# Patient Record
Sex: FEMALE | Race: WHITE | NOT HISPANIC OR LATINO | Employment: OTHER | URBAN - NONMETROPOLITAN AREA
[De-identification: names, ages, dates, MRNs, and addresses within clinical notes are randomized per-mention and may not be internally consistent; named-entity substitution may affect disease eponyms.]

---

## 2017-01-12 ENCOUNTER — OFFICE VISIT (OUTPATIENT)
Dept: ORTHOPEDIC SURGERY | Facility: CLINIC | Age: 72
End: 2017-01-12

## 2017-01-12 ENCOUNTER — TELEPHONE (OUTPATIENT)
Dept: CARDIOLOGY | Facility: CLINIC | Age: 72
End: 2017-01-12

## 2017-01-12 VITALS — BODY MASS INDEX: 29.53 KG/M2 | HEIGHT: 64 IN | WEIGHT: 173 LBS

## 2017-01-12 DIAGNOSIS — M75.101 TEAR OF RIGHT ROTATOR CUFF, UNSPECIFIED TEAR EXTENT: ICD-10-CM

## 2017-01-12 DIAGNOSIS — M25.511 RIGHT SHOULDER PAIN, UNSPECIFIED CHRONICITY: ICD-10-CM

## 2017-01-12 DIAGNOSIS — M75.41 IMPINGEMENT SYNDROME OF RIGHT SHOULDER: ICD-10-CM

## 2017-01-12 DIAGNOSIS — M17.0 PRIMARY OSTEOARTHRITIS OF BOTH KNEES: Primary | ICD-10-CM

## 2017-01-12 PROCEDURE — 99214 OFFICE O/P EST MOD 30 MIN: CPT | Performed by: NURSE PRACTITIONER

## 2017-01-12 PROCEDURE — 20610 DRAIN/INJ JOINT/BURSA W/O US: CPT | Performed by: NURSE PRACTITIONER

## 2017-01-12 RX ORDER — TRIAMCINOLONE ACETONIDE 40 MG/ML
40 INJECTION, SUSPENSION INTRA-ARTICULAR; INTRAMUSCULAR
Status: COMPLETED | OUTPATIENT
Start: 2017-01-12 | End: 2017-01-12

## 2017-01-12 RX ORDER — LIDOCAINE HYDROCHLORIDE 20 MG/ML
2 INJECTION, SOLUTION INFILTRATION; PERINEURAL
Status: COMPLETED | OUTPATIENT
Start: 2017-01-12 | End: 2017-01-12

## 2017-01-12 RX ADMIN — LIDOCAINE HYDROCHLORIDE 2 ML: 20 INJECTION, SOLUTION INFILTRATION; PERINEURAL at 08:04

## 2017-01-12 RX ADMIN — TRIAMCINOLONE ACETONIDE 40 MG: 40 INJECTION, SUSPENSION INTRA-ARTICULAR; INTRAMUSCULAR at 08:04

## 2017-01-12 NOTE — TELEPHONE ENCOUNTER
Patient was notified that Dr. Mendez reviewed her chart. She was recently seen in the office and has been cleared for surgery.     ----- Message from Dmitri Mendez MD sent at 1/12/2017 12:45 PM CST -----  Regarding: RE: clearance needed   Contact: [phone]  She can be cleared.   ----- Message -----     From: Shirley Jon RN     Sent: 1/12/2017   1:09 PM       To: Dmitri Mendez MD  Subject: FW: clearance needed                             Please advise on clearance for this pt.   Shirley  ----- Message -----     From: Pippa Yee     Sent: 1/12/2017  10:48 AM       To: Shirley Jon RN  Subject: clearance needed                                 Patient is to have surgery on her shoulder (not scheduled yet) and her surgeon Dr Haines advised her she needs clearance. [phone]

## 2017-01-12 NOTE — PROGRESS NOTES
Thelma Haley is a 71 y.o. female returns for     Chief Complaint   Patient presents with   • Left Knee - Follow-up   • Right Knee - Follow-up   • Right Shoulder - Follow-up       HISTORY OF PRESENT ILLNESS: patient request steroid injection geoffrey knee and to see davie for rotator cuff tear.        CONCURRENT MEDICAL HISTORY:    Past Medical History   Diagnosis Date   • Abnormal results of thyroid function studies    • Acute pain of left shoulder    • Acute pain of right shoulder    • Allergic rhinitis due to pollen    • Asthma       uncomplicated      • Cataract    • Chronic rhinitis    • Essential hypertension    • Extrinsic asthma with status asthmaticus    • Glaucoma    • Hyperlipidemia    • Impingement syndrome of right shoulder    • Neuropathy    • Nuclear senile cataract    • Obesity    • Overweight with body mass index (BMI) 25.0-29.9    • Primary fibromyalgia syndrome    • Primary open angle glaucoma    • Sinus headache    • Temporomandibular joint disorder        Allergies   Allergen Reactions   • Ciprofloxacin    • Lortab [Hydrocodone-Acetaminophen]    • Metronidazole    • Ultram [Tramadol Hcl]          Current Outpatient Prescriptions:   •  albuterol (PROVENTIL HFA;VENTOLIN HFA) 108 (90 BASE) MCG/ACT inhaler, Inhale 2 puffs Every 4 (Four) Hours As Needed for wheezing., Disp: 6.7 g, Rfl: 11  •  Biotin (BIOTIN MAXIMUM STRENGTH) 10 MG tablet, Take  by mouth., Disp: , Rfl:   •  budesonide-formoterol (SYMBICORT) 80-4.5 MCG/ACT inhaler, Inhale 2 puffs 2 (Two) Times a Day., Disp: 10.2 g, Rfl: 0  •  CETIRIZINE HCL PO, Take  by mouth., Disp: , Rfl:   •  Cholecalciferol (VITAMIN D3) 1000 UNITS capsule, Take  by mouth., Disp: , Rfl:   •  FLUAD 0.5 ML suspension prefilled syringe, ADM 0.5ML IM UTD, Disp: , Rfl: 0  •  fluticasone (FLONASE) 50 MCG/ACT nasal spray, 2 sprays into each nostril Daily. Administer 2 sprays in each nostril for each dose., Disp: 1 each, Rfl: 5  •  gabapentin (NEURONTIN) 100 MG capsule, Take 1  "capsule by mouth 3 (Three) Times a Day., Disp: 270 capsule, Rfl: 3  •  hydrochlorothiazide (HYDRODIURIL) 25 MG tablet, Take 1 tablet by mouth Daily., Disp: 90 tablet, Rfl: 3  •  lisinopril (PRINIVIL,ZESTRIL) 5 MG tablet, Take 1 tablet by mouth Daily., Disp: 90 tablet, Rfl: 3  •  LOTEMAX 0.5 % gel ophthalmic gel, , Disp: , Rfl:   •  lovastatin (MEVACOR) 20 MG tablet, Take 1 tablet by mouth Every Night., Disp: 90 tablet, Rfl: 3  •  methIMAzole (TAPAZOLE) 5 MG tablet, Take 0.5 tablets by mouth Daily., Disp: 45 tablet, Rfl: 11    Past Surgical History   Procedure Laterality Date   • Shoulder surgery       Excision of 4.8 cm mass with primary intermediate closure.)   03/30/2012    • Carpal tunnel release       Bilateral carpal tunnel release.)   03/27/2000    • Injection of medication  06/17/2015     celestone(betamethasone) (2)    • Injection of medication  04/14/2016     depo medrol ( methylprednisone) (20)   • Spinal fusion     • Other surgical history       hysterosope procedure   • Right oophorectomy         ROS  No fevers or chills.  No chest pain or shortness of air.  No GI or  disturbances.    PHYSICAL EXAMINATION:       Visit Vitals   • Ht 64\" (162.6 cm)   • Wt 173 lb (78.5 kg)   • BMI 29.7 kg/m2       Physical Exam   Constitutional: She is oriented to person, place, and time. Vital signs are normal. She appears well-developed and well-nourished. She is cooperative.   HENT:   Head: Normocephalic and atraumatic.   Neck: Trachea normal and phonation normal.   Cardiovascular: Regular rhythm, S1 normal, S2 normal, normal heart sounds and normal pulses.    Pulmonary/Chest: Effort normal and breath sounds normal. No respiratory distress.   Abdominal: Soft. Normal appearance. She exhibits no distension.   Musculoskeletal:        Right knee: She exhibits no effusion.        Left knee: She exhibits no effusion.   Neurological: She is alert and oriented to person, place, and time. GCS eye subscore is 4. GCS verbal " subscore is 5. GCS motor subscore is 6.   Skin: Skin is warm, dry and intact.   Psychiatric: She has a normal mood and affect. Her speech is normal and behavior is normal. Judgment and thought content normal. Cognition and memory are normal.   Vitals reviewed.      GAIT:     []  Normal  [x]  Antalgic    Assistive device: [x]  None  []  Walker     []  Crutches  []  Cane     []  Wheelchair  []  Stretcher    Right Knee Exam     Tenderness   The patient is experiencing tenderness in the medial joint line and pes anserinus.    Range of Motion   Extension: normal   Flexion: abnormal     Other   Erythema: absent  Scars: absent  Sensation: normal  Pulse: present  Swelling: mild  Other tests: no effusion present      Left Knee Exam     Tenderness   The patient is experiencing tenderness in the pes anserinus.    Range of Motion   Extension: normal   Flexion: abnormal     Other   Erythema: absent  Scars: absent  Sensation: normal  Pulse: present  Swelling: mild  Effusion: no effusion present      Right Shoulder Exam     Tenderness   The patient is experiencing tenderness in the acromioclavicular joint.    Range of Motion   Active Abduction: abnormal   Forward Flexion: abnormal   External Rotation: abnormal   Internal Rotation 90 degrees: abnormal     Muscle Strength   Abduction: 4/5   Internal Rotation: 4/5   External Rotation: 4/5   Supraspinatus: 3/5     Tests   Cross Arm: positive  Drop Arm: positive  Impingement: positive    Other   Erythema: absent  Scars: absent  Sensation: normal  Pulse: present      Left Shoulder Exam   Left shoulder exam is normal.          Large Joint Arthrocentesis  Date/Time: 1/12/2017 8:04 AM  Consent given by: patient  Site marked: site marked  Timeout: Immediately prior to procedure a time out was called to verify the correct patient, procedure, equipment, support staff and site/side marked as required   Supporting Documentation  Indications: pain   Procedure Details  Location: knee - R  knee  Preparation: Patient was prepped and draped in the usual sterile fashion  Needle size: 22 G  Approach: anteromedial  Medications administered: 40 mg triamcinolone acetonide 40 MG/ML; 2 mL lidocaine 2 %  Patient tolerance: patient tolerated the procedure well with no immediate complications    Large Joint Arthrocentesis  Date/Time: 1/12/2017 8:04 AM  Consent given by: patient  Site marked: site marked  Timeout: Immediately prior to procedure a time out was called to verify the correct patient, procedure, equipment, support staff and site/side marked as required   Supporting Documentation  Indications: pain   Procedure Details  Location: knee - L knee  Preparation: Patient was prepped and draped in the usual sterile fashion  Needle size: 22 G  Approach: anteromedial  Medications administered: 40 mg triamcinolone acetonide 40 MG/ML; 2 mL lidocaine 2 %  Patient tolerance: patient tolerated the procedure well with no immediate complications            MRI right shoulder. 11/16/16      History- Trauma, patient fell, two months ago. Continued pain right  shoulder.      Prior exam- Right shoulder x-ray October 28, 2016      Technique- Multiplanar multisequence noncontrast images right  shoulder.      Large full-thickness tear distal supraspinatous tendon, and portions  of the infraspinatus tendon. The size of the gap in lateral medial  projection 1.68 cm. The size of the gap in AP projection 2.8 cm. The  supraspinatous muscle appears small, atrophy.       Acromioclavicular joint arthrosis. Significant capsular hypertrophy,  causing moderate underlying impingement. Normal-appearing glenoid  mark. Normal-appearing long head biceps tendon within the  intertubercular sulcus.      No bone edema or fracture.      Conclusion- Large full-thickness tear involving the supraspinatous and  portions of the infraspinous tendon. This is associated with some  atrophic changes of the the supraspinatous muscle.       Acromioclavicular  joint arthrosis, capsule hypertrophy causing  underlying impingement. MRI examination right shoulder is otherwise  unremarkable.      Electronically Signed By- Simone Martin MD On: 2016-11-10 17:45:18    PROCEDURE- Right Shoulder three views. 10/28/16      REASON FOR EXAM-               [id number]- PAIN IN RIGHT SHOULDER      FINDINGS- No comparison. Bony and soft tissue structures of the  shoulder are normal. There is no evidence of fracture or dislocation.               IMPRESSION-   Negative shoulder.       Electronically Signed By- Sergei Shore MD On: 2016-10-28 13:20:31      ASSESSMENT:    Diagnoses and all orders for this visit:    Primary osteoarthritis of both knees  -     Large Joint Arthrocentesis  -     Large Joint Arthrocentesis    Tear of right rotator cuff, unspecified tear extent    Impingement syndrome of right shoulder    Right shoulder pain, unspecified chronicity          PLAN  Dr. Haines reviewed the MRI of the shoulder and recommended repair of the RTC tear.   Risk, benefits and alternative tx options explained in detail and patient verbalized understanding of the tx plan.     No Follow-up on file.    JUAN Hathaway

## 2017-01-12 NOTE — MR AVS SNAPSHOT
Thelma PIA Haley   1/12/2017 8:00 AM   Office Visit    Dept Phone:  [phone]   Encounter #:  [id number]    Provider:  JUAN Villanueva   Department:  [facility] ORTHOPEDICS                Your Full Care Plan              Your Updated Medication List          This list is accurate as of: 1/12/17  8:43 AM.  Always use your most recent med list.                albuterol 108 (90 BASE) MCG/ACT inhaler   Commonly known as:  PROVENTIL HFA;VENTOLIN HFA   Inhale 2 puffs Every 4 (Four) Hours As Needed for wheezing.       BIOTIN MAXIMUM STRENGTH 10 MG tablet   Generic drug:  Biotin       budesonide-formoterol 80-4.5 MCG/ACT inhaler   Commonly known as:  SYMBICORT   Inhale 2 puffs 2 (Two) Times a Day.       CETIRIZINE HCL PO       FLUAD 0.5 ML suspension prefilled syringe   Generic drug:  Influenza Vac A&B Surf Ant Adj       fluticasone 50 MCG/ACT nasal spray   Commonly known as:  FLONASE   2 sprays into each nostril Daily. Administer 2 sprays in each nostril for each dose.       gabapentin 100 MG capsule   Commonly known as:  NEURONTIN   Take 1 capsule by mouth 3 (Three) Times a Day.       hydrochlorothiazide 25 MG tablet   Commonly known as:  HYDRODIURIL   Take 1 tablet by mouth Daily.       lisinopril 5 MG tablet   Commonly known as:  PRINIVIL,ZESTRIL   Take 1 tablet by mouth Daily.       LOTEMAX 0.5 % gel ophthalmic gel   Generic drug:  loteprednol etabonate       lovastatin 20 MG tablet   Commonly known as:  MEVACOR   Take 1 tablet by mouth Every Night.       methIMAzole 5 MG tablet   Commonly known as:  TAPAZOLE   Take 0.5 tablets by mouth Daily.       Vitamin D3 1000 UNITS capsule               We Performed the Following     Large Joint Arthrocentesis     Large Joint Arthrocentesis       You Were Diagnosed With        Codes Comments    Primary osteoarthritis of both knees    -  Primary ICD-10-CM: M17.0  ICD-9-CM: 715.16     Tear of right rotator cuff, unspecified tear  extent     ICD-10-CM: M75.101  ICD-9-CM: 840.4     Impingement syndrome of right shoulder     ICD-10-CM: M75.41  ICD-9-CM: 726.2     Right shoulder pain, unspecified chronicity     ICD-10-CM: M25.511  ICD-9-CM: 719.41       Instructions     None    Patient Instructions History      Upcoming Appointments     Visit Type Date Time Department    FOLLOW UP 1/12/2017  8:00 AM [facility] ORTHOPEDIC [facility]    [facility] BONE DENSITY AXIAL 1/25/2017 10:00 AM [facility] DEXA [facility]OMENS [facility]AD    FOLLOW UP 3/13/2017  8:00 AM [facility]OP[facility]    FOLLOW UP 4/12/2017  8:00 AM [facility] ORTHOPEDIC [facility]    OFFICE VISIT 6/23/2017 10:45 AM [facility] CARDIOLOGY [facility]      MyChart Signup     Our records indicate that you have declined [facility] sign[facility]. If you would like to sign up for S[facility], please email [email or web address] or call [phone] to obtain an activation code.             Other Info from Your Visit           Your Appointments     Jan 25, 2017 10:00 AM CST   DEXA BONE DENSITY AXIAL with [facility] WOMEN CTR DEXA 1   [facility] BONE DENSITY ([facility] ([facility])    [address] [place] [id number] [phone]            Mar 13, 2017  8:00 AM CDT   Follow Up with JUAN Villanueva   [facility] ORTHOPEDICS (--)    [address] [place] [address] [phone]           Arrive 15 minutes prior to appointment.            Apr 12, 2017  8:00 AM CDT   Follow Up with JUAN Villanueva   [facility] ORTHOPEDICS (--)    [address] [place] [address] [phone]           Arrive 15 minutes prior to appointment.            Jun 23, 2017 10:45 AM CDT   Office Visit with Dmitri Mendez MD   [facility] CARDIOLOGY (--)    [address] [place] [address] [address]  [facility] [address] [place] [id number] [phone]           Arrive 15 minutes prior to appointment.              Other Notes About Your Plan   "   Risk Score: 4        Allergies     Ciprofloxacin      Lortab [Hydrocodone-acetaminophen]      Metronidazole      Ultram [Tramadol Hcl]        Reason for Visit     Left Knee - Follow-up     Right Knee - Follow-up     Right Shoulder - Follow-up           Vital Signs     Height Weight Body Mass Index Smoking Status          64\" (162.6 cm) 173 lb (78.5 kg) 29.7 kg/m2 Never Smoker        Problems and Diagnoses Noted     Impingement syndrome of right shoulder    Primary osteoarthritis of both knees    -  Primary    Tear of right rotator cuff        Right shoulder pain, unspecified chronicity            "

## 2017-01-13 ENCOUNTER — HOSPITAL ENCOUNTER (OUTPATIENT)
Dept: OTHER | Facility: HOSPITAL | Age: 72
Discharge: HOME | End: 2017-01-13
Attending: INTERNAL MEDICINE | Admitting: INTERNAL MEDICINE

## 2017-01-23 RX ORDER — LOVASTATIN 20 MG/1
TABLET ORAL
Qty: 90 TABLET | Refills: 1 | Status: SHIPPED | OUTPATIENT
Start: 2017-01-23 | End: 2017-12-21 | Stop reason: SDUPTHER

## 2017-01-24 ENCOUNTER — OFFICE VISIT (OUTPATIENT)
Dept: FAMILY MEDICINE CLINIC | Facility: CLINIC | Age: 72
End: 2017-01-24

## 2017-01-24 VITALS
HEART RATE: 70 BPM | HEIGHT: 64 IN | DIASTOLIC BLOOD PRESSURE: 80 MMHG | BODY MASS INDEX: 29.37 KG/M2 | OXYGEN SATURATION: 96 % | SYSTOLIC BLOOD PRESSURE: 126 MMHG | WEIGHT: 172 LBS

## 2017-01-24 DIAGNOSIS — I10 ESSENTIAL HYPERTENSION: Primary | ICD-10-CM

## 2017-01-24 DIAGNOSIS — E66.09 NON MORBID OBESITY DUE TO EXCESS CALORIES: ICD-10-CM

## 2017-01-24 DIAGNOSIS — G89.29 CHRONIC RIGHT SHOULDER PAIN: ICD-10-CM

## 2017-01-24 DIAGNOSIS — M25.511 CHRONIC RIGHT SHOULDER PAIN: ICD-10-CM

## 2017-01-24 PROCEDURE — 99213 OFFICE O/P EST LOW 20 MIN: CPT | Performed by: FAMILY MEDICINE

## 2017-01-24 NOTE — PROGRESS NOTES
Case discussed with family medicine resident and agree with assessment and plan.     Erik Andujar, DO

## 2017-01-24 NOTE — MR AVS SNAPSHOT
Thelma Haley   1/24/2017 9:00 AM   Office Visit    Dept Phone:  [phone]   Encounter #:  [id number]    Provider:  Julisa Ruiz MD   Department:  [facility] FAMILY MEDICINE                Your Full Care Plan              Your Updated Medication List          This list is accurate as of: 1/24/17  9:50 AM.  Always use your most recent med list.                albuterol 108 (90 BASE) MCG/ACT inhaler   Commonly known as:  PROVENTIL HFA;VENTOLIN HFA   Inhale 2 puffs Every 4 (Four) Hours As Needed for wheezing.       BIOTIN MAXIMUM STRENGTH 10 MG tablet   Generic drug:  Biotin       budesonide-formoterol 80-4.5 MCG/ACT inhaler   Commonly known as:  SYMBICORT   Inhale 2 puffs 2 (Two) Times a Day.       CETIRIZINE HCL PO       FLUAD 0.5 ML suspension prefilled syringe   Generic drug:  Influenza Vac A&B Surf Ant Adj       fluticasone 50 MCG/ACT nasal spray   Commonly known as:  FLONASE   2 sprays into each nostril Daily. Administer 2 sprays in each nostril for each dose.       gabapentin 100 MG capsule   Commonly known as:  NEURONTIN   Take 1 capsule by mouth 3 (Three) Times a Day.       hydrochlorothiazide 25 MG tablet   Commonly known as:  HYDRODIURIL   Take 1 tablet by mouth Daily.       lisinopril 5 MG tablet   Commonly known as:  PRINIVIL,ZESTRIL   Take 1 tablet by mouth Daily.       LOTEMAX 0.5 % gel ophthalmic gel   Generic drug:  loteprednol etabonate       lovastatin 20 MG tablet   Commonly known as:  MEVACOR   TAKE 1 TABLET BY MOUTH EVERY NIGHT AT BEDTIME       methIMAzole 5 MG tablet   Commonly known as:  TAPAZOLE   Take 0.5 tablets by mouth Daily.       Vitamin D3 1000 UNITS capsule               Instructions     None    Patient Instructions History      Upcoming Appointments     Visit Type Date Time Department    OFFICE VISIT 1/24/2017  9:00 AM [facility] FM RESIDENT [facility] MIGUEL [facility]EE BONE DENSITY AXIAL 1/24/2017 10:30 AM [facility] DEXA WOMENS MAD    FOLLOW UP 3/13/2017   "8:00 AM [facility]    FOLLOW UP 4/12/2017  8:00 AM [facility]    OFFICE VISIT 6/23/2017 10:45 AM [facility] MIGUEL [facility] Signup     Our records indicate that you have declined [facility] signup. If you would like to sign up for [facility], please email [email or web address] or call [phone] to obtain an activation code.             Other Info from Your Visit           Your Appointments     Jan 24, 2017 10:30 AM CST   DEXA BONE DENSITY AXIAL with [facility]IG[facility] WOMEN CTR DEXA 1   [facility] BONE DENSITY ([facility])    [address] [place] [address] [phone]            Mar 13, 2017  8:00 AM CDT   Follow Up with JUAN Villanueva   [facility] ORTHOPEDICS (--)    [address]  [facility] [address] [phone]           Arrive 15 minutes prior to appointment.            Apr 12, 2017  8:00 AM CDT   Follow Up with JUAN Villanueva   [facility] ORTHOPEDICS (--)    [address]  [facility] [address] [phone]           Arrive 15 minutes prior to appointment.            Jun 23, 2017 10:45 AM CDT   Office Visit with Dmitri Mendez MD   [facility] CARDIOLOGY (--)    [address] [place] [address]  [facility] [address] [place] [address] [phone]           Arrive 15 minutes prior to appointment.              Other Notes About Your Plan     Risk Score: 4        Allergies     Ciprofloxacin      Lortab [Hydrocodone-acetaminophen]      Metronidazole      Ultram [Tramadol Hcl]        Reason for Visit     Surgical Clearance           Vital Signs     Blood Pressure Pulse Height Weight Oxygen Saturation Body Mass Index    126/80 70 64\" (162.6 cm) 172 lb (78 kg) 96% 29.52 kg/m2    Smoking Status                   Never Smoker             "

## 2017-01-24 NOTE — PROGRESS NOTES
I have reviewed the notes, assessments, and/or procedures performed. I concur with her/his documentation of Thelma Haley.     Erik Andujar, DO

## 2017-01-24 NOTE — PROGRESS NOTES
Thelma Haley is a 71 y.o. female presents for surgery clearance.  Patient plans to have right rotator cuff surgery done by Dr. Haines.  Patient states this is planned for February 6.  She does not have any complaints at today's visit.  She recently saw Dr. Mendez her cardiologist on December 16.  At that visit EKG was performed and found to be normal.  Patient states that Dr. Mendez pleased with her health.  She does not have any prior cardiac history.  She has never had an MI, cardiac catheter, or any cardiac related events.  Patient denies any chest pain.  She is a nonsmoker.  She does not have any respiratory issues.  Patient has not been sick recently.  At this time she states her right shoulder is the only thing causing her pain and she is anxious to have the surgery performed.  She is nervous about the postop period and how long she will be restricted.  No recent medication changes.    Chief Complaint   Patient presents with   • Surgical Clearance       Shoulder Injury    The right shoulder is affected. There was no injury mechanism. The quality of the pain is described as aching. Pertinent negatives include no chest pain, muscle weakness, numbness or tingling. The symptoms are aggravated by movement and overhead lifting. Treatments tried: injections. The treatment provided mild relief.       Social History     Social History   • Marital status:      Spouse name: N/A   • Number of children: N/A   • Years of education: N/A     Occupational History   • Not on file.     Social History Main Topics   • Smoking status: Never Smoker   • Smokeless tobacco: Never Used   • Alcohol use No   • Drug use: No   • Sexual activity: Defer     Other Topics Concern   • Not on file     Social History Narrative       Allergies   Allergen Reactions   • Ciprofloxacin    • Lortab [Hydrocodone-Acetaminophen]    • Metronidazole    • Ultram [Tramadol Hcl]        Past Medical History   Diagnosis Date   • Abnormal results of  "thyroid function studies    • Acute pain of left shoulder    • Acute pain of right shoulder    • Allergic rhinitis due to pollen    • Asthma       uncomplicated      • Cataract    • Chronic rhinitis    • Essential hypertension    • Extrinsic asthma with status asthmaticus    • Glaucoma    • Hyperlipidemia    • Impingement syndrome of right shoulder    • Neuropathy    • Nuclear senile cataract    • Obesity    • Overweight with body mass index (BMI) 25.0-29.9    • Primary fibromyalgia syndrome    • Primary open angle glaucoma    • Sinus headache    • Temporomandibular joint disorder        Family History   Problem Relation Age of Onset   • Heart disease Other    • Hypertension Other    • COPD Mother        Review of Systems   Constitutional: Negative for activity change, appetite change, chills, fatigue and fever.   HENT: Negative for congestion, dental problem, facial swelling, postnasal drip, rhinorrhea, sinus pressure, trouble swallowing and voice change.    Eyes: Negative for photophobia and visual disturbance.   Respiratory: Negative for cough, choking, chest tightness, shortness of breath and wheezing.    Cardiovascular: Negative for chest pain, palpitations and leg swelling.   Gastrointestinal: Negative for abdominal pain, constipation, diarrhea, nausea and vomiting.   Genitourinary: Negative for difficulty urinating.   Musculoskeletal: Positive for arthralgias.        Right shoulder   Skin: Negative for rash and wound.   Neurological: Negative for dizziness, tingling, seizures, syncope, facial asymmetry, speech difficulty, light-headedness, numbness and headaches.   Hematological: Negative for adenopathy.   Psychiatric/Behavioral: Negative for behavioral problems. The patient is not nervous/anxious.      Visit Vitals   • /80   • Pulse 70   • Ht 64\" (162.6 cm)   • Wt 172 lb (78 kg)   • SpO2 96%   • BMI 29.52 kg/m2       Physical Exam   Constitutional: She is oriented to person, place, and time. She " appears well-developed and well-nourished. No distress.   HENT:   Head: Normocephalic and atraumatic.   Nose: Nose normal.   Mouth/Throat: Oropharynx is clear and moist. No oropharyngeal exudate.   Eyes: Conjunctivae and EOM are normal. Right eye exhibits no discharge. Left eye exhibits no discharge. No scleral icterus.   Neck: Neck supple. No tracheal deviation present. No thyromegaly present.   Cardiovascular: Normal rate, regular rhythm, normal heart sounds and intact distal pulses.    Pulmonary/Chest: Effort normal and breath sounds normal. No stridor. No respiratory distress. She has no wheezes. She exhibits no tenderness.   Abdominal: Soft. Bowel sounds are normal. She exhibits no distension. There is no tenderness.   Musculoskeletal: She exhibits no edema or tenderness.   Lymphadenopathy:     She has no cervical adenopathy.   Neurological: She is alert and oriented to person, place, and time. She exhibits normal muscle tone. Coordination normal.   Skin: Skin is warm and dry. She is not diaphoretic.   Psychiatric: She has a normal mood and affect. Her behavior is normal. Judgment and thought content normal.   Nursing note and vitals reviewed.        Assessment/Plan    Thelma was seen today for surgical clearance.    Diagnoses and all orders for this visit:    Essential hypertension    Non morbid obesity due to excess calories    Chronic right shoulder pain        PLAN:  Patient is felt to be low risk for surgery.  Patient has recently been seen by her cardiologist and cleared.  Patient has not had any history of cardiac events.  Patient is a nonsmoker and does not have any respiratory issues.  Patient is currently asymptomatic.  Patient has been instructed to contact our office or any changes or new concerns prior to her procedure.  Risk score 3          This document has been electronically signed by Julisa Ruiz MD on January 24, 2017 11:00 AM      Signed by Julisa Ruiz MD

## 2017-01-30 ENCOUNTER — PREP FOR SURGERY (OUTPATIENT)
Dept: ORTHOPEDIC SURGERY | Facility: CLINIC | Age: 72
End: 2017-01-30

## 2017-01-30 DIAGNOSIS — M25.511 ACUTE PAIN OF RIGHT SHOULDER: ICD-10-CM

## 2017-01-30 DIAGNOSIS — M75.121 COMPLETE TEAR OF RIGHT ROTATOR CUFF: Primary | ICD-10-CM

## 2017-01-30 DIAGNOSIS — M19.011 ARTHRITIS OF RIGHT ACROMIOCLAVICULAR JOINT: ICD-10-CM

## 2017-01-30 DIAGNOSIS — M75.41 IMPINGEMENT SYNDROME OF RIGHT SHOULDER: ICD-10-CM

## 2017-01-30 RX ORDER — SODIUM CHLORIDE 0.9 % (FLUSH) 0.9 %
1-10 SYRINGE (ML) INJECTION AS NEEDED
Status: CANCELLED | OUTPATIENT
Start: 2017-01-30

## 2017-01-30 RX ORDER — CELECOXIB 200 MG/1
200 CAPSULE ORAL ONCE
Status: CANCELLED | OUTPATIENT
Start: 2017-01-30 | End: 2017-01-30

## 2017-02-01 ENCOUNTER — APPOINTMENT (OUTPATIENT)
Dept: PREADMISSION TESTING | Facility: HOSPITAL | Age: 72
End: 2017-02-01

## 2017-02-01 VITALS
WEIGHT: 176 LBS | BODY MASS INDEX: 30.05 KG/M2 | DIASTOLIC BLOOD PRESSURE: 78 MMHG | HEART RATE: 74 BPM | RESPIRATION RATE: 18 BRPM | HEIGHT: 64 IN | OXYGEN SATURATION: 98 % | SYSTOLIC BLOOD PRESSURE: 142 MMHG

## 2017-02-01 LAB
ANION GAP SERPL CALCULATED.3IONS-SCNC: 9 MMOL/L (ref 5–15)
BUN BLD-MCNC: 24 MG/DL (ref 7–21)
BUN/CREAT SERPL: 24.5 (ref 7–25)
CALCIUM SPEC-SCNC: 9.6 MG/DL (ref 8.4–10.2)
CHLORIDE SERPL-SCNC: 105 MMOL/L (ref 95–110)
CO2 SERPL-SCNC: 27 MMOL/L (ref 22–31)
CREAT BLD-MCNC: 0.98 MG/DL (ref 0.5–1)
GFR SERPL CREATININE-BSD FRML MDRD: 56 ML/MIN/1.73 (ref 39–90)
GLUCOSE BLD-MCNC: 110 MG/DL (ref 60–100)
POTASSIUM BLD-SCNC: 4.1 MMOL/L (ref 3.5–5.1)
SODIUM BLD-SCNC: 141 MMOL/L (ref 137–145)

## 2017-02-01 PROCEDURE — 80048 BASIC METABOLIC PNL TOTAL CA: CPT | Performed by: ANESTHESIOLOGY

## 2017-02-01 PROCEDURE — 36415 COLL VENOUS BLD VENIPUNCTURE: CPT

## 2017-02-03 ENCOUNTER — ANESTHESIA EVENT (OUTPATIENT)
Dept: PERIOP | Facility: HOSPITAL | Age: 72
End: 2017-02-03

## 2017-02-06 ENCOUNTER — ANESTHESIA (OUTPATIENT)
Dept: PERIOP | Facility: HOSPITAL | Age: 72
End: 2017-02-06

## 2017-02-06 ENCOUNTER — HOSPITAL ENCOUNTER (OUTPATIENT)
Facility: HOSPITAL | Age: 72
Setting detail: HOSPITAL OUTPATIENT SURGERY
Discharge: HOME | End: 2017-02-06
Attending: ORTHOPAEDIC SURGERY | Admitting: ORTHOPAEDIC SURGERY

## 2017-02-06 VITALS
SYSTOLIC BLOOD PRESSURE: 165 MMHG | OXYGEN SATURATION: 97 % | HEART RATE: 73 BPM | WEIGHT: 177.47 LBS | BODY MASS INDEX: 30.3 KG/M2 | DIASTOLIC BLOOD PRESSURE: 75 MMHG | TEMPERATURE: 97 F | HEIGHT: 64 IN | RESPIRATION RATE: 18 BRPM

## 2017-02-06 DIAGNOSIS — M75.41 IMPINGEMENT SYNDROME OF RIGHT SHOULDER REGION: ICD-10-CM

## 2017-02-06 DIAGNOSIS — M75.41 IMPINGEMENT SYNDROME OF RIGHT SHOULDER: ICD-10-CM

## 2017-02-06 DIAGNOSIS — M19.011 ARTHRITIS OF RIGHT ACROMIOCLAVICULAR JOINT: ICD-10-CM

## 2017-02-06 DIAGNOSIS — M75.121 COMPLETE TEAR OF RIGHT ROTATOR CUFF: ICD-10-CM

## 2017-02-06 DIAGNOSIS — M25.511 ACUTE PAIN OF RIGHT SHOULDER: ICD-10-CM

## 2017-02-06 PROCEDURE — 25010000003 CEFAZOLIN PER 500 MG: Performed by: ORTHOPAEDIC SURGERY

## 2017-02-06 PROCEDURE — 25010000002 DEXAMETHASONE PER 1 MG: Performed by: NURSE ANESTHETIST, CERTIFIED REGISTERED

## 2017-02-06 PROCEDURE — 25010000002 FENTANYL CITRATE (PF) 100 MCG/2ML SOLUTION: Performed by: NURSE ANESTHETIST, CERTIFIED REGISTERED

## 2017-02-06 PROCEDURE — 25010000002 EPINEPHRINE 1 MG/ML SOLUTION: Performed by: ORTHOPAEDIC SURGERY

## 2017-02-06 PROCEDURE — 25010000002 PROPOFOL 10 MG/ML EMULSION: Performed by: NURSE ANESTHETIST, CERTIFIED REGISTERED

## 2017-02-06 PROCEDURE — 88305 TISSUE EXAM BY PATHOLOGIST: CPT | Performed by: PATHOLOGY

## 2017-02-06 PROCEDURE — 25010000002 SUCCINYLCHOLINE PER 20 MG: Performed by: NURSE ANESTHETIST, CERTIFIED REGISTERED

## 2017-02-06 PROCEDURE — 29827 SHO ARTHRS SRG RT8TR CUF RPR: CPT | Performed by: ORTHOPAEDIC SURGERY

## 2017-02-06 PROCEDURE — C1713 ANCHOR/SCREW BN/BN,TIS/BN: HCPCS | Performed by: ORTHOPAEDIC SURGERY

## 2017-02-06 PROCEDURE — 29824 SHO ARTHRS SRG DSTL CLAVICLC: CPT | Performed by: ORTHOPAEDIC SURGERY

## 2017-02-06 PROCEDURE — 88305 TISSUE EXAM BY PATHOLOGIST: CPT | Performed by: ORTHOPAEDIC SURGERY

## 2017-02-06 PROCEDURE — 25010000002 ONDANSETRON PER 1 MG: Performed by: NURSE ANESTHETIST, CERTIFIED REGISTERED

## 2017-02-06 PROCEDURE — 25010000002 PROMETHAZINE PER 50 MG: Performed by: ORTHOPAEDIC SURGERY

## 2017-02-06 PROCEDURE — 25010000002 MIDAZOLAM PER 1 MG: Performed by: NURSE ANESTHETIST, CERTIFIED REGISTERED

## 2017-02-06 PROCEDURE — 29826 SHO ARTHRS SRG DECOMPRESSION: CPT | Performed by: ORTHOPAEDIC SURGERY

## 2017-02-06 DEVICE — SUT/ANCH BIOCOMP SWIVELOCK/C DBL 4.75X22MM: Type: IMPLANTABLE DEVICE | Site: SHOULDER | Status: FUNCTIONAL

## 2017-02-06 DEVICE — SUT/ANCH BIOCOMP SWIVELOCK/C TIGR TP LP WHT: Type: IMPLANTABLE DEVICE | Site: SHOULDER | Status: FUNCTIONAL

## 2017-02-06 RX ORDER — SODIUM CHLORIDE 0.9 % (FLUSH) 0.9 %
1-10 SYRINGE (ML) INJECTION AS NEEDED
Status: DISCONTINUED | OUTPATIENT
Start: 2017-02-06 | End: 2017-02-06 | Stop reason: HOSPADM

## 2017-02-06 RX ORDER — METHIMAZOLE 5 MG/1
2.5 TABLET ORAL DAILY
COMMUNITY
End: 2018-04-25

## 2017-02-06 RX ORDER — ONDANSETRON 2 MG/ML
INJECTION INTRAMUSCULAR; INTRAVENOUS AS NEEDED
Status: DISCONTINUED | OUTPATIENT
Start: 2017-02-06 | End: 2017-02-06 | Stop reason: SURG

## 2017-02-06 RX ORDER — ROCURONIUM BROMIDE 10 MG/ML
INJECTION, SOLUTION INTRAVENOUS AS NEEDED
Status: DISCONTINUED | OUTPATIENT
Start: 2017-02-06 | End: 2017-02-06 | Stop reason: SURG

## 2017-02-06 RX ORDER — CELECOXIB 200 MG/1
200 CAPSULE ORAL ONCE
Status: COMPLETED | OUTPATIENT
Start: 2017-02-06 | End: 2017-02-06

## 2017-02-06 RX ORDER — ONDANSETRON 2 MG/ML
4 INJECTION INTRAMUSCULAR; INTRAVENOUS ONCE AS NEEDED
Status: COMPLETED | OUTPATIENT
Start: 2017-02-06 | End: 2017-02-06

## 2017-02-06 RX ORDER — PROMETHAZINE HYDROCHLORIDE 25 MG/ML
12.5 INJECTION, SOLUTION INTRAMUSCULAR; INTRAVENOUS ONCE AS NEEDED
Status: COMPLETED | OUTPATIENT
Start: 2017-02-06 | End: 2017-02-06

## 2017-02-06 RX ORDER — FENTANYL CITRATE 50 UG/ML
INJECTION, SOLUTION INTRAMUSCULAR; INTRAVENOUS AS NEEDED
Status: DISCONTINUED | OUTPATIENT
Start: 2017-02-06 | End: 2017-02-06 | Stop reason: SURG

## 2017-02-06 RX ORDER — OXYCODONE AND ACETAMINOPHEN 7.5; 325 MG/1; MG/1
1 TABLET ORAL EVERY 4 HOURS PRN
Qty: 40 TABLET | Refills: 0 | Status: SHIPPED | OUTPATIENT
Start: 2017-02-06 | End: 2017-06-26

## 2017-02-06 RX ORDER — DEXAMETHASONE SODIUM PHOSPHATE 4 MG/ML
8 INJECTION, SOLUTION INTRA-ARTICULAR; INTRALESIONAL; INTRAMUSCULAR; INTRAVENOUS; SOFT TISSUE ONCE AS NEEDED
Status: COMPLETED | OUTPATIENT
Start: 2017-02-06 | End: 2017-02-06

## 2017-02-06 RX ORDER — ROCURONIUM BROMIDE 10 MG/ML
INJECTION, SOLUTION INTRAVENOUS AS NEEDED
Status: DISCONTINUED | OUTPATIENT
Start: 2017-02-06 | End: 2017-02-06

## 2017-02-06 RX ORDER — SODIUM CHLORIDE, SODIUM GLUCONATE, SODIUM ACETATE, POTASSIUM CHLORIDE, AND MAGNESIUM CHLORIDE 526; 502; 368; 37; 30 MG/100ML; MG/100ML; MG/100ML; MG/100ML; MG/100ML
1000 INJECTION, SOLUTION INTRAVENOUS CONTINUOUS PRN
Status: DISCONTINUED | OUTPATIENT
Start: 2017-02-06 | End: 2017-02-06 | Stop reason: HOSPADM

## 2017-02-06 RX ORDER — NALOXONE HCL 0.4 MG/ML
0.2 VIAL (ML) INJECTION AS NEEDED
Status: DISCONTINUED | OUTPATIENT
Start: 2017-02-06 | End: 2017-02-10 | Stop reason: HOSPADM

## 2017-02-06 RX ORDER — SUCCINYLCHOLINE CHLORIDE 20 MG/ML
INJECTION INTRAMUSCULAR; INTRAVENOUS AS NEEDED
Status: DISCONTINUED | OUTPATIENT
Start: 2017-02-06 | End: 2017-02-06 | Stop reason: SURG

## 2017-02-06 RX ORDER — DIPHENHYDRAMINE HYDROCHLORIDE 50 MG/ML
12.5 INJECTION INTRAMUSCULAR; INTRAVENOUS
Status: DISCONTINUED | OUTPATIENT
Start: 2017-02-06 | End: 2017-02-10 | Stop reason: HOSPADM

## 2017-02-06 RX ORDER — HYDRALAZINE HYDROCHLORIDE 20 MG/ML
5 INJECTION INTRAMUSCULAR; INTRAVENOUS
Status: DISCONTINUED | OUTPATIENT
Start: 2017-02-06 | End: 2017-02-10 | Stop reason: HOSPADM

## 2017-02-06 RX ORDER — BUPIVACAINE HYDROCHLORIDE AND EPINEPHRINE 2.5; 5 MG/ML; UG/ML
INJECTION, SOLUTION EPIDURAL; INFILTRATION; INTRACAUDAL; PERINEURAL
Status: DISCONTINUED
Start: 2017-02-06 | End: 2017-02-06 | Stop reason: WASHOUT

## 2017-02-06 RX ORDER — MIDAZOLAM HYDROCHLORIDE 1 MG/ML
INJECTION INTRAMUSCULAR; INTRAVENOUS AS NEEDED
Status: DISCONTINUED | OUTPATIENT
Start: 2017-02-06 | End: 2017-02-06 | Stop reason: SURG

## 2017-02-06 RX ORDER — MORPHINE SULFATE 2 MG/ML
2 INJECTION, SOLUTION INTRAMUSCULAR; INTRAVENOUS
Status: ACTIVE | OUTPATIENT
Start: 2017-02-06 | End: 2017-02-06

## 2017-02-06 RX ORDER — CEFAZOLIN SODIUM 1 G/3ML
INJECTION, POWDER, FOR SOLUTION INTRAMUSCULAR; INTRAVENOUS AS NEEDED
Status: DISCONTINUED | OUTPATIENT
Start: 2017-02-06 | End: 2017-02-06

## 2017-02-06 RX ORDER — FLUMAZENIL 0.1 MG/ML
0.2 INJECTION INTRAVENOUS AS NEEDED
Status: DISCONTINUED | OUTPATIENT
Start: 2017-02-06 | End: 2017-02-10 | Stop reason: HOSPADM

## 2017-02-06 RX ORDER — PROPOFOL 10 MG/ML
VIAL (ML) INTRAVENOUS AS NEEDED
Status: DISCONTINUED | OUTPATIENT
Start: 2017-02-06 | End: 2017-02-06 | Stop reason: SURG

## 2017-02-06 RX ORDER — DEXAMETHASONE SODIUM PHOSPHATE 4 MG/ML
8 INJECTION, SOLUTION INTRA-ARTICULAR; INTRALESIONAL; INTRAMUSCULAR; INTRAVENOUS; SOFT TISSUE ONCE
Status: DISCONTINUED | OUTPATIENT
Start: 2017-02-06 | End: 2017-02-10 | Stop reason: HOSPADM

## 2017-02-06 RX ORDER — OXYCODONE AND ACETAMINOPHEN 7.5; 325 MG/1; MG/1
1 TABLET ORAL ONCE AS NEEDED
Status: DISCONTINUED | OUTPATIENT
Start: 2017-02-06 | End: 2017-02-10 | Stop reason: HOSPADM

## 2017-02-06 RX ORDER — ONDANSETRON 2 MG/ML
4 INJECTION INTRAMUSCULAR; INTRAVENOUS ONCE AS NEEDED
Status: DISCONTINUED | OUTPATIENT
Start: 2017-02-06 | End: 2017-02-10 | Stop reason: HOSPADM

## 2017-02-06 RX ORDER — LABETALOL HYDROCHLORIDE 5 MG/ML
5 INJECTION, SOLUTION INTRAVENOUS
Status: DISCONTINUED | OUTPATIENT
Start: 2017-02-06 | End: 2017-02-10 | Stop reason: HOSPADM

## 2017-02-06 RX ORDER — EPINEPHRINE 1 MG/ML
INJECTION INTRAMUSCULAR; INTRAVENOUS; SUBCUTANEOUS AS NEEDED
Status: DISCONTINUED | OUTPATIENT
Start: 2017-02-06 | End: 2017-02-10 | Stop reason: HOSPADM

## 2017-02-06 RX ORDER — METOCLOPRAMIDE HYDROCHLORIDE 5 MG/ML
10 INJECTION INTRAMUSCULAR; INTRAVENOUS ONCE AS NEEDED
Status: DISCONTINUED | OUTPATIENT
Start: 2017-02-06 | End: 2017-02-10 | Stop reason: HOSPADM

## 2017-02-06 RX ADMIN — PROPOFOL 150 MG: 10 INJECTION, EMULSION INTRAVENOUS at 12:49

## 2017-02-06 RX ADMIN — CEFAZOLIN SODIUM 2 G: 1 INJECTION, POWDER, FOR SOLUTION INTRAMUSCULAR; INTRAVENOUS at 12:40

## 2017-02-06 RX ADMIN — PROMETHAZINE HYDROCHLORIDE 3 MG: 25 INJECTION INTRAMUSCULAR; INTRAVENOUS at 16:33

## 2017-02-06 RX ADMIN — ONDANSETRON 4 MG: 2 INJECTION INTRAMUSCULAR; INTRAVENOUS at 15:17

## 2017-02-06 RX ADMIN — SUCCINYLCHOLINE CHLORIDE 100 MG: 20 INJECTION, SOLUTION INTRAMUSCULAR; INTRAVENOUS at 12:49

## 2017-02-06 RX ADMIN — ROCURONIUM BROMIDE 5 MG: 10 INJECTION INTRAVENOUS at 12:49

## 2017-02-06 RX ADMIN — ONDANSETRON 4 MG: 2 INJECTION INTRAMUSCULAR; INTRAVENOUS at 14:10

## 2017-02-06 RX ADMIN — CELECOXIB 200 MG: 200 CAPSULE ORAL at 10:33

## 2017-02-06 RX ADMIN — FENTANYL CITRATE 100 MCG: 50 INJECTION, SOLUTION INTRAMUSCULAR; INTRAVENOUS at 12:40

## 2017-02-06 RX ADMIN — FENTANYL CITRATE 50 MCG: 50 INJECTION, SOLUTION INTRAMUSCULAR; INTRAVENOUS at 13:35

## 2017-02-06 RX ADMIN — SODIUM CHLORIDE, SODIUM GLUCONATE, SODIUM ACETATE, POTASSIUM CHLORIDE, AND MAGNESIUM CHLORIDE 1000 ML: 526; 502; 368; 37; 30 INJECTION, SOLUTION INTRAVENOUS at 16:39

## 2017-02-06 RX ADMIN — SODIUM CHLORIDE, SODIUM GLUCONATE, SODIUM ACETATE, POTASSIUM CHLORIDE, AND MAGNESIUM CHLORIDE 1000 ML: 526; 502; 368; 37; 30 INJECTION, SOLUTION INTRAVENOUS at 10:33

## 2017-02-06 RX ADMIN — MIDAZOLAM 2 MG: 1 INJECTION INTRAMUSCULAR; INTRAVENOUS at 12:33

## 2017-02-06 RX ADMIN — ROCURONIUM BROMIDE 30 MG: 10 INJECTION INTRAVENOUS at 13:10

## 2017-02-06 RX ADMIN — DEXAMETHASONE SODIUM PHOSPHATE 8 MG: 4 INJECTION, SOLUTION INTRAMUSCULAR; INTRAVENOUS at 16:12

## 2017-02-06 RX ADMIN — FENTANYL CITRATE 50 MCG: 50 INJECTION, SOLUTION INTRAMUSCULAR; INTRAVENOUS at 13:15

## 2017-02-06 RX ADMIN — FENTANYL CITRATE 50 MCG: 50 INJECTION, SOLUTION INTRAMUSCULAR; INTRAVENOUS at 14:15

## 2017-02-06 NOTE — OP NOTE
NAME: Thelma Haley     YOB: 1945    DATE OF SURGERY: 2/6/2017    PREOPERATIVE DIAGNOSIS:  IMPINGEMENT SYNDROME OF RIGHT SHOULDER     POSTOPERATIVE DIAGNOSIS:  Post-Op Diagnosis Codes:     * Impingement syndrome of right shoulder region [M75.41]     * Complete tear of right rotator cuff [M75.121]    PROCEDURE PERFORMED:      Arthroscopy of the Right shoulder with   1.  Rotator cuff repair   2.  NEENA procedure   3.  Sub-acromial decompression    SURGEON:  Terrence Hianes MD    Assistant:  MEHREEN Subramanian    Staff:    Circulator: Ellie Chiang RN; Jovany Max RN  Scrub Person: Marie Juarez  Assistant: Inga Angeles CSA    Anesthesia:  General  With interscalene block    Estimated Blood Loss:  minimal    Specimens : * No specimens in log *    Complications: none    Implants:    Implant Name Type Inv. Item Serial No.  Lot No. LRB No. Used   SUT/ANCH BIOCOMP SWIVELOCK/C TIGR [facility] - [id number] - [id number] Implant SUT/ANCH BIOCOMP SWIVELOCK/C TIGR TP [facility] [id number] ARTHREX [id number] Right 1   SUT/ANCH BIOCOMP SWIVELOCK/C DBL 4.75X19.1MM - [id number] - [id number] Implant SUT/ANCH BIOCOMP SWIVELOCK/C DBL 4.75X19.1MM [id number] ARTHREX [id number] Right 1       DESCRIPTION OF PROCEDURE:   Once consent was obtained, the patient was taken to the operating. Once adequate anesthesia was obtained, then the patient was rolled in the lateral decubitus position, Right side up. All bony prominences were well padded, and an axillary roll was placed. The Right upper extremity was then manipulated through a full range of  motion without any difficulty. The Right upper extremity was then  prepped and draped in the standard surgical fashion. The Right arm was  then placed in the arthroscopic arm griffin. The standard posterior  portal was made, and the diagnostic portion of the arthroscopy began.  The intra-articular portion of the exam showed that the articular  surface  was in good condition without any sign of articular cartilage wear.. The biceps tendon showed a firm attachment onto the glenoid labrum. The rotator cuff was visualized and the torn and retracted back to the level of the glenoid.. There were no loose bodiesin the intra-articular space or in the subacromial recess.      The subacromial space was then entered and there was noted to be a large  subacromial spur on the anterior aspect. There also was noted to be  significant inflammatory bursitis, which was resected using electrocautery and the  suction shaver. Once the acromion and the clavicle were clearly  identified, then the buffy was used to resect the subacromial spur and to  turn the acromion into a type 1 acromion. This was done after having  established a lateral portal using an 18-gauge spinal needle for  localization and a small stab wound incision.      At this point, the anterior portal was also established using an 18-  gauge spinal needle for localization and a small stab wound incision.  The cannula was inserted and the distal clavicle resection was performed  in the standard fashion resecting approximately 8-10 mm of bone.      The attention was returned to the rotator cuff which was found to be torn and pulled back to the level of the glenoid.  There was noted to be some fraying at the edge of the rotator cuff as well.  Biceps tendon was noted and somewhat frayed underneath the rotator cuff tear.  The cuff was then repaired using 2 Arthrex fiber tapes in an inverted mattress orientation.  Each fiber tape was then placed into a swivel lock suture anchor which was placed about 1-1/2 cm lateral to the articular margin on the greater tuberosity.  There was a slight area of elevation anterior to the anterior fiber tape and the #2 FiberWire was passed using the scorpion needle punch and this suture was tied using a modified Quijano loop sliding knot.  There was also a slight area in between the fiber tapes  that was captured with the #2 FiberWire in the posterior swivel lock.  Once these were tied then it was felt that adequate fixation of the rotator cuff had been achieved.  The edge of the rotator cuff was probed and found to be firmly attached onto the tuberosity.    The shaver was then allowed to run on suction to remove any  remaining loose fragments. The arthroscopy was then terminated. The  wounds were closed with interrupted nylon suture, covered with Xeroform  gauze, 4x4's, and Elastoplast dressing. The patient was then awakened  and taken to the recovery room in good condition. She tolerated the  procedure very well.      Terrence Haines MD     Date: 2/6/2017  Time: 3:06 PM

## 2017-02-06 NOTE — ANESTHESIA POSTPROCEDURE EVALUATION
Patient: Thelma Haley    Procedure Summary     Date Anesthesia Start Anesthesia Stop Room / Location    02/06/17 1233 1510  [facility] / [facility]       Procedure Diagnosis Surgeon Provider    RIGHT SHOULDER ARTHROSCOPY SUBACROMIAL DECOMPRESSION, ROTATOR CUFF REPAIR    (Right Shoulder) Impingement syndrome of right shoulder region; Complete tear of right rotator cuff  (IMPINGEMENT SYNDROME OF RIGHT SHOULDER ) MD Eliud Mayen MD          Anesthesia Type: general  Last vitals  BP (!) 190/92 (02/06/17 1554)    Temp 97.5 °F (36.4 °C) (02/06/17 1554)    Pulse 95 (02/06/17 1554)   Resp 20 (02/06/17 1554)    SpO2 95 % (02/06/17 1554)      Post Anesthesia Care and Evaluation    Patient location during evaluation: bedside  Patient participation: complete - patient participated  Level of consciousness: awake  Pain management: adequate  Airway patency: patent  Anesthetic complications: No anesthetic complications  PONV Status: inadequately controlled  Cardiovascular status: acceptable  Respiratory status: acceptable  Hydration status: acceptable    Comments:

## 2017-02-06 NOTE — PLAN OF CARE
Problem: Perioperative Period (Adult)  Goal: Signs and Symptoms of Listed Potential Problems Will be Absent or Manageable (Perioperative Period)  Outcome: Outcome(s) achieved Date Met:  02/06/17 02/06/17 6906   Perioperative Period   Problems Assessed (Perioperative Period) all   Problems Present (Perioperative Period) none

## 2017-02-06 NOTE — PLAN OF CARE
Problem: Patient Care Overview (Adult)  Goal: Plan of Care Review  Outcome: Outcome(s) achieved Date Met:  02/06/17 02/06/17 1747   Coping/Psychosocial Response Interventions   Plan Of Care Reviewed With patient   Patient Care Overview   Progress improving   Outcome Evaluation   Outcome Summary/Follow up Plan discharge criteria met

## 2017-02-06 NOTE — H&P (VIEW-ONLY)
Thelma Haley is a 71 y.o. female returns for     Chief Complaint   Patient presents with   • Left Knee - Follow-up   • Right Knee - Follow-up   • Right Shoulder - Follow-up       HISTORY OF PRESENT ILLNESS: patient request steroid injection geoffrey knee and to see davie for rotator cuff tear.        CONCURRENT MEDICAL HISTORY:    Past Medical History   Diagnosis Date   • Abnormal results of thyroid function studies    • Acute pain of left shoulder    • Acute pain of right shoulder    • Allergic rhinitis due to pollen    • Asthma       uncomplicated      • Cataract    • Chronic rhinitis    • Essential hypertension    • Extrinsic asthma with status asthmaticus    • Glaucoma    • Hyperlipidemia    • Impingement syndrome of right shoulder    • Neuropathy    • Nuclear senile cataract    • Obesity    • Overweight with body mass index (BMI) 25.0-29.9    • Primary fibromyalgia syndrome    • Primary open angle glaucoma    • Sinus headache    • Temporomandibular joint disorder        Allergies   Allergen Reactions   • Ciprofloxacin    • Lortab [Hydrocodone-Acetaminophen]    • Metronidazole    • Ultram [Tramadol Hcl]          Current Outpatient Prescriptions:   •  albuterol (PROVENTIL HFA;VENTOLIN HFA) 108 (90 BASE) MCG/ACT inhaler, Inhale 2 puffs Every 4 (Four) Hours As Needed for wheezing., Disp: 6.7 g, Rfl: 11  •  Biotin (BIOTIN MAXIMUM STRENGTH) 10 MG tablet, Take  by mouth., Disp: , Rfl:   •  budesonide-formoterol (SYMBICORT) 80-4.5 MCG/ACT inhaler, Inhale 2 puffs 2 (Two) Times a Day., Disp: 10.2 g, Rfl: 0  •  CETIRIZINE HCL PO, Take  by mouth., Disp: , Rfl:   •  Cholecalciferol (VITAMIN D3) 1000 UNITS capsule, Take  by mouth., Disp: , Rfl:   •  FLUAD 0.5 ML suspension prefilled syringe, ADM 0.5ML IM UTD, Disp: , Rfl: 0  •  fluticasone (FLONASE) 50 MCG/ACT nasal spray, 2 sprays into each nostril Daily. Administer 2 sprays in each nostril for each dose., Disp: 1 each, Rfl: 5  •  gabapentin (NEURONTIN) 100 MG capsule, Take 1  "capsule by mouth 3 (Three) Times a Day., Disp: 270 capsule, Rfl: 3  •  hydrochlorothiazide (HYDRODIURIL) 25 MG tablet, Take 1 tablet by mouth Daily., Disp: 90 tablet, Rfl: 3  •  lisinopril (PRINIVIL,ZESTRIL) 5 MG tablet, Take 1 tablet by mouth Daily., Disp: 90 tablet, Rfl: 3  •  LOTEMAX 0.5 % gel ophthalmic gel, , Disp: , Rfl:   •  lovastatin (MEVACOR) 20 MG tablet, Take 1 tablet by mouth Every Night., Disp: 90 tablet, Rfl: 3  •  methIMAzole (TAPAZOLE) 5 MG tablet, Take 0.5 tablets by mouth Daily., Disp: 45 tablet, Rfl: 11    Past Surgical History   Procedure Laterality Date   • Shoulder surgery       Excision of 4.8 cm mass with primary intermediate closure.)   03/30/2012    • Carpal tunnel release       Bilateral carpal tunnel release.)   03/27/2000    • Injection of medication  06/17/2015     celestone(betamethasone) (2)    • Injection of medication  04/14/2016     depo medrol ( methylprednisone) (20)   • Spinal fusion     • Other surgical history       hysterosope procedure   • Right oophorectomy         ROS  No fevers or chills.  No chest pain or shortness of air.  No GI or  disturbances.    PHYSICAL EXAMINATION:       Visit Vitals   • Ht 64\" (162.6 cm)   • Wt 173 lb (78.5 kg)   • BMI 29.7 kg/m2       Physical Exam   Constitutional: She is oriented to person, place, and time. Vital signs are normal. She appears well-developed and well-nourished. She is cooperative.   HENT:   Head: Normocephalic and atraumatic.   Neck: Trachea normal and phonation normal.   Cardiovascular: Regular rhythm, S1 normal, S2 normal, normal heart sounds and normal pulses.    Pulmonary/Chest: Effort normal and breath sounds normal. No respiratory distress.   Abdominal: Soft. Normal appearance. She exhibits no distension.   Musculoskeletal:        Right knee: She exhibits no effusion.        Left knee: She exhibits no effusion.   Neurological: She is alert and oriented to person, place, and time. GCS eye subscore is 4. GCS verbal " subscore is 5. GCS motor subscore is 6.   Skin: Skin is warm, dry and intact.   Psychiatric: She has a normal mood and affect. Her speech is normal and behavior is normal. Judgment and thought content normal. Cognition and memory are normal.   Vitals reviewed.      GAIT:     []  Normal  [x]  Antalgic    Assistive device: [x]  None  []  Walker     []  Crutches  []  Cane     []  Wheelchair  []  Stretcher    Right Knee Exam     Tenderness   The patient is experiencing tenderness in the medial joint line and pes anserinus.    Range of Motion   Extension: normal   Flexion: abnormal     Other   Erythema: absent  Scars: absent  Sensation: normal  Pulse: present  Swelling: mild  Other tests: no effusion present      Left Knee Exam     Tenderness   The patient is experiencing tenderness in the pes anserinus.    Range of Motion   Extension: normal   Flexion: abnormal     Other   Erythema: absent  Scars: absent  Sensation: normal  Pulse: present  Swelling: mild  Effusion: no effusion present      Right Shoulder Exam     Tenderness   The patient is experiencing tenderness in the acromioclavicular joint.    Range of Motion   Active Abduction: abnormal   Forward Flexion: abnormal   External Rotation: abnormal   Internal Rotation 90 degrees: abnormal     Muscle Strength   Abduction: 4/5   Internal Rotation: 4/5   External Rotation: 4/5   Supraspinatus: 3/5     Tests   Cross Arm: positive  Drop Arm: positive  Impingement: positive    Other   Erythema: absent  Scars: absent  Sensation: normal  Pulse: present      Left Shoulder Exam   Left shoulder exam is normal.          Large Joint Arthrocentesis  Date/Time: 1/12/2017 8:04 AM  Consent given by: patient  Site marked: site marked  Timeout: Immediately prior to procedure a time out was called to verify the correct patient, procedure, equipment, support staff and site/side marked as required   Supporting Documentation  Indications: pain   Procedure Details  Location: knee - R  knee  Preparation: Patient was prepped and draped in the usual sterile fashion  Needle size: 22 G  Approach: anteromedial  Medications administered: 40 mg triamcinolone acetonide 40 MG/ML; 2 mL lidocaine 2 %  Patient tolerance: patient tolerated the procedure well with no immediate complications    Large Joint Arthrocentesis  Date/Time: 1/12/2017 8:04 AM  Consent given by: patient  Site marked: site marked  Timeout: Immediately prior to procedure a time out was called to verify the correct patient, procedure, equipment, support staff and site/side marked as required   Supporting Documentation  Indications: pain   Procedure Details  Location: knee - L knee  Preparation: Patient was prepped and draped in the usual sterile fashion  Needle size: 22 G  Approach: anteromedial  Medications administered: 40 mg triamcinolone acetonide 40 MG/ML; 2 mL lidocaine 2 %  Patient tolerance: patient tolerated the procedure well with no immediate complications            MRI right shoulder. 11/16/16      History- Trauma, patient fell, two months ago. Continued pain right  shoulder.      Prior exam- Right shoulder x-ray October 28, 2016      Technique- Multiplanar multisequence noncontrast images right  shoulder.      Large full-thickness tear distal supraspinatous tendon, and portions  of the infraspinatus tendon. The size of the gap in lateral medial  projection 1.68 cm. The size of the gap in AP projection 2.8 cm. The  supraspinatous muscle appears small, atrophy.       Acromioclavicular joint arthrosis. Significant capsular hypertrophy,  causing moderate underlying impingement. Normal-appearing glenoid  mark. Normal-appearing long head biceps tendon within the  intertubercular sulcus.      No bone edema or fracture.      Conclusion- Large full-thickness tear involving the supraspinatous and  portions of the infraspinous tendon. This is associated with some  atrophic changes of the the supraspinatous muscle.       Acromioclavicular  joint arthrosis, capsule hypertrophy causing  underlying impingement. MRI examination right shoulder is otherwise  unremarkable.      Electronically Signed By- Simone Martin MD On: 2016-11-10 17:45:18    PROCEDURE- Right Shoulder three views. 10/28/16      REASON FOR EXAM-               [id number]- PAIN IN RIGHT SHOULDER      FINDINGS- No comparison. Bony and soft tissue structures of the  shoulder are normal. There is no evidence of fracture or dislocation.               IMPRESSION-   Negative shoulder.       Electronically Signed By- Sergei Shore MD On: 2016-10-28 13:20:31      ASSESSMENT:    Diagnoses and all orders for this visit:    Primary osteoarthritis of both knees  -     Large Joint Arthrocentesis  -     Large Joint Arthrocentesis    Tear of right rotator cuff, unspecified tear extent    Impingement syndrome of right shoulder    Right shoulder pain, unspecified chronicity          PLAN  Dr. Haines reviewed the MRI of the shoulder and recommended repair of the RTC tear.   Risk, benefits and alternative tx options explained in detail and patient verbalized understanding of the tx plan.     No Follow-up on file.    JUAN Hathaway

## 2017-02-06 NOTE — PLAN OF CARE
Problem: Patient Care Overview (Adult)  Goal: Plan of Care Review  Outcome: Ongoing (interventions implemented as appropriate)    02/06/17 1538   Coping/Psychosocial Response Interventions   Plan Of Care Reviewed With patient   Patient Care Overview   Progress improving   Outcome Evaluation   Outcome Summary/Follow up Plan vss, pain management begun, dsg cdi       Goal: Adult Individualization and Mutuality  Outcome: Ongoing (interventions implemented as appropriate)    Problem: Perioperative Period (Adult)  Goal: Signs and Symptoms of Listed Potential Problems Will be Absent or Manageable (Perioperative Period)  Outcome: Ongoing (interventions implemented as appropriate)

## 2017-02-06 NOTE — ANESTHESIA PREPROCEDURE EVALUATION
Anesthesia Evaluation     Patient summary reviewed and Nursing notes reviewed    Airway   Mallampati: II  TM distance: >3 FB  Neck ROM: full  possible difficult intubation  Dental - normal exam     Pulmonary - normal exam    breath sounds clear to auscultation  (+) asthma,   Cardiovascular - normal exam  (+) hypertension poorly controlled,     ECG reviewed  Rhythm: regular  Rate: normal  ROS comment: EKG:NSR    Neuro/Psych  (+) headaches (Sinus headaches.),    GI/Hepatic/Renal/Endo    (+) morbid obesity, hyperthyroidism    Musculoskeletal     (+) myalgias (Fibromyalgia),   Abdominal    Substance History - negative use     OB/GYN negative ob/gyn ROS         Other   (+) arthritis                          Anesthesia Plan    ASA 3     general   (Discussed peripheral nerve block(interscalene) for post op pain relief and patient understands possible complications,risks and agrees.)  intravenous induction   Anesthetic plan and risks discussed with patient and spouse/significant other.

## 2017-02-06 NOTE — PLAN OF CARE
Problem: Patient Care Overview (Adult)  Goal: Discharge Needs Assessment  Outcome: Outcome(s) achieved Date Met:  02/06/17 02/06/17 174   Discharge Needs Assessment   Concerns To Be Addressed no discharge needs identified

## 2017-02-06 NOTE — ANESTHESIA PROCEDURE NOTES
Peripheral Block    Patient location during procedure: holding area  Start time: 2/6/2017 12:38 PM  Stop time: 2/6/2017 12:47 PM  Reason for block: at surgeon's request and post-op pain management  Performed by  Anesthesiologist: MAIN FLOYD  Preanesthetic Checklist  Completed: patient identified and risks and benefits discussed  Peripheral Block Prep:  Sterile barriers:gloves  Prep: ChloraPrep  Patient monitoring: blood pressure monitoring, continuous pulse oximetry and EKG  Peripheral Procedure  Sedation:yes  Guidance:ultrasound guided  Images:still images obtained  Laterality:rightBlock Type:interscalene  Injection Technique:single-shotNeedle Type:short-bevel  Needle Gauge:21 G  ULTRASOUND INTERPRETATION.  Using ultrasound guidance a 21 G gauge needle was placed in close proximity to the brachial plexus nerve, at which point, under ultrasound guidance anesthetic was injected in the area of the nerve and spread of the anesthesia was seen on ultrasound in close proximity thereto.  There were no abnormalities seen on ultrasound; a digital image was taken; and the patient tolerated the procedure with no complications.   Medications  Comment:    Local Injected:ropivacaine 0.5% without epinephrine Local Amount Injected:25mL  Post Assessment  Patient Tolerance:comfortable throughout block  Complications:no  Additional Notes  Ultrasound Interpretation:  Using ultrasound guidance the needle was placed in close proximity to the brachial plexus and anesthetic was injected in the area of the brachial plexus and spread of the anesthetic was seen on ultrasound in close proximity thereto.  There were no abnormalities seen on ultrasound; a digital image was taken; and the patient tolerated the procedure with no complications.

## 2017-02-06 NOTE — PLAN OF CARE
Problem: Patient Care Overview (Adult)  Goal: Adult Individualization and Mutuality  Outcome: Outcome(s) achieved Date Met:  02/06/17

## 2017-02-06 NOTE — ANESTHESIA PROCEDURE NOTES
Airway  Urgency: elective    Airway not difficult    General Information and Staff    Patient location during procedure: OR  CRNA: MARY BETANCOURT    Indications and Patient Condition    Preoxygenated: yes  Mask difficulty assessment: 1 - vent by mask    Final Airway Details  Final airway type: endotracheal airway      Successful airway: ETT  Cuffed: yes   Successful intubation technique: direct laryngoscopy  Facilitating devices/methods: intubating stylet  Endotracheal tube insertion site: oral  Blade: Motta  Blade size: #3  Cormack-Lehane Classification: grade IIa - partial view of glottis  Placement verified by: chest auscultation and capnometry   Cuff volume (mL): 10  Measured from: lips  Number of attempts at approach: 1

## 2017-02-07 DIAGNOSIS — M75.121 COMPLETE TEAR OF RIGHT ROTATOR CUFF: Primary | ICD-10-CM

## 2017-02-08 LAB
LAB AP CASE REPORT: NORMAL
Lab: NORMAL
PATH REPORT.FINAL DX SPEC: NORMAL
PATH REPORT.GROSS SPEC: NORMAL

## 2017-02-09 ENCOUNTER — TELEPHONE (OUTPATIENT)
Dept: ORTHOPEDIC SURGERY | Facility: CLINIC | Age: 72
End: 2017-02-09

## 2017-02-09 RX ORDER — ONDANSETRON HYDROCHLORIDE 8 MG/1
8 TABLET, FILM COATED ORAL EVERY 8 HOURS PRN
Qty: 30 TABLET | Refills: 0 | Status: SHIPPED | OUTPATIENT
Start: 2017-02-09 | End: 2017-07-31

## 2017-02-09 NOTE — TELEPHONE ENCOUNTER
Patient was told yes she needs to start PT. Which she starts today. She was given script of zofran.

## 2017-02-16 ENCOUNTER — OFFICE VISIT (OUTPATIENT)
Dept: ORTHOPEDIC SURGERY | Facility: CLINIC | Age: 72
End: 2017-02-16

## 2017-02-16 DIAGNOSIS — M25.511 ACUTE PAIN OF RIGHT SHOULDER: ICD-10-CM

## 2017-02-16 DIAGNOSIS — M75.41 IMPINGEMENT SYNDROME OF RIGHT SHOULDER: ICD-10-CM

## 2017-02-16 DIAGNOSIS — M19.011 ARTHRITIS OF RIGHT ACROMIOCLAVICULAR JOINT: ICD-10-CM

## 2017-02-16 DIAGNOSIS — M75.121 COMPLETE TEAR OF RIGHT ROTATOR CUFF: Primary | ICD-10-CM

## 2017-02-16 PROCEDURE — 99024 POSTOP FOLLOW-UP VISIT: CPT | Performed by: ORTHOPAEDIC SURGERY

## 2017-02-16 NOTE — PROGRESS NOTES
Thelma Haley is a 71 y.o. female is s/p       Chief Complaint   Patient presents with   • Right Shoulder - Post-op   • Suture / Staple Removal       HISTORY OF PRESENT ILLNESS:   Arthroscopy of the Right shoulder with  1. Rotator cuff repair  2. NEENA procedure  3. Sub-acromial decompression done on 2/6/2017  Pain is improved.  She now has some general soreness.  She is working with physical therapy and wearing a sling.  No numbness or tingling noted.      [facility]   Allergies   Allergen Reactions   • Ciprofloxacin    • Hydrocodone    • Lortab [Hydrocodone-Acetaminophen]    • Metronidazole    • Ultram [Tramadol Hcl]          Current Outpatient Prescriptions:   •  albuterol (PROVENTIL HFA;VENTOLIN HFA) 108 (90 BASE) MCG/ACT inhaler, Inhale 2 puffs Every 4 (Four) Hours As Needed for wheezing., Disp: 6.7 g, Rfl: 11  •  Biotin (BIOTIN MAXIMUM STRENGTH) 10 MG tablet, Take 10 mg by mouth Daily., Disp: , Rfl:   •  budesonide-formoterol (SYMBICORT) 80-4.5 MCG/ACT inhaler, Inhale 2 puffs 2 (Two) Times a Day., Disp: 10.2 g, Rfl: 0  •  CETIRIZINE HCL PO, Take 10 mg by mouth Daily., Disp: , Rfl:   •  Cholecalciferol (VITAMIN D3) 1000 UNITS capsule, Take 1,000 Units by mouth Daily., Disp: , Rfl:   •  FLUAD 0.5 ML suspension prefilled syringe, ADM 0.5ML IM UTD, Disp: , Rfl: 0  •  fluticasone (FLONASE) 50 MCG/ACT nasal spray, 2 sprays into each nostril Daily. Administer 2 sprays in each nostril for each dose., Disp: 1 each, Rfl: 5  •  gabapentin (NEURONTIN) 100 MG capsule, Take 1 capsule by mouth 3 (Three) Times a Day., Disp: 270 capsule, Rfl: 3  •  hydrochlorothiazide (HYDRODIURIL) 25 MG tablet, Take 1 tablet by mouth Daily., Disp: 90 tablet, Rfl: 3  •  lisinopril (PRINIVIL,ZESTRIL) 5 MG tablet, Take 1 tablet by mouth Daily., Disp: 90 tablet, Rfl: 3  •  LOTEMAX 0.5 % gel ophthalmic gel, 1 drop 4 (Four) Times a Day., Disp: , Rfl:   •  lovastatin (MEVACOR) 20 MG tablet, TAKE 1 TABLET BY MOUTH  EVERY NIGHT AT BEDTIME, Disp: 90 tablet, Rfl: 1  •  methIMAzole (TAPAZOLE) 5 MG tablet, Take 2.5 mg by mouth Daily., Disp: , Rfl:   •  ondansetron (ZOFRAN) 8 MG tablet, Take 1 tablet by mouth Every 8 (Eight) Hours As Needed for nausea or vomiting., Disp: 30 tablet, Rfl: 0  •  oxyCODONE-acetaminophen (PERCOCET) 7.5-325 MG per tablet, Take 1 tablet by mouth Every 4 (Four) Hours As Needed (Pain)., Disp: 40 tablet, Rfl: 0    No fevers or chills.  No nausea or vomiting.      PHYSICAL EXAMINATION:       Thelma Haley is a 71 y.o. female    Patient is awake and alert, answers questions appropriately and is in no apparent distress.      Right Shoulder Exam     Comments:  Incisions clean dry and intact.  No erythema.  Sutures removed today's visit.  Mild diffuse swelling.                    ASSESSMENT:    Diagnoses and all orders for this visit:    Complete tear of right rotator cuff    Acute pain of right shoulder    Impingement syndrome of right shoulder    Arthritis of right acromioclavicular joint        PLAN    Continue passive range of motion.  Continue use of the sling.  Slowly increase passive range of motion as her pain allows.  We discussed no active motion until 6 weeks postop.  Continue working with physical therapy.    Return in about 4 weeks (around 3/16/2017).    Terrence Haines MD

## 2017-03-13 ENCOUNTER — OFFICE VISIT (OUTPATIENT)
Dept: ORTHOPEDIC SURGERY | Facility: CLINIC | Age: 72
End: 2017-03-13

## 2017-03-13 VITALS — HEIGHT: 64 IN | BODY MASS INDEX: 29.37 KG/M2 | WEIGHT: 172 LBS

## 2017-03-13 DIAGNOSIS — M25.512 ACUTE PAIN OF LEFT SHOULDER: ICD-10-CM

## 2017-03-13 DIAGNOSIS — M75.42 IMPINGEMENT SYNDROME, SHOULDER, LEFT: Primary | ICD-10-CM

## 2017-03-13 PROCEDURE — 20610 DRAIN/INJ JOINT/BURSA W/O US: CPT | Performed by: NURSE PRACTITIONER

## 2017-03-13 PROCEDURE — 99213 OFFICE O/P EST LOW 20 MIN: CPT | Performed by: NURSE PRACTITIONER

## 2017-03-13 RX ORDER — LIDOCAINE HYDROCHLORIDE 20 MG/ML
2 INJECTION, SOLUTION INFILTRATION; PERINEURAL
Status: COMPLETED | OUTPATIENT
Start: 2017-03-13 | End: 2017-03-13

## 2017-03-13 RX ORDER — TRIAMCINOLONE ACETONIDE 40 MG/ML
40 INJECTION, SUSPENSION INTRA-ARTICULAR; INTRAMUSCULAR
Status: COMPLETED | OUTPATIENT
Start: 2017-03-13 | End: 2017-03-13

## 2017-03-13 RX ADMIN — LIDOCAINE HYDROCHLORIDE 2 ML: 20 INJECTION, SOLUTION INFILTRATION; PERINEURAL at 08:06

## 2017-03-13 RX ADMIN — TRIAMCINOLONE ACETONIDE 40 MG: 40 INJECTION, SUSPENSION INTRA-ARTICULAR; INTRAMUSCULAR at 08:06

## 2017-03-13 NOTE — PROGRESS NOTES
Thelma Haley is a 71 y.o. female returns for     Chief Complaint   Patient presents with   • Left Shoulder - Follow-up       HISTORY OF PRESENT ILLNESS: Patient states that the left shoulder is about as bad as the right. Had surgery on right in February. Requesting injection in the left shoulder.          CONCURRENT MEDICAL HISTORY:    Past Medical History   Diagnosis Date   • Abnormal results of thyroid function studies    • Acute pain of left shoulder    • Acute pain of right shoulder    • Allergic rhinitis due to pollen    • Asthma       uncomplicated      • Cataract    • Chronic rhinitis    • Essential hypertension    • Extrinsic asthma with status asthmaticus    • Glaucoma    • Hyperlipidemia    • Impingement syndrome of right shoulder    • Neuropathy    • Nuclear senile cataract    • Obesity    • Overweight with body mass index (BMI) 25.0-29.9    • Primary fibromyalgia syndrome    • Primary open angle glaucoma    • Sinus headache    • Temporomandibular joint disorder        Allergies   Allergen Reactions   • Ciprofloxacin    • Hydrocodone    • Lortab [Hydrocodone-Acetaminophen]    • Metronidazole    • Ultram [Tramadol Hcl]          Current Outpatient Prescriptions:   •  albuterol (PROVENTIL HFA;VENTOLIN HFA) 108 (90 BASE) MCG/ACT inhaler, Inhale 2 puffs Every 4 (Four) Hours As Needed for wheezing., Disp: 6.7 g, Rfl: 11  •  Biotin (BIOTIN MAXIMUM STRENGTH) 10 MG tablet, Take 10 mg by mouth Daily., Disp: , Rfl:   •  budesonide-formoterol (SYMBICORT) 80-4.5 MCG/ACT inhaler, Inhale 2 puffs 2 (Two) Times a Day., Disp: 10.2 g, Rfl: 0  •  CETIRIZINE HCL PO, Take 10 mg by mouth Daily., Disp: , Rfl:   •  Cholecalciferol (VITAMIN D3) 1000 UNITS capsule, Take 1,000 Units by mouth Daily., Disp: , Rfl:   •  FLUAD 0.5 ML suspension prefilled syringe, ADM 0.5ML IM UTD, Disp: , Rfl: 0  •  fluticasone (FLONASE) 50 MCG/ACT nasal spray, 2 sprays into each nostril Daily. Administer 2 sprays in each nostril for each dose., Disp:  "1 each, Rfl: 5  •  gabapentin (NEURONTIN) 100 MG capsule, Take 1 capsule by mouth 3 (Three) Times a Day., Disp: 270 capsule, Rfl: 3  •  hydrochlorothiazide (HYDRODIURIL) 25 MG tablet, Take 1 tablet by mouth Daily., Disp: 90 tablet, Rfl: 3  •  lisinopril (PRINIVIL,ZESTRIL) 5 MG tablet, Take 1 tablet by mouth Daily., Disp: 90 tablet, Rfl: 3  •  LOTEMAX 0.5 % gel ophthalmic gel, 1 drop 4 (Four) Times a Day., Disp: , Rfl:   •  lovastatin (MEVACOR) 20 MG tablet, TAKE 1 TABLET BY MOUTH EVERY NIGHT AT BEDTIME, Disp: 90 tablet, Rfl: 1  •  methIMAzole (TAPAZOLE) 5 MG tablet, Take 2.5 mg by mouth Daily., Disp: , Rfl:   •  ondansetron (ZOFRAN) 8 MG tablet, Take 1 tablet by mouth Every 8 (Eight) Hours As Needed for nausea or vomiting., Disp: 30 tablet, Rfl: 0  •  oxyCODONE-acetaminophen (PERCOCET) 7.5-325 MG per tablet, Take 1 tablet by mouth Every 4 (Four) Hours As Needed (Pain)., Disp: 40 tablet, Rfl: 0    Past Surgical History   Procedure Laterality Date   • Shoulder surgery       Excision of 4.8 cm mass with primary intermediate closure.)   03/30/2012    • Carpal tunnel release       Bilateral carpal tunnel release.)   03/27/2000    • Injection of medication  06/17/2015     celestone(betamethasone) (2)    • Injection of medication  04/14/2016     depo medrol ( methylprednisone) (20)   • Spinal fusion     • Other surgical history       hysterosope procedure   • Right oophorectomy     • Shoulder arthroscopy Right 2/6/2017     Procedure: RIGHT SHOULDER ARTHROSCOPY SUBACROMIAL DECOMPRESSION, ROTATOR CUFF REPAIR   ;  Surgeon: Terrence Haines MD;  Location: [facility];  Service:        ROS  No fevers or chills.  No chest pain or shortness of air.  No GI or  disturbances.    PHYSICAL EXAMINATION:       Visit Vitals   • Ht 64\" (162.6 cm)   • Wt 172 lb (78 kg)   • BMI 29.52 kg/m2       Physical Exam   Constitutional: She is oriented to person, place, and time. Vital signs are normal. She appears well-developed and " well-nourished. She is cooperative.   HENT:   Head: Normocephalic and atraumatic.   Neck: Trachea normal and phonation normal.   Pulmonary/Chest: Effort normal. No respiratory distress.   Abdominal: Soft. Normal appearance. She exhibits no distension.   Neurological: She is alert and oriented to person, place, and time. GCS eye subscore is 4. GCS verbal subscore is 5. GCS motor subscore is 6.   Skin: Skin is warm, dry and intact.   Psychiatric: She has a normal mood and affect. Her speech is normal and behavior is normal. Judgment and thought content normal. Cognition and memory are normal.   Vitals reviewed.      GAIT:     []  Normal  [x]  Antalgic    Assistive device: [x]  None  []  Walker     []  Crutches  []  Cane     []  Wheelchair  []  Stretcher    Right Shoulder Exam     Tenderness   The patient is experiencing no tenderness.        Range of Motion   Active Abduction: abnormal   Forward Flexion: abnormal   External Rotation: abnormal   Internal Rotation 90 degrees: abnormal     Muscle Strength   Abduction: 4/5   Internal Rotation: 4/5     Tests   Cross Arm: positive  Drop Arm: positive  Impingement: positive    Other   Erythema: absent  Scars: absent  Sensation: normal  Pulse: present            Large Joint Arthrocentesis  Date/Time: 3/13/2017 8:06 AM  Consent given by: patient  Site marked: site marked  Timeout: Immediately prior to procedure a time out was called to verify the correct patient, procedure, equipment, support staff and site/side marked as required   Supporting Documentation  Indications: pain   Procedure Details  Location: shoulder - L subacromial bursa  Preparation: Patient was prepped and draped in the usual sterile fashion  Needle size: 22 G  Approach: posterior  Medications administered: 40 mg triamcinolone acetonide 40 MG/ML; 2 mL lidocaine 2%  Patient tolerance: patient tolerated the procedure well with no immediate complications            No results  found.          ASSESSMENT:    Diagnoses and all orders for this visit:    Impingement syndrome, shoulder, left  -     Large Joint Arthrocentesis    Acute pain of left shoulder  -     Large Joint Arthrocentesis          PLAN  Injected the left shoulder today and recommended f/u as needed for exacerbations in pain   No Follow-up on file.    Michael Wong, APRN

## 2017-03-21 ENCOUNTER — OFFICE VISIT (OUTPATIENT)
Dept: ORTHOPEDIC SURGERY | Facility: CLINIC | Age: 72
End: 2017-03-21

## 2017-03-21 DIAGNOSIS — M19.011 ARTHRITIS OF RIGHT ACROMIOCLAVICULAR JOINT: ICD-10-CM

## 2017-03-21 DIAGNOSIS — M75.121 COMPLETE TEAR OF RIGHT ROTATOR CUFF: Primary | ICD-10-CM

## 2017-03-21 PROCEDURE — 99024 POSTOP FOLLOW-UP VISIT: CPT | Performed by: ORTHOPAEDIC SURGERY

## 2017-03-21 NOTE — PROGRESS NOTES
Thelma Haley is a 71 y.o. female is s/p       Chief Complaint   Patient presents with   • Right Shoulder - Follow-up       HISTORY OF PRESENT ILLNESS:  Arthroscopy of the Right shoulder with  1. Rotator cuff repair  2. NEENA procedure  3. Sub-acromial decompression 2/6/17    Patient is currently in PT and no longer wearing sling.    sleeping better.  Improving slowly  [facility].      Allergies   Allergen Reactions   • Ciprofloxacin    • Hydrocodone    • Lortab [Hydrocodone-Acetaminophen]    • Metronidazole    • Ultram [Tramadol Hcl]          Current Outpatient Prescriptions:   •  albuterol (PROVENTIL HFA;VENTOLIN HFA) 108 (90 BASE) MCG/ACT inhaler, Inhale 2 puffs Every 4 (Four) Hours As Needed for wheezing., Disp: 6.7 g, Rfl: 11  •  Biotin (BIOTIN MAXIMUM STRENGTH) 10 MG tablet, Take 10 mg by mouth Daily., Disp: , Rfl:   •  budesonide-formoterol (SYMBICORT) 80-4.5 MCG/ACT inhaler, Inhale 2 puffs 2 (Two) Times a Day., Disp: 10.2 g, Rfl: 0  •  CETIRIZINE HCL PO, Take 10 mg by mouth Daily., Disp: , Rfl:   •  Cholecalciferol (VITAMIN D3) 1000 UNITS capsule, Take 1,000 Units by mouth Daily., Disp: , Rfl:   •  FLUAD 0.5 ML suspension prefilled syringe, ADM 0.5ML IM UTD, Disp: , Rfl: 0  •  fluticasone (FLONASE) 50 MCG/ACT nasal spray, 2 sprays into each nostril Daily. Administer 2 sprays in each nostril for each dose., Disp: 1 each, Rfl: 5  •  gabapentin (NEURONTIN) 100 MG capsule, Take 1 capsule by mouth 3 (Three) Times a Day., Disp: 270 capsule, Rfl: 3  •  hydrochlorothiazide (HYDRODIURIL) 25 MG tablet, Take 1 tablet by mouth Daily., Disp: 90 tablet, Rfl: 3  •  lisinopril (PRINIVIL,ZESTRIL) 5 MG tablet, Take 1 tablet by mouth Daily., Disp: 90 tablet, Rfl: 3  •  LOTEMAX 0.5 % gel ophthalmic gel, 1 drop 4 (Four) Times a Day., Disp: , Rfl:   •  lovastatin (MEVACOR) 20 MG tablet, TAKE 1 TABLET BY MOUTH EVERY NIGHT AT BEDTIME, Disp: 90 tablet, Rfl: 1  •  methIMAzole (TAPAZOLE) 5 MG tablet, Take 2.5 mg by mouth  Daily., Disp: , Rfl:   •  ondansetron (ZOFRAN) 8 MG tablet, Take 1 tablet by mouth Every 8 (Eight) Hours As Needed for nausea or vomiting., Disp: 30 tablet, Rfl: 0  •  oxyCODONE-acetaminophen (PERCOCET) 7.5-325 MG per tablet, Take 1 tablet by mouth Every 4 (Four) Hours As Needed (Pain)., Disp: 40 tablet, Rfl: 0    No fevers or chills.  No nausea or vomiting.      PHYSICAL EXAMINATION:       Thelma Haley is a 71 y.o. female    Patient is awake and alert, answers questions appropriately and is in no apparent distress.      Right Shoulder Exam     Range of Motion   Active Abduction: 100   Forward Flexion: 120     Other   Sensation: normal  Pulse: present    Comments:  Incisions healed                    ASSESSMENT:    Diagnoses and all orders for this visit:    Complete tear of right rotator cuff    Arthritis of right acromioclavicular joint          PLAN    Continue PT  Continue HEP  Slowly progress as pain allows.  Start gentle strengthening and resistance.    Return in about 6 weeks (around 5/2/2017) for recheck.    Terrence Haines MD

## 2017-04-12 ENCOUNTER — OFFICE VISIT (OUTPATIENT)
Dept: ORTHOPEDIC SURGERY | Facility: CLINIC | Age: 72
End: 2017-04-12

## 2017-04-12 VITALS — BODY MASS INDEX: 30.71 KG/M2 | HEIGHT: 64 IN | WEIGHT: 179.9 LBS

## 2017-04-12 DIAGNOSIS — M17.0 PRIMARY OSTEOARTHRITIS OF BOTH KNEES: Primary | ICD-10-CM

## 2017-04-12 PROCEDURE — 20610 DRAIN/INJ JOINT/BURSA W/O US: CPT | Performed by: NURSE PRACTITIONER

## 2017-04-12 PROCEDURE — 99213 OFFICE O/P EST LOW 20 MIN: CPT | Performed by: NURSE PRACTITIONER

## 2017-04-12 RX ORDER — NYSTATIN 100000 U/G
OINTMENT TOPICAL
COMMUNITY
Start: 2017-04-02 | End: 2017-07-03

## 2017-04-12 RX ORDER — LIDOCAINE HYDROCHLORIDE 20 MG/ML
2 INJECTION, SOLUTION INFILTRATION; PERINEURAL
Status: COMPLETED | OUTPATIENT
Start: 2017-04-12 | End: 2017-04-12

## 2017-04-12 RX ORDER — TRIAMCINOLONE ACETONIDE 40 MG/ML
40 INJECTION, SUSPENSION INTRA-ARTICULAR; INTRAMUSCULAR
Status: COMPLETED | OUTPATIENT
Start: 2017-04-12 | End: 2017-04-12

## 2017-04-12 RX ORDER — DORZOLAMIDE HYDROCHLORIDE AND TIMOLOL MALEATE 20; 5 MG/ML; MG/ML
SOLUTION/ DROPS OPHTHALMIC
Refills: 0 | COMMUNITY
Start: 2017-03-14 | End: 2019-11-04

## 2017-04-12 RX ADMIN — LIDOCAINE HYDROCHLORIDE 2 ML: 20 INJECTION, SOLUTION INFILTRATION; PERINEURAL at 08:12

## 2017-04-12 RX ADMIN — TRIAMCINOLONE ACETONIDE 40 MG: 40 INJECTION, SUSPENSION INTRA-ARTICULAR; INTRAMUSCULAR at 08:13

## 2017-04-12 RX ADMIN — LIDOCAINE HYDROCHLORIDE 2 ML: 20 INJECTION, SOLUTION INFILTRATION; PERINEURAL at 08:13

## 2017-04-12 RX ADMIN — TRIAMCINOLONE ACETONIDE 40 MG: 40 INJECTION, SUSPENSION INTRA-ARTICULAR; INTRAMUSCULAR at 08:12

## 2017-04-12 NOTE — PROGRESS NOTES
Thelma Haley is a 71 y.o. female returns for     Chief Complaint   Patient presents with   • Left Knee - Follow-up   • Right Knee - Follow-up       HISTORY OF PRESENT ILLNESS:       CONCURRENT MEDICAL HISTORY:    Past Medical History:   Diagnosis Date   • Abnormal results of thyroid function studies    • Acute pain of left shoulder    • Acute pain of right shoulder    • Allergic rhinitis due to pollen    • Asthma      uncomplicated      • Cataract    • Chronic rhinitis    • Essential hypertension    • Extrinsic asthma with status asthmaticus    • Glaucoma    • Hyperlipidemia    • Impingement syndrome of right shoulder    • Neuropathy    • Nuclear senile cataract    • Obesity    • Overweight with body mass index (BMI) 25.0-29.9    • Primary fibromyalgia syndrome    • Primary open angle glaucoma    • Sinus headache    • Temporomandibular joint disorder        Allergies   Allergen Reactions   • Ciprofloxacin    • Hydrocodone    • Lortab [Hydrocodone-Acetaminophen]    • Metronidazole    • Ultram [Tramadol Hcl]          Current Outpatient Prescriptions:   •  (No Medication Selected), APPLY BID TO THE AFFECTED AREA., Disp: , Rfl: 2  •  albuterol (PROVENTIL HFA;VENTOLIN HFA) 108 (90 BASE) MCG/ACT inhaler, Inhale 2 puffs Every 4 (Four) Hours As Needed for wheezing., Disp: 6.7 g, Rfl: 11  •  Biotin (BIOTIN MAXIMUM STRENGTH) 10 MG tablet, Take 10 mg by mouth Daily., Disp: , Rfl:   •  budesonide-formoterol (SYMBICORT) 80-4.5 MCG/ACT inhaler, Inhale 2 puffs 2 (Two) Times a Day., Disp: 10.2 g, Rfl: 0  •  CETIRIZINE HCL PO, Take 10 mg by mouth Daily., Disp: , Rfl:   •  Cholecalciferol (VITAMIN D3) 1000 UNITS capsule, Take 1,000 Units by mouth Daily., Disp: , Rfl:   •  dorzolamide-timolol (COSOPT) 22.3-6.8 MG/ML ophthalmic solution, INSTILL 1 DROP INTO EACH EYE BID, Disp: , Rfl: 0  •  FLUAD 0.5 ML suspension prefilled syringe, ADM 0.5ML IM UTD, Disp: , Rfl: 0  •  fluticasone (FLONASE) 50 MCG/ACT nasal spray, 2 sprays into each  "nostril Daily. Administer 2 sprays in each nostril for each dose., Disp: 1 each, Rfl: 5  •  gabapentin (NEURONTIN) 100 MG capsule, Take 1 capsule by mouth 3 (Three) Times a Day., Disp: 270 capsule, Rfl: 3  •  hydrochlorothiazide (HYDRODIURIL) 25 MG tablet, Take 1 tablet by mouth Daily., Disp: 90 tablet, Rfl: 3  •  lisinopril (PRINIVIL,ZESTRIL) 5 MG tablet, Take 1 tablet by mouth Daily., Disp: 90 tablet, Rfl: 3  •  LOTEMAX 0.5 % gel ophthalmic gel, 1 drop 4 (Four) Times a Day., Disp: , Rfl:   •  lovastatin (MEVACOR) 20 MG tablet, TAKE 1 TABLET BY MOUTH EVERY NIGHT AT BEDTIME, Disp: 90 tablet, Rfl: 1  •  methIMAzole (TAPAZOLE) 5 MG tablet, Take 2.5 mg by mouth Daily., Disp: , Rfl:   •  nystatin (MYCOSTATIN) 269611 UNIT/GM ointment, , Disp: , Rfl:   •  ondansetron (ZOFRAN) 8 MG tablet, Take 1 tablet by mouth Every 8 (Eight) Hours As Needed for nausea or vomiting., Disp: 30 tablet, Rfl: 0  •  oxyCODONE-acetaminophen (PERCOCET) 7.5-325 MG per tablet, Take 1 tablet by mouth Every 4 (Four) Hours As Needed (Pain)., Disp: 40 tablet, Rfl: 0    Past Surgical History:   Procedure Laterality Date   • CARPAL TUNNEL RELEASE      Bilateral carpal tunnel release.)   03/27/2000    • INJECTION OF MEDICATION  06/17/2015    celestone(betamethasone) (2)    • INJECTION OF MEDICATION  04/14/2016    depo medrol ( methylprednisone) (20)   • OTHER SURGICAL HISTORY      hysterosope procedure   • RIGHT OOPHORECTOMY     • SHOULDER ARTHROSCOPY Right 2/6/2017    Procedure: RIGHT SHOULDER ARTHROSCOPY SUBACROMIAL DECOMPRESSION, ROTATOR CUFF REPAIR   ;  Surgeon: Terrence Haines MD;  Location: [facility];  Service:    • SHOULDER SURGERY      Excision of 4.8 cm mass with primary intermediate closure.)   03/30/2012    • SPINAL FUSION         ROS  No fevers or chills.  No chest pain or shortness of air.  No GI or  disturbances.    PHYSICAL EXAMINATION:       Ht 64\" (162.6 cm)  Wt 179 lb 14.4 oz (81.6 kg)  BMI 30.88 kg/m2    Physical Exam "   Constitutional: She is oriented to person, place, and time. Vital signs are normal. She appears well-developed and well-nourished. She is cooperative.   HENT:   Head: Normocephalic and atraumatic.   Neck: Trachea normal and phonation normal.   Pulmonary/Chest: Effort normal. No respiratory distress.   Abdominal: Soft. Normal appearance. She exhibits no distension.   Musculoskeletal:        Right knee: She exhibits no effusion.        Left knee: She exhibits no effusion.   Neurological: She is alert and oriented to person, place, and time. GCS eye subscore is 4. GCS verbal subscore is 5. GCS motor subscore is 6.   Skin: Skin is warm, dry and intact.   Psychiatric: She has a normal mood and affect. Her speech is normal and behavior is normal. Judgment and thought content normal. Cognition and memory are normal.   Vitals reviewed.      GAIT:     []  Normal  [x]  Antalgic    Assistive device: []  None  []  Walker     []  Crutches  []  Cane     []  Wheelchair  []  Stretcher    Right Knee Exam     Tenderness   The patient is experiencing tenderness in the medial joint line.    Range of Motion   Extension: normal   Flexion: abnormal     Other   Erythema: absent  Scars: absent  Sensation: normal  Pulse: present  Swelling: mild  Other tests: no effusion present      Left Knee Exam     Tenderness   The patient is experiencing tenderness in the medial joint line.    Range of Motion   Extension: normal   Flexion: abnormal     Other   Erythema: absent  Sensation: normal  Pulse: present  Swelling: mild  Effusion: no effusion present           Large Joint Arthrocentesis  Date/Time: 4/12/2017 8:12 AM  Consent given by: patient  Site marked: site marked  Timeout: Immediately prior to procedure a time out was called to verify the correct patient, procedure, equipment, support staff and site/side marked as required   Supporting Documentation  Indications: pain   Procedure Details  Location: knee - R knee  Preparation: Patient was  prepped and draped in the usual sterile fashion  Needle size: 22 G  Approach: anteromedial  Medications administered: 2 mL lidocaine 2%; 40 mg triamcinolone acetonide 40 MG/ML  Patient tolerance: patient tolerated the procedure well with no immediate complications    Large Joint Arthrocentesis  Date/Time: 4/12/2017 8:13 AM  Consent given by: patient  Site marked: site marked  Timeout: Immediately prior to procedure a time out was called to verify the correct patient, procedure, equipment, support staff and site/side marked as required   Supporting Documentation  Indications: pain   Procedure Details  Location: knee - L knee  Preparation: Patient was prepped and draped in the usual sterile fashion  Needle size: 22 G  Approach: anteromedial  Medications administered: 2 mL lidocaine 2%; 40 mg triamcinolone acetonide 40 MG/ML  Patient tolerance: patient tolerated the procedure well with no immediate complications              No results found.          ASSESSMENT:    Diagnoses and all orders for this visit:    Primary osteoarthritis of both knees  -     Large Joint Arthrocentesis  -     Large Joint Arthrocentesis    Other orders  -     (No Medication Selected); APPLY BID TO THE AFFECTED AREA.  -     dorzolamide-timolol (COSOPT) 22.3-6.8 MG/ML ophthalmic solution; INSTILL 1 DROP INTO EACH EYE BID  -     nystatin (MYCOSTATIN) 234813 UNIT/GM ointment;           PLAN  Repeated the intra-articular injections into both knees today and recommended continue progression of activity and range of motion as tolerated based on pain.  Also use of the cane as needed during periods of pain exacerbation and follow-up as needed.  No Follow-up on file.    Michael Wong, JUAN

## 2017-05-16 ENCOUNTER — OFFICE VISIT (OUTPATIENT)
Dept: ORTHOPEDIC SURGERY | Facility: CLINIC | Age: 72
End: 2017-05-16

## 2017-05-16 VITALS — BODY MASS INDEX: 30.56 KG/M2 | WEIGHT: 179 LBS | HEIGHT: 64 IN

## 2017-05-16 DIAGNOSIS — M19.011 ARTHRITIS OF RIGHT ACROMIOCLAVICULAR JOINT: ICD-10-CM

## 2017-05-16 DIAGNOSIS — M75.121 COMPLETE TEAR OF RIGHT ROTATOR CUFF: Primary | ICD-10-CM

## 2017-05-16 DIAGNOSIS — M25.511 ACUTE PAIN OF RIGHT SHOULDER: ICD-10-CM

## 2017-05-16 DIAGNOSIS — M75.41 IMPINGEMENT SYNDROME OF RIGHT SHOULDER: ICD-10-CM

## 2017-05-16 PROCEDURE — 99213 OFFICE O/P EST LOW 20 MIN: CPT | Performed by: ORTHOPAEDIC SURGERY

## 2017-06-13 ENCOUNTER — OFFICE VISIT (OUTPATIENT)
Dept: ORTHOPEDIC SURGERY | Facility: CLINIC | Age: 72
End: 2017-06-13

## 2017-06-13 VITALS — BODY MASS INDEX: 30.05 KG/M2 | WEIGHT: 176 LBS | HEIGHT: 64 IN

## 2017-06-13 DIAGNOSIS — M25.512 ACUTE PAIN OF LEFT SHOULDER: ICD-10-CM

## 2017-06-13 DIAGNOSIS — M75.42 IMPINGEMENT SYNDROME, SHOULDER, LEFT: ICD-10-CM

## 2017-06-13 DIAGNOSIS — M75.41 IMPINGEMENT SYNDROME OF RIGHT SHOULDER: ICD-10-CM

## 2017-06-13 DIAGNOSIS — M75.121 COMPLETE TEAR OF RIGHT ROTATOR CUFF: Primary | ICD-10-CM

## 2017-06-13 DIAGNOSIS — M25.511 RIGHT SHOULDER PAIN, UNSPECIFIED CHRONICITY: ICD-10-CM

## 2017-06-13 DIAGNOSIS — M25.511 ACUTE PAIN OF RIGHT SHOULDER: ICD-10-CM

## 2017-06-13 DIAGNOSIS — M19.011 ARTHRITIS OF RIGHT ACROMIOCLAVICULAR JOINT: ICD-10-CM

## 2017-06-13 PROCEDURE — 20610 DRAIN/INJ JOINT/BURSA W/O US: CPT | Performed by: NURSE PRACTITIONER

## 2017-06-13 PROCEDURE — 99213 OFFICE O/P EST LOW 20 MIN: CPT | Performed by: NURSE PRACTITIONER

## 2017-06-13 RX ORDER — LIDOCAINE HYDROCHLORIDE 20 MG/ML
2 INJECTION, SOLUTION INFILTRATION; PERINEURAL
Status: COMPLETED | OUTPATIENT
Start: 2017-06-13 | End: 2017-06-13

## 2017-06-13 RX ORDER — TRIAMCINOLONE ACETONIDE 40 MG/ML
40 INJECTION, SUSPENSION INTRA-ARTICULAR; INTRAMUSCULAR
Status: COMPLETED | OUTPATIENT
Start: 2017-06-13 | End: 2017-06-13

## 2017-06-13 RX ADMIN — LIDOCAINE HYDROCHLORIDE 2 ML: 20 INJECTION, SOLUTION INFILTRATION; PERINEURAL at 08:00

## 2017-06-13 RX ADMIN — LIDOCAINE HYDROCHLORIDE 2 ML: 20 INJECTION, SOLUTION INFILTRATION; PERINEURAL at 08:01

## 2017-06-13 RX ADMIN — TRIAMCINOLONE ACETONIDE 40 MG: 40 INJECTION, SUSPENSION INTRA-ARTICULAR; INTRAMUSCULAR at 08:00

## 2017-06-13 RX ADMIN — TRIAMCINOLONE ACETONIDE 40 MG: 40 INJECTION, SUSPENSION INTRA-ARTICULAR; INTRAMUSCULAR at 08:01

## 2017-06-13 NOTE — PROGRESS NOTES
Thelma Haley is a 71 y.o. female returns for     Chief Complaint   Patient presents with   • Right Shoulder - Follow-up   • Left Shoulder - Follow-up       HISTORY OF PRESENT ILLNESS:       CONCURRENT MEDICAL HISTORY:    Past Medical History:   Diagnosis Date   • Abnormal results of thyroid function studies    • Acute pain of left shoulder    • Acute pain of right shoulder    • Allergic rhinitis due to pollen    • Asthma      uncomplicated      • Cataract    • Chronic rhinitis    • Essential hypertension    • Extrinsic asthma with status asthmaticus    • Glaucoma    • Hyperlipidemia    • Impingement syndrome of right shoulder    • Neuropathy    • Nuclear senile cataract    • Obesity    • Overweight with body mass index (BMI) 25.0-29.9    • Primary fibromyalgia syndrome    • Primary open angle glaucoma    • Sinus headache    • Temporomandibular joint disorder        Allergies   Allergen Reactions   • Ciprofloxacin    • Hydrocodone    • Lortab [Hydrocodone-Acetaminophen]    • Metronidazole    • Ultram [Tramadol Hcl]          Current Outpatient Prescriptions:   •  (No Medication Selected), APPLY BID TO THE AFFECTED AREA., Disp: , Rfl: 2  •  albuterol (PROVENTIL HFA;VENTOLIN HFA) 108 (90 BASE) MCG/ACT inhaler, Inhale 2 puffs Every 4 (Four) Hours As Needed for wheezing., Disp: 6.7 g, Rfl: 11  •  Biotin (BIOTIN MAXIMUM STRENGTH) 10 MG tablet, Take 10 mg by mouth Daily., Disp: , Rfl:   •  budesonide-formoterol (SYMBICORT) 80-4.5 MCG/ACT inhaler, Inhale 2 puffs 2 (Two) Times a Day., Disp: 10.2 g, Rfl: 0  •  CETIRIZINE HCL PO, Take 10 mg by mouth Daily., Disp: , Rfl:   •  Cholecalciferol (VITAMIN D3) 1000 UNITS capsule, Take 1,000 Units by mouth Daily., Disp: , Rfl:   •  dorzolamide-timolol (COSOPT) 22.3-6.8 MG/ML ophthalmic solution, INSTILL 1 DROP INTO EACH EYE BID, Disp: , Rfl: 0  •  FLUAD 0.5 ML suspension prefilled syringe, ADM 0.5ML IM UTD, Disp: , Rfl: 0  •  fluticasone (FLONASE) 50 MCG/ACT nasal spray, 2 sprays  "into each nostril Daily. Administer 2 sprays in each nostril for each dose., Disp: 1 each, Rfl: 5  •  gabapentin (NEURONTIN) 100 MG capsule, Take 1 capsule by mouth 3 (Three) Times a Day., Disp: 270 capsule, Rfl: 3  •  hydrochlorothiazide (HYDRODIURIL) 25 MG tablet, Take 1 tablet by mouth Daily., Disp: 90 tablet, Rfl: 3  •  lisinopril (PRINIVIL,ZESTRIL) 5 MG tablet, Take 1 tablet by mouth Daily., Disp: 90 tablet, Rfl: 3  •  LOTEMAX 0.5 % gel ophthalmic gel, 1 drop 4 (Four) Times a Day., Disp: , Rfl:   •  lovastatin (MEVACOR) 20 MG tablet, TAKE 1 TABLET BY MOUTH EVERY NIGHT AT BEDTIME, Disp: 90 tablet, Rfl: 1  •  methIMAzole (TAPAZOLE) 5 MG tablet, Take 2.5 mg by mouth Daily., Disp: , Rfl:   •  nystatin (MYCOSTATIN) 521098 UNIT/GM ointment, , Disp: , Rfl:   •  ondansetron (ZOFRAN) 8 MG tablet, Take 1 tablet by mouth Every 8 (Eight) Hours As Needed for nausea or vomiting., Disp: 30 tablet, Rfl: 0  •  oxyCODONE-acetaminophen (PERCOCET) 7.5-325 MG per tablet, Take 1 tablet by mouth Every 4 (Four) Hours As Needed (Pain)., Disp: 40 tablet, Rfl: 0    Past Surgical History:   Procedure Laterality Date   • CARPAL TUNNEL RELEASE      Bilateral carpal tunnel release.)   03/27/2000    • INJECTION OF MEDICATION  06/17/2015    celestone(betamethasone) (2)    • INJECTION OF MEDICATION  04/14/2016    depo medrol ( methylprednisone) (20)   • OTHER SURGICAL HISTORY      hysterosope procedure   • RIGHT OOPHORECTOMY     • SHOULDER ARTHROSCOPY Right 2/6/2017    Procedure: RIGHT SHOULDER ARTHROSCOPY SUBACROMIAL DECOMPRESSION, ROTATOR CUFF REPAIR   ;  Surgeon: Terrence Haines MD;  Location: [facility];  Service:    • SHOULDER SURGERY      Excision of 4.8 cm mass with primary intermediate closure.)   03/30/2012    • SPINAL FUSION         ROS  No fevers or chills.  No chest pain or shortness of air.  No GI or  disturbances.    PHYSICAL EXAMINATION:       Ht 64\" (162.6 cm)  Wt 176 lb (79.8 kg)  BMI 30.21 kg/m2    Physical Exam "   Constitutional: She is oriented to person, place, and time. Vital signs are normal. She appears well-developed and well-nourished. She is cooperative.   HENT:   Head: Normocephalic and atraumatic.   Neck: Trachea normal and phonation normal.   Pulmonary/Chest: Effort normal. No respiratory distress.   Abdominal: Soft. Normal appearance. She exhibits no distension.   Neurological: She is alert and oriented to person, place, and time. GCS eye subscore is 4. GCS verbal subscore is 5. GCS motor subscore is 6.   Skin: Skin is warm, dry and intact.   Psychiatric: She has a normal mood and affect. Her speech is normal and behavior is normal. Judgment and thought content normal. Cognition and memory are normal.   Vitals reviewed.      GAIT:     [x]  Normal  []  Antalgic    Assistive device: [x]  None  []  Walker     []  Crutches  []  Cane     []  Wheelchair  []  Stretcher    Right Shoulder Exam     Tenderness   The patient is experiencing tenderness in the acromioclavicular joint.    Range of Motion   Active Abduction: abnormal   Forward Flexion: abnormal   External Rotation: abnormal   Internal Rotation 90 degrees: abnormal     Muscle Strength   Abduction: 4/5   Internal Rotation: 4/5   External Rotation: 4/5   Supraspinatus: 4/5     Other   Erythema: absent  Scars: absent  Sensation: normal  Pulse: present      Left Shoulder Exam     Tenderness   The patient is experiencing tenderness in the acromioclavicular joint.    Range of Motion   Active Abduction: abnormal   Forward Flexion: abnormal   External Rotation: abnormal   Internal Rotation 90 degrees: abnormal     Muscle Strength   Abduction: 4/5   Internal Rotation: 4/5   External Rotation: 4/5   Supraspinatus: 4/5     Other   Erythema: absent  Scars: absent  Sensation: normal  Pulse: present            Large Joint Arthrocentesis  Date/Time: 6/13/2017 8:00 AM  Consent given by: patient  Site marked: site marked  Timeout: Immediately prior to procedure a time out was  called to verify the correct patient, procedure, equipment, support staff and site/side marked as required   Supporting Documentation  Indications: pain   Procedure Details  Location: shoulder - R subacromial bursa  Preparation: Patient was prepped and draped in the usual sterile fashion  Needle size: 22 G  Approach: posterior  Medications administered: 40 mg triamcinolone acetonide 40 MG/ML; 2 mL lidocaine 2%  Patient tolerance: patient tolerated the procedure well with no immediate complications    Large Joint Arthrocentesis  Date/Time: 6/13/2017 8:01 AM  Consent given by: patient  Site marked: site marked  Timeout: Immediately prior to procedure a time out was called to verify the correct patient, procedure, equipment, support staff and site/side marked as required   Supporting Documentation  Indications: pain   Procedure Details  Location: shoulder - L subacromial bursa  Preparation: Patient was prepped and draped in the usual sterile fashion  Needle size: 22 G  Approach: posterior  Medications administered: 40 mg triamcinolone acetonide 40 MG/ML; 2 mL lidocaine 2%  Patient tolerance: patient tolerated the procedure well with no immediate complications              No results found.          ASSESSMENT:    Diagnoses and all orders for this visit:    Complete tear of right rotator cuff  -     Large Joint Arthrocentesis  -     Large Joint Arthrocentesis    Arthritis of right acromioclavicular joint  -     Large Joint Arthrocentesis  -     Large Joint Arthrocentesis    Acute pain of right shoulder  -     Large Joint Arthrocentesis  -     Large Joint Arthrocentesis    Impingement syndrome, shoulder, left  -     Large Joint Arthrocentesis  -     Large Joint Arthrocentesis    Impingement syndrome of right shoulder  -     Large Joint Arthrocentesis  -     Large Joint Arthrocentesis    Acute pain of left shoulder  -     Large Joint Arthrocentesis  -     Large Joint Arthrocentesis    Right shoulder pain, unspecified  chronicity  -     Large Joint Arthrocentesis  -     Large Joint Arthrocentesis          PLAN  Injected the shoulder today and recommended progression range of motion and activity as tolerated based on pain.  Follow-up in as needed for exacerbations of discomfort.  No Follow-up on file.    Michael Wong, APRN

## 2017-06-26 ENCOUNTER — OFFICE VISIT (OUTPATIENT)
Dept: CARDIOLOGY | Facility: CLINIC | Age: 72
End: 2017-06-26

## 2017-06-26 VITALS
SYSTOLIC BLOOD PRESSURE: 136 MMHG | BODY MASS INDEX: 30.56 KG/M2 | HEIGHT: 64 IN | DIASTOLIC BLOOD PRESSURE: 78 MMHG | HEART RATE: 70 BPM | WEIGHT: 179 LBS | OXYGEN SATURATION: 97 %

## 2017-06-26 DIAGNOSIS — E78.5 HYPERLIPIDEMIA, UNSPECIFIED HYPERLIPIDEMIA TYPE: ICD-10-CM

## 2017-06-26 DIAGNOSIS — I10 ESSENTIAL HYPERTENSION: Primary | ICD-10-CM

## 2017-06-26 PROCEDURE — 99213 OFFICE O/P EST LOW 20 MIN: CPT | Performed by: INTERNAL MEDICINE

## 2017-06-26 RX ORDER — KETOCONAZOLE 20 MG/ML
SHAMPOO TOPICAL
Refills: 2 | COMMUNITY
Start: 2017-06-14 | End: 2017-08-18

## 2017-06-26 RX ORDER — FLUOCINONIDE TOPICAL SOLUTION USP, 0.05% 0.5 MG/ML
SOLUTION TOPICAL
Refills: 2 | COMMUNITY
Start: 2017-06-16 | End: 2017-08-18

## 2017-06-26 RX ORDER — MINOCYCLINE HYDROCHLORIDE 100 MG/1
CAPSULE ORAL
Refills: 2 | COMMUNITY
Start: 2017-06-14 | End: 2017-10-19

## 2017-06-26 NOTE — PROGRESS NOTES
Chief complaint : Hypertension    History of Present Illness very pleasant 71-year-old female who comes today for a follow-up visit.  Patient has no chest pain or chest discomfort.  Patient denies shortness of breath orthopnea or PND.         Review of Systems   Constitution: Negative. Negative for decreased appetite, diaphoresis, weakness, night sweats, weight gain and weight loss.   HENT: Negative for headaches, hearing loss, nosebleeds and sore throat.    Eyes: Negative.  Negative for blurred vision and photophobia.   Cardiovascular: Negative for chest pain, claudication, dyspnea on exertion, irregular heartbeat, leg swelling, palpitations, paroxysmal nocturnal dyspnea and syncope.   Respiratory: Negative for cough, hemoptysis, shortness of breath and wheezing.    Endocrine: Negative for cold intolerance, heat intolerance, polydipsia, polyphagia and polyuria.   Hematologic/Lymphatic: Negative.    Skin: Negative for color change, dry skin, flushing, itching and rash.   Musculoskeletal: Negative.  Negative for muscle cramps, muscle weakness and myalgias.   Gastrointestinal: Negative for abdominal pain, change in bowel habit, diarrhea, hematemesis, melena, nausea and vomiting.   Genitourinary: Negative for dysuria, frequency and hematuria.   Neurological: Negative for dizziness, focal weakness, light-headedness, loss of balance, numbness and seizures.   Psychiatric/Behavioral: Negative.  Negative for substance abuse, suicidal ideas and thoughts of violence.   Allergic/Immunologic: Negative.        Past Medical History:   Diagnosis Date   • Abnormal results of thyroid function studies    • Acute pain of left shoulder    • Acute pain of right shoulder    • Allergic rhinitis due to pollen    • Asthma      uncomplicated      • Cataract    • Chronic rhinitis    • Essential hypertension    • Extrinsic asthma with status asthmaticus    • Glaucoma    • Hyperlipidemia    • Impingement syndrome of right shoulder    •  Neuropathy    • Nuclear senile cataract    • Obesity    • Overweight with body mass index (BMI) 25.0-29.9    • Primary fibromyalgia syndrome    • Primary open angle glaucoma    • Sinus headache    • Temporomandibular joint disorder        Family History   Problem Relation Age of Onset   • Heart disease Other    • Hypertension Other    • COPD Mother        Ciprofloxacin; Hydrocodone; Lortab [hydrocodone-acetaminophen]; Metronidazole; and Ultram [tramadol hcl]     reports that she has never smoked. She has never used smokeless tobacco. She reports that she does not drink alcohol or use illicit drugs.    Objective     Vital Signs  Heart Rate:  [70] 70  BP: (136)/(78) 136/78    Physical Exam   Constitutional: She is oriented to person, place, and time. She appears well-developed and well-nourished.   HENT:   Head: Normocephalic and atraumatic.   Eyes: Conjunctivae and EOM are normal. Pupils are equal, round, and reactive to light.   Neck: Neck supple. No JVD present. Carotid bruit is not present. No tracheal deviation and no edema present.   Cardiovascular: Normal rate, regular rhythm, S1 normal, S2 normal, normal heart sounds and intact distal pulses.  Exam reveals no gallop, no S3, no S4 and no friction rub.    No murmur heard.  Pulmonary/Chest: Effort normal and breath sounds normal. She has no wheezes. She has no rales. She exhibits no tenderness.   Abdominal: Bowel sounds are normal. She exhibits no abdominal bruit and no pulsatile midline mass. There is no rebound and no guarding.   Musculoskeletal: Normal range of motion. She exhibits no edema.   Neurological: She is alert and oriented to person, place, and time.   Skin: Skin is warm and dry.   Psychiatric: She has a normal mood and affect.       Procedures    Assessment/Plan     Patient Active Problem List   Diagnosis   • Primary fibromyalgia syndrome   • Obesity   • Neuropathy   • Hyperlipidemia   • Essential hypertension   • Glaucoma   • Chronic rhinitis   •  Allergic rhinitis due to pollen   • Abnormal results of thyroid function studies   • Acute pain of left shoulder   • Acute pain of right shoulder   • Impingement syndrome of right shoulder   • Diverticulitis of intestine without perforation or abscess   • Chest pain   • Vitamin D deficiency   • Subclinical hyperthyroidism   • Prediabetes   • Simple goiter   • Disorder of bone    • Impaired fasting glucose    • Complete tear of right rotator cuff   • Arthritis of right acromioclavicular joint     1. Essential hypertension  Patient's blood pressure is well-controlled.  We'll continue with current medications.    2. Hyperlipidemia, unspecified hyperlipidemia type  Component      Latest Ref Rng & Units 6/3/2015 9/2/2016   Total Cholesterol      0 - 199 mg/dl 203 (H) 151   Triglycerides      20 - 199 mg/dl 211 (H) 147   HDL Cholesterol      60 - 200 mg/dl 37 (L) 40 (L)   LDL Cholesterol      0 - 129 mg/dl 124 82     · Continue with current dose of statin.  · We will continue to monitor lipid panel and liver function test    I discussed the patients findings and my recommendations with patient.          This document has been electronically signed by Dmitri Mendez MD on June 26, 2017 3:04 PM     Dmitri Mendez MD  06/26/17  3:02 PM

## 2017-07-03 ENCOUNTER — OFFICE VISIT (OUTPATIENT)
Dept: PULMONOLOGY | Facility: CLINIC | Age: 72
End: 2017-07-03

## 2017-07-03 VITALS
HEIGHT: 64 IN | WEIGHT: 179 LBS | OXYGEN SATURATION: 97 % | BODY MASS INDEX: 30.56 KG/M2 | DIASTOLIC BLOOD PRESSURE: 78 MMHG | HEART RATE: 83 BPM | SYSTOLIC BLOOD PRESSURE: 120 MMHG

## 2017-07-03 DIAGNOSIS — J20.9 ACUTE BRONCHITIS, UNSPECIFIED ORGANISM: ICD-10-CM

## 2017-07-03 DIAGNOSIS — J45.42 MODERATE PERSISTENT ASTHMA WITH STATUS ASTHMATICUS: Primary | ICD-10-CM

## 2017-07-03 PROCEDURE — 99214 OFFICE O/P EST MOD 30 MIN: CPT | Performed by: INTERNAL MEDICINE

## 2017-07-03 PROCEDURE — 96372 THER/PROPH/DIAG INJ SC/IM: CPT | Performed by: INTERNAL MEDICINE

## 2017-07-03 RX ORDER — BUDESONIDE AND FORMOTEROL FUMARATE DIHYDRATE 160; 4.5 UG/1; UG/1
AEROSOL RESPIRATORY (INHALATION)
Qty: 1 INHALER | Refills: 5 | Status: SHIPPED | OUTPATIENT
Start: 2017-07-03 | End: 2017-07-31

## 2017-07-03 RX ORDER — AMOXICILLIN AND CLAVULANATE POTASSIUM 875; 125 MG/1; MG/1
TABLET, FILM COATED ORAL
Qty: 20 TABLET | Refills: 0 | Status: SHIPPED | OUTPATIENT
Start: 2017-07-03 | End: 2017-07-20

## 2017-07-03 RX ORDER — PREDNISONE 10 MG/1
TABLET ORAL
Qty: 21 TABLET | Refills: 0 | Status: SHIPPED | OUTPATIENT
Start: 2017-07-03 | End: 2017-07-20

## 2017-07-03 RX ORDER — METHYLPREDNISOLONE ACETATE 40 MG/ML
80 INJECTION, SUSPENSION INTRA-ARTICULAR; INTRALESIONAL; INTRAMUSCULAR; SOFT TISSUE ONCE
Status: COMPLETED | OUTPATIENT
Start: 2017-07-03 | End: 2017-07-03

## 2017-07-03 RX ADMIN — METHYLPREDNISOLONE ACETATE 80 MG: 40 INJECTION, SUSPENSION INTRA-ARTICULAR; INTRALESIONAL; INTRAMUSCULAR; SOFT TISSUE at 09:23

## 2017-07-03 NOTE — PROGRESS NOTES
"This lady has asthma and allergic rhinitis and chronic bronchitis.  For several days she has complained of cough wheeze shortness of breath purulent sputum and dyspnea on exertion    ROS    Constitutional-no night sweats weight loss headaches  GI no abdominal pain nausea or diarrhea  Neuro no seizure or neurologic deficits  Musculoskeletal no deformity or joint pain   no dysuria or hematuria  Skin no rash or other lesions  All other systems reviewed and were negative except for the above.      Physical Exam  /78 (BP Location: Right arm, Patient Position: Sitting, Cuff Size: Adult)  Pulse 83  Ht 64\" (162.6 cm)  Wt 179 lb (81.2 kg)  SpO2 97%  BMI 30.73 kg/m2  Vital signs as above  Pupils equally round and reactive to light and accommodation, neck no JVD or adenopathy.  Cardiovascular regular rhythm and rate no murmur or gallop.  Abdomen soft no organomegaly tenderness.  Extremities no clubbing cyanosis or edema.  No cervical adenopathy.  No skin rash.  Neurologic good strength bilaterally without deficits  Dyspneic white female with wheezes bilaterally    Impression asthma with status asthmaticus, acute bronchitis    Plan Depo-Medrol, prednisone, Symbicort, Augmentin, return in 2 weeks  "

## 2017-07-11 ENCOUNTER — OFFICE VISIT (OUTPATIENT)
Dept: PULMONOLOGY | Facility: CLINIC | Age: 72
End: 2017-07-11

## 2017-07-11 VITALS
DIASTOLIC BLOOD PRESSURE: 92 MMHG | OXYGEN SATURATION: 97 % | WEIGHT: 173.8 LBS | SYSTOLIC BLOOD PRESSURE: 142 MMHG | BODY MASS INDEX: 29.67 KG/M2 | HEIGHT: 64 IN | HEART RATE: 89 BPM

## 2017-07-11 DIAGNOSIS — J45.42 MODERATE PERSISTENT ASTHMA WITH STATUS ASTHMATICUS: Primary | ICD-10-CM

## 2017-07-11 DIAGNOSIS — J20.9 ACUTE BRONCHITIS, UNSPECIFIED ORGANISM: ICD-10-CM

## 2017-07-11 PROCEDURE — 99214 OFFICE O/P EST MOD 30 MIN: CPT | Performed by: INTERNAL MEDICINE

## 2017-07-11 PROCEDURE — 96372 THER/PROPH/DIAG INJ SC/IM: CPT | Performed by: INTERNAL MEDICINE

## 2017-07-11 RX ORDER — PREDNISONE 10 MG/1
TABLET ORAL
Qty: 21 TABLET | Refills: 0 | Status: SHIPPED | OUTPATIENT
Start: 2017-07-11 | End: 2017-07-20

## 2017-07-11 RX ORDER — METHYLPREDNISOLONE ACETATE 40 MG/ML
80 INJECTION, SUSPENSION INTRA-ARTICULAR; INTRALESIONAL; INTRAMUSCULAR; SOFT TISSUE ONCE
Status: COMPLETED | OUTPATIENT
Start: 2017-07-11 | End: 2017-07-11

## 2017-07-11 RX ORDER — DOXYCYCLINE 100 MG/1
CAPSULE ORAL
Qty: 20 CAPSULE | Refills: 0 | Status: SHIPPED | OUTPATIENT
Start: 2017-07-11 | End: 2017-07-31

## 2017-07-11 RX ADMIN — METHYLPREDNISOLONE ACETATE 80 MG: 40 INJECTION, SUSPENSION INTRA-ARTICULAR; INTRALESIONAL; INTRAMUSCULAR; SOFT TISSUE at 10:05

## 2017-07-11 NOTE — PROGRESS NOTES
"This lady has asthma and chronic bronchitis.  For the past several days she has noted cough wheeze and purulent sputum.  She denies chest pain or hemoptysis.    ROS    Constitutional-no night sweats weight loss headaches  GI no abdominal pain nausea or diarrhea  Neuro no seizure or neurologic deficits  Musculoskeletal no deformity or joint pain   no dysuria or hematuria  Skin no rash or other lesions  All other systems reviewed and were negative except for the above.      Physical Exam  /92  Pulse 89  Ht 64\" (162.6 cm)  Wt 173 lb 12.8 oz (78.8 kg)  SpO2 97%  BMI 29.83 kg/m2  Vital signs as above  Pupils equally round and reactive to light and accommodation, neck no JVD or adenopathy.  Cardiovascular regular rhythm and rate no murmur or gallop.  Abdomen soft no organomegaly tenderness.  Extremities no clubbing cyanosis or edema.  No cervical adenopathy.  No skin rash.  Neurologic good strength bilaterally without deficits  Lungs reveal tight wheezes and rhonchi cardiovascular regular rhythm and rate    Impression status asthmaticus and acute bronchitis    Plan Depo-Medrol, doxycycline, prednisone, routine meds, return in 2 weeks  "

## 2017-07-19 ENCOUNTER — OFFICE VISIT (OUTPATIENT)
Dept: ORTHOPEDIC SURGERY | Facility: CLINIC | Age: 72
End: 2017-07-19

## 2017-07-19 DIAGNOSIS — M17.0 PRIMARY OSTEOARTHRITIS OF BOTH KNEES: Primary | ICD-10-CM

## 2017-07-19 PROCEDURE — 99214 OFFICE O/P EST MOD 30 MIN: CPT | Performed by: NURSE PRACTITIONER

## 2017-07-19 PROCEDURE — 20610 DRAIN/INJ JOINT/BURSA W/O US: CPT | Performed by: NURSE PRACTITIONER

## 2017-07-19 RX ADMIN — TRIAMCINOLONE ACETONIDE 40 MG: 40 INJECTION, SUSPENSION INTRA-ARTICULAR; INTRAMUSCULAR at 13:55

## 2017-07-19 RX ADMIN — LIDOCAINE HYDROCHLORIDE 2 ML: 10 INJECTION, SOLUTION INFILTRATION; PERINEURAL at 13:55

## 2017-07-19 NOTE — PROGRESS NOTES
Thelma Haley is a 71 y.o. female returns for     Chief Complaint   Patient presents with   • Left Knee - Follow-up   • Right Knee - Follow-up       HISTORY OF PRESENT ILLNESS: patient requesting steroid injection geoffrey knees, last injection done on 4/12/2017       CONCURRENT MEDICAL HISTORY:    Past Medical History:   Diagnosis Date   • Abnormal results of thyroid function studies    • Acute pain of left shoulder    • Acute pain of right shoulder    • Allergic rhinitis due to pollen    • Asthma      uncomplicated      • Cataract    • Chronic rhinitis    • Essential hypertension    • Extrinsic asthma with status asthmaticus    • Glaucoma    • Hyperlipidemia    • Impingement syndrome of right shoulder    • Neuropathy    • Nuclear senile cataract    • Obesity    • Overweight with body mass index (BMI) 25.0-29.9    • Primary fibromyalgia syndrome    • Primary open angle glaucoma    • Sinus headache    • Temporomandibular joint disorder        Allergies   Allergen Reactions   • Ciprofloxacin    • Hydrocodone    • Lortab [Hydrocodone-Acetaminophen]    • Metronidazole    • Ultram [Tramadol Hcl]          Current Outpatient Prescriptions:   •  albuterol (PROVENTIL HFA;VENTOLIN HFA) 108 (90 BASE) MCG/ACT inhaler, Inhale 2 puffs Every 4 (Four) Hours As Needed for wheezing., Disp: 6.7 g, Rfl: 11  •  amoxicillin-clavulanate (AUGMENTIN) 875-125 MG per tablet, 1 tablet twice a day po, Disp: 20 tablet, Rfl: 0  •  Biotin (BIOTIN MAXIMUM STRENGTH) 10 MG tablet, Take 10 mg by mouth Daily., Disp: , Rfl:   •  budesonide-formoterol (SYMBICORT) 160-4.5 MCG/ACT inhaler, 2 puffs twice a day, Disp: 1 inhaler, Rfl: 5  •  budesonide-formoterol (SYMBICORT) 80-4.5 MCG/ACT inhaler, Inhale 2 puffs 2 (Two) Times a Day., Disp: 10.2 g, Rfl: 0  •  CETIRIZINE HCL PO, Take 10 mg by mouth Daily., Disp: , Rfl:   •  Cholecalciferol (VITAMIN D3) 1000 UNITS capsule, Take 1,000 Units by mouth Daily., Disp: , Rfl:   •  dorzolamide-timolol (COSOPT) 22.3-6.8  MG/ML ophthalmic solution, INSTILL 1 DROP INTO EACH EYE BID, Disp: , Rfl: 0  •  doxycycline (MONODOX) 100 MG capsule, 1 dose by mouth twice a day, Disp: 20 capsule, Rfl: 0  •  fluocinonide (LIDEX) 0.05 % external solution, APPLY TOPICALLY AA ONCE QD TO BID, Disp: , Rfl: 2  •  fluticasone (FLONASE) 50 MCG/ACT nasal spray, 2 sprays into each nostril Daily. Administer 2 sprays in each nostril for each dose., Disp: 1 each, Rfl: 5  •  gabapentin (NEURONTIN) 100 MG capsule, Take 1 capsule by mouth 3 (Three) Times a Day., Disp: 270 capsule, Rfl: 3  •  hydrochlorothiazide (HYDRODIURIL) 25 MG tablet, Take 1 tablet by mouth Daily., Disp: 90 tablet, Rfl: 3  •  ketoconazole (NIZORAL) 2 % shampoo, USE 2 TO 3 TIMES PER WEEK PRN, Disp: , Rfl: 2  •  lisinopril (PRINIVIL,ZESTRIL) 5 MG tablet, Take 1 tablet by mouth Daily., Disp: 90 tablet, Rfl: 3  •  LOTEMAX 0.5 % gel ophthalmic gel, 1 drop 4 (Four) Times a Day., Disp: , Rfl:   •  lovastatin (MEVACOR) 20 MG tablet, TAKE 1 TABLET BY MOUTH EVERY NIGHT AT BEDTIME, Disp: 90 tablet, Rfl: 1  •  methIMAzole (TAPAZOLE) 5 MG tablet, Take 2.5 mg by mouth Daily., Disp: , Rfl:   •  metroNIDAZOLE (METROCREAM) 0.75 % cream, APPLY TOPICALLY BID FOR ROSACEA, Disp: , Rfl: 2  •  minocycline (MINOCIN,DYNACIN) 100 MG capsule, TK 1 C PO QD, Disp: , Rfl: 2  •  ondansetron (ZOFRAN) 8 MG tablet, Take 1 tablet by mouth Every 8 (Eight) Hours As Needed for nausea or vomiting., Disp: 30 tablet, Rfl: 0  •  predniSONE (DELTASONE) 10 MG tablet, 2 tablets by mouth daily for 1 week, then 1 tablet daily for 1 week, Disp: 21 tablet, Rfl: 0  •  predniSONE (DELTASONE) 10 MG tablet, 2 tablets by mouth daily for 1 week, then 1 tablet daily for 1 week, Disp: 21 tablet, Rfl: 0    Past Surgical History:   Procedure Laterality Date   • CARPAL TUNNEL RELEASE      Bilateral carpal tunnel release.)   03/27/2000    • INJECTION OF MEDICATION  06/17/2015    celestone(betamethasone) (2)    • INJECTION OF MEDICATION  04/14/2016     depo medrol ( methylprednisone) (20)   • OTHER SURGICAL HISTORY      hysterosope procedure   • RIGHT OOPHORECTOMY     • SHOULDER ARTHROSCOPY Right 2/6/2017    Procedure: RIGHT SHOULDER ARTHROSCOPY SUBACROMIAL DECOMPRESSION, ROTATOR CUFF REPAIR   ;  Surgeon: Terrence Haines MD;  Location: [facility];  Service:    • SHOULDER SURGERY      Excision of 4.8 cm mass with primary intermediate closure.)   03/30/2012    • SPINAL FUSION         ROS  No fevers or chills.  No chest pain or shortness of air.  No GI or  disturbances.    PHYSICAL EXAMINATION:       There were no vitals taken for this visit.    Physical Exam   Constitutional: She is oriented to person, place, and time. Vital signs are normal. She appears well-developed and well-nourished. She is cooperative.   HENT:   Head: Normocephalic and atraumatic.   Neck: Trachea normal and phonation normal.   Pulmonary/Chest: Effort normal. No respiratory distress.   Abdominal: Soft. Normal appearance. She exhibits no distension.   Musculoskeletal:        Right knee: She exhibits no effusion.        Left knee: She exhibits no effusion.   Neurological: She is alert and oriented to person, place, and time. GCS eye subscore is 4. GCS verbal subscore is 5. GCS motor subscore is 6.   Skin: Skin is warm, dry and intact.   Psychiatric: She has a normal mood and affect. Her speech is normal and behavior is normal. Judgment and thought content normal. Cognition and memory are normal.   Vitals reviewed.      GAIT:     []  Normal  [x]  Antalgic    Assistive device: []  None  []  Walker     []  Crutches  []  Cane     []  Wheelchair  []  Stretcher    Right Knee Exam     Tenderness   The patient is experiencing tenderness in the medial joint line.    Range of Motion   Extension: normal   Flexion: abnormal     Other   Erythema: absent  Scars: absent  Sensation: normal  Pulse: present  Swelling: mild  Other tests: no effusion present      Left Knee Exam     Tenderness   The patient  is experiencing tenderness in the medial joint line.    Range of Motion   Extension: normal   Flexion: abnormal     Other   Erythema: absent  Sensation: normal  Pulse: present  Swelling: mild  Effusion: no effusion present                Large Joint Arthrocentesis  Date/Time: 7/19/2017 1:55 PM  Consent given by: patient  Site marked: site marked  Timeout: Immediately prior to procedure a time out was called to verify the correct patient, procedure, equipment, support staff and site/side marked as required   Supporting Documentation  Indications: pain   Procedure Details  Location: knee - R knee  Preparation: Patient was prepped and draped in the usual sterile fashion  Needle size: 22 G  Approach: anteromedial  Medications administered: 40 mg triamcinolone acetonide 40 MG/ML; 2 mL lidocaine 1 %  Patient tolerance: patient tolerated the procedure well with no immediate complications    Large Joint Arthrocentesis  Date/Time: 7/19/2017 1:55 PM  Consent given by: patient  Site marked: site marked  Timeout: Immediately prior to procedure a time out was called to verify the correct patient, procedure, equipment, support staff and site/side marked as required   Supporting Documentation  Indications: pain   Procedure Details  Location: knee - L knee  Preparation: Patient was prepped and draped in the usual sterile fashion  Needle size: 22 G  Approach: anteromedial  Medications administered: 40 mg triamcinolone acetonide 40 MG/ML; 2 mL lidocaine 1 %  Patient tolerance: patient tolerated the procedure well with no immediate complications              ASSESSMENT:    Diagnoses and all orders for this visit:    Primary osteoarthritis of both knees  -     Large Joint Arthrocentesis  -     Large Joint Arthrocentesis          PLAN  Repeated the bilateral intra-articular injection of steroids today and recommended continued home exercises and progressive range of motion and activity as tolerated based on pain  No Follow-up on  file.    Michael Wong, APRN

## 2017-07-20 ENCOUNTER — APPOINTMENT (OUTPATIENT)
Dept: LAB | Facility: HOSPITAL | Age: 72
End: 2017-07-20

## 2017-07-20 ENCOUNTER — OFFICE VISIT (OUTPATIENT)
Dept: FAMILY MEDICINE CLINIC | Facility: CLINIC | Age: 72
End: 2017-07-20

## 2017-07-20 VITALS
BODY MASS INDEX: 29.89 KG/M2 | SYSTOLIC BLOOD PRESSURE: 154 MMHG | OXYGEN SATURATION: 98 % | HEART RATE: 89 BPM | DIASTOLIC BLOOD PRESSURE: 82 MMHG | HEIGHT: 64 IN | WEIGHT: 175.06 LBS

## 2017-07-20 DIAGNOSIS — I10 ESSENTIAL HYPERTENSION: Primary | ICD-10-CM

## 2017-07-20 DIAGNOSIS — Z51.81 ENCOUNTER FOR MEDICATION MONITORING: ICD-10-CM

## 2017-07-20 DIAGNOSIS — G62.9 NEUROPATHY: ICD-10-CM

## 2017-07-20 PROCEDURE — 80307 DRUG TEST PRSMV CHEM ANLYZR: CPT | Performed by: FAMILY MEDICINE

## 2017-07-20 PROCEDURE — 99213 OFFICE O/P EST LOW 20 MIN: CPT | Performed by: FAMILY MEDICINE

## 2017-07-20 PROCEDURE — G0481 DRUG TEST DEF 8-14 CLASSES: HCPCS | Performed by: FAMILY MEDICINE

## 2017-07-20 RX ORDER — LIDOCAINE HYDROCHLORIDE 10 MG/ML
2 INJECTION, SOLUTION INFILTRATION; PERINEURAL
Status: COMPLETED | OUTPATIENT
Start: 2017-07-19 | End: 2017-07-19

## 2017-07-20 RX ORDER — LISINOPRIL 5 MG/1
5 TABLET ORAL DAILY
Qty: 90 TABLET | Refills: 3 | Status: SHIPPED | OUTPATIENT
Start: 2017-07-20 | End: 2018-07-20 | Stop reason: SDUPTHER

## 2017-07-20 RX ORDER — TRIAMCINOLONE ACETONIDE 40 MG/ML
40 INJECTION, SUSPENSION INTRA-ARTICULAR; INTRAMUSCULAR
Status: COMPLETED | OUTPATIENT
Start: 2017-07-19 | End: 2017-07-19

## 2017-07-20 RX ORDER — HYDROCHLOROTHIAZIDE 25 MG/1
25 TABLET ORAL DAILY
Qty: 90 TABLET | Refills: 3 | Status: SHIPPED | OUTPATIENT
Start: 2017-07-20 | End: 2018-07-20 | Stop reason: SDUPTHER

## 2017-07-20 RX ORDER — GABAPENTIN 100 MG/1
100 CAPSULE ORAL 3 TIMES DAILY
Qty: 90 CAPSULE | Refills: 2 | Status: SHIPPED | OUTPATIENT
Start: 2017-07-20 | End: 2017-10-25 | Stop reason: SDUPTHER

## 2017-07-20 NOTE — PROGRESS NOTES
Subjective   Thelma Haley is a 71 y.o. female.     History of Present Illness The patienbt comes in to see Dr. Polo. She is doing well.     The following portions of the patient's history were reviewed and updated as appropriate: allergies, current medications, past family history, past medical history, past social history, past surgical history and problem list.    Review of Systems   Constitutional: Negative for fatigue and fever.   Respiratory: Negative for cough, chest tightness and stridor.    Cardiovascular: Negative for chest pain, palpitations and leg swelling.   Neurological: Positive for dizziness.       Objective   Physical Exam   Constitutional: She appears well-developed and well-nourished.   HENT:   Head: Normocephalic and atraumatic.   Right Ear: External ear normal.   Left Ear: External ear normal.   Nose: Nose normal.   Mouth/Throat: Oropharynx is clear and moist.   Eyes: EOM are normal. Pupils are equal, round, and reactive to light.   Neck: Normal range of motion.   Cardiovascular: Normal rate, regular rhythm and normal heart sounds.  Exam reveals no gallop and no friction rub.    No murmur heard.  Pulmonary/Chest: Effort normal and breath sounds normal. No respiratory distress. She has no wheezes. She has no rales.   Abdominal: Soft. Bowel sounds are normal.   Skin: Skin is warm and dry.   Vitals reviewed.      Assessment/Plan   Thelma was seen today for hypertension and med refill.    Diagnoses and all orders for this visit:    Neuropathy  -     ToxASSURE Select 13 (MW)    Encounter for medication monitoring  -     ToxASSURE Select 13 (MW)    Other orders  -     lisinopril (PRINIVIL,ZESTRIL) 5 MG tablet; Take 1 tablet by mouth Daily.  -     gabapentin (NEURONTIN) 100 MG capsule; Take 1 capsule by mouth 3 (Three) Times a Day.  -     hydrochlorothiazide (HYDRODIURIL) 25 MG tablet; Take 1 tablet by mouth Daily.        I have reviewed the notes, assessments, and/or procedures performed by  the resident  , I concur with her/his documentation of Thelma Haley.

## 2017-07-20 NOTE — PROGRESS NOTES
Subjective   Thelma Haley is a 71 y.o. female.     Hypertension   This is a chronic problem. The current episode started more than 1 year ago. The problem has been waxing and waning since onset. The problem is uncontrolled. Pertinent negatives include no chest pain, headaches, palpitations or shortness of breath. Agents associated with hypertension include decongestants. Risk factors for coronary artery disease include obesity, sedentary lifestyle, stress and family history. Past treatments include ACE inhibitors.        The following portions of the patient's history were reviewed and updated as appropriate: allergies, current medications, past family history, past medical history, past social history, past surgical history and problem list.    Social History     Social History   • Marital status:      Spouse name: N/A   • Number of children: N/A   • Years of education: N/A     Occupational History   • Not on file.     Social History Main Topics   • Smoking status: Never Smoker   • Smokeless tobacco: Never Used   • Alcohol use No   • Drug use: No   • Sexual activity: Defer     Other Topics Concern   • Not on file     Social History Narrative       Immunization History   Administered Date(s) Administered   • Influenza, Unspecified 10/29/2008, 10/02/2014, 12/17/2015   • Pneumococcal Conjugate 13-Valent 12/17/2015   • Pneumococcal Polysaccharide 10/02/2014   • Tetanus 07/10/2014        Current Outpatient Prescriptions on File Prior to Visit   Medication Sig Dispense Refill   • Biotin (BIOTIN MAXIMUM STRENGTH) 10 MG tablet Take 10 mg by mouth Daily.     • budesonide-formoterol (SYMBICORT) 160-4.5 MCG/ACT inhaler 2 puffs twice a day 1 inhaler 5   • budesonide-formoterol (SYMBICORT) 80-4.5 MCG/ACT inhaler Inhale 2 puffs 2 (Two) Times a Day. 10.2 g 0   • CETIRIZINE HCL PO Take 10 mg by mouth Daily.     • Cholecalciferol (VITAMIN D3) 1000 UNITS capsule Take 1,000 Units by mouth Daily.     • dorzolamide-timolol  (COSOPT) 22.3-6.8 MG/ML ophthalmic solution INSTILL 1 DROP INTO EACH EYE BID  0   • doxycycline (MONODOX) 100 MG capsule 1 dose by mouth twice a day 20 capsule 0   • fluocinonide (LIDEX) 0.05 % external solution APPLY TOPICALLY AA ONCE QD TO BID  2   • fluticasone (FLONASE) 50 MCG/ACT nasal spray 2 sprays into each nostril Daily. Administer 2 sprays in each nostril for each dose. 1 each 5   • gabapentin (NEURONTIN) 100 MG capsule Take 1 capsule by mouth 3 (Three) Times a Day. 270 capsule 3   • hydrochlorothiazide (HYDRODIURIL) 25 MG tablet Take 1 tablet by mouth Daily. 90 tablet 3   • ketoconazole (NIZORAL) 2 % shampoo USE 2 TO 3 TIMES PER WEEK PRN  2   • lisinopril (PRINIVIL,ZESTRIL) 5 MG tablet Take 1 tablet by mouth Daily. 90 tablet 3   • lovastatin (MEVACOR) 20 MG tablet TAKE 1 TABLET BY MOUTH EVERY NIGHT AT BEDTIME 90 tablet 1   • methIMAzole (TAPAZOLE) 5 MG tablet Take 2.5 mg by mouth Daily.     • metroNIDAZOLE (METROCREAM) 0.75 % cream APPLY TOPICALLY BID FOR ROSACEA  2   • minocycline (MINOCIN,DYNACIN) 100 MG capsule TK 1 C PO QD  2   • ondansetron (ZOFRAN) 8 MG tablet Take 1 tablet by mouth Every 8 (Eight) Hours As Needed for nausea or vomiting. 30 tablet 0   • albuterol (PROVENTIL HFA;VENTOLIN HFA) 108 (90 BASE) MCG/ACT inhaler Inhale 2 puffs Every 4 (Four) Hours As Needed for wheezing. 6.7 g 11   • [DISCONTINUED] amoxicillin-clavulanate (AUGMENTIN) 875-125 MG per tablet 1 tablet twice a day po 20 tablet 0   • [DISCONTINUED] LOTEMAX 0.5 % gel ophthalmic gel 1 drop 4 (Four) Times a Day.     • [DISCONTINUED] predniSONE (DELTASONE) 10 MG tablet 2 tablets by mouth daily for 1 week, then 1 tablet daily for 1 week 21 tablet 0   • [DISCONTINUED] predniSONE (DELTASONE) 10 MG tablet 2 tablets by mouth daily for 1 week, then 1 tablet daily for 1 week 21 tablet 0     No current facility-administered medications on file prior to visit.        Review of Systems   Constitutional: Negative for activity change, appetite  change, fatigue and fever.   HENT: Negative for ear pain and sore throat.    Eyes: Negative for pain and visual disturbance.   Respiratory: Negative for cough and shortness of breath.    Cardiovascular: Negative for chest pain and palpitations.   Gastrointestinal: Negative for abdominal pain, constipation and nausea.   Endocrine: Negative for cold intolerance and heat intolerance.   Genitourinary: Negative for difficulty urinating and dysuria.   Musculoskeletal: Negative for arthralgias and gait problem.   Skin: Negative for color change and rash.   Neurological: Positive for dizziness (which started when patient started doxycycline by Dr. Lamar.). Negative for weakness and headaches.   Hematological: Negative for adenopathy. Does not bruise/bleed easily.   Psychiatric/Behavioral: Negative for agitation, confusion and sleep disturbance.       Objective   Physical Exam   Constitutional: She is oriented to person, place, and time. She appears well-developed and well-nourished.   HENT:   Head: Normocephalic and atraumatic.   Mouth/Throat: Abnormal dentition. Dental caries present.   Eyes: Conjunctivae, EOM and lids are normal. Pupils are equal, round, and reactive to light.   Neck: Normal range of motion. Neck supple.   Cardiovascular: Normal rate, regular rhythm and normal heart sounds.  Exam reveals no gallop and no friction rub.    No murmur heard.  Pulmonary/Chest: Effort normal and breath sounds normal.   Abdominal: Soft. Normal appearance and bowel sounds are normal. There is no tenderness.   Musculoskeletal: Normal range of motion.   Neurological: She is alert and oriented to person, place, and time.   Skin: Skin is warm, dry and intact.   Psychiatric: She has a normal mood and affect. Her speech is normal and behavior is normal. Judgment and thought content normal. Cognition and memory are normal.   Nursing note and vitals reviewed.      Lab Results (most recent)     None          Vitals:    07/20/17 1547    BP: 154/82   Pulse: 89   SpO2: 98%       Assessment/Plan   Thelma was seen today for hypertension and med refill.    Diagnoses and all orders for this visit:    Essential hypertension    Neuropathy  -     ToxASSURE Select 13 (MW)    Encounter for medication monitoring  -     ToxASSURE Select 13 (MW)    Other orders  -     lisinopril (PRINIVIL,ZESTRIL) 5 MG tablet; Take 1 tablet by mouth Daily.  -     gabapentin (NEURONTIN) 100 MG capsule; Take 1 capsule by mouth 3 (Three) Times a Day.  -     hydrochlorothiazide (HYDRODIURIL) 25 MG tablet; Take 1 tablet by mouth Daily.           Risk score 3          This document has been electronically signed by Gina Polo MD on July 20, 2017 3:51 PM

## 2017-07-20 NOTE — PATIENT INSTRUCTIONS
Gabapentin capsules or tablets  What is this medicine?  GABAPENTIN (GA ba pen tin) is used to control partial seizures in adults with epilepsy. It is also used to treat certain types of nerve pain.  This medicine may be used for other purposes; ask your health care provider or pharmacist if you have questions.  COMMON BRAND NAME(S): Active-PAC with Gabapentin, Gabarone, Neurontin  What should I tell my health care provider before I take this medicine?  They need to know if you have any of these conditions:  -kidney disease  -suicidal thoughts, plans, or attempt; a previous suicide attempt by you or a family member  -an unusual or allergic reaction to gabapentin, other medicines, foods, dyes, or preservatives  -pregnant or trying to get pregnant  -breast-feeding  How should I use this medicine?  Take this medicine by mouth with a glass of water. Follow the directions on the prescription label. You can take it with or without food. If it upsets your stomach, take it with food.Take your medicine at regular intervals. Do not take it more often than directed. Do not stop taking except on your doctor's advice.  If you are directed to break the 600 or 800 mg tablets in half as part of your dose, the extra half tablet should be used for the next dose. If you have not used the extra half tablet within 28 days, it should be thrown away.  A special MedGuide will be given to you by the pharmacist with each prescription and refill. Be sure to read this information carefully each time.  Talk to your pediatrician regarding the use of this medicine in children. Special care may be needed.  Overdosage: If you think you have taken too much of this medicine contact a poison control center or emergency room at once.  NOTE: This medicine is only for you. Do not share this medicine with others.  What if I miss a dose?  If you miss a dose, take it as soon as you can. If it is almost time for your next dose, take only that dose. Do not  take double or extra doses.  What may interact with this medicine?  Do not take this medicine with any of the following medications:  -other gabapentin products  This medicine may also interact with the following medications:  -alcohol  -antacids  -antihistamines for allergy, cough and cold  -certain medicines for anxiety or sleep  -certain medicines for depression or psychotic disturbances  -homatropine; hydrocodone  -naproxen  -narcotic medicines (opiates) for pain  -phenothiazines like chlorpromazine, mesoridazine, prochlorperazine, thioridazine  This list may not describe all possible interactions. Give your health care provider a list of all the medicines, herbs, non-prescription drugs, or dietary supplements you use. Also tell them if you smoke, drink alcohol, or use illegal drugs. Some items may interact with your medicine.  What should I watch for while using this medicine?  Visit your doctor or health care professional for regular checks on your progress. You may want to keep a record at home of how you feel your condition is responding to treatment. You may want to share this information with your doctor or health care professional at each visit. You should contact your doctor or health care professional if your seizures get worse or if you have any new types of seizures. Do not stop taking this medicine or any of your seizure medicines unless instructed by your doctor or health care professional. Stopping your medicine suddenly can increase your seizures or their severity.  Wear a medical identification bracelet or chain if you are taking this medicine for seizures, and carry a card that lists all your medications.  You may get drowsy, dizzy, or have blurred vision. Do not drive, use machinery, or do anything that needs mental alertness until you know how this medicine affects you. To reduce dizzy or fainting spells, do not sit or stand up quickly, especially if you are an older patient. Alcohol can  increase drowsiness and dizziness. Avoid alcoholic drinks.  Your mouth may get dry. Chewing sugarless gum or sucking hard candy, and drinking plenty of water will help.  The use of this medicine may increase the chance of suicidal thoughts or actions. Pay special attention to how you are responding while on this medicine. Any worsening of mood, or thoughts of suicide or dying should be reported to your health care professional right away.  Women who become pregnant while using this medicine may enroll in the [place] Antiepileptic Drug Pregnancy Registry by calling 1-934.538.4620. This registry collects information about the safety of antiepileptic drug use during pregnancy.  What side effects may I notice from receiving this medicine?  Side effects that you should report to your doctor or health care professional as soon as possible:  -allergic reactions like skin rash, itching or hives, swelling of the face, lips, or tongue  -worsening of mood, thoughts or actions of suicide or dying  Side effects that usually do not require medical attention (report to your doctor or health care professional if they continue or are bothersome):  -constipation  -difficulty walking or controlling muscle movements  -dizziness  -nausea  -slurred speech  -tiredness  -tremors  -weight gain  This list may not describe all possible side effects. Call your doctor for medical advice about side effects. You may report side effects to FDA at 5-567-FDA-2739.  Where should I keep my medicine?  Keep out of reach of children.  This medicine may cause accidental overdose and death if it taken by other adults, children, or pets. Mix any unused medicine with a substance like cat litter or coffee grounds. Then throw the medicine away in a sealed container like a sealed bag or a coffee can with a lid. Do not use the medicine after the expiration date.  Store at room temperature between 15 and 30 degrees C (59 and 86 degrees F).  NOTE: This  sheet is a summary. It may not cover all possible information. If you have questions about this medicine, talk to your doctor, pharmacist, or health care provider.     © 2017, [employer]. (2015-02-13 15:26:50)    Hypertension  Hypertension, commonly called high blood pressure, is when the force of blood pumping through your arteries is too strong. Your arteries are the blood vessels that carry blood from your heart throughout your body. A blood pressure reading consists of a higher number over a lower number, such as 110/72. The higher number (systolic) is the pressure inside your arteries when your heart pumps. The lower number (diastolic) is the pressure inside your arteries when your heart relaxes. Ideally you want your blood pressure below 120/80.  Hypertension forces your heart to work harder to pump blood. Your arteries may become narrow or stiff. Having untreated or uncontrolled hypertension can cause heart attack, stroke, kidney disease, and other problems.  RISK FACTORS  Some risk factors for high blood pressure are controllable. Others are not.   Risk factors you cannot control include:   · Race. You may be at higher risk if you are .  · Age. Risk increases with age.  · Gender. Men are at higher risk than women before age 45 years. After age 65, women are at higher risk than men.  Risk factors you can control include:  · Not getting enough exercise or physical activity.  · Being overweight.  · Getting too much fat, sugar, calories, or salt in your diet.  · Drinking too much alcohol.  SIGNS AND SYMPTOMS  Hypertension does not usually cause signs or symptoms. Extremely high blood pressure (hypertensive crisis) may cause headache, anxiety, shortness of breath, and nosebleed.  DIAGNOSIS  To check if you have hypertension, your health care provider will measure your blood pressure while you are seated, with your arm held at the level of your heart. It should be measured at least  twice using the same arm. Certain conditions can cause a difference in blood pressure between your right and left arms. A blood pressure reading that is higher than normal on one occasion does not mean that you need treatment. If it is not clear whether you have high blood pressure, you may be asked to return on a different day to have your blood pressure checked again. Or, you may be asked to monitor your blood pressure at home for 1 or more weeks.  TREATMENT  Treating high blood pressure includes making lifestyle changes and possibly taking medicine. Living a healthy lifestyle can help lower high blood pressure. You may need to change some of your habits.  Lifestyle changes may include:  · Following the DASH diet. This diet is high in fruits, vegetables, and whole grains. It is low in salt, red meat, and added sugars.  · Keep your sodium intake below 2,300 mg per day.  · Getting at least 30-45 minutes of aerobic exercise at least 4 times per week.  · Losing weight if necessary.  · Not smoking.  · Limiting alcoholic beverages.  · Learning ways to reduce stress.  Your health care provider may prescribe medicine if lifestyle changes are not enough to get your blood pressure under control, and if one of the following is true:  · You are 18-59 years of age and your systolic blood pressure is above 140.  · You are 60 years of age or older, and your systolic blood pressure is above 150.  · Your diastolic blood pressure is above 90.  · You have diabetes, and your systolic blood pressure is over 140 or your diastolic blood pressure is over 90.  · You have kidney disease and your blood pressure is above 140/90.  · You have heart disease and your blood pressure is above 140/90.  Your personal target blood pressure may vary depending on your medical conditions, your age, and other factors.  HOME CARE INSTRUCTIONS  · Have your blood pressure rechecked as directed by your health care provider.    · Take medicines only as  directed by your health care provider. Follow the directions carefully. Blood pressure medicines must be taken as prescribed. The medicine does not work as well when you skip doses. Skipping doses also puts you at risk for problems.  · Do not smoke.    · Monitor your blood pressure at home as directed by your health care provider.   SEEK MEDICAL CARE IF:   · You think you are having a reaction to medicines taken.  · You have recurrent headaches or feel dizzy.  · You have swelling in your ankles.  · You have trouble with your vision.  SEEK IMMEDIATE MEDICAL CARE IF:  · You develop a severe headache or confusion.  · You have unusual weakness, numbness, or feel faint.  · You have severe chest or abdominal pain.  · You vomit repeatedly.  · You have trouble breathing.  MAKE SURE YOU:   · Understand these instructions.  · Will watch your condition.  · Will get help right away if you are not doing well or get worse.     This information is not intended to replace advice given to you by your health care provider. Make sure you discuss any questions you have with your health care provider.     Document Released: 12/18/2006 Document Revised: 05/03/2016 Document Reviewed: 10/10/2014  [employer] Interactive Patient Education ©2017 [employer]    How to Take Your Blood Pressure  HOW DO I GET A BLOOD PRESSURE MACHINE?  · You can buy an electronic home blood pressure machine at your local pharmacy. Insurance will sometimes cover the cost if you have a prescription.  · Ask your doctor what type of machine is best for you. There are different machines for your arm and your wrist.  · If you decide to buy a machine to check your blood pressure on your arm, first check the size of your arm so you can buy the right size cuff. To check the size of your arm:      Use a measuring tape that shows both inches and centimeters.      Wrap the measuring tape around the upper-middle part of your arm. You may need someone to help you measure.      " Write down your arm measurement in both inches and centimeters.    · To measure your blood pressure correctly, it is important to have the right size cuff.      If your arm is up to 13 inches (up to 34 centimeters), get an adult cuff size.    If your arm is 13 to 17 inches (35 to 44 centimeters), get a large adult cuff size.       If your arm is 17 to 20 inches (45 to 52 centimeters), get an adult thigh cuff.    WHAT DO THE NUMBERS MEAN?   · There are two numbers that make up your blood pressure. For example: 120/80.    The first number (120 in our example) is called the \"systolic pressure.\" It is a measure of the pressure in your blood vessels when your heart is pumping blood.    The second number (80 in our example) is called the \"diastolic pressure.\" It is a measure of the pressure in your blood vessels when your heart is resting between beats.  · Your doctor will tell you what your blood pressure should be.  WHAT SHOULD I DO BEFORE I CHECK MY BLOOD PRESSURE?   · Try to rest or relax for at least 30 minutes before you check your blood pressure.  · Do not smoke.  · Do not have any drinks with caffeine, such as:    Soda.    Coffee.    Tea.  · Check your blood pressure in a quiet room.  · Sit down and stretch out your arm on a table. Keep your arm at about the level of your heart. Let your arm relax.  · Make sure that your legs are not crossed.  HOW DO I CHECK MY BLOOD PRESSURE?  · Follow the directions that came with your machine.  · Make sure you remove any tight-fitting clothing from your arm or wrist. Wrap the cuff around your upper arm or wrist. You should be able to fit a finger between the cuff and your arm. If you cannot fit a finger between the cuff and your arm, it is too tight and should be removed and rewrapped.  · Some units require you to manually pump up the arm cuff.  · Automatic units inflate the cuff when you press a button.  · Cuff deflation is automatic in both models.  · After the cuff is " inflated, the unit measures your blood pressure and pulse. The readings are shown on a monitor. Hold still and breathe normally while the cuff is inflated.  · Getting a reading takes less than a minute.  · Some models store readings in a memory. Some provide a printout of readings. If your machine does not store your readings, keep a written record.  · Take readings with you to your next visit with your doctor.     This information is not intended to replace advice given to you by your health care provider. Make sure you discuss any questions you have with your health care provider.     Document Released: 11/30/2009 Document Revised: 01/08/2016 Document Reviewed: 02/12/2015  [employer] Interactive Patient Education ©2017 [employer]

## 2017-07-24 ENCOUNTER — APPOINTMENT (OUTPATIENT)
Dept: LAB | Facility: HOSPITAL | Age: 72
End: 2017-07-24

## 2017-07-24 ENCOUNTER — LAB (OUTPATIENT)
Dept: LAB | Facility: HOSPITAL | Age: 72
End: 2017-07-24

## 2017-07-24 ENCOUNTER — OFFICE VISIT (OUTPATIENT)
Dept: PULMONOLOGY | Facility: CLINIC | Age: 72
End: 2017-07-24

## 2017-07-24 VITALS
HEART RATE: 71 BPM | BODY MASS INDEX: 29.88 KG/M2 | SYSTOLIC BLOOD PRESSURE: 140 MMHG | DIASTOLIC BLOOD PRESSURE: 82 MMHG | OXYGEN SATURATION: 98 % | WEIGHT: 175 LBS | HEIGHT: 64 IN

## 2017-07-24 DIAGNOSIS — J45.30 MILD PERSISTENT ASTHMA WITHOUT COMPLICATION: ICD-10-CM

## 2017-07-24 DIAGNOSIS — J30.1 NON-SEASONAL ALLERGIC RHINITIS DUE TO POLLEN: Primary | ICD-10-CM

## 2017-07-24 DIAGNOSIS — E78.5 HYPERLIPIDEMIA, UNSPECIFIED HYPERLIPIDEMIA TYPE: ICD-10-CM

## 2017-07-24 LAB
ALBUMIN SERPL-MCNC: 3.4 G/DL (ref 3.4–4.8)
ALBUMIN/GLOB SERPL: 1.2 G/DL (ref 1.1–1.8)
ALP SERPL-CCNC: 74 U/L (ref 38–126)
ALT SERPL W P-5'-P-CCNC: 44 U/L (ref 9–52)
ANION GAP SERPL CALCULATED.3IONS-SCNC: 7 MMOL/L (ref 5–15)
ARTICHOKE IGE QN: 129 MG/DL (ref 1–129)
AST SERPL-CCNC: 22 U/L (ref 14–36)
BILIRUB SERPL-MCNC: 0.8 MG/DL (ref 0.2–1.3)
BUN BLD-MCNC: 29 MG/DL (ref 7–21)
BUN/CREAT SERPL: 33 (ref 7–25)
CALCIUM SPEC-SCNC: 9.4 MG/DL (ref 8.4–10.2)
CHLORIDE SERPL-SCNC: 104 MMOL/L (ref 95–110)
CHOLEST SERPL-MCNC: 191 MG/DL (ref 0–199)
CO2 SERPL-SCNC: 25 MMOL/L (ref 22–31)
CREAT BLD-MCNC: 0.88 MG/DL (ref 0.5–1)
GFR SERPL CREATININE-BSD FRML MDRD: 63 ML/MIN/1.73 (ref 60–90)
GLOBULIN UR ELPH-MCNC: 2.9 GM/DL (ref 2.3–3.5)
GLUCOSE BLD-MCNC: 167 MG/DL (ref 60–100)
HDLC SERPL-MCNC: 50 MG/DL (ref 60–200)
LDLC/HDLC SERPL: 2.28 {RATIO} (ref 0–3.22)
POTASSIUM BLD-SCNC: 4.9 MMOL/L (ref 3.5–5.1)
PROT SERPL-MCNC: 6.3 G/DL (ref 6.3–8.6)
SODIUM BLD-SCNC: 136 MMOL/L (ref 137–145)
TRIGL SERPL-MCNC: 135 MG/DL (ref 20–199)

## 2017-07-24 PROCEDURE — 80061 LIPID PANEL: CPT | Performed by: INTERNAL MEDICINE

## 2017-07-24 PROCEDURE — 80053 COMPREHEN METABOLIC PANEL: CPT | Performed by: INTERNAL MEDICINE

## 2017-07-24 PROCEDURE — 99213 OFFICE O/P EST LOW 20 MIN: CPT | Performed by: INTERNAL MEDICINE

## 2017-07-24 PROCEDURE — 36415 COLL VENOUS BLD VENIPUNCTURE: CPT

## 2017-07-24 RX ORDER — DOXYCYCLINE 100 MG/1
CAPSULE ORAL
Qty: 20 CAPSULE | Refills: 0 | Status: SHIPPED | OUTPATIENT
Start: 2017-07-24 | End: 2017-07-31

## 2017-07-24 RX ORDER — BUDESONIDE AND FORMOTEROL FUMARATE DIHYDRATE 160; 4.5 UG/1; UG/1
AEROSOL RESPIRATORY (INHALATION)
Qty: 1 INHALER | Refills: 5 | OUTPATIENT
Start: 2017-07-24 | End: 2019-07-13

## 2017-07-24 RX ORDER — ALBUTEROL SULFATE 90 UG/1
AEROSOL, METERED RESPIRATORY (INHALATION)
Qty: 1 INHALER | Refills: 5 | Status: SHIPPED | OUTPATIENT
Start: 2017-07-24 | End: 2019-07-13

## 2017-07-24 NOTE — PROGRESS NOTES
"This lady has asthma and rhinitis.  She had recent status asthmaticus but has improved.  She has not coughing or wheezing    ROS    Constitutional-no night sweats weight loss headaches  GI no abdominal pain nausea or diarrhea  Neuro no seizure or neurologic deficits  Musculoskeletal no deformity or joint pain   no dysuria or hematuria  Skin no rash or other lesions  All other systems reviewed and were negative except for the above.      Physical Exam  /82 (BP Location: Left arm, Patient Position: Sitting, Cuff Size: Adult)  Pulse 71  Ht 64\" (162.6 cm)  Wt 175 lb (79.4 kg)  SpO2 98%  BMI 30.04 kg/m2  Vital signs as above  Pupils equally round and reactive to light and accommodation, neck no JVD or adenopathy.  Cardiovascular regular rhythm and rate no murmur or gallop.  Abdomen soft no organomegaly tenderness.  Extremities no clubbing cyanosis or edema.  No cervical adenopathy.  No skin rash.  Neurologic good strength bilaterally without deficits  Lungs are clear    Impression rhinitis and asthma improved    Plan you present medications, return in 3 months  "

## 2017-07-25 DIAGNOSIS — R73.01 ELEVATED FASTING GLUCOSE: Primary | ICD-10-CM

## 2017-07-25 NOTE — PROGRESS NOTES
Patient had a CMP with a fasting glucose 167. Called to verify that the measurement was fasting and patient reported about 10-11 hours of fasting prior to getting the blood test done. Have discussed with patients concerns for diagnosis of diabetes and need for HbA1c to verify possible diagnosis. Patient voiced her understanding.          This document has been electronically signed by Gina Polo MD on July 25, 2017 1:27 PM

## 2017-07-29 LAB — CONV REPORT SUMMARY: NORMAL

## 2017-07-31 ENCOUNTER — LAB (OUTPATIENT)
Dept: LAB | Facility: HOSPITAL | Age: 72
End: 2017-07-31

## 2017-07-31 ENCOUNTER — OFFICE VISIT (OUTPATIENT)
Dept: FAMILY MEDICINE CLINIC | Facility: CLINIC | Age: 72
End: 2017-07-31

## 2017-07-31 VITALS
HEART RATE: 100 BPM | DIASTOLIC BLOOD PRESSURE: 80 MMHG | HEIGHT: 64 IN | WEIGHT: 177 LBS | SYSTOLIC BLOOD PRESSURE: 120 MMHG | OXYGEN SATURATION: 98 % | BODY MASS INDEX: 30.22 KG/M2

## 2017-07-31 DIAGNOSIS — E11.9 TYPE 2 DIABETES MELLITUS WITHOUT COMPLICATION, WITHOUT LONG-TERM CURRENT USE OF INSULIN (HCC): Primary | ICD-10-CM

## 2017-07-31 DIAGNOSIS — R73.01 ELEVATED FASTING GLUCOSE: ICD-10-CM

## 2017-07-31 LAB — HBA1C MFR BLD: 8.7 % (ref 4–5.6)

## 2017-07-31 PROCEDURE — 99213 OFFICE O/P EST LOW 20 MIN: CPT | Performed by: FAMILY MEDICINE

## 2017-07-31 PROCEDURE — 36415 COLL VENOUS BLD VENIPUNCTURE: CPT

## 2017-07-31 PROCEDURE — 83036 HEMOGLOBIN GLYCOSYLATED A1C: CPT | Performed by: FAMILY MEDICINE

## 2017-07-31 NOTE — PROGRESS NOTES
Subjective   Thelma Haley is a 71 y.o. female.     HPI Comments: Patient came in for councelling for newly diagnosed type 2 diabetes. Patient reports that she has many educational books about how to eat at home since her husbands diagnosis of pre-diabetes. Has seen Dr. Rivera for pre-diabetes but did not make any changes in diet or lifestyle. Spoke in depth about need for dietary changes, sugar avoidance, glycemic index, and exercise as tolerated. Dicussed need for frequent foot checks at home and foot care because of complications of diabetic feet, renal disease, and increased cardiovascular risk because of her diagnosis.    Diabetes   She presents for her initial diabetic visit. She has type 2 diabetes mellitus. There are no hypoglycemic associated symptoms. Pertinent negatives for hypoglycemia include no confusion, dizziness or headaches. Associated symptoms include blurred vision, fatigue, foot paresthesias, polyuria, visual change and weakness. Pertinent negatives for diabetes include no chest pain, no foot ulcerations and no weight loss. There are no hypoglycemic complications. Risk factors for coronary artery disease include diabetes mellitus, obesity, stress, sedentary lifestyle, post-menopausal and hypertension. When asked about current treatments, none were reported. An ACE inhibitor/angiotensin II receptor blocker is being taken. She does not see a podiatrist.Eye exam is current.        The following portions of the patient's history were reviewed and updated as appropriate: new diagnosis and related medications and plan.    Social History     Social History   • Marital status:      Spouse name: N/A   • Number of children: N/A   • Years of education: N/A     Occupational History   • Not on file.     Social History Main Topics   • Smoking status: Never Smoker   • Smokeless tobacco: Never Used   • Alcohol use No   • Drug use: No   • Sexual activity: Defer     Other Topics Concern   • Not on  file     Social History Narrative       Immunization History   Administered Date(s) Administered   • Influenza, Unspecified 10/29/2008, 10/02/2014, 12/17/2015   • Pneumococcal Conjugate 13-Valent 12/17/2015   • Pneumococcal Polysaccharide 10/02/2014   • Tetanus 07/10/2014        Current Outpatient Prescriptions on File Prior to Visit   Medication Sig Dispense Refill   • albuterol (PROVENTIL HFA;VENTOLIN HFA) 108 (90 BASE) MCG/ACT inhaler Inhale 2 puffs Every 4 (Four) Hours As Needed for wheezing. 6.7 g 11   • albuterol (VENTOLIN HFA) 108 (90 BASE) MCG/ACT inhaler 2 puffs every 4 hours as needed for breathing 1 inhaler 5   • Biotin (BIOTIN MAXIMUM STRENGTH) 10 MG tablet Take 10 mg by mouth Daily.     • budesonide-formoterol (SYMBICORT) 160-4.5 MCG/ACT inhaler 2 puffs twice a day 1 inhaler 5   • CETIRIZINE HCL PO Take 10 mg by mouth Daily.     • Cholecalciferol (VITAMIN D3) 1000 UNITS capsule Take 1,000 Units by mouth Daily.     • dorzolamide-timolol (COSOPT) 22.3-6.8 MG/ML ophthalmic solution INSTILL 1 DROP INTO EACH EYE BID  0   • fluocinonide (LIDEX) 0.05 % external solution APPLY TOPICALLY AA ONCE QD TO BID  2   • fluticasone (FLONASE) 50 MCG/ACT nasal spray 2 sprays into each nostril Daily. Administer 2 sprays in each nostril for each dose. 1 each 5   • gabapentin (NEURONTIN) 100 MG capsule Take 1 capsule by mouth 3 (Three) Times a Day. 90 capsule 2   • hydrochlorothiazide (HYDRODIURIL) 25 MG tablet Take 1 tablet by mouth Daily. 90 tablet 3   • ketoconazole (NIZORAL) 2 % shampoo USE 2 TO 3 TIMES PER WEEK PRN  2   • lisinopril (PRINIVIL,ZESTRIL) 5 MG tablet Take 1 tablet by mouth Daily. 90 tablet 3   • lovastatin (MEVACOR) 20 MG tablet TAKE 1 TABLET BY MOUTH EVERY NIGHT AT BEDTIME 90 tablet 1   • methIMAzole (TAPAZOLE) 5 MG tablet Take 2.5 mg by mouth Daily.     • metroNIDAZOLE (METROCREAM) 0.75 % cream APPLY TOPICALLY BID FOR ROSACEA  2   • minocycline (MINOCIN,DYNACIN) 100 MG capsule TK 1 C PO QD  2   •  [DISCONTINUED] budesonide-formoterol (SYMBICORT) 160-4.5 MCG/ACT inhaler 2 puffs twice a day 1 inhaler 5   • [DISCONTINUED] budesonide-formoterol (SYMBICORT) 80-4.5 MCG/ACT inhaler Inhale 2 puffs 2 (Two) Times a Day. 10.2 g 0   • [DISCONTINUED] doxycycline (MONODOX) 100 MG capsule 1 dose by mouth twice a day 20 capsule 0   • [DISCONTINUED] doxycycline (MONODOX) 100 MG capsule 1 dose by mouth twice a day 20 capsule 0   • [DISCONTINUED] ondansetron (ZOFRAN) 8 MG tablet Take 1 tablet by mouth Every 8 (Eight) Hours As Needed for nausea or vomiting. 30 tablet 0     No current facility-administered medications on file prior to visit.        Review of Systems   Constitutional: Positive for fatigue. Negative for activity change, appetite change, fever and weight loss.   HENT: Negative for ear pain and sore throat.    Eyes: Positive for blurred vision. Negative for pain and visual disturbance.   Respiratory: Negative for cough and shortness of breath.    Cardiovascular: Negative for chest pain and palpitations.   Gastrointestinal: Negative for abdominal pain and nausea.   Endocrine: Positive for polyuria. Negative for cold intolerance and heat intolerance.   Genitourinary: Negative for difficulty urinating and dysuria.   Musculoskeletal: Negative for arthralgias and gait problem.   Skin: Negative for color change and rash.   Neurological: Positive for weakness. Negative for dizziness and headaches.   Hematological: Negative for adenopathy. Does not bruise/bleed easily.   Psychiatric/Behavioral: Negative for agitation, confusion and sleep disturbance.       Objective   Physical Exam   Constitutional: She is oriented to person, place, and time. She appears well-developed and well-nourished.   HENT:   Head: Normocephalic and atraumatic.   Eyes: Conjunctivae, EOM and lids are normal. Pupils are equal, round, and reactive to light.   Neck: Normal range of motion. Neck supple.   Cardiovascular: Normal rate, regular rhythm and  normal heart sounds.  Exam reveals no gallop and no friction rub.    No murmur heard.  Pulmonary/Chest: Effort normal and breath sounds normal.   Abdominal: Soft. Normal appearance and bowel sounds are normal. There is no tenderness.   Musculoskeletal: Normal range of motion.   Neurological: She is alert and oriented to person, place, and time.   Skin: Skin is warm, dry and intact.   Psychiatric: She has a normal mood and affect. Her speech is normal and behavior is normal. Judgment and thought content normal. Cognition and memory are normal.     Lab Results   Component Value Date    HGBA1C 8.7 (H) 07/31/2017            Vitals:    07/31/17 1407   BP: 120/80   Pulse: 100   SpO2: 98%       Assessment/Plan   Thelma was seen today for diabetes and follow-up.    Diagnoses and all orders for this visit:    Type 2 diabetes mellitus without complication, without long-term current use of insulin  -     SITagliptin-MetFORMIN HCl ER  MG tablet sustained-release 24 hour; Take 1 tablet by mouth Daily.  -     Ambulatory Referral to Nutrition Services                       Risk score 4          This document has been electronically signed by Gina Polo MD on July 31, 2017 3:03 PM

## 2017-07-31 NOTE — PATIENT INSTRUCTIONS
"Basic Carbohydrate Counting for Diabetes Mellitus  Carbohydrate counting is a method for keeping track of the amount of carbohydrates you eat. Eating carbohydrates naturally increases the level of sugar (glucose) in your blood, so it is important for you to know the amount that is okay for you to have in every meal. Carbohydrate counting helps keep the level of glucose in your blood within normal limits. The amount of carbohydrates allowed is different for every person. A dietitian can help you calculate the amount that is right for you. Once you know the amount of carbohydrates you can have, you can count the carbohydrates in the foods you want to eat.  Carbohydrates are found in the following foods:  · Grains, such as breads and cereals.  · Dried beans and soy products.  · Starchy vegetables, such as potatoes, peas, and corn.  · Fruit and fruit juices.  · Milk and yogurt.  · Sweets and snack foods, such as cake, cookies, candy, chips, soft drinks, and fruit drinks.  CARBOHYDRATE COUNTING  There are two ways to count the carbohydrates in your food. You can use either of the methods or a combination of both.  Reading the \"Nutrition Facts\" on Packaged Food  The \"Nutrition Facts\" is an area that is included on the labels of almost all packaged food and beverages in the [place]. It includes the serving size of that food or beverage and information about the nutrients in each serving of the food, including the grams (g) of carbohydrate per serving.   Decide the number of servings of this food or beverage that you will be able to eat or drink. Multiply that number of servings by the number of grams of carbohydrate that is listed on the label for that serving. The total will be the amount of carbohydrates you will be having when you eat or drink this food or beverage.  Learning Standard Serving Sizes of Food  When you eat food that is not packaged or does not include \"Nutrition Facts\" on the label, you need to " measure the servings in order to count the amount of carbohydrates. A serving of most carbohydrate-rich foods contains about 15 g of carbohydrates. The following list includes serving sizes of carbohydrate-rich foods that provide 15 g of carbohydrate per serving:    · 1 slice of bread (1 oz) or 1 six-inch tortilla.    · ½ of a hamburger bun or English muffin.  · 4-6 crackers.  · ¾ cup unsweetened dry cereal.    · ½ cup hot cereal.  · ? cup rice or pasta.    · ½ cup mashed potatoes or ¼ of a large baked potato.  · 1 cup fresh fruit or one small piece of fruit.    · ½ cup canned or frozen fruit or fruit juice.  · 1 cup milk.  · ? cup plain fat-free yogurt or yogurt sweetened with artificial sweeteners.  · ½ cup cooked dried beans or starchy vegetable, such as peas, corn, or potatoes.    Decide the number of standard-size servings that you will eat. Multiply that number of servings by 15 (the grams of carbohydrates in that serving). For example, if you eat 2 cups of strawberries, you will have eaten 2 servings and 30 g of carbohydrates (2 servings x 15 g = 30 g). For foods such as soups and casseroles, in which more than one food is mixed in, you will need to count the carbohydrates in each food that is included.  EXAMPLE OF CARBOHYDRATE COUNTING  Sample Dinner   · 3 oz chicken breast.  · ? cup of brown rice.  · ½ cup of corn.  · 1 cup milk.    · 1 cup strawberries with sugar-free whipped topping.    Carbohydrate Calculation   Step 1: Identify the foods that contain carbohydrates:   · Rice.    · Corn.    · Milk.    · Strawberries.  Step 2: Calculate the number of servings eaten of each:   · 2 servings of rice.    · 1 serving of corn.    · 1 serving of milk.    · 1 serving of strawberries.  Step 3:  Multiply each of those number of servings by 15 g:   · 2 servings of rice x 15 g = 30 g.    · 1 serving of corn x 15 g = 15 g.    · 1 serving of milk x 15 g = 15 g.    · 1 serving of strawberries x 15 g = 15 g.  Step 4: Add  together all of the amounts to find the total grams of carbohydrates eaten: 30 g + 15 g + 15 g + 15 g = 75 g.     This information is not intended to replace advice given to you by your health care provider. Make sure you discuss any questions you have with your health care provider.     Document Released: 12/18/2006 Document Revised: 01/08/2016 Document Reviewed: 11/14/2014  [employer] Interactive Patient Education ©2017 [employer]    Diabetes and Exercise  Exercising regularly is important. It is not just about losing weight. It has many health benefits, such as:  · Improving your overall fitness, flexibility, and endurance.  · Increasing your bone density.  · Helping with weight control.  · Decreasing your body fat.  · Increasing your muscle strength.  · Reducing stress and tension.  · Improving your overall health.  People with diabetes who exercise gain additional benefits because exercise:  · Reduces appetite.  · Improves the body's use of blood sugar (glucose).  · Helps lower or control blood glucose.  · Decreases blood pressure.  · Helps control blood lipids (such as cholesterol and triglycerides).  · Improves the body's use of the hormone insulin by:    Increasing the body's insulin sensitivity.    Reducing the body's insulin needs.  · Decreases the risk for heart disease because exercising:    Lowers cholesterol and triglycerides levels.    Increases the levels of good cholesterol (such as high-density lipoproteins [HDL]) in the body.    Lowers blood glucose levels.  YOUR ACTIVITY PLAN   Choose an activity that you enjoy, and set realistic goals. To exercise safely, you should begin practicing any new physical activity slowly, and gradually increase the intensity of the exercise over time. Your health care provider or diabetes educator can help create an activity plan that works for you. General recommendations include:  · Encouraging children to engage in at least 60 minutes of physical activity each  day.  · Stretching and performing strength training exercises, such as yoga or weight lifting, at least 2 times per week.  · Performing a total of at least 150 minutes of moderate-intensity exercise each week, such as brisk walking or water aerobics.  · Exercising at least 3 days per week, making sure you allow no more than 2 consecutive days to pass without exercising.  · Avoiding long periods of inactivity (90 minutes or more). When you have to spend an extended period of time sitting down, take frequent breaks to walk or stretch.  RECOMMENDATIONS FOR EXERCISING WITH TYPE 1 OR TYPE 2 DIABETES   · Check your blood glucose before exercising. If blood glucose levels are greater than 240 mg/dL, check for urine ketones. Do not exercise if ketones are present.  · Avoid injecting insulin into areas of the body that are going to be exercised. For example, avoid injecting insulin into:    The arms when playing tennis.    The legs when jogging.  · Keep a record of:    Food intake before and after you exercise.    Expected peak times of insulin action.    Blood glucose levels before and after you exercise.    The type and amount of exercise you have done.  · Review your records with your health care provider. Your health care provider will help you to develop guidelines for adjusting food intake and insulin amounts before and after exercising.  · If you take insulin or oral hypoglycemic agents, watch for signs and symptoms of hypoglycemia. They include:    Dizziness.    Shaking.    Sweating.    Chills.    Confusion.  · Drink plenty of water while you exercise to prevent dehydration or heat stroke. Body water is lost during exercise and must be replaced.  · Talk to your health care provider before starting an exercise program to make sure it is safe for you. Remember, almost any type of activity is better than none.     This information is not intended to replace advice given to you by your health care provider. Make sure you  discuss any questions you have with your health care provider.     Document Released: 03/09/2005 Document Revised: 04/10/2017 Document Reviewed: 05/27/2014  [employer] Interactive Patient Education ©2017 [employer].    Diabetes and Foot Care  Diabetes may cause you to have problems because of poor blood supply (circulation) to your feet and legs. This may cause the skin on your feet to become thinner, break easier, and heal more slowly. Your skin may become dry, and the skin may peel and crack. You may also have nerve damage in your legs and feet causing decreased feeling in them. You may not notice minor injuries to your feet that could lead to infections or more serious problems. Taking care of your feet is one of the most important things you can do for yourself.  HOME CARE INSTRUCTIONS  · Wear shoes at all times, even in the house. Do not go barefoot. Bare feet are easily injured.  · Check your feet daily for blisters, cuts, and redness. If you cannot see the bottom of your feet, use a mirror or ask someone for help.  · Wash your feet with warm water (do not use hot water) and mild soap. Then pat your feet and the areas between your toes until they are completely dry. Do not soak your feet as this can dry your skin.  · Apply a moisturizing lotion or petroleum jelly (that does not contain alcohol and is unscented) to the skin on your feet and to dry, brittle toenails. Do not apply lotion between your toes.  · Trim your toenails straight across. Do not dig under them or around the cuticle. File the edges of your nails with an emery board or nail file.  · Do not cut corns or calluses or try to remove them with medicine.  · Wear clean socks or stockings every day. Make sure they are not too tight. Do not wear knee-high stockings since they may decrease blood flow to your legs.  · Wear shoes that fit properly and have enough cushioning. To break in new shoes, wear them for just a few hours a day. This prevents you  from injuring your feet. Always look in your shoes before you put them on to be sure there are no objects inside.  · Do not cross your legs. This may decrease the blood flow to your feet.  · If you find a minor scrape, cut, or break in the skin on your feet, keep it and the skin around it clean and dry. These areas may be cleansed with mild soap and water. Do not cleanse the area with peroxide, alcohol, or iodine.  · When you remove an adhesive bandage, be sure not to damage the skin around it.  · If you have a wound, look at it several times a day to make sure it is healing.  · Do not use heating pads or hot water bottles. They may burn your skin. If you have lost feeling in your feet or legs, you may not know it is happening until it is too late.  · Make sure your health care provider performs a complete foot exam at least annually or more often if you have foot problems. Report any cuts, sores, or bruises to your health care provider immediately.  SEEK MEDICAL CARE IF:   · You have an injury that is not healing.  · You have cuts or breaks in the skin.  · You have an ingrown nail.  · You notice redness on your legs or feet.  · You feel burning or tingling in your legs or feet.  · You have pain or cramps in your legs and feet.  · Your legs or feet are numb.  · Your feet always feel cold.  SEEK IMMEDIATE MEDICAL CARE IF:   · There is increasing redness, swelling, or pain in or around a wound.  · There is a red line that goes up your leg.  · Pus is coming from a wound.  · You develop a fever or as directed by your health care provider.  · You notice a bad smell coming from an ulcer or wound.     This information is not intended to replace advice given to you by your health care provider. Make sure you discuss any questions you have with your health care provider.     Document Released: 12/15/2001 Document Revised: 04/10/2017 Document Reviewed: 05/27/2014  [employer] Interactive Patient Education ©2017 [employer]    Diabetes and Standards of Medical Care  Diabetes is complicated. Your diabetes team may include a dietitian, nurse, diabetes educator, endocrinologist, eye doctor, your primary care provider, and others. To help everyone know what is going on and to help you get the care you deserve, the following schedule of care was developed to help keep you on track.  HbA1c test  This test shows how well you have controlled your glucose over the past 2-3 months. It is used to see if your diabetes management plan needs to be adjusted.   · It is performed at least 2 times a year if you are meeting treatment goals.  · It is performed 4 times a year if therapy has changed or if you are not meeting treatment goals.  Blood pressure test  · This test is performed at every routine medical visit. The goal is less than 140/90 mm Hg for most people, but 130/80 mm Hg in some cases. Ask your health care provider about your goal.  Dental exam  · Visit your dentist two times per year.  Eye exam  · If you are diagnosed with type 1 diabetes as a child, get an exam upon reaching the age of 10 years or older and having had diabetes for 3-5 years. Yearly eye exams are recommended after that initial eye exam.  · If you have type 1 diabetes, have an eye exam 3-5 years after you are diagnosed, and then once per year after your first exam.  · If you have type 2 diabetes, have an eye exam as soon as you are diagnosed, and then once per year after your first exam.  Foot care exam  · Visual foot exams are performed at every routine medical visit. The exams check for cuts, injuries, or other problems with the feet.  · You should have a complete foot exam performed every year. This exam includes an inspection of the structure and skin of your feet, a check of the pulses in your feet, and a check of the sensation in your feet.    Type 1 diabetes: The first exam is performed 3-5 years after diagnosis.    Type 2 diabetes: The first exam is performed at  the time of diagnosis.  · Check your feet nightly for cuts, injuries, or other problems with your feet. Tell your health care provider if anything is not healing.  Kidney function test (urine microalbumin)  · This test is performed once a year.  ¨ Type 1 diabetes: The first test is performed 5 years after diagnosis.  ¨ Type 2 diabetes: The first test is performed at the time of diagnosis.  · A serum creatinine and estimated glomerular filtration rate (eGFR) test is done once a year to assess the level of chronic kidney disease (CKD), if present.  Lipid profile (cholesterol, HDL, LDL, triglycerides)  · Performed every 5 years for most people.    The goal for LDL is less than 100 mg/dL. If you are at high risk, the goal is less than 70 mg/dL.    The goal for HDL is 40 mg/dL-50 mg/dL for men and 50 mg/dL-60 mg/dL for women. An HDL cholesterol of 60 mg/dL or higher gives some protection against heart disease.    The goal for triglycerides is less than 150 mg/dL.  Immunizations  · The flu (influenza) vaccine is recommended yearly for every person 6 months of age or older who has diabetes.  · The pneumonia (pneumococcal) vaccine is recommended for every person 2 years of age or older who has diabetes. Adults 65 years of age or older may receive the pneumonia vaccine as a series of two separate shots.  · The hepatitis B vaccine is recommended for adults shortly after they have been diagnosed with diabetes.  · The Tdap (tetanus, diphtheria, and pertussis) vaccine should be given:    According to normal childhood vaccination schedules, for children.    Every 10 years, for adults who have diabetes.  Mental and Emotional Health  · Screening for symptoms of eating disorders, anxiety, and depression is recommended at the time of diagnosis and afterward as needed. If your screening shows that you have symptoms (you have a positive screening result), you may need further evaluation and be referred to a mental health care  provider.  Diabetes self-management education  · Education is recommended at diagnosis and ongoing as needed.  Treatment plan  · Your treatment plan is reviewed at every medical visit.     This information is not intended to replace advice given to you by your health care provider. Make sure you discuss any questions you have with your health care provider.     Document Released: 10/15/2010 Document Revised: 04/10/2017 Document Reviewed: 05/20/2014  [employer] Interactive Patient Education ©2017 [employer] Inc.    Diabetic Nephropathy  Diabetic nephropathy is kidney disease that is caused by diabetes. Kidneys are the organs that filter and clean your blood. Kidneys also get rid of body waste products and extra fluid.  Diabetes can cause gradual kidney damage over many years. Diabetic nephropathy that continues to get worse can lead to kidney failure.  CAUSES  This condition is caused by kidney damage from diabetes.  RISK FACTORS  This condition is more likely to develop in people with diabetes who also have:  · High blood pressure.  · High blood glucose.  · A family history of diabetic nephropathy.  · A history of diabetes for many years.  · A history of tobacco use.  · Certain inherited genes.  SYMPTOMS  You may already have this condition before symptoms develop. Symptoms may include:  · Swelling of your hands, feet, or ankles.  · Weakness.  · Poor appetite.  · Nausea.  · Confusion.  · Fatigue.  · Trouble sleeping.  If nephropathy leads to kidney failure, symptoms may include:  · Vomiting.  · Shortness of breath.  · Seizures.  · Coma.  DIAGNOSIS  Usually, your health care provider can diagnose diabetic nephropathy from your symptoms and medical history. Your health care provider will also do a physical exam. It is important to diagnose this condition before symptoms develop so you may also have screening tests to look for any early signs of problems. These tests may include:  · Yearly (annual) urine tests.  · Urine  collection over a 24-hour period to measure kidney function.  · Blood tests that measure blood glucose levels and kidney function.  Problems other than diabetes can damage kidneys. If screening tests show early kidney damage, but your health care provider suspects that it is caused by a different problem, you may have other tests, such as:  · An ultrasound of your kidneys.  · A procedure to take a sample of kidney tissue for testing (biopsy).  TREATMENT  The goal of treatment is to prevent or slow down damage to your kidneys by managing your diabetes. To do this, it is important to control:  · Your blood pressure. Your target blood pressure will be based on your age, the medicines you take, how long you have had diabetes, and any other medical conditions you have. Generally, the goal is to keep your blood pressure below 140/90. Medicines called ACE inhibitors may be used along with a water pill (diuretic) to help you control your blood pressure.  · Your A1c (hemoglobin A1c, or HbA1c) level. This is a measurement of your average blood glucose level during the previous 2-3 months. You and your health care provider should work to keep this number under 7.  · Your blood lipids. If you have high cholesterol, you may need to take lipid-lowering drugs, such as statins.  Other treatments may include:  · Medicines, including insulin injections.  · Lifestyle changes, such as:    Losing weight.    Quitting smoking (smoking cessation).  · A diet, which may include:    Restrictions of salt (sodium), protein, and fluid intake.    Vitamin D supplements.  If your disease progresses to end-stage kidney failure, treatment may include:  · Dialysis. This is a procedure to filter your blood with a machine.  · Kidney transplant.  HOME CARE INSTRUCTIONS  · Take over-the-counter and prescription medicines only as told by your health care provider.  · Follow instructions from your health care provider about eating or drinking  restrictions.  · Do not use tobacco products, including cigarettes, chewing tobacco, and e-cigarettes. If you need help quitting, ask your health care provider.  · Be physically active every day. Ask your health care provider what type of exercise is best for you.  · Eat healthy foods, and eat healthy snacks between meals. Have 3 meals and 3 snacks each day.  · Maintain a healthy weight.  · Keep all follow-up visits as told by your health care provider. This is important.  SEEK MEDICAL CARE IF:  · You have trouble keeping your blood glucose in your goal range.  · Your hands, feet, or ankles are swollen.  · You feel weak, tired, or dizzy.  · You have muscle spasms.  · You have nausea or vomiting.  · You feel tired all the time.  SEEK IMMEDIATE MEDICAL CARE IF:  · You are very sleepy.  · You have:    A seizure or convulsion.    Severe, painful muscle spasms.    Shortness of breath.    Chest pain.  · You pass out.     This information is not intended to replace advice given to you by your health care provider. Make sure you discuss any questions you have with your health care provider.     Document Released: 01/06/2009 Document Revised: 04/10/2017 Document Reviewed: 08/08/2016  [employer] Interactive Patient Education ©2017 [employer]    Diabetic Retinopathy  Diabetic retinopathy is a disease of the light-sensitive membrane at the back of the eye (retina). It is a complication of diabetes and a common cause of blindness. Early detection of the disease is key to keeping your eyes healthy.   CAUSES   Diabetic retinopathy is caused by blood sugar (glucose) levels that are too high over an extended period of time. High blood sugars cause damage to the small blood vessels of the retina, allowing blood to leak through the vessel walls. This causes visual impairment and eventually blindness.  RISK FACTORS  · High blood pressure.  · Having diabetes for a long time.  · Having poorly controlled blood sugars.  SIGNS AND SYMPTOMS    In the early stages of diabetic retinopathy, there are often no symptoms. As the condition advances, symptoms may include:  · Blurred vision. This is usually caused by a swelling due to abnormal blood glucose levels. The blurriness may go away when blood glucose levels return to normal.  · Moving specks or dark spots (floaters) in your vision. These can be caused by a small retinal hemorrhage. A hemorrhage is bleeding from blood vessels.  · Missing parts of your field of vision, such as things at the side. This can be caused by larger retinal hemorrhages.  · Difficulty reading books or signs.  · Double vision.  · Pain in one or both eyes.  · Feeling pressure in one or both eyes.  · Trouble seeing straight lines. Straight lines do not look straight.  · Redness of the eyes that does not go away.  DIAGNOSIS   Your eye care specialist can detect changes in the blood vessels of your eye by putting drops in your eyes that enlarge (dilate) your pupils. This allows your eye care specialist to get a good look at your retina to see if there are any changes that have occurred as a result of your diabetes. You should have your eyes examined once a year.  TREATMENT   Your eye care specialist may use a special laser beam to seal the blood vessels of the retina and stop them from leaking. Early detection and treatment are important so that further damage to your eyes can be prevented. In addition, managing your blood sugars and keeping them in the target range can slow the progress of the disease.  HOME CARE INSTRUCTIONS   · Keep your blood pressure within your target range.  · Keep your blood glucose levels within your target range.  · Follow your health care provider's instructions regarding diet and other means for controlling your blood glucose levels.  · Check your blood levels for glucose as recommended by your health care provider.  · Keep regular appointments with your eye specialist. An eye specialist can usually see  diabetic retinopathy developing long before it starts causing problems. In many cases, it can be treated to prevent complications from occurring. If you have diabetes, you should have your eyes checked at least every year. Your risk of retinopathy increases the longer you have the disease.  · If you smoke, quit. Ask your health care provider for help if needed. Smoking can make retinopathy worse.  SEEK MEDICAL CARE IF:   · You notice gradual blurring or other changes in your vision over time.  · You notice that your glasses or contact lenses do not make things look as sharp as they once did.  · You have trouble reading or seeing details at a distance with either eye.  · You notice a sudden change in your vision or notice that parts of your field of vision appear missing or hazy.  · You suddenly see moving specks or dark spots in the field of vision of either eye.  · You have sudden partial loss of vision in either eye.     This information is not intended to replace advice given to you by your health care provider. Make sure you discuss any questions you have with your health care provider.     Document Released: 12/15/2001 Document Revised: 10/08/2014 Document Reviewed: 06/09/2014  [employer] Interactive Patient Education ©2017 [employer]  Vegetarian Eating and Nutrition  Vegetarian diets are designed for those people who prefer vegetarian eating for Mu-ism, ecologic, or personal reasons. These diets, which are often lower in cholesterol and saturated fats, can provide significant health benefits. They result in lower rates of obesity, diabetes, breast and colon cancers, and cardiovascular and gallbladder diseases.   There are several types of vegetarian diets, but all restrict animal proteins to some degree. Because animal proteins have important nutrients, vegetarians should make sure to get these nutrients from other types of foods. The main purpose of healthy vegetarian eating is to provide the energy,  vitamins, and minerals for proper growth and health maintenance at any age.   WHAT DO I NEED TO KNOW ABOUT VEGETARIAN EATING AND NUTRITION?  The following nutrients are found in animal products. It is important to make sure you get enough of these nutrients from your diet. If you think you are not getting the right nutrients or if you do not eat any animal products, talk with your health care provider or dietitian about taking supplements.  Protein  Your dietitian can help you determine your individual protein needs. Sources of protein include:  · Beans, such as black beans or kidney beans, or other legumes, such as lentils and split peas. Soy products. Nuts, such as almonds, Brazil nuts, and pecans. Seeds, such as sunflower seeds. Tofu, tempeh, and hummus. Eggs, milk, and cheese.  Vitamin B12  This vitamin is only found in:  · Cheeses, fish, and eggs. It can also be found in some breakfast cereals and other prepared products that have added vitamin B12. If you eat no animal products, you should discuss taking a supplement with your health care provider or dietitian.  Vitamin D  Good sources of vitamin D include:  · Cod liver oil. Fish, such as swordfish, salmon, and tuna. Dairy products. Orange juice. Fortified mushrooms. Cereals with added vitamin D.  Iron  Good sources of iron include:  · Dark, leafy greens. Nuts. Beans. Grain products that have added iron, such as cereals. Tofu, tempeh, soybeans, and quinoa. Plant-based iron is better absorbed when eaten with vitamin C. For example, squeeze lemon juice over cooked greens like kale, chard, or spinach, or have a glass of orange juice with your meals.  Omega-3 Fatty Acids  Good sources of omega-3 fatty acids include:  · Walnuts. Foods with added omega-3 fatty acids, such as eggs, milk, and juices. Flax seeds, canola oil, soybean oil, and tofu. Fish (cold water fatty fish such as salmon, sardines, mackerel, herring, lake trout, and albacore tuna).  Calcium  Good  sources of calcium include:  · Dark, leafy greens, such as kale, bok fiona, Chinese cabbage, kolton greens and mustard greens. Broccoli. Okra. Dairy products. Soy products with added calcium. Calcium-fortified breakfast cereals, calcium-fortified fruit juices, and figs.  Zinc  Good sources of zinc include:  · Legumes. Dairy products. Wheat germ, cereals, and breads that have added zinc. Baked beans. Legumes, such as cashews, chickpeas, kidney beans, and green peas. Almonds and nut butters. Tofu and other soy products.     This information is not intended to replace advice given to you by your health care provider. Make sure you discuss any questions you have with your health care provider.     Document Released: 12/20/2004 Document Revised: 01/08/2016 Document Reviewed: 10/23/2014  [employer] Interactive Patient Education ©2017 [employer]  Reading Food Labels  Foods that are packaged or in containers have a Nutrition Facts panel on the side or back. This is commonly called the food label. The food label helps you make healthy food choices by providing information about serving size and the amount of calories and various nutrients in the food. You can check the food label to find out if the food contains high or low amounts of nutrients that you want to limit in your diet. You can also use the food label to see if the food is a good source of the nutrients that you want to make sure are included in your diet.  HOW DO I READ THE FOOD LABEL?  · Begin by checking the serving size and number of servings in the container. All of the nutrition information listed on the food label is based on one serving. If you eat more than one serving, you must multiply the amounts (such as calories, grams of saturated fat, or milligrams of sodium) by the number of servings.  · Check the calories. Choosing foods that are low in calories can help you manage your weight.  · Look at the numbers in the % Daily Value column for each listed  nutrient. This gives you an idea of how much of the daily recommended amount for that nutrient is provided in one serving of the food. A daily value of 5% or less is considered low. A daily value of 20% or more is considered high.  · Check the amounts for the items you should limit in your diet. These include:    Total fat.    Saturated fat.    Trans fat.    Cholesterol.    Sodium.  · Check the amounts for the items you should make sure you get enough of. These include:    Dietary fiber.    Vitamins A and C.    Calcium.    Iron.  WHAT INFORMATION IS PROVIDED ON THE FOOD LABEL?  Serving Information  · Serving size.    The serving size is listed in cups or pieces. The nutrient amounts listed on the food label apply to this amount of the food.  · Servings per container or package.    This shows the number of servings you can expect to get from the container or package if you follow the suggested serving size.  Amount Per Serving  · Calories.    The number of calories in one serving of the food. This information is helpful in managing weight. Low-calorie foods contain 40 calories or less. High-calorie foods contain 400 or more calories.  · Calories from fat.    The number of calories that come from fat in one serving.  Percent Daily Value  Percent Daily Value (shown on the label as % Daily Value) tells you what percent of the daily value for each nutrient one serving provides. The daily value is the recommended amount of the nutrient that you should get each day. For example, if 15% is listed next to dietary fiber, it means that one serving of the food will give you 15% of the recommended amount of fiber that you should get in a day. The daily values are based on a 2,000-calorie-per-day diet. You may get more or less than 2,000 calories in your diet each day, but the % Daily Value gives you an idea of whether the food contains a high or low amount of the listed nutrient. A daily value of 5% or less is low. A daily value  of 20% or more is high.  Total Fat  Total fat shows you the total amount of fat in one serving (listed in grams). Foods with high amounts of fat usually have higher calories and may lead to weight gain. Two of the fats that make up a portion of the total fat are included on the label:  · Saturated fat.    This number is the amount of saturated fat in one serving (listed in grams). Saturated fat increases the amount of blood cholesterol and should be limited to less than 7% of total calories each day. This means that if you eat 2,000 calories each day, you should eat less than 140 calories from saturated fat.    · Trans fat.    This number is the amount of trans fat in one serving (listed in grams). Trans fat is the most unhealthy fat for heart health and should be limited to as little as possible (less than 2 grams per day).  Cholesterol  The amount of cholesterol in one serving is listed in milligrams. Cholesterol should be limited to no more than 300 mg each day.  Sodium  The amount of sodium in one serving is listed in milligrams. Most people should limit their sodium intake to 2,300 mg a day.  Total Carbohydrate  This number shows the amount of total carbohydrate in one serving (listed in grams). This information can help people with diabetes manage the amount of carbohydrate they eat. Two of the carbohydrates that make up a portion of the total carbohydrate are included on the label:  · Dietary fiber.    The amount of dietary fiber in one serving is listed in grams. Most people should eat at least 25 g of dietary fiber each day.    · Sugars.    The amount of sugar in one serving is also listed in grams. This value includes both naturally occurring sugars from fruit and milk and added sugars such as honey or table sugar.  Protein  The amount of protein in one serving is listed in grams.    Vitamins and Minerals  The % Daily Value is listed for vitamin A, vitamin C, calcium, and iron. Some food labels also list %  "Daily Values for some other vitamins or minerals.    WHAT OTHER IMPORTANT LABELING IS ON THE FOOD PACKAGE?  Ingredients   Food labels will list each ingredient in the food. The first ingredient listed is the ingredient that the food has the most of. The ingredients are listed in the order of their amount by weight from highest to lowest.  Food Allergen Labeling  Food labels may also include a food allergen warning. Listed here are ingredients that can cause allergic reactions in some people. The potential allergens are listed behind the word \"Contains\" or \"May contain.\" Examples of ingredients that may be listed are wheat, dairy, eggs, soy, and nuts. If a person knows that he or she is allergic to one of these ingredients, he or she will know to avoid that food.  WHERE CAN I FIND MORE INFORMATION?  [place] Food and Drug Administration: www.fda.gov     This information is not intended to replace advice given to you by your health care provider. Make sure you discuss any questions you have with your health care provider.     Document Released: 12/18/2006 Document Revised: 12/23/2014 Document Reviewed: 11/10/2014  [employer] Interactive Patient Education ©2017 [employer]    "

## 2017-07-31 NOTE — PROGRESS NOTES
Subjective   Thelma Haley is a 71 y.o. female.     History of Present Illness   The patient comes in for check of her Diabetes. She has received teaching on diet and foot changes.  The following portions of the patient's history were reviewed and updated as appropriate: allergies, current medications, past family history, past medical history, past social history, past surgical history and problem list.    Review of Systems   Constitutional: Negative for fatigue and fever.   Respiratory: Negative for cough, chest tightness and stridor.    Cardiovascular: Negative for chest pain, palpitations and leg swelling.       Objective   Physical Exam   Constitutional: She appears well-developed and well-nourished.   HENT:   Head: Normocephalic and atraumatic.   Right Ear: External ear normal.   Left Ear: External ear normal.   Nose: Nose normal.   Mouth/Throat: Oropharynx is clear and moist.   Eyes: Pupils are equal, round, and reactive to light.   Neck: Normal range of motion.   Cardiovascular: Normal rate, regular rhythm and normal heart sounds.  Exam reveals no gallop and no friction rub.    No murmur heard.  Pulmonary/Chest: Effort normal and breath sounds normal. No respiratory distress. She has no wheezes. She has no rales.   Abdominal: Soft. Bowel sounds are normal. She exhibits no distension. There is no tenderness.   Skin: Skin is warm and dry.   Vitals reviewed.      Assessment/Plan   Thelma was seen today for diabetes and follow-up.    Diagnoses and all orders for this visit:    Type 2 diabetes mellitus without complication, without long-term current use of insulin      I have reviewed the notes, assessments, and/or procedures performed by the resident  , I concur with her/his documentation of Thelma Haley.        This document has been electronically signed by Aurelio Ellison MD on July 31, 2017 3:21 PM

## 2017-08-01 ENCOUNTER — TELEPHONE (OUTPATIENT)
Dept: CARDIOLOGY | Facility: CLINIC | Age: 72
End: 2017-08-01

## 2017-08-01 NOTE — TELEPHONE ENCOUNTER
----- Message from Donna Egan [place] sent at 7/31/2017  8:36 AM CDT -----  Contact: [phone]  Patient says she is having a problem with her feet swelling and fluctuation with her weight.  She says she has had times where she gained 6lbs over night.  She has asked if you could call her at [phone].    Spoke with pt. She is concerned about leg edema with weight gain. She has no shortness of breath or chest pain. Dr. Mendez was notified. Appointment was made for tomorrow for evaluation.

## 2017-08-02 ENCOUNTER — LAB (OUTPATIENT)
Dept: LAB | Facility: HOSPITAL | Age: 72
End: 2017-08-02

## 2017-08-02 ENCOUNTER — OFFICE VISIT (OUTPATIENT)
Dept: CARDIOLOGY | Facility: CLINIC | Age: 72
End: 2017-08-02

## 2017-08-02 VITALS
DIASTOLIC BLOOD PRESSURE: 62 MMHG | WEIGHT: 178 LBS | OXYGEN SATURATION: 95 % | HEART RATE: 86 BPM | SYSTOLIC BLOOD PRESSURE: 124 MMHG | BODY MASS INDEX: 30.39 KG/M2 | HEIGHT: 64 IN

## 2017-08-02 DIAGNOSIS — I10 ESSENTIAL HYPERTENSION: ICD-10-CM

## 2017-08-02 DIAGNOSIS — R60.0 LOCALIZED EDEMA: ICD-10-CM

## 2017-08-02 DIAGNOSIS — R07.9 CHEST PAIN, UNSPECIFIED TYPE: ICD-10-CM

## 2017-08-02 DIAGNOSIS — I87.2 VENOUS (PERIPHERAL) INSUFFICIENCY: ICD-10-CM

## 2017-08-02 DIAGNOSIS — R06.09 DYSPNEA ON EXERTION: ICD-10-CM

## 2017-08-02 DIAGNOSIS — E78.5 HYPERLIPIDEMIA, UNSPECIFIED HYPERLIPIDEMIA TYPE: Primary | ICD-10-CM

## 2017-08-02 LAB — D-DIMER, QUANTITATIVE (MAD,POW, STR): 1126 NG/ML (FEU) (ref 0–470)

## 2017-08-02 PROCEDURE — 99214 OFFICE O/P EST MOD 30 MIN: CPT | Performed by: INTERNAL MEDICINE

## 2017-08-02 PROCEDURE — 36415 COLL VENOUS BLD VENIPUNCTURE: CPT

## 2017-08-02 PROCEDURE — 85379 FIBRIN DEGRADATION QUANT: CPT | Performed by: INTERNAL MEDICINE

## 2017-08-02 RX ORDER — BUDESONIDE AND FORMOTEROL FUMARATE DIHYDRATE 80; 4.5 UG/1; UG/1
AEROSOL RESPIRATORY (INHALATION)
COMMUNITY
End: 2018-04-25

## 2017-08-02 NOTE — PROGRESS NOTES
Chief complaint : Hypertension    History of Present Illness very pleasant 71-year-old female who comes today for follow-up visit.  Patient has been complaining of chest discomfort.  She stated this retrosternal in location.  Patient stated that it radiates to her neck.  It's associated with severe fatigue and shortness of breath.  Patient also has noted significant bilateral lower extremity edema all the way out to her niece.  Patient stated the edema is better when she gets up in the morning.  It worse during the day.         Review of Systems   Constitution: Negative. Negative for decreased appetite, diaphoresis, weakness, night sweats, weight gain and weight loss.   HENT: Negative for headaches, hearing loss, nosebleeds and sore throat.    Eyes: Negative.  Negative for blurred vision and photophobia.   Cardiovascular: Negative for chest pain, claudication, dyspnea on exertion, irregular heartbeat, leg swelling, palpitations, paroxysmal nocturnal dyspnea and syncope.   Respiratory: Negative for cough, hemoptysis, shortness of breath and wheezing.    Endocrine: Negative for cold intolerance, heat intolerance, polydipsia, polyphagia and polyuria.   Hematologic/Lymphatic: Negative.    Skin: Negative for color change, dry skin, flushing, itching and rash.   Musculoskeletal: Negative.  Negative for muscle cramps, muscle weakness and myalgias.   Gastrointestinal: Negative for abdominal pain, change in bowel habit, diarrhea, hematemesis, melena, nausea and vomiting.   Genitourinary: Negative for dysuria, frequency and hematuria.   Neurological: Negative for dizziness, focal weakness, light-headedness, loss of balance, numbness and seizures.   Psychiatric/Behavioral: Negative.  Negative for substance abuse, suicidal ideas and thoughts of violence.   Allergic/Immunologic: Negative.        Past Medical History:   Diagnosis Date   • Abnormal results of thyroid function studies    • Acute pain of left shoulder    • Acute  pain of right shoulder    • Allergic rhinitis due to pollen    • Asthma      uncomplicated      • Cataract    • Chronic rhinitis    • Essential hypertension    • Extrinsic asthma with status asthmaticus    • Glaucoma    • Hyperlipidemia    • Impingement syndrome of right shoulder    • Neuropathy    • Nuclear senile cataract    • Obesity    • Overweight with body mass index (BMI) 25.0-29.9    • Primary fibromyalgia syndrome    • Primary open angle glaucoma    • Sinus headache    • Temporomandibular joint disorder        Family History   Problem Relation Age of Onset   • Heart disease Other    • Hypertension Other    • COPD Mother        Ciprofloxacin; Hydrocodone; Lortab [hydrocodone-acetaminophen]; Metronidazole; and Ultram [tramadol hcl]     reports that she has never smoked. She has never used smokeless tobacco. She reports that she does not drink alcohol or use illicit drugs.    Objective     Vital Signs  Heart Rate:  [86] 86  BP: (124)/(62) 124/62    Physical Exam   Constitutional: She is oriented to person, place, and time. She appears well-developed and well-nourished.   HENT:   Head: Normocephalic and atraumatic.   Eyes: Conjunctivae and EOM are normal. Pupils are equal, round, and reactive to light.   Neck: Neck supple. No JVD present. Carotid bruit is not present. No tracheal deviation and no edema present.   Cardiovascular: Normal rate, regular rhythm, S1 normal, S2 normal, normal heart sounds and intact distal pulses.  Exam reveals no gallop, no S3, no S4 and no friction rub.    No murmur heard.  Pulmonary/Chest: Effort normal and breath sounds normal. She has no wheezes. She has no rales. She exhibits no tenderness.   Abdominal: Bowel sounds are normal. She exhibits no abdominal bruit and no pulsatile midline mass. There is no rebound and no guarding.   Musculoskeletal: Normal range of motion. She exhibits no edema.   Neurological: She is alert and oriented to person, place, and time.   Skin: Skin is  warm and dry.   Psychiatric: She has a normal mood and affect.       Procedures    Assessment/Plan     Patient Active Problem List   Diagnosis   • Primary fibromyalgia syndrome   • Obesity   • Neuropathy   • Hyperlipidemia   • Essential hypertension   • Glaucoma   • Chronic rhinitis   • Allergic rhinitis due to pollen   • Abnormal results of thyroid function studies   • Acute pain of left shoulder   • Acute pain of right shoulder   • Impingement syndrome of right shoulder   • Diverticulitis of intestine without perforation or abscess   • Chest pain   • Vitamin D deficiency   • Subclinical hyperthyroidism   • Type 2 diabetes mellitus without complication, without long-term current use of insulin   • Simple goiter   • Disorder of bone    • Impaired fasting glucose    • Complete tear of right rotator cuff   • Arthritis of right acromioclavicular joint   • Primary osteoarthritis of both knees   • Asthma   • Bronchitis, acute   • Neck pain     1. Hyperlipidemia, unspecified hyperlipidemia type    Component      Latest Ref Rng & Units 6/3/2015 9/2/2016 7/24/2017   Total Cholesterol      0 - 199 mg/dL 203 (H) 151 191   Triglycerides      20 - 199 mg/dL 211 (H) 147 135   HDL Cholesterol      60 - 200 mg/dL 37 (L) 40 (L) 50 (L)   LDL Cholesterol      1 - 129 mg/dL 124 82 129   LDL/HDL Ratio      0.00 - 3.22   2.28     Continue with current dose of pravastatin.  Continue with risk factor modification.    2. Essential hypertension  Patient's blood pressure is well controlled we'll continue with current dose off lisinopril and hydrochlorothiazide.    3.  Chest pain  We will order nuclear perfusion imaging stress test to evaluate for possible coronary artery disease.  Patient has been complaining of chest discomfort and severe fatigue and lack of energy.    4.  Bilateral lower extremity edema.  We are ordering a d-dimer to evaluate any potential possibility of venous thrombosis.  I have also advised patient to elevate her legs.   Once we determined she has no differential pulses order venous Dopplers to evaluate for venous insufficiency.    I discussed the patients findings and my recommendations with patient.          This document has been electronically signed by Dmitri Mendez MD on August 2, 2017 3:56 PM     Dmitri Mendez MD  08/02/17  3:54 PM

## 2017-08-03 ENCOUNTER — HOSPITAL ENCOUNTER (OUTPATIENT)
Dept: CT IMAGING | Facility: HOSPITAL | Age: 72
Discharge: HOME | End: 2017-08-03
Admitting: INTERNAL MEDICINE

## 2017-08-03 ENCOUNTER — TELEPHONE (OUTPATIENT)
Dept: CARDIOLOGY | Facility: CLINIC | Age: 72
End: 2017-08-03

## 2017-08-03 DIAGNOSIS — I26.99 OTHER PULMONARY EMBOLISM WITHOUT ACUTE COR PULMONALE, UNSPECIFIED CHRONICITY (HCC): ICD-10-CM

## 2017-08-03 DIAGNOSIS — I26.99 OTHER PULMONARY EMBOLISM WITHOUT ACUTE COR PULMONALE, UNSPECIFIED CHRONICITY (HCC): Primary | ICD-10-CM

## 2017-08-03 DIAGNOSIS — I82.403 ACUTE DEEP VEIN THROMBOSIS (DVT) OF BOTH LOWER EXTREMITIES, UNSPECIFIED VEIN (HCC): ICD-10-CM

## 2017-08-03 PROCEDURE — 71275 CT ANGIOGRAPHY CHEST: CPT

## 2017-08-03 PROCEDURE — 0 IOPAMIDOL PER 1 ML: Performed by: INTERNAL MEDICINE

## 2017-08-03 RX ADMIN — IOPAMIDOL 58 ML: 755 INJECTION, SOLUTION INTRAVENOUS at 15:30

## 2017-08-03 NOTE — TELEPHONE ENCOUNTER
Dr. Mendez spoke with Ms Haley to inform her the DDimer was abnormal. Lower Ext U/S and CT Chest ordered. Times were given to Ms. Haley.

## 2017-08-04 ENCOUNTER — TELEPHONE (OUTPATIENT)
Dept: CARDIOLOGY | Facility: CLINIC | Age: 72
End: 2017-08-04

## 2017-08-04 NOTE — TELEPHONE ENCOUNTER
Dr. Mendez noted Ms. Haley CT and Lower extremity U/S were negative for blood clots. He wants her to elevate her legs as much as possible.

## 2017-08-04 NOTE — TELEPHONE ENCOUNTER
----- Message from Donna Trujillo sent at 8/4/2017  1:41 PM CDT -----  Contact: [phone]  Patient has some questions about when she needs to take her diabetic medication for her nuclear.  She asked whenever they called to schedule, but they weren't sure.  Her phone number is [phone].    Instructions given for Myocardial nuclear stress test. All questions answered.

## 2017-08-10 LAB
BH CV ECHO MEAS - ACS: 1.7 CM
BH CV ECHO MEAS - AO MAX PG (FULL): 6.9 MMHG
BH CV ECHO MEAS - AO MAX PG: 11.7 MMHG
BH CV ECHO MEAS - AO MEAN PG (FULL): 4 MMHG
BH CV ECHO MEAS - AO MEAN PG: 6 MMHG
BH CV ECHO MEAS - AO ROOT AREA: 3.8 CM^2
BH CV ECHO MEAS - AO ROOT DIAM: 2.2 CM
BH CV ECHO MEAS - AO V2 MAX: 171 CM/SEC
BH CV ECHO MEAS - AO V2 MEAN: 117 CM/SEC
BH CV ECHO MEAS - AO V2 VTI: 29.2 CM
BH CV ECHO MEAS - AVA(I,A): 3.5 CM^2
BH CV ECHO MEAS - AVA(I,D): 3.5 CM^2
BH CV ECHO MEAS - AVA(V,A): 2.6 CM^2
BH CV ECHO MEAS - AVA(V,D): 2.6 CM^2
BH CV ECHO MEAS - EDV(CUBED): 39.3 ML
BH CV ECHO MEAS - EDV(TEICH): 47.4 ML
BH CV ECHO MEAS - EF(CUBED): 64.8 %
BH CV ECHO MEAS - EF(TEICH): 57.5 %
BH CV ECHO MEAS - ESV(CUBED): 13.8 ML
BH CV ECHO MEAS - ESV(TEICH): 20.2 ML
BH CV ECHO MEAS - FS: 29.4 %
BH CV ECHO MEAS - IVS/LVPW: 1
BH CV ECHO MEAS - IVSD: 1.3 CM
BH CV ECHO MEAS - LV MASS(C)D: 147.6 GRAMS
BH CV ECHO MEAS - LV MAX PG: 4.8 MMHG
BH CV ECHO MEAS - LV MEAN PG: 2 MMHG
BH CV ECHO MEAS - LV V1 MAX: 109 CM/SEC
BH CV ECHO MEAS - LV V1 MEAN: 71.8 CM/SEC
BH CV ECHO MEAS - LV V1 VTI: 24.3 CM
BH CV ECHO MEAS - LVIDD: 3.4 CM
BH CV ECHO MEAS - LVIDS: 2.4 CM
BH CV ECHO MEAS - LVOT AREA (M): 4.2 CM^2
BH CV ECHO MEAS - LVOT AREA: 4.2 CM^2
BH CV ECHO MEAS - LVOT DIAM: 2.3 CM
BH CV ECHO MEAS - LVPWD: 1.3 CM
BH CV ECHO MEAS - MR MAX PG: 100.8 MMHG
BH CV ECHO MEAS - MR MAX VEL: 502 CM/SEC
BH CV ECHO MEAS - MV A MAX VEL: 135 CM/SEC
BH CV ECHO MEAS - MV DEC SLOPE: 310 CM/SEC^2
BH CV ECHO MEAS - MV E MAX VEL: 89.3 CM/SEC
BH CV ECHO MEAS - MV E/A: 0.66
BH CV ECHO MEAS - MV P1/2T MAX VEL: 84.5 CM/SEC
BH CV ECHO MEAS - MV P1/2T: 79.8 MSEC
BH CV ECHO MEAS - MVA P1/2T LCG: 2.6 CM^2
BH CV ECHO MEAS - MVA(P1/2T): 2.8 CM^2
BH CV ECHO MEAS - PA MAX PG: 3.2 MMHG
BH CV ECHO MEAS - PA V2 MAX: 89.8 CM/SEC
BH CV ECHO MEAS - RAP SYSTOLE: 5 MMHG
BH CV ECHO MEAS - RVSP: 39.6 MMHG
BH CV ECHO MEAS - SV(AO): 111 ML
BH CV ECHO MEAS - SV(CUBED): 25.5 ML
BH CV ECHO MEAS - SV(LVOT): 101 ML
BH CV ECHO MEAS - SV(TEICH): 27.3 ML
BH CV ECHO MEAS - TR MAX VEL: 294 CM/SEC

## 2017-08-16 ENCOUNTER — APPOINTMENT (OUTPATIENT)
Dept: NUCLEAR MEDICINE | Facility: HOSPITAL | Age: 72
End: 2017-08-16
Attending: INTERNAL MEDICINE

## 2017-08-16 ENCOUNTER — HOSPITAL ENCOUNTER (EMERGENCY)
Facility: HOSPITAL | Age: 72
Discharge: HOME | End: 2017-08-16
Attending: EMERGENCY MEDICINE | Admitting: EMERGENCY MEDICINE

## 2017-08-16 ENCOUNTER — APPOINTMENT (OUTPATIENT)
Dept: GENERAL RADIOLOGY | Facility: HOSPITAL | Age: 72
End: 2017-08-16

## 2017-08-16 ENCOUNTER — APPOINTMENT (OUTPATIENT)
Dept: CARDIOLOGY | Facility: HOSPITAL | Age: 72
End: 2017-08-16
Attending: INTERNAL MEDICINE

## 2017-08-16 VITALS
DIASTOLIC BLOOD PRESSURE: 67 MMHG | SYSTOLIC BLOOD PRESSURE: 147 MMHG | WEIGHT: 173.2 LBS | RESPIRATION RATE: 18 BRPM | OXYGEN SATURATION: 97 % | BODY MASS INDEX: 29.57 KG/M2 | HEART RATE: 67 BPM | HEIGHT: 64 IN | TEMPERATURE: 98.2 F

## 2017-08-16 DIAGNOSIS — M54.32 SCIATICA OF LEFT SIDE: Primary | ICD-10-CM

## 2017-08-16 PROCEDURE — 99283 EMERGENCY DEPT VISIT LOW MDM: CPT

## 2017-08-16 PROCEDURE — 72100 X-RAY EXAM L-S SPINE 2/3 VWS: CPT

## 2017-08-16 PROCEDURE — 25010000002 KETOROLAC TROMETHAMINE PER 15 MG: Performed by: EMERGENCY MEDICINE

## 2017-08-16 PROCEDURE — 25010000002 ORPHENADRINE CITRATE PER 60 MG: Performed by: EMERGENCY MEDICINE

## 2017-08-16 PROCEDURE — 96374 THER/PROPH/DIAG INJ IV PUSH: CPT

## 2017-08-16 PROCEDURE — 96372 THER/PROPH/DIAG INJ SC/IM: CPT

## 2017-08-16 RX ORDER — CYCLOBENZAPRINE HCL 5 MG
5 TABLET ORAL 3 TIMES DAILY PRN
Qty: 15 TABLET | Refills: 0 | Status: SHIPPED | OUTPATIENT
Start: 2017-08-16 | End: 2017-10-19

## 2017-08-16 RX ORDER — METHYLPREDNISOLONE 4 MG/1
TABLET ORAL
Qty: 21 TABLET | Refills: 0 | Status: SHIPPED | OUTPATIENT
Start: 2017-08-16 | End: 2017-10-19

## 2017-08-16 RX ORDER — ORPHENADRINE CITRATE 30 MG/ML
60 INJECTION INTRAMUSCULAR; INTRAVENOUS ONCE
Status: COMPLETED | OUTPATIENT
Start: 2017-08-16 | End: 2017-08-16

## 2017-08-16 RX ORDER — KETOROLAC TROMETHAMINE 30 MG/ML
60 INJECTION, SOLUTION INTRAMUSCULAR; INTRAVENOUS ONCE
Status: COMPLETED | OUTPATIENT
Start: 2017-08-16 | End: 2017-08-16

## 2017-08-16 RX ORDER — OXYCODONE HYDROCHLORIDE AND ACETAMINOPHEN 5; 325 MG/1; MG/1
1 TABLET ORAL EVERY 6 HOURS PRN
Qty: 10 TABLET | Refills: 0 | Status: SHIPPED | OUTPATIENT
Start: 2017-08-16 | End: 2017-08-18

## 2017-08-16 RX ADMIN — ORPHENADRINE CITRATE 60 MG: 30 INJECTION INTRAMUSCULAR; INTRAVENOUS at 08:11

## 2017-08-16 RX ADMIN — KETOROLAC TROMETHAMINE 60 MG: 60 INJECTION, SOLUTION INTRAMUSCULAR at 08:09

## 2017-08-16 NOTE — ED NOTES
Pt is presented to ED with c/o left hip pain that radiates down her left leg.  Pt states this started Saturday.     Nimco Duran RN  08/16/17 [id number]

## 2017-08-16 NOTE — ED PROVIDER NOTES
"Subjective   Patient is a 71 y.o. female presenting with back pain.   Back Pain   Location:  Lumbar spine, sacro-iliac joint and gluteal region  Quality:  Aching  Radiates to:  L thigh and L foot  Pain severity:  Moderate  Pain is:  Same all the time  Onset quality:  Sudden  Duration:  5 days  Timing:  Constant  Progression:  Unchanged  Chronicity:  New  Context comment:  Pt states that saturday, 8/12/2017 she was ewalking up the front steps of her house and felt a \"twinge\" in her back which has progressed down her left leg to her foot since  Relieved by:  Nothing  Worsened by:  Nothing  Ineffective treatments:  None tried  Associated symptoms: leg pain (left)    Associated symptoms: no abdominal pain, no bladder incontinence, no bowel incontinence, no chest pain, no dysuria, no fever, no headaches, no numbness, no paresthesias, no pelvic pain, no perianal numbness, no tingling and no weakness    Risk factors: obesity    Risk factors: no hx of cancer        Review of Systems   Constitutional: Negative for activity change, appetite change, chills and fever.   HENT: Negative for congestion, ear pain and sore throat.    Eyes: Negative.  Negative for discharge and redness.   Respiratory: Negative.  Negative for cough, chest tightness and shortness of breath.    Cardiovascular: Negative.  Negative for chest pain, palpitations and leg swelling.   Gastrointestinal: Negative.  Negative for abdominal pain, bowel incontinence, diarrhea, nausea and vomiting.   Genitourinary: Negative for bladder incontinence, difficulty urinating, dysuria, flank pain, pelvic pain and urgency.   Musculoskeletal: Positive for back pain. Negative for arthralgias, joint swelling, myalgias and neck pain.   Skin: Negative.  Negative for color change and rash.   Neurological: Negative.  Negative for dizziness, tingling, seizures, speech difficulty, weakness, numbness, headaches and paresthesias.        Positive straight leg raise LLE at 30 degrees "   Psychiatric/Behavioral: Negative for behavioral problems.   All other systems reviewed and are negative.      Past Medical History:   Diagnosis Date   • Abnormal results of thyroid function studies    • Acute pain of left shoulder    • Acute pain of right shoulder    • Allergic rhinitis due to pollen    • Asthma      uncomplicated      • Cataract    • Chronic rhinitis    • Essential hypertension    • Extrinsic asthma with status asthmaticus    • Glaucoma    • Hyperlipidemia    • Impingement syndrome of right shoulder    • Neuropathy    • Nuclear senile cataract    • Obesity    • Overweight with body mass index (BMI) 25.0-29.9    • Primary fibromyalgia syndrome    • Primary open angle glaucoma    • Rosacea    • Sinus headache    • Temporomandibular joint disorder        Allergies   Allergen Reactions   • Ciprofloxacin    • Hydrocodone    • Lortab [Hydrocodone-Acetaminophen]    • Metronidazole    • Ultram [Tramadol Hcl]        Past Surgical History:   Procedure Laterality Date   • CARPAL TUNNEL RELEASE      Bilateral carpal tunnel release.)   03/27/2000    • CATARACT EXTRACTION      Bilateral   • INJECTION OF MEDICATION  06/17/2015    celestone(betamethasone) (2)    • INJECTION OF MEDICATION  04/14/2016    depo medrol ( methylprednisone) (20)   • OTHER SURGICAL HISTORY      hysterosope procedure   • RIGHT OOPHORECTOMY     • ROTATOR CUFF REPAIR      Right   • SHOULDER ARTHROSCOPY Right 2/6/2017    Procedure: RIGHT SHOULDER ARTHROSCOPY SUBACROMIAL DECOMPRESSION, ROTATOR CUFF REPAIR   ;  Surgeon: Terrence Haines MD;  Location: [facility];  Service:    • SHOULDER SURGERY      Excision of 4.8 cm mass with primary intermediate closure.)   03/30/2012    • SPINAL FUSION         Family History   Problem Relation Age of Onset   • Heart disease Other    • Hypertension Other    • COPD Mother        Social History     Social History   • Marital status:      Spouse name: N/A   • Number of children: N/A   • Years of  education: N/A     Social History Main Topics   • Smoking status: Never Smoker   • Smokeless tobacco: Never Used   • Alcohol use No   • Drug use: No   • Sexual activity: Defer     Other Topics Concern   • None     Social History Narrative           Objective   Physical Exam   Constitutional: She is oriented to person, place, and time. She appears well-developed and well-nourished.   HENT:   Head: Normocephalic and atraumatic.   Mouth/Throat: Oropharynx is clear and moist.   Eyes: Conjunctivae and EOM are normal. Pupils are equal, round, and reactive to light.   Neck: Normal range of motion. Neck supple.   Cardiovascular: Normal rate, regular rhythm and normal heart sounds.  Exam reveals no gallop and no friction rub.    No murmur heard.  Pulmonary/Chest: Effort normal and breath sounds normal. She has no wheezes. She has no rales.   Abdominal: Soft. Bowel sounds are normal. She exhibits no mass. There is no tenderness. There is no rebound and no guarding.   Musculoskeletal: Normal range of motion. She exhibits no tenderness.   Neurological: She is alert and oriented to person, place, and time. She has normal reflexes. No cranial nerve deficit.   Positive straight leg raise, LLE at 30 degrees   Skin: Skin is warm and dry.   Nursing note and vitals reviewed.      Procedures         ED Course  ED Course      Labs Reviewed - No data to display    XR Spine Lumbar 2 or 3 View   Final Result   1. Lumbar spine curvature convex left may be positional versus   secondary to levoscoliosis..   2. Degenerative disc disease at the L3-L4, L4-L5, and L1-L2   intervertebral disc space levels. Worse degenerative changes are   seen at the L3-4 disc space level..   3. Otherwise unremarkable lumbar spine..      Electronically signed by:  Sergei Shore MD  8/16/2017 8:44 AM CDT   Workstation: [id number]          Will d/c on medrol dose pack, flexeril and norco            [facility]  Number of Diagnoses or Management Options      Final diagnoses:    Sciatica of left side            Arnoldo Ruiz MD  08/22/17 0908

## 2017-08-18 ENCOUNTER — OFFICE VISIT (OUTPATIENT)
Dept: FAMILY MEDICINE CLINIC | Facility: CLINIC | Age: 72
End: 2017-08-18

## 2017-08-18 VITALS
OXYGEN SATURATION: 98 % | WEIGHT: 173.13 LBS | BODY MASS INDEX: 29.56 KG/M2 | HEIGHT: 64 IN | DIASTOLIC BLOOD PRESSURE: 80 MMHG | SYSTOLIC BLOOD PRESSURE: 152 MMHG | HEART RATE: 80 BPM

## 2017-08-18 DIAGNOSIS — E11.9 TYPE 2 DIABETES MELLITUS WITHOUT COMPLICATION, WITHOUT LONG-TERM CURRENT USE OF INSULIN (HCC): ICD-10-CM

## 2017-08-18 DIAGNOSIS — R29.898 WEAKNESS OF LOW BACK DUE TO INACTIVITY: ICD-10-CM

## 2017-08-18 DIAGNOSIS — G89.29 CHRONIC BILATERAL LOW BACK PAIN WITHOUT SCIATICA: Primary | ICD-10-CM

## 2017-08-18 DIAGNOSIS — M54.50 CHRONIC BILATERAL LOW BACK PAIN WITHOUT SCIATICA: Primary | ICD-10-CM

## 2017-08-18 DIAGNOSIS — M54.32 SCIATICA OF LEFT SIDE: Chronic | ICD-10-CM

## 2017-08-18 PROCEDURE — 99213 OFFICE O/P EST LOW 20 MIN: CPT | Performed by: FAMILY MEDICINE

## 2017-08-18 RX ORDER — BLOOD-GLUCOSE METER
1 KIT MISCELLANEOUS AS NEEDED
Qty: 1 EACH | Refills: 2 | Status: SHIPPED | OUTPATIENT
Start: 2017-08-18 | End: 2018-08-18

## 2017-08-18 NOTE — PROGRESS NOTES
Subjective:     Thelma Haley is a 71 y.o. female who presents for left sided sciatica. She went to the ED and she was given percocet, but hasn't been taking it because it doesn't help. She has been taking naproxen. She has not been in a formal PT program.    DM type 2 recently diagnosed.  She is taking januvia/metformin.    The following portions of the patient's history were reviewed and updated as appropriate: allergies, current medications, past family history, past medical history, past social history, past surgical history and problem list.      Past Medical Hx:  Past Medical History:   Diagnosis Date   • Abnormal results of thyroid function studies    • Acute pain of left shoulder    • Acute pain of right shoulder    • Allergic rhinitis due to pollen    • Asthma      uncomplicated      • Cataract    • Chronic rhinitis    • Essential hypertension    • Extrinsic asthma with status asthmaticus    • Glaucoma    • Hyperlipidemia    • Impingement syndrome of right shoulder    • Neuropathy    • Nuclear senile cataract    • Obesity    • Overweight with body mass index (BMI) 25.0-29.9    • Primary fibromyalgia syndrome    • Primary open angle glaucoma    • Rosacea    • Sinus headache    • Temporomandibular joint disorder        Past Surgical Hx:  Past Surgical History:   Procedure Laterality Date   • CARPAL TUNNEL RELEASE      Bilateral carpal tunnel release.)   03/27/2000    • CATARACT EXTRACTION      Bilateral   • INJECTION OF MEDICATION  06/17/2015    celestone(betamethasone) (2)    • INJECTION OF MEDICATION  04/14/2016    depo medrol ( methylprednisone) (20)   • OTHER SURGICAL HISTORY      hysterosope procedure   • RIGHT OOPHORECTOMY     • ROTATOR CUFF REPAIR      Right   • SHOULDER ARTHROSCOPY Right 2/6/2017    Procedure: RIGHT SHOULDER ARTHROSCOPY SUBACROMIAL DECOMPRESSION, ROTATOR CUFF REPAIR   ;  Surgeon: Terrence Haines MD;  Location: [facility];  Service:    • SHOULDER SURGERY      Excision of  4.8 cm mass with primary intermediate closure.)   03/30/2012    • SPINAL FUSION         Health Maintenance:  Health Maintenance   Topic Date Due   • MEDICARE ANNUAL WELLNESS  08/24/2016   • ZOSTER VACCINE  08/24/2016   • DIABETIC FOOT EXAM  07/31/2017   • DIABETIC EYE EXAM  07/31/2017   • URINE MICROALBUMIN  07/31/2017   • INFLUENZA VACCINE  09/01/2017   • MAMMOGRAM  09/28/2017   • HEMOGLOBIN A1C  01/31/2018   • LIPID PANEL  07/24/2018   • COLONOSCOPY  05/21/2022   • TDAP/TD VACCINES (2 - Td) 07/10/2024   • PNEUMOCOCCAL VACCINES (65+ LOW/MEDIUM RISK)  Completed       Current Meds:    Current Outpatient Prescriptions:   •  albuterol (PROVENTIL HFA;VENTOLIN HFA) 108 (90 BASE) MCG/ACT inhaler, Inhale 2 puffs Every 4 (Four) Hours As Needed for wheezing., Disp: 6.7 g, Rfl: 11  •  albuterol (VENTOLIN HFA) 108 (90 BASE) MCG/ACT inhaler, 2 puffs every 4 hours as needed for breathing, Disp: 1 inhaler, Rfl: 5  •  ASPIRIN PO, Take  by mouth., Disp: , Rfl:   •  Biotin (BIOTIN MAXIMUM STRENGTH) 10 MG tablet, Take 10 mg by mouth Daily., Disp: , Rfl:   •  budesonide-formoterol (SYMBICORT) 160-4.5 MCG/ACT inhaler, 2 puffs twice a day, Disp: 1 inhaler, Rfl: 5  •  budesonide-formoterol (SYMBICORT) 80-4.5 MCG/ACT inhaler, Inhale., Disp: , Rfl:   •  CETIRIZINE HCL PO, Take 10 mg by mouth Daily., Disp: , Rfl:   •  Cholecalciferol (VITAMIN D3) 1000 UNITS capsule, Take 1,000 Units by mouth Daily., Disp: , Rfl:   •  cyclobenzaprine (FLEXERIL) 5 MG tablet, Take 1 tablet by mouth 3 (Three) Times a Day As Needed for Muscle Spasms., Disp: 15 tablet, Rfl: 0  •  dorzolamide-timolol (COSOPT) 22.3-6.8 MG/ML ophthalmic solution, INSTILL 1 DROP INTO EACH EYE BID, Disp: , Rfl: 0  •  fluticasone (FLONASE) 50 MCG/ACT nasal spray, 2 sprays into each nostril Daily. Administer 2 sprays in each nostril for each dose., Disp: 1 each, Rfl: 5  •  gabapentin (NEURONTIN) 100 MG capsule, Take 1 capsule by mouth 3 (Three) Times a Day., Disp: 90 capsule, Rfl: 2  •   hydrochlorothiazide (HYDRODIURIL) 25 MG tablet, Take 1 tablet by mouth Daily., Disp: 90 tablet, Rfl: 3  •  lisinopril (PRINIVIL,ZESTRIL) 5 MG tablet, Take 1 tablet by mouth Daily., Disp: 90 tablet, Rfl: 3  •  lovastatin (MEVACOR) 20 MG tablet, TAKE 1 TABLET BY MOUTH EVERY NIGHT AT BEDTIME, Disp: 90 tablet, Rfl: 1  •  methIMAzole (TAPAZOLE) 5 MG tablet, Take 2.5 mg by mouth Daily., Disp: , Rfl:   •  MethylPREDNISolone (MEDROL, HARDY,) 4 MG tablet, Take as directed on package instructions., Disp: 21 tablet, Rfl: 0  •  metroNIDAZOLE (METROCREAM) 0.75 % cream, APPLY TOPICALLY BID FOR ROSACEA, Disp: , Rfl: 2  •  minocycline (MINOCIN,DYNACIN) 100 MG capsule, TK 1 C PO QD, Disp: , Rfl: 2  •  SITagliptin-MetFORMIN HCl ER  MG tablet sustained-release 24 hour, Take 1 tablet by mouth Daily., Disp: 30 tablet, Rfl: 4    Allergies:  Ciprofloxacin; Hydrocodone; Lortab [hydrocodone-acetaminophen]; Metronidazole; and Ultram [tramadol hcl]    Family Hx:  Family History   Problem Relation Age of Onset   • Heart disease Other    • Hypertension Other    • COPD Mother         Social History:  Social History     Social History   • Marital status:      Spouse name: N/A   • Number of children: N/A   • Years of education: N/A     Occupational History   • Not on file.     Social History Main Topics   • Smoking status: Never Smoker   • Smokeless tobacco: Never Used   • Alcohol use No   • Drug use: No   • Sexual activity: Defer     Other Topics Concern   • Not on file     Social History Narrative       Review of Systems  Review of Systems   Constitutional: Negative for activity change, appetite change, fatigue and fever.   HENT: Negative for ear pain and sore throat.    Eyes: Negative for pain and visual disturbance.   Respiratory: Negative for cough and shortness of breath.    Cardiovascular: Negative for chest pain and palpitations.   Gastrointestinal: Negative for abdominal pain and nausea.   Endocrine: Negative for cold  "intolerance and heat intolerance.   Genitourinary: Negative for difficulty urinating and dysuria.   Musculoskeletal: Positive for arthralgias and back pain. Negative for gait problem.   Skin: Negative for color change and rash.   Neurological: Negative for dizziness, weakness and headaches.   Hematological: Negative for adenopathy. Does not bruise/bleed easily.   Psychiatric/Behavioral: Negative for agitation, confusion and sleep disturbance.       Objective:     /80 (BP Location: Left arm, Patient Position: Sitting, Cuff Size: Adult)  Pulse 80  Ht 64\" (162.6 cm)  Wt 173 lb 2 oz (78.5 kg)  SpO2 98%  BMI 29.72 kg/m2  Physical Exam   Constitutional: She is oriented to person, place, and time. She appears well-developed and well-nourished.   HENT:   Head: Normocephalic and atraumatic.   Right Ear: Hearing, tympanic membrane, external ear and ear canal normal.   Left Ear: Hearing, tympanic membrane, external ear and ear canal normal.   Nose: Nose normal.   Mouth/Throat: Oropharynx is clear and moist.   Eyes: Conjunctivae and EOM are normal. Pupils are equal, round, and reactive to light.   Neck: Normal range of motion. Neck supple.   Cardiovascular: Normal rate, regular rhythm and normal heart sounds.    Pulmonary/Chest: Effort normal and breath sounds normal.   Abdominal: Soft. Bowel sounds are normal. She exhibits no distension. There is no tenderness.   Musculoskeletal: Normal range of motion. She exhibits tenderness (left sciatic notch tenderness).   Neurological: She is alert and oriented to person, place, and time.   Skin: Skin is warm and dry.   Psychiatric: She has a normal mood and affect. Her speech is normal and behavior is normal. Judgment and thought content normal. Cognition and memory are normal.              Assessment:     Thelma was seen today for back pain.    Diagnoses and all orders for this visit:    Chronic bilateral low back pain without sciatica  -     Ambulatory Referral to Physical " Therapy Evaluate and treat (back pain and weakness when getting out of chair, off floor)    Sciatica of left side  -     Ambulatory Referral to Physical Therapy Evaluate and treat (back pain and weakness when getting out of chair, off floor)    Weakness of low back due to inactivity  -     Ambulatory Referral to Physical Therapy Evaluate and treat (back pain and weakness when getting out of chair, off floor)        Plan:     I have seen and examined the patient.  I have reviewed the notes, assessments, and/or procedures performed by Gina Polo MD , I concur with her/his documentation and assessment and plan for Thelma Haley.        This document has been electronically signed by Caryl Astudillo MD on August 18, 2017 10:36 AM

## 2017-08-18 NOTE — PROGRESS NOTES
Subjective   Thelma Haley is a 71 y.o. female.     HPI Comments: Sciatica  Patient has been having left sided low back pain related to a sciatica flare that began last Saturday (8/12/17). The pain is severe and is limiting her ability to do anything; describes and sharp and radiates down her legs. She has pain bilaterally but the left side is causing more of her symptoms. She has been doing exercises at home, using ice/heat, steroids from ED, and pain medications (naproxen) but has not been taking the percocet as it made her dizzy and nauseated and didn't affect the pain in the slightest. Pain is unaffected and still causing significant discomfort for the patient.  Patient was diagnosed with diabetes last month with elevated HbA1c. She has had a visit for diabetes education. She reports that she has been doing well with her diet and has many resources as her  also has diabetes. Requesting glucometer at her visit today.       The following portions of the patient's history were reviewed and updated as appropriate: allergies, current medications, past family history, past medical history, past social history, past surgical history and problem list.    Social History     Social History   • Marital status:      Spouse name: N/A   • Number of children: N/A   • Years of education: N/A     Occupational History   • Not on file.     Social History Main Topics   • Smoking status: Never Smoker   • Smokeless tobacco: Never Used   • Alcohol use No   • Drug use: No   • Sexual activity: Defer     Other Topics Concern   • Not on file     Social History Narrative       Immunization History   Administered Date(s) Administered   • Influenza, Unspecified 10/29/2008, 10/02/2014, 12/17/2015   • Pneumococcal Conjugate 13-Valent 12/17/2015   • Pneumococcal Polysaccharide 10/02/2014   • Tetanus 07/10/2014        Current Outpatient Prescriptions on File Prior to Visit   Medication Sig Dispense Refill   • albuterol  (PROVENTIL HFA;VENTOLIN HFA) 108 (90 BASE) MCG/ACT inhaler Inhale 2 puffs Every 4 (Four) Hours As Needed for wheezing. 6.7 g 11   • albuterol (VENTOLIN HFA) 108 (90 BASE) MCG/ACT inhaler 2 puffs every 4 hours as needed for breathing 1 inhaler 5   • ASPIRIN PO Take  by mouth.     • Biotin (BIOTIN MAXIMUM STRENGTH) 10 MG tablet Take 10 mg by mouth Daily.     • budesonide-formoterol (SYMBICORT) 160-4.5 MCG/ACT inhaler 2 puffs twice a day 1 inhaler 5   • budesonide-formoterol (SYMBICORT) 80-4.5 MCG/ACT inhaler Inhale.     • CETIRIZINE HCL PO Take 10 mg by mouth Daily.     • Cholecalciferol (VITAMIN D3) 1000 UNITS capsule Take 1,000 Units by mouth Daily.     • cyclobenzaprine (FLEXERIL) 5 MG tablet Take 1 tablet by mouth 3 (Three) Times a Day As Needed for Muscle Spasms. 15 tablet 0   • dorzolamide-timolol (COSOPT) 22.3-6.8 MG/ML ophthalmic solution INSTILL 1 DROP INTO EACH EYE BID  0   • fluticasone (FLONASE) 50 MCG/ACT nasal spray 2 sprays into each nostril Daily. Administer 2 sprays in each nostril for each dose. 1 each 5   • gabapentin (NEURONTIN) 100 MG capsule Take 1 capsule by mouth 3 (Three) Times a Day. 90 capsule 2   • hydrochlorothiazide (HYDRODIURIL) 25 MG tablet Take 1 tablet by mouth Daily. 90 tablet 3   • lisinopril (PRINIVIL,ZESTRIL) 5 MG tablet Take 1 tablet by mouth Daily. 90 tablet 3   • lovastatin (MEVACOR) 20 MG tablet TAKE 1 TABLET BY MOUTH EVERY NIGHT AT BEDTIME 90 tablet 1   • methIMAzole (TAPAZOLE) 5 MG tablet Take 2.5 mg by mouth Daily.     • MethylPREDNISolone (MEDROL, HARDY,) 4 MG tablet Take as directed on package instructions. 21 tablet 0   • metroNIDAZOLE (METROCREAM) 0.75 % cream APPLY TOPICALLY BID FOR ROSACEA  2   • minocycline (MINOCIN,DYNACIN) 100 MG capsule TK 1 C PO QD  2   • SITagliptin-MetFORMIN HCl ER  MG tablet sustained-release 24 hour Take 1 tablet by mouth Daily. 30 tablet 4   • [DISCONTINUED] fluocinonide (LIDEX) 0.05 % external solution APPLY TOPICALLY AA ONCE QD TO BID   2   • [DISCONTINUED] ketoconazole (NIZORAL) 2 % shampoo USE 2 TO 3 TIMES PER WEEK PRN  2   • [DISCONTINUED] oxyCODONE-acetaminophen (PERCOCET) 5-325 MG per tablet Take 1 tablet by mouth Every 6 (Six) Hours As Needed for Moderate Pain . 10 tablet 0     No current facility-administered medications on file prior to visit.        Review of Systems   Constitutional: Negative for activity change, appetite change, fatigue and fever.   HENT: Negative for ear pain and sore throat.    Eyes: Negative for pain and visual disturbance.   Respiratory: Negative for cough and shortness of breath.    Cardiovascular: Negative for chest pain and palpitations.   Gastrointestinal: Negative for abdominal pain and nausea.   Endocrine: Negative for cold intolerance and heat intolerance.   Genitourinary: Negative for difficulty urinating and dysuria.   Musculoskeletal: Positive for back pain. Negative for arthralgias and gait problem.   Skin: Negative for color change and rash.   Neurological: Negative for dizziness, weakness and headaches.   Hematological: Negative for adenopathy. Does not bruise/bleed easily.   Psychiatric/Behavioral: Negative for agitation, confusion and sleep disturbance.       Objective   Physical Exam   Constitutional: She is oriented to person, place, and time. She appears well-developed and well-nourished.   HENT:   Head: Normocephalic and atraumatic.   Right Ear: Tympanic membrane normal.   Left Ear: Tympanic membrane normal.   Eyes: Conjunctivae, EOM and lids are normal. Pupils are equal, round, and reactive to light.   Neck: Normal range of motion. Neck supple.   Cardiovascular: Normal rate, regular rhythm and normal heart sounds.  Exam reveals no gallop and no friction rub.    No murmur heard.  Pulmonary/Chest: Effort normal and breath sounds normal.   Abdominal: Soft. Normal appearance and bowel sounds are normal. There is no tenderness.   Musculoskeletal: Normal range of motion.    Thelma had a diabetic foot  exam performed (decreased vibrational and monofilament on right foot) today.   During the foot exam she had a monofilament test performed.    Neurological Sensory Findings -  No right ankle/foot altered proprioception and no left ankle/foot altered proprioception    Vascular Status -  Her exam exhibits right foot edema. Her exam exhibits left foot edema.   Skin Integrity  -  She has right foot onychomycosis, right foot ingrown toenail and right heel is dry and cracked.She has left foot onychomycosis, left foot ingrown toenail and left heel dry and cracked..  Neurological: She is alert and oriented to person, place, and time.   Skin: Skin is warm, dry and intact.   Psychiatric: She has a normal mood and affect. Her speech is normal and behavior is normal. Judgment and thought content normal. Cognition and memory are normal.       Lab Results (most recent)     None          Vitals:    08/18/17 0953   BP: 152/80   Pulse: 80   SpO2: 98%       Assessment/Plan   Thelma was seen today for back pain.    Diagnoses and all orders for this visit:    Chronic bilateral low back pain without sciatica  -     Ambulatory Referral to Physical Therapy Evaluate and treat (back pain and weakness when getting out of chair, off floor)    Sciatica of left side  -     Ambulatory Referral to Physical Therapy Evaluate and treat (back pain and weakness when getting out of chair, off floor)    Weakness of low back due to inactivity  -     Ambulatory Referral to Physical Therapy Evaluate and treat (back pain and weakness when getting out of chair, off floor)    Type 2 diabetes mellitus without complication, without long-term current use of insulin  -     glucose monitoring kit (FREESTYLE) monitoring kit; 1 each As Needed (diabetes).                       Risk score 4          This document has been electronically signed by Gina Polo MD on August 18, 2017 10:06 AM

## 2017-08-24 ENCOUNTER — TELEPHONE (OUTPATIENT)
Dept: FAMILY MEDICINE CLINIC | Facility: CLINIC | Age: 72
End: 2017-08-24

## 2017-08-24 DIAGNOSIS — E11.9 TYPE 2 DIABETES MELLITUS WITHOUT COMPLICATION, WITHOUT LONG-TERM CURRENT USE OF INSULIN (HCC): Primary | ICD-10-CM

## 2017-08-24 RX ORDER — LANCETS 33 GAUGE
1 EACH MISCELLANEOUS 2 TIMES DAILY PRN
Qty: 100 EACH | Refills: 12 | Status: SHIPPED | OUTPATIENT
Start: 2017-08-24 | End: 2018-03-06 | Stop reason: SDUPTHER

## 2017-08-24 NOTE — TELEPHONE ENCOUNTER
DEANA CALLING ABOUT THE SCRIPT FOR THIS PTS GLUCOSE TEST KIT, SAYS IT WAS FOR THE FREESTYLE, AND THAT THE PT ALREADY HAS A ONE TOUCH METER AT HOME.  ASKING IF YOU CAN DO THE SCRIPT FOR THE ONE TOUCH STRIPS AND LANCETS INSTEAD SO SHE WON'T HAVE TO GE T A NEW METER.    THANKS

## 2017-08-29 ENCOUNTER — TELEPHONE (OUTPATIENT)
Dept: FAMILY MEDICINE CLINIC | Facility: CLINIC | Age: 72
End: 2017-08-29

## 2017-08-30 ENCOUNTER — DOCUMENTATION (OUTPATIENT)
Dept: FAMILY MEDICINE CLINIC | Facility: CLINIC | Age: 72
End: 2017-08-30

## 2017-08-30 DIAGNOSIS — M54.41 ACUTE BILATERAL LOW BACK PAIN WITH RIGHT-SIDED SCIATICA: Primary | ICD-10-CM

## 2017-08-30 NOTE — PROGRESS NOTES
Spoke to Nissa at [facility] physical therapy reports that patient has developed left sided foot drop. Patient was recently seen in my office for severe progressive back pain. Imaging available indicated radiculopathy. Will order MRI to evaluate for new onset neurological deficits in setting of back pain.          This document has been electronically signed by Gina Polo MD on August 30, 2017 9:55 AM

## 2017-09-06 ENCOUNTER — APPOINTMENT (OUTPATIENT)
Dept: NUCLEAR MEDICINE | Facility: HOSPITAL | Age: 72
End: 2017-09-06
Attending: INTERNAL MEDICINE

## 2017-09-06 ENCOUNTER — APPOINTMENT (OUTPATIENT)
Dept: CARDIOLOGY | Facility: HOSPITAL | Age: 72
End: 2017-09-06
Attending: INTERNAL MEDICINE

## 2017-09-07 ENCOUNTER — TELEPHONE (OUTPATIENT)
Dept: FAMILY MEDICINE CLINIC | Facility: CLINIC | Age: 72
End: 2017-09-07

## 2017-09-07 RX ORDER — LORAZEPAM 1 MG/1
1 TABLET ORAL EVERY 8 HOURS PRN
Qty: 2 TABLET | Refills: 0 | Status: SHIPPED | OUTPATIENT
Start: 2017-09-07 | End: 2017-09-07

## 2017-09-07 RX ORDER — LORAZEPAM 1 MG/1
1 TABLET ORAL EVERY 8 HOURS PRN
Qty: 2 TABLET | Refills: 0 | Status: SHIPPED | OUTPATIENT
Start: 2017-09-07 | End: 2017-10-19

## 2017-09-07 RX ORDER — LORAZEPAM 1 MG/1
1 TABLET ORAL EVERY 8 HOURS PRN
Qty: 2 TABLET | Refills: 0 | Status: SHIPPED | OUTPATIENT
Start: 2017-09-07 | End: 2017-09-07 | Stop reason: SDUPTHER

## 2017-09-07 NOTE — TELEPHONE ENCOUNTER
PATIENT CALLED YESTERDAY AND TODAY. SHE NEEDS TO SPEAK WITH DR CALDERA.    SHE WAS IN ER A FEW WEEKS AGO FOR BACK PAIN.  SHE HAS A MRI SCHEDULED FOR TOMORROW AT 4 PM BUT IS NOT SURE HOW SHE WILL TOLERATE IT WITHOUT SOMETHING FOR PAIN. SHE HAS DIFFICULTY WITH A LOT OF PAIN MEDICATIONS SO SHE NEEDS TO SPEAK WITH YOU BEFORE YOU CAN PRESCRIBE.    PLEASE CALL.    THANK YOU.

## 2017-09-08 ENCOUNTER — HOSPITAL ENCOUNTER (OUTPATIENT)
Dept: MRI IMAGING | Facility: HOSPITAL | Age: 72
Discharge: HOME | End: 2017-09-08
Admitting: FAMILY MEDICINE

## 2017-09-08 DIAGNOSIS — M54.41 ACUTE BILATERAL LOW BACK PAIN WITH RIGHT-SIDED SCIATICA: ICD-10-CM

## 2017-09-08 PROCEDURE — 72148 MRI LUMBAR SPINE W/O DYE: CPT

## 2017-09-11 DIAGNOSIS — M79.89 SOFT TISSUE MASS: Primary | ICD-10-CM

## 2017-09-11 NOTE — PROGRESS NOTES
Discussed with the patient results of her MRI and recommended that she see an orthopedic surgeon for back surgery. Patient had a poor interaction with Dr. Moore and requested referral to a different surgeon. Discussed with her reasons to have further imaging of mass, most likely to be a herniated disk but cannot exclude a meningioma with gadolinium contrast. Have ordered patient BMP for eval of kidney function discussed with patient she can go to the lab for this test. Patient expressed her understanding.          This document has been electronically signed by Gina Polo MD on September 11, 2017 2:05 PM

## 2017-09-12 DIAGNOSIS — M79.89 MASS OF SOFT TISSUE: Primary | ICD-10-CM

## 2017-09-13 ENCOUNTER — HOSPITAL ENCOUNTER (OUTPATIENT)
Dept: MRI IMAGING | Facility: HOSPITAL | Age: 72
Discharge: HOME | End: 2017-09-13
Admitting: FAMILY MEDICINE

## 2017-09-13 DIAGNOSIS — M79.89 SOFT TISSUE MASS: ICD-10-CM

## 2017-09-13 PROCEDURE — 72149 MRI LUMBAR SPINE W/DYE: CPT

## 2017-09-13 PROCEDURE — 25010000002 GADOTERIDOL PER 1 ML: Performed by: FAMILY MEDICINE

## 2017-09-13 PROCEDURE — A9576 INJ PROHANCE MULTIPACK: HCPCS | Performed by: FAMILY MEDICINE

## 2017-09-13 RX ADMIN — GADOTERIDOL 17 ML: 279.3 INJECTION, SOLUTION INTRAVENOUS at 14:00

## 2017-09-14 ENCOUNTER — OFFICE VISIT (OUTPATIENT)
Dept: ORTHOPEDIC SURGERY | Facility: CLINIC | Age: 72
End: 2017-09-14

## 2017-09-14 VITALS — HEIGHT: 64 IN | WEIGHT: 173 LBS | BODY MASS INDEX: 29.53 KG/M2

## 2017-09-14 DIAGNOSIS — M75.41 IMPINGEMENT SYNDROME OF RIGHT SHOULDER: ICD-10-CM

## 2017-09-14 DIAGNOSIS — M25.511 ACUTE PAIN OF RIGHT SHOULDER: Primary | ICD-10-CM

## 2017-09-14 PROCEDURE — 99213 OFFICE O/P EST LOW 20 MIN: CPT | Performed by: ORTHOPAEDIC SURGERY

## 2017-09-14 NOTE — PROGRESS NOTES
Thelma Haley is a 72 y.o. female returns for     Chief Complaint   Patient presents with   • Right Shoulder - Follow-up       HISTORY OF PRESENT ILLNESS:   Patient has not gotten worse. She has been having a lot pain with her back.        CONCURRENT MEDICAL HISTORY:    Past Medical History:   Diagnosis Date   • Abnormal results of thyroid function studies    • Acute pain of left shoulder    • Acute pain of right shoulder    • Allergic rhinitis due to pollen    • Asthma      uncomplicated      • Cataract    • Chronic rhinitis    • Essential hypertension    • Extrinsic asthma with status asthmaticus    • Glaucoma    • Hyperlipidemia    • Impingement syndrome of right shoulder    • Neuropathy    • Nuclear senile cataract    • Obesity    • Overweight with body mass index (BMI) 25.0-29.9    • Primary fibromyalgia syndrome    • Primary open angle glaucoma    • Rosacea    • Sinus headache    • Temporomandibular joint disorder        Allergies   Allergen Reactions   • Ciprofloxacin    • Hydrocodone    • Lortab [Hydrocodone-Acetaminophen]    • Metronidazole    • Ultram [Tramadol Hcl]          Current Outpatient Prescriptions:   •  albuterol (PROVENTIL HFA;VENTOLIN HFA) 108 (90 BASE) MCG/ACT inhaler, Inhale 2 puffs Every 4 (Four) Hours As Needed for wheezing., Disp: 6.7 g, Rfl: 11  •  albuterol (VENTOLIN HFA) 108 (90 BASE) MCG/ACT inhaler, 2 puffs every 4 hours as needed for breathing, Disp: 1 inhaler, Rfl: 5  •  ASPIRIN PO, Take  by mouth., Disp: , Rfl:   •  Biotin (BIOTIN MAXIMUM STRENGTH) 10 MG tablet, Take 10 mg by mouth Daily., Disp: , Rfl:   •  budesonide-formoterol (SYMBICORT) 160-4.5 MCG/ACT inhaler, 2 puffs twice a day, Disp: 1 inhaler, Rfl: 5  •  budesonide-formoterol (SYMBICORT) 80-4.5 MCG/ACT inhaler, Inhale., Disp: , Rfl:   •  CETIRIZINE HCL PO, Take 10 mg by mouth Daily., Disp: , Rfl:   •  Cholecalciferol (VITAMIN D3) 1000 UNITS capsule, Take 1,000 Units by mouth Daily., Disp: , Rfl:   •  cyclobenzaprine  (FLEXERIL) 5 MG tablet, Take 1 tablet by mouth 3 (Three) Times a Day As Needed for Muscle Spasms., Disp: 15 tablet, Rfl: 0  •  dorzolamide-timolol (COSOPT) 22.3-6.8 MG/ML ophthalmic solution, INSTILL 1 DROP INTO EACH EYE BID, Disp: , Rfl: 0  •  fluticasone (FLONASE) 50 MCG/ACT nasal spray, 2 sprays into each nostril Daily. Administer 2 sprays in each nostril for each dose., Disp: 1 each, Rfl: 5  •  gabapentin (NEURONTIN) 100 MG capsule, Take 1 capsule by mouth 3 (Three) Times a Day., Disp: 90 capsule, Rfl: 2  •  glucose blood test strip, Use as instructed, Disp: 100 each, Rfl: 12  •  glucose monitoring kit (FREESTYLE) monitoring kit, 1 each As Needed (diabetes)., Disp: 1 each, Rfl: 2  •  hydrochlorothiazide (HYDRODIURIL) 25 MG tablet, Take 1 tablet by mouth Daily., Disp: 90 tablet, Rfl: 3  •  lisinopril (PRINIVIL,ZESTRIL) 5 MG tablet, Take 1 tablet by mouth Daily., Disp: 90 tablet, Rfl: 3  •  LORazepam (ATIVAN) 1 MG tablet, Take 1 tablet by mouth Every 8 (Eight) Hours As Needed for Anxiety., Disp: 2 tablet, Rfl: 0  •  lovastatin (MEVACOR) 20 MG tablet, TAKE 1 TABLET BY MOUTH EVERY NIGHT AT BEDTIME, Disp: 90 tablet, Rfl: 1  •  methIMAzole (TAPAZOLE) 5 MG tablet, Take 2.5 mg by mouth Daily., Disp: , Rfl:   •  MethylPREDNISolone (MEDROL, HARDY,) 4 MG tablet, Take as directed on package instructions., Disp: 21 tablet, Rfl: 0  •  metroNIDAZOLE (METROCREAM) 0.75 % cream, APPLY TOPICALLY BID FOR ROSACEA, Disp: , Rfl: 2  •  minocycline (MINOCIN,DYNACIN) 100 MG capsule, TK 1 C PO QD, Disp: , Rfl: 2  •  ONETOUCH DELICA LANCETS 33G misc, 1 application 2 (Two) Times a Day As Needed (for sugars)., Disp: 100 each, Rfl: 12  •  SITagliptin-MetFORMIN HCl ER  MG tablet sustained-release 24 hour, Take 1 tablet by mouth Daily., Disp: 30 tablet, Rfl: 4    Past Surgical History:   Procedure Laterality Date   • CARPAL TUNNEL RELEASE      Bilateral carpal tunnel release.)   03/27/2000    • CATARACT EXTRACTION      Bilateral   •  "INJECTION OF MEDICATION  06/17/2015    celestone(betamethasone) (2)    • INJECTION OF MEDICATION  04/14/2016    depo medrol ( methylprednisone) (20)   • OTHER SURGICAL HISTORY      hysterosope procedure   • RIGHT OOPHORECTOMY     • ROTATOR CUFF REPAIR      Right   • SHOULDER ARTHROSCOPY Right 2/6/2017    Procedure: RIGHT SHOULDER ARTHROSCOPY SUBACROMIAL DECOMPRESSION, ROTATOR CUFF REPAIR   ;  Surgeon: Terrence Haines MD;  Location: [facility];  Service:    • SHOULDER SURGERY      Excision of 4.8 cm mass with primary intermediate closure.)   03/30/2012    • SPINAL FUSION         ROS  No fevers or chills.  No chest pain or shortness of air.  No GI or  disturbances.    PHYSICAL EXAMINATION:       Ht 64\" (162.6 cm)  Wt 173 lb (78.5 kg)  LMP 02/06/1980 (Approximate)  BMI 29.7 kg/m2    Physical Exam   Constitutional: She is oriented to person, place, and time. She appears well-developed and well-nourished.   Neurological: She is alert and oriented to person, place, and time.   Psychiatric: She has a normal mood and affect. Her behavior is normal. Judgment and thought content normal.       Right Shoulder Exam     Tenderness   The patient is experiencing tenderness in the acromioclavicular joint.    Range of Motion   Active Abduction: 150   Forward Flexion: 150     Muscle Strength   Abduction: 4/5   Supraspinatus: 4/5     Tests   Hawkin's test: positive  Impingement: positive    Other   Erythema: absent  Sensation: normal  Pulse: present                        ASSESSMENT:    Diagnoses and all orders for this visit:    Acute pain of right shoulder    Impingement syndrome of right shoulder          PLAN    Activity as tolerated.  Continue with HEP  No restrictions  Continue stretching and progression as pain allows.      Return if symptoms worsen or fail to improve, for recheck.    Terrence Haines MD  "

## 2017-09-15 ENCOUNTER — TELEPHONE (OUTPATIENT)
Dept: FAMILY MEDICINE CLINIC | Facility: CLINIC | Age: 72
End: 2017-09-15

## 2017-09-15 NOTE — TELEPHONE ENCOUNTER
Pt called, said that she had an MRI 1st of this week and had to have another one on Wednesday.    She is wanting to know if you could please call her with those results with those results as soon as can.   Pt said that she did not want to wait all weekend and not know the results.    Please call pt at [phone]

## 2017-09-19 ENCOUNTER — TELEPHONE (OUTPATIENT)
Dept: FAMILY MEDICINE CLINIC | Facility: CLINIC | Age: 72
End: 2017-09-19

## 2017-09-19 NOTE — TELEPHONE ENCOUNTER
PT CALLED, SAID THAT SHE WAS SUPPOSED TO HAVE A REF TO A BACK SURGEON MIRTA PALACIO IN [place].   PT STATED THAT SHE HAS NOT HEARD FROM YOU NOR HIS OFFICE IN REGARDS TO THIS AND WANTED TO MAKE SURE THAT YOU SENT THE REF IN.    SAID THAT SHE IS IN A LOT OF PAIN.       PLEASE CALL PT [phone]

## 2017-09-26 ENCOUNTER — TELEPHONE (OUTPATIENT)
Dept: NUTRITION | Facility: HOSPITAL | Age: 72
End: 2017-09-26

## 2017-09-26 NOTE — PROGRESS NOTES
[facility]    Patient Name:  Thelma Haley  YOB: 1945  MRN: [id number]  Admit Date:  (Not on file)        Pt was referred for diabetes education. Has medicare which will pay 80% of the fee. Spoke with pt by phone and she said she is in so much pain with a bulging disc in her back and she is unable to come in for the individual visit or the class at this time. This RDN will follow as pt is ready to discuss dm education.       Electronically signed by:  Shoshana De Leon RD  09/26/17 11:32 AM

## 2017-10-04 ENCOUNTER — TELEPHONE (OUTPATIENT)
Dept: CARDIOLOGY | Facility: CLINIC | Age: 72
End: 2017-10-04

## 2017-10-04 ENCOUNTER — APPOINTMENT (OUTPATIENT)
Dept: NUCLEAR MEDICINE | Facility: HOSPITAL | Age: 72
End: 2017-10-04
Attending: INTERNAL MEDICINE

## 2017-10-04 ENCOUNTER — APPOINTMENT (OUTPATIENT)
Dept: CARDIOLOGY | Facility: HOSPITAL | Age: 72
End: 2017-10-04
Attending: INTERNAL MEDICINE

## 2017-10-19 ENCOUNTER — OFFICE VISIT (OUTPATIENT)
Dept: FAMILY MEDICINE CLINIC | Facility: CLINIC | Age: 72
End: 2017-10-19

## 2017-10-19 ENCOUNTER — OFFICE VISIT (OUTPATIENT)
Dept: ORTHOPEDIC SURGERY | Facility: CLINIC | Age: 72
End: 2017-10-19

## 2017-10-19 VITALS
OXYGEN SATURATION: 98 % | HEART RATE: 88 BPM | HEIGHT: 64 IN | DIASTOLIC BLOOD PRESSURE: 72 MMHG | SYSTOLIC BLOOD PRESSURE: 148 MMHG | WEIGHT: 161.19 LBS | BODY MASS INDEX: 27.52 KG/M2

## 2017-10-19 DIAGNOSIS — E11.9 TYPE 2 DIABETES MELLITUS WITHOUT COMPLICATION, WITHOUT LONG-TERM CURRENT USE OF INSULIN (HCC): Primary | ICD-10-CM

## 2017-10-19 DIAGNOSIS — Z01.818 PRE-OPERATIVE CLEARANCE: ICD-10-CM

## 2017-10-19 DIAGNOSIS — M17.0 PRIMARY OSTEOARTHRITIS OF BOTH KNEES: Primary | ICD-10-CM

## 2017-10-19 LAB — GLUCOSE BLDC GLUCOMTR-MCNC: 90 MG/DL (ref 70–130)

## 2017-10-19 PROCEDURE — 82962 GLUCOSE BLOOD TEST: CPT | Performed by: FAMILY MEDICINE

## 2017-10-19 PROCEDURE — 93010 ELECTROCARDIOGRAM REPORT: CPT | Performed by: INTERNAL MEDICINE

## 2017-10-19 PROCEDURE — 99214 OFFICE O/P EST MOD 30 MIN: CPT | Performed by: NURSE PRACTITIONER

## 2017-10-19 PROCEDURE — 20610 DRAIN/INJ JOINT/BURSA W/O US: CPT | Performed by: NURSE PRACTITIONER

## 2017-10-19 PROCEDURE — 93005 ELECTROCARDIOGRAM TRACING: CPT | Performed by: FAMILY MEDICINE

## 2017-10-19 RX ORDER — OMEPRAZOLE 20 MG/1
20 CAPSULE, DELAYED RELEASE ORAL
COMMUNITY
End: 2019-02-21

## 2017-10-19 RX ORDER — LIDOCAINE HYDROCHLORIDE 10 MG/ML
2 INJECTION, SOLUTION INFILTRATION; PERINEURAL
Status: COMPLETED | OUTPATIENT
Start: 2017-10-19 | End: 2017-10-19

## 2017-10-19 RX ORDER — TRIAMCINOLONE ACETONIDE 40 MG/ML
40 INJECTION, SUSPENSION INTRA-ARTICULAR; INTRAMUSCULAR
Status: COMPLETED | OUTPATIENT
Start: 2017-10-19 | End: 2017-10-19

## 2017-10-19 RX ADMIN — TRIAMCINOLONE ACETONIDE 40 MG: 40 INJECTION, SUSPENSION INTRA-ARTICULAR; INTRAMUSCULAR at 08:10

## 2017-10-19 RX ADMIN — LIDOCAINE HYDROCHLORIDE 2 ML: 10 INJECTION, SOLUTION INFILTRATION; PERINEURAL at 08:10

## 2017-10-19 NOTE — PROGRESS NOTES
Thelma Haley presents for preop evaluation:     Surgeon : Dr. Khalif Betts  Type of surgery : L4 L5 back surgery   Surgery site : L4-L5 of lumbar spine  Date of procedure:  Oct 26, 2017    Review of Systems   Constitutional: Negative for activity change, appetite change, fatigue and fever.   HENT: Negative for ear pain and sore throat.    Eyes: Negative for pain and visual disturbance.   Respiratory: Negative for cough and shortness of breath.    Cardiovascular: Negative for chest pain and palpitations.   Gastrointestinal: Negative for abdominal pain and nausea.   Endocrine: Negative for cold intolerance and heat intolerance.   Genitourinary: Negative for difficulty urinating and dysuria.   Musculoskeletal: Positive for back pain, gait problem and joint swelling. Negative for arthralgias.   Skin: Negative for color change and rash.   Neurological: Negative for dizziness, weakness and headaches.   Hematological: Negative for adenopathy. Does not bruise/bleed easily.   Psychiatric/Behavioral: Negative for agitation, confusion and sleep disturbance.       Physical Exam   Constitutional: She is oriented to person, place, and time. She appears well-developed and well-nourished.   HENT:   Head: Normocephalic and atraumatic.   Eyes: Conjunctivae, EOM and lids are normal. Pupils are equal, round, and reactive to light.   Neck: Normal range of motion. Neck supple.   Cardiovascular: Normal rate, regular rhythm and normal heart sounds.  Exam reveals no gallop and no friction rub.    No murmur heard.  Pulmonary/Chest: Effort normal and breath sounds normal.   Abdominal: Soft. Normal appearance and bowel sounds are normal. There is no tenderness.   Musculoskeletal: Normal range of motion.   Neurological: She is alert and oriented to person, place, and time.   Skin: Skin is warm, dry and intact.   Psychiatric: She has a normal mood and affect. Her speech is normal and behavior is normal. Judgment and thought content normal.  Cognition and memory are normal.         Lab Results   Component Value Date    WBC 7.5 09/02/2016    HGB 12.5 09/02/2016    HCT 35.5 09/02/2016    MCV 87.7 09/02/2016     09/02/2016     Lab Results   Component Value Date    CREATININE 0.88 07/24/2017     No results found for: GLUF       Assessment/Plan     Thelma was seen today for pre-op exam.    Diagnoses and all orders for this visit:    Type 2 diabetes mellitus without complication, without long-term current use of insulin  -     POCT Glucose    Pre-operative clearance  -     ECG 12 Lead  Patient has several order from her referred surgeon that has requested [facility]; CBC, BMP, PT, aPTT, INR, ESR, and Hep B and C, as well as a CXR. Could not find an order in for an EKG, will have her do that here today and have results faxed to [phone]      Risk score          This document has been electronically signed by Gina Polo MD on October 19, 2017 9:11 AM  '

## 2017-10-20 ENCOUNTER — TELEPHONE (OUTPATIENT)
Dept: FAMILY MEDICINE CLINIC | Facility: CLINIC | Age: 72
End: 2017-10-20

## 2017-10-24 ENCOUNTER — TELEPHONE (OUTPATIENT)
Dept: FAMILY MEDICINE CLINIC | Facility: CLINIC | Age: 72
End: 2017-10-24

## 2017-10-24 NOTE — TELEPHONE ENCOUNTER
Pt called, said that when she was here last time, she forgot to mention that she needed her Gabapentin.   She is wanting to know if she can get that soon please.         Can reach pt at [phone]      Thanks

## 2017-10-25 ENCOUNTER — TELEPHONE (OUTPATIENT)
Dept: FAMILY MEDICINE CLINIC | Facility: CLINIC | Age: 72
End: 2017-10-25

## 2017-10-25 RX ORDER — GABAPENTIN 100 MG/1
100 CAPSULE ORAL 3 TIMES DAILY
Qty: 90 CAPSULE | Refills: 2 | Status: SHIPPED | OUTPATIENT
Start: 2017-10-25 | End: 2018-05-03 | Stop reason: SDUPTHER

## 2017-10-25 NOTE — TELEPHONE ENCOUNTER
DR PALACIO OFFICE IN [place] CALLED. THEY ARE NEEDING CLEARANCE FOR PT SURGERY TOMORROW. SENT FAX TWICE 10/11/17. PLEASE CALL AT [facility]AR[employer] CONVENIENCE [phone]

## 2017-10-25 NOTE — TELEPHONE ENCOUNTER
PATIENT CALLED FOR REFILL : GABAPENTIN 100 MG     THANK YOU    PATIENT CANNOT COME IN BECAUSE SHE IS HAVING SURGERY IN THE MORNING IN [place]

## 2017-10-27 RX ORDER — GABAPENTIN 100 MG/1
CAPSULE ORAL
Qty: 270 CAPSULE | Refills: 0 | OUTPATIENT
Start: 2017-10-27

## 2017-12-21 RX ORDER — LOVASTATIN 20 MG/1
20 TABLET ORAL NIGHTLY
Qty: 90 TABLET | Refills: 3 | Status: SHIPPED | OUTPATIENT
Start: 2017-12-21 | End: 2018-04-25

## 2018-01-08 ENCOUNTER — EPISODE CHANGES (OUTPATIENT)
Dept: CASE MANAGEMENT | Facility: OTHER | Age: 73
End: 2018-01-08

## 2018-01-08 ENCOUNTER — OFFICE VISIT (OUTPATIENT)
Dept: ORTHOPEDIC SURGERY | Facility: CLINIC | Age: 73
End: 2018-01-08

## 2018-01-08 VITALS — HEIGHT: 64 IN | WEIGHT: 158.7 LBS | BODY MASS INDEX: 27.1 KG/M2

## 2018-01-08 DIAGNOSIS — M17.0 PRIMARY OSTEOARTHRITIS OF BOTH KNEES: Primary | ICD-10-CM

## 2018-01-08 PROBLEM — M51.16 LUMBAR DISC HERNIATION WITH RADICULOPATHY: Status: ACTIVE | Noted: 2017-10-11

## 2018-01-08 PROCEDURE — 20610 DRAIN/INJ JOINT/BURSA W/O US: CPT | Performed by: NURSE PRACTITIONER

## 2018-01-08 PROCEDURE — 99214 OFFICE O/P EST MOD 30 MIN: CPT | Performed by: NURSE PRACTITIONER

## 2018-01-08 RX ORDER — DIAZEPAM 5 MG/1
TABLET ORAL
Refills: 0 | COMMUNITY
Start: 2017-10-28 | End: 2018-04-25

## 2018-01-08 RX ORDER — LIDOCAINE HYDROCHLORIDE 10 MG/ML
2 INJECTION, SOLUTION INFILTRATION; PERINEURAL
Status: COMPLETED | OUTPATIENT
Start: 2018-01-08 | End: 2018-01-08

## 2018-01-08 RX ORDER — TIZANIDINE 4 MG/1
TABLET ORAL
Refills: 0 | COMMUNITY
Start: 2017-10-27 | End: 2018-04-25

## 2018-01-08 RX ORDER — TRIAMCINOLONE ACETONIDE 40 MG/ML
40 INJECTION, SUSPENSION INTRA-ARTICULAR; INTRAMUSCULAR
Status: COMPLETED | OUTPATIENT
Start: 2018-01-08 | End: 2018-01-08

## 2018-01-08 RX ADMIN — TRIAMCINOLONE ACETONIDE 40 MG: 40 INJECTION, SUSPENSION INTRA-ARTICULAR; INTRAMUSCULAR at 11:08

## 2018-01-08 RX ADMIN — LIDOCAINE HYDROCHLORIDE 2 ML: 10 INJECTION, SOLUTION INFILTRATION; PERINEURAL at 11:08

## 2018-01-08 NOTE — PROGRESS NOTES
Thelma Haley is a 72 y.o. female returns for     Chief Complaint   Patient presents with   • Right Shoulder - Follow-up   • Left Shoulder - Follow-up       HISTORY OF PRESENT ILLNESS:  72-year-old  female in the office today for follow-up of the left shoulder pain and right knee pain.  She is requesting an intra-articular injection in the joints again however she's recently been been diagnosed with diabetes and is concerned that the  steroids will increase her blood sugar.       CONCURRENT MEDICAL HISTORY:    Past Medical History:   Diagnosis Date   • Abnormal results of thyroid function studies    • Acute pain of left shoulder    • Acute pain of right shoulder    • Allergic rhinitis due to pollen    • Asthma      uncomplicated      • Cataract    • Chronic rhinitis    • Essential hypertension    • Extrinsic asthma with status asthmaticus    • Glaucoma    • Hyperlipidemia    • Impingement syndrome of right shoulder    • Neuropathy    • Nuclear senile cataract    • Obesity    • Overweight with body mass index (BMI) 25.0-29.9    • Primary fibromyalgia syndrome    • Primary open angle glaucoma    • Rosacea    • Sinus headache    • Temporomandibular joint disorder        Allergies   Allergen Reactions   • Ciprofloxacin    • Hydrocodone    • Lortab [Hydrocodone-Acetaminophen]    • Metronidazole    • Oxycodone      Dizziness and doesn't decrease pain   • Ultram [Tramadol Hcl]          Current Outpatient Prescriptions:   •  albuterol (PROVENTIL HFA;VENTOLIN HFA) 108 (90 BASE) MCG/ACT inhaler, Inhale 2 puffs Every 4 (Four) Hours As Needed for wheezing., Disp: 6.7 g, Rfl: 11  •  albuterol (VENTOLIN HFA) 108 (90 BASE) MCG/ACT inhaler, 2 puffs every 4 hours as needed for breathing, Disp: 1 inhaler, Rfl: 5  •  Biotin (BIOTIN MAXIMUM STRENGTH) 10 MG tablet, Take 10 mg by mouth Daily., Disp: , Rfl:   •  budesonide-formoterol (SYMBICORT) 160-4.5 MCG/ACT inhaler, 2 puffs twice a day, Disp: 1 inhaler, Rfl: 5  •   budesonide-formoterol (SYMBICORT) 80-4.5 MCG/ACT inhaler, Inhale., Disp: , Rfl:   •  CETIRIZINE HCL PO, Take 10 mg by mouth Daily., Disp: , Rfl:   •  Cholecalciferol (VITAMIN D3) 1000 UNITS capsule, Take 1,000 Units by mouth Daily., Disp: , Rfl:   •  diazePAM (VALIUM) 5 MG tablet, TK 1/2 TO 1 T PO Q 8 H PRN FOR PAIN, Disp: , Rfl: 0  •  dorzolamide-timolol (COSOPT) 22.3-6.8 MG/ML ophthalmic solution, INSTILL 1 DROP INTO EACH EYE BID, Disp: , Rfl: 0  •  fluticasone (FLONASE) 50 MCG/ACT nasal spray, 2 sprays into each nostril Daily. Administer 2 sprays in each nostril for each dose., Disp: 1 each, Rfl: 5  •  gabapentin (NEURONTIN) 100 MG capsule, Take 1 capsule by mouth 3 (Three) Times a Day., Disp: 90 capsule, Rfl: 2  •  glucose blood test strip, Use as instructed, Disp: 100 each, Rfl: 12  •  glucose monitoring kit (FREESTYLE) monitoring kit, 1 each As Needed (diabetes)., Disp: 1 each, Rfl: 2  •  hydrochlorothiazide (HYDRODIURIL) 25 MG tablet, Take 1 tablet by mouth Daily., Disp: 90 tablet, Rfl: 3  •  lisinopril (PRINIVIL,ZESTRIL) 5 MG tablet, Take 1 tablet by mouth Daily., Disp: 90 tablet, Rfl: 3  •  lovastatin (MEVACOR) 20 MG tablet, Take 1 tablet by mouth Every Night., Disp: 90 tablet, Rfl: 3  •  methIMAzole (TAPAZOLE) 5 MG tablet, Take 2.5 mg by mouth Daily., Disp: , Rfl:   •  omeprazole (priLOSEC) 20 MG capsule, Take 20 mg by mouth., Disp: , Rfl:   •  ONETOUCH DELICA LANCETS 33G misc, 1 application 2 (Two) Times a Day As Needed (for sugars)., Disp: 100 each, Rfl: 12  •  SITagliptin-MetFORMIN HCl ER  MG tablet sustained-release 24 hour, Take 1 tablet by mouth Daily., Disp: 30 tablet, Rfl: 4  •  tiZANidine (ZANAFLEX) 4 MG tablet, TK 1 T PO Q 6 HOURS PRN P, Disp: , Rfl: 0    Past Surgical History:   Procedure Laterality Date   • CARPAL TUNNEL RELEASE      Bilateral carpal tunnel release.)   03/27/2000    • CATARACT EXTRACTION      Bilateral   • INJECTION OF MEDICATION  06/17/2015    celestone(betamethasone)  "(2)    • INJECTION OF MEDICATION  04/14/2016    depo medrol ( methylprednisone) (20)   • OTHER SURGICAL HISTORY      hysterosope procedure   • RIGHT OOPHORECTOMY     • ROTATOR CUFF REPAIR      Right   • SHOULDER ARTHROSCOPY Right 2/6/2017    Procedure: RIGHT SHOULDER ARTHROSCOPY SUBACROMIAL DECOMPRESSION, ROTATOR CUFF REPAIR   ;  Surgeon: Terrence Haines MD;  Location: [facility];  Service:    • SHOULDER SURGERY      Excision of 4.8 cm mass with primary intermediate closure.)   03/30/2012    • SPINAL FUSION         ROS  No fevers or chills.  No chest pain or shortness of air.  No GI or  disturbances.    PHYSICAL EXAMINATION:       Ht 162.6 cm (64\")  Wt 72 kg (158 lb 11.2 oz)  LMP 02/06/1980 (Approximate)  BMI 27.24 kg/m2    Physical Exam   Constitutional: She is oriented to person, place, and time. Vital signs are normal. She appears well-developed and well-nourished. She is cooperative.   HENT:   Head: Normocephalic and atraumatic.   Neck: Trachea normal and phonation normal.   Pulmonary/Chest: Effort normal. No respiratory distress.   Abdominal: Soft. Normal appearance. She exhibits no distension.   Musculoskeletal:        Right knee: She exhibits no effusion.        Left knee: She exhibits no effusion.   Neurological: She is alert and oriented to person, place, and time. GCS eye subscore is 4. GCS verbal subscore is 5. GCS motor subscore is 6.   Skin: Skin is warm, dry and intact.   Psychiatric: She has a normal mood and affect. Her speech is normal and behavior is normal. Judgment and thought content normal. Cognition and memory are normal.   Vitals reviewed.      GAIT:     []  Normal  [x]  Antalgic    Assistive device: []  None  []  Walker     []  Crutches  []  Cane     []  Wheelchair  []  Stretcher    Right Knee Exam     Tenderness   The patient is experiencing tenderness in the medial joint line and lateral joint line.    Range of Motion   Extension: normal   Right knee flexion: 110.     Other "   Erythema: absent  Scars: absent  Sensation: normal  Pulse: present  Swelling: mild  Other tests: no effusion present      Left Knee Exam     Tenderness   The patient is experiencing no tenderness.         Range of Motion   Extension: normal   Flexion: abnormal     Muscle Strength     The patient has normal left knee strength.    Other   Erythema: absent  Scars: absent  Sensation: normal  Pulse: present  Swelling: mild  Effusion: no effusion present              No results found.        Large Joint Arthrocentesis  Date/Time: 1/8/2018 11:08 AM  Consent given by: patient  Timeout: Immediately prior to procedure a time out was called to verify the correct patient, procedure, equipment, support staff and site/side marked as required   Supporting Documentation  Indications: pain   Procedure Details  Location: knee - R knee  Preparation: Patient was prepped and draped in the usual sterile fashion  Needle size: 22 G  Approach: anteromedial  Medications administered: 40 mg triamcinolone acetonide 40 MG/ML; 2 mL lidocaine 1 %  Patient tolerance: patient tolerated the procedure well with no immediate complications        ASSESSMENT:    Diagnoses and all orders for this visit:    Primary osteoarthritis of both knees  -     Large Joint Arthrocentesis    Other orders  -     diazePAM (VALIUM) 5 MG tablet; TK 1/2 TO 1 T PO Q 8 H PRN FOR PAIN  -     tiZANidine (ZANAFLEX) 4 MG tablet; TK 1 T PO Q 6 HOURS PRN P          PLAN  Repeated the intra-articular injection into the right knee and recommended the patient monitor left shoulder pain over the next couple weeks to see if she has improvement once the steroid has a chance to systemically be absorbed.  If she doesn't and her blood sugars remained stable then we'll proceed with a subacromial injection into the left shoulder in approximately 2-4 weeks.  No Follow-up on file.    Michael Wong, JUAN

## 2018-01-22 ENCOUNTER — OFFICE VISIT (OUTPATIENT)
Dept: ORTHOPEDIC SURGERY | Facility: CLINIC | Age: 73
End: 2018-01-22

## 2018-01-22 VITALS — BODY MASS INDEX: 26.98 KG/M2 | HEIGHT: 64 IN | WEIGHT: 158 LBS

## 2018-01-22 DIAGNOSIS — M75.42 IMPINGEMENT SYNDROME, SHOULDER, LEFT: Primary | ICD-10-CM

## 2018-01-22 PROCEDURE — 20610 DRAIN/INJ JOINT/BURSA W/O US: CPT | Performed by: NURSE PRACTITIONER

## 2018-01-22 PROCEDURE — 99214 OFFICE O/P EST MOD 30 MIN: CPT | Performed by: NURSE PRACTITIONER

## 2018-01-22 RX ORDER — TRIAMCINOLONE ACETONIDE 40 MG/ML
40 INJECTION, SUSPENSION INTRA-ARTICULAR; INTRAMUSCULAR
Status: COMPLETED | OUTPATIENT
Start: 2018-01-22 | End: 2018-01-22

## 2018-01-22 RX ORDER — LIDOCAINE HYDROCHLORIDE 10 MG/ML
2 INJECTION, SOLUTION INFILTRATION; PERINEURAL
Status: COMPLETED | OUTPATIENT
Start: 2018-01-22 | End: 2018-01-22

## 2018-01-22 RX ADMIN — LIDOCAINE HYDROCHLORIDE 2 ML: 10 INJECTION, SOLUTION INFILTRATION; PERINEURAL at 09:21

## 2018-01-22 RX ADMIN — TRIAMCINOLONE ACETONIDE 40 MG: 40 INJECTION, SUSPENSION INTRA-ARTICULAR; INTRAMUSCULAR at 09:21

## 2018-01-22 NOTE — PROGRESS NOTES
Thelma Haley is a 72 y.o. female returns for     Chief Complaint   Patient presents with   • Left Shoulder - Follow-up   • Right Shoulder - Follow-up       HISTORY OF PRESENT ILLNESS:  72-year-old  female in the office today for follow-up of left shoulder pain.      CONCURRENT MEDICAL HISTORY:    Past Medical History:   Diagnosis Date   • Abnormal results of thyroid function studies    • Acute pain of left shoulder    • Acute pain of right shoulder    • Allergic rhinitis due to pollen    • Asthma      uncomplicated      • Cataract    • Chronic rhinitis    • Essential hypertension    • Extrinsic asthma with status asthmaticus    • Glaucoma    • Hyperlipidemia    • Impingement syndrome of right shoulder    • Neuropathy    • Nuclear senile cataract    • Obesity    • Overweight with body mass index (BMI) 25.0-29.9    • Primary fibromyalgia syndrome    • Primary open angle glaucoma    • Rosacea    • Sinus headache    • Temporomandibular joint disorder        Allergies   Allergen Reactions   • Ciprofloxacin    • Hydrocodone    • Lortab [Hydrocodone-Acetaminophen]    • Metronidazole    • Oxycodone      Dizziness and doesn't decrease pain   • Ultram [Tramadol Hcl]          Current Outpatient Prescriptions:   •  albuterol (PROVENTIL HFA;VENTOLIN HFA) 108 (90 BASE) MCG/ACT inhaler, Inhale 2 puffs Every 4 (Four) Hours As Needed for wheezing., Disp: 6.7 g, Rfl: 11  •  albuterol (VENTOLIN HFA) 108 (90 BASE) MCG/ACT inhaler, 2 puffs every 4 hours as needed for breathing, Disp: 1 inhaler, Rfl: 5  •  Biotin (BIOTIN MAXIMUM STRENGTH) 10 MG tablet, Take 10 mg by mouth Daily., Disp: , Rfl:   •  budesonide-formoterol (SYMBICORT) 160-4.5 MCG/ACT inhaler, 2 puffs twice a day, Disp: 1 inhaler, Rfl: 5  •  budesonide-formoterol (SYMBICORT) 80-4.5 MCG/ACT inhaler, Inhale., Disp: , Rfl:   •  CETIRIZINE HCL PO, Take 10 mg by mouth Daily., Disp: , Rfl:   •  Cholecalciferol (VITAMIN D3) 1000 UNITS capsule, Take 1,000 Units by mouth  Daily., Disp: , Rfl:   •  diazePAM (VALIUM) 5 MG tablet, TK 1/2 TO 1 T PO Q 8 H PRN FOR PAIN, Disp: , Rfl: 0  •  dorzolamide-timolol (COSOPT) 22.3-6.8 MG/ML ophthalmic solution, INSTILL 1 DROP INTO EACH EYE BID, Disp: , Rfl: 0  •  fluticasone (FLONASE) 50 MCG/ACT nasal spray, 2 sprays into each nostril Daily. Administer 2 sprays in each nostril for each dose., Disp: 1 each, Rfl: 5  •  gabapentin (NEURONTIN) 100 MG capsule, Take 1 capsule by mouth 3 (Three) Times a Day., Disp: 90 capsule, Rfl: 2  •  glucose blood test strip, Use as instructed, Disp: 100 each, Rfl: 12  •  glucose monitoring kit (FREESTYLE) monitoring kit, 1 each As Needed (diabetes)., Disp: 1 each, Rfl: 2  •  hydrochlorothiazide (HYDRODIURIL) 25 MG tablet, Take 1 tablet by mouth Daily., Disp: 90 tablet, Rfl: 3  •  lisinopril (PRINIVIL,ZESTRIL) 5 MG tablet, Take 1 tablet by mouth Daily., Disp: 90 tablet, Rfl: 3  •  lovastatin (MEVACOR) 20 MG tablet, Take 1 tablet by mouth Every Night., Disp: 90 tablet, Rfl: 3  •  methIMAzole (TAPAZOLE) 5 MG tablet, Take 2.5 mg by mouth Daily., Disp: , Rfl:   •  omeprazole (priLOSEC) 20 MG capsule, Take 20 mg by mouth., Disp: , Rfl:   •  ONETOUCH DELICA LANCETS 33G misc, 1 application 2 (Two) Times a Day As Needed (for sugars)., Disp: 100 each, Rfl: 12  •  SITagliptin-MetFORMIN HCl ER  MG tablet sustained-release 24 hour, Take 1 tablet by mouth Daily., Disp: 30 tablet, Rfl: 4  •  tiZANidine (ZANAFLEX) 4 MG tablet, TK 1 T PO Q 6 HOURS PRN P, Disp: , Rfl: 0    Past Surgical History:   Procedure Laterality Date   • CARPAL TUNNEL RELEASE      Bilateral carpal tunnel release.)   03/27/2000    • CATARACT EXTRACTION      Bilateral   • INJECTION OF MEDICATION  06/17/2015    celestone(betamethasone) (2)    • INJECTION OF MEDICATION  04/14/2016    depo medrol ( methylprednisone) (20)   • OTHER SURGICAL HISTORY      hysterosope procedure   • RIGHT OOPHORECTOMY     • ROTATOR CUFF REPAIR      Right   • SHOULDER ARTHROSCOPY  "Right 2/6/2017    Procedure: RIGHT SHOULDER ARTHROSCOPY SUBACROMIAL DECOMPRESSION, ROTATOR CUFF REPAIR   ;  Surgeon: Terrence Haines MD;  Location: [facility];  Service:    • SHOULDER SURGERY      Excision of 4.8 cm mass with primary intermediate closure.)   03/30/2012    • SPINAL FUSION         ROS  No fevers or chills.  No chest pain or shortness of air.  No GI or  disturbances.    PHYSICAL EXAMINATION:       Ht 162.6 cm (64\")  Wt 71.7 kg (158 lb)  LMP 02/06/1980 (Approximate)  BMI 27.12 kg/m2    Physical Exam   Constitutional: She is oriented to person, place, and time. Vital signs are normal. She appears well-developed and well-nourished. She is cooperative.   HENT:   Head: Normocephalic and atraumatic.   Neck: Trachea normal and phonation normal.   Pulmonary/Chest: Effort normal. No respiratory distress.   Abdominal: Soft. Normal appearance. She exhibits no distension.   Neurological: She is alert and oriented to person, place, and time. GCS eye subscore is 4. GCS verbal subscore is 5. GCS motor subscore is 6.   Skin: Skin is warm, dry and intact.   Psychiatric: She has a normal mood and affect. Her speech is normal and behavior is normal. Judgment and thought content normal. Cognition and memory are normal.   Vitals reviewed.      GAIT:     [x]  Normal  []  Antalgic    Assistive device: [x]  None  []  Walker     []  Crutches  []  Cane     []  Wheelchair  []  Stretcher    Right Shoulder Exam   Right shoulder exam is normal.      Left Shoulder Exam     Tenderness   The patient is experiencing tenderness in the acromioclavicular joint.    Range of Motion   Active Abduction: abnormal   Forward Flexion: abnormal   External Rotation: abnormal   Internal Rotation 90 degrees: abnormal     Muscle Strength   Abduction: 4/5   Internal Rotation: 4/5   External Rotation: 4/5   Supraspinatus: 4/5     Tests   Cross Arm: positive  Drop Arm: positive  Impingement: positive    Other   Erythema: absent  Scars: " absent  Sensation: normal  Pulse: present               No results found.  Large Joint Arthrocentesis  Date/Time: 1/22/2018 9:21 AM  Consent given by: patient  Site marked: site marked  Timeout: Immediately prior to procedure a time out was called to verify the correct patient, procedure, equipment, support staff and site/side marked as required   Supporting Documentation  Indications: pain   Procedure Details  Location: shoulder - L subacromial bursa  Preparation: Patient was prepped and draped in the usual sterile fashion  Needle size: 22 G  Approach: posterior  Medications administered: 40 mg triamcinolone acetonide 40 MG/ML; 2 mL lidocaine 1 %  Patient tolerance: patient tolerated the procedure well with no immediate complications                ASSESSMENT:    Diagnoses and all orders for this visit:    Impingement syndrome, shoulder, left  -     Large Joint Arthrocentesis          PLAN  Repeated the subacromial injection today and recommended progression of range of motion and activity as tolerated based on pain follow-up in 3 months for recheck.  No Follow-up on file.    Michael Wong, APRN

## 2018-01-31 DIAGNOSIS — E11.9 TYPE 2 DIABETES MELLITUS WITHOUT COMPLICATION, WITHOUT LONG-TERM CURRENT USE OF INSULIN (HCC): ICD-10-CM

## 2018-01-31 RX ORDER — SITAGLIPTIN AND METFORMIN HYDROCHLORIDE 1000; 50 MG/1; MG/1
TABLET, FILM COATED, EXTENDED RELEASE ORAL
Qty: 30 TABLET | Refills: 0 | Status: SHIPPED | OUTPATIENT
Start: 2018-01-31 | End: 2018-02-26 | Stop reason: SDUPTHER

## 2018-02-23 ENCOUNTER — TELEPHONE (OUTPATIENT)
Dept: FAMILY MEDICINE CLINIC | Facility: CLINIC | Age: 73
End: 2018-02-23

## 2018-02-26 ENCOUNTER — TELEPHONE (OUTPATIENT)
Dept: FAMILY MEDICINE CLINIC | Facility: CLINIC | Age: 73
End: 2018-02-26

## 2018-02-26 DIAGNOSIS — E11.9 TYPE 2 DIABETES MELLITUS WITHOUT COMPLICATION, WITHOUT LONG-TERM CURRENT USE OF INSULIN (HCC): ICD-10-CM

## 2018-02-26 NOTE — TELEPHONE ENCOUNTER
If in future these messages can be sent as an Rx request that would be significantly more efficient.

## 2018-02-26 NOTE — TELEPHONE ENCOUNTER
I completely agree. Unfortunately, I can't send messages as RX requests. We don't have access to do that up front.

## 2018-02-26 NOTE — TELEPHONE ENCOUNTER
Patient called and requested a refill on her Janumet be sent to [facility].     She is almost out of refills and wanted to have some called in before she runs out. She said this was the second time she has tried to call us.     Call back number [phone]    Thank you.

## 2018-02-26 NOTE — TELEPHONE ENCOUNTER
If in future these messages could be sent as an Rx request that would be significantly more efficient.

## 2018-03-03 DIAGNOSIS — E11.9 TYPE 2 DIABETES MELLITUS WITHOUT COMPLICATION, WITHOUT LONG-TERM CURRENT USE OF INSULIN (HCC): ICD-10-CM

## 2018-03-05 RX ORDER — SITAGLIPTIN AND METFORMIN HYDROCHLORIDE 1000; 50 MG/1; MG/1
TABLET, FILM COATED, EXTENDED RELEASE ORAL
Qty: 30 TABLET | Refills: 0 | Status: SHIPPED | OUTPATIENT
Start: 2018-03-05 | End: 2018-09-14 | Stop reason: SDUPTHER

## 2018-03-06 ENCOUNTER — TELEPHONE (OUTPATIENT)
Dept: FAMILY MEDICINE CLINIC | Facility: CLINIC | Age: 73
End: 2018-03-06

## 2018-03-06 DIAGNOSIS — E11.9 TYPE 2 DIABETES MELLITUS WITHOUT COMPLICATION, WITHOUT LONG-TERM CURRENT USE OF INSULIN (HCC): ICD-10-CM

## 2018-03-06 NOTE — TELEPHONE ENCOUNTER
Patient called and said [facility] faxed over a paper for her to get diabetic testing supplies because she has Medicare; and she has called to see if we had received it and if it had been filled out. She is about to run out of testing supplies.     Thank you.     [phone]

## 2018-03-06 NOTE — TELEPHONE ENCOUNTER
Please disregard previous message sent; patient called her pharmacy and everything checked out and is ready to go.    Thank you.

## 2018-03-07 RX ORDER — LANCETS 33 GAUGE
EACH MISCELLANEOUS
Qty: 100 EACH | Refills: 12 | Status: SHIPPED | OUTPATIENT
Start: 2018-03-07 | End: 2019-04-15 | Stop reason: SDUPTHER

## 2018-04-04 DIAGNOSIS — E11.9 TYPE 2 DIABETES MELLITUS WITHOUT COMPLICATION, WITHOUT LONG-TERM CURRENT USE OF INSULIN (HCC): ICD-10-CM

## 2018-04-10 ENCOUNTER — TELEPHONE (OUTPATIENT)
Dept: FAMILY MEDICINE CLINIC | Facility: CLINIC | Age: 73
End: 2018-04-10

## 2018-04-11 ENCOUNTER — TELEPHONE (OUTPATIENT)
Dept: FAMILY MEDICINE CLINIC | Facility: CLINIC | Age: 73
End: 2018-04-11

## 2018-04-12 ENCOUNTER — APPOINTMENT (OUTPATIENT)
Dept: LAB | Facility: HOSPITAL | Age: 73
End: 2018-04-12

## 2018-04-12 DIAGNOSIS — E11.8 DM (DIABETES MELLITUS) WITH COMPLICATIONS (HCC): Primary | ICD-10-CM

## 2018-04-12 LAB — HBA1C MFR BLD: 6 % (ref 4–5.6)

## 2018-04-12 PROCEDURE — 36415 COLL VENOUS BLD VENIPUNCTURE: CPT | Performed by: FAMILY MEDICINE

## 2018-04-12 PROCEDURE — 83036 HEMOGLOBIN GLYCOSYLATED A1C: CPT | Performed by: FAMILY MEDICINE

## 2018-04-12 RX ORDER — METHIMAZOLE 5 MG/1
TABLET ORAL
Qty: 45 TABLET | Refills: 0 | Status: SHIPPED | OUTPATIENT
Start: 2018-04-12 | End: 2018-07-17 | Stop reason: SDUPTHER

## 2018-04-20 ENCOUNTER — OFFICE VISIT (OUTPATIENT)
Dept: FAMILY MEDICINE CLINIC | Facility: CLINIC | Age: 73
End: 2018-04-20

## 2018-04-20 ENCOUNTER — APPOINTMENT (OUTPATIENT)
Dept: LAB | Facility: HOSPITAL | Age: 73
End: 2018-04-20

## 2018-04-20 VITALS
OXYGEN SATURATION: 98 % | HEART RATE: 77 BPM | HEIGHT: 64 IN | SYSTOLIC BLOOD PRESSURE: 122 MMHG | DIASTOLIC BLOOD PRESSURE: 72 MMHG | BODY MASS INDEX: 25.32 KG/M2 | WEIGHT: 148.31 LBS

## 2018-04-20 DIAGNOSIS — E11.9 TYPE 2 DIABETES MELLITUS WITHOUT COMPLICATION, WITHOUT LONG-TERM CURRENT USE OF INSULIN (HCC): Primary | ICD-10-CM

## 2018-04-20 DIAGNOSIS — Z12.31 ENCOUNTER FOR SCREENING MAMMOGRAM FOR MALIGNANT NEOPLASM OF BREAST: ICD-10-CM

## 2018-04-20 DIAGNOSIS — R53.83 FATIGUE, UNSPECIFIED TYPE: ICD-10-CM

## 2018-04-20 LAB
ALBUMIN UR-MCNC: 1.2 MG/L
ANION GAP SERPL CALCULATED.3IONS-SCNC: 17 MMOL/L (ref 5–15)
ARTICHOKE IGE QN: 114 MG/DL (ref 1–129)
BASOPHILS # BLD AUTO: 0.02 10*3/MM3 (ref 0–0.2)
BASOPHILS NFR BLD AUTO: 0.3 % (ref 0–2)
BUN BLD-MCNC: 35 MG/DL (ref 7–21)
BUN/CREAT SERPL: 28.2 (ref 7–25)
CALCIUM SPEC-SCNC: 10.1 MG/DL (ref 8.4–10.2)
CHLORIDE SERPL-SCNC: 102 MMOL/L (ref 95–110)
CHOLEST SERPL-MCNC: 197 MG/DL (ref 0–199)
CO2 SERPL-SCNC: 22 MMOL/L (ref 22–31)
CREAT BLD-MCNC: 1.24 MG/DL (ref 0.5–1)
CREAT UR-MCNC: 137.1 MG/DL
DEPRECATED RDW RBC AUTO: 40.3 FL (ref 36.4–46.3)
EOSINOPHIL # BLD AUTO: 0.1 10*3/MM3 (ref 0–0.7)
EOSINOPHIL NFR BLD AUTO: 1.4 % (ref 0–7)
ERYTHROCYTE [DISTWIDTH] IN BLOOD BY AUTOMATED COUNT: 13.4 % (ref 11.5–14.5)
FOLATE SERPL-MCNC: 12.4 NG/ML (ref 2.76–21)
GFR SERPL CREATININE-BSD FRML MDRD: 42 ML/MIN/1.73 (ref 39–90)
GLUCOSE BLD-MCNC: 95 MG/DL (ref 60–100)
HCT VFR BLD AUTO: 35.2 % (ref 35–45)
HDLC SERPL-MCNC: 36 MG/DL (ref 60–200)
HGB BLD-MCNC: 12.3 G/DL (ref 12–15.5)
IMM GRANULOCYTES # BLD: 0.04 10*3/MM3 (ref 0–0.02)
IMM GRANULOCYTES NFR BLD: 0.6 % (ref 0–0.5)
LDLC/HDLC SERPL: 3.08 {RATIO} (ref 0–3.22)
LYMPHOCYTES # BLD AUTO: 1.61 10*3/MM3 (ref 0.6–4.2)
LYMPHOCYTES NFR BLD AUTO: 22.4 % (ref 10–50)
MCH RBC QN AUTO: 29.4 PG (ref 26.5–34)
MCHC RBC AUTO-ENTMCNC: 34.9 G/DL (ref 31.4–36)
MCV RBC AUTO: 84 FL (ref 80–98)
MICROALBUMIN/CREAT UR: 8.8 MG/G (ref 0–30)
MONOCYTES # BLD AUTO: 0.48 10*3/MM3 (ref 0–0.9)
MONOCYTES NFR BLD AUTO: 6.7 % (ref 0–12)
NEUTROPHILS # BLD AUTO: 4.95 10*3/MM3 (ref 2–8.6)
NEUTROPHILS NFR BLD AUTO: 68.6 % (ref 37–80)
PLATELET # BLD AUTO: 262 10*3/MM3 (ref 150–450)
PMV BLD AUTO: 12 FL (ref 8–12)
POTASSIUM BLD-SCNC: 4.6 MMOL/L (ref 3.5–5.1)
RBC # BLD AUTO: 4.19 10*6/MM3 (ref 3.77–5.16)
SODIUM BLD-SCNC: 141 MMOL/L (ref 137–145)
TRIGL SERPL-MCNC: 251 MG/DL (ref 20–199)
VIT B12 BLD-MCNC: 332 PG/ML (ref 239–931)
WBC NRBC COR # BLD: 7.2 10*3/MM3 (ref 3.2–9.8)

## 2018-04-20 PROCEDURE — 82746 ASSAY OF FOLIC ACID SERUM: CPT | Performed by: FAMILY MEDICINE

## 2018-04-20 PROCEDURE — 82607 VITAMIN B-12: CPT | Performed by: FAMILY MEDICINE

## 2018-04-20 PROCEDURE — 99213 OFFICE O/P EST LOW 20 MIN: CPT | Performed by: FAMILY MEDICINE

## 2018-04-20 PROCEDURE — 85025 COMPLETE CBC W/AUTO DIFF WBC: CPT | Performed by: FAMILY MEDICINE

## 2018-04-20 PROCEDURE — 84443 ASSAY THYROID STIM HORMONE: CPT | Performed by: FAMILY MEDICINE

## 2018-04-20 PROCEDURE — 82043 UR ALBUMIN QUANTITATIVE: CPT | Performed by: FAMILY MEDICINE

## 2018-04-20 PROCEDURE — 36415 COLL VENOUS BLD VENIPUNCTURE: CPT | Performed by: FAMILY MEDICINE

## 2018-04-20 PROCEDURE — 80048 BASIC METABOLIC PNL TOTAL CA: CPT | Performed by: FAMILY MEDICINE

## 2018-04-20 PROCEDURE — 82570 ASSAY OF URINE CREATININE: CPT | Performed by: FAMILY MEDICINE

## 2018-04-20 PROCEDURE — 80061 LIPID PANEL: CPT | Performed by: FAMILY MEDICINE

## 2018-04-20 NOTE — PROGRESS NOTES
Subjective   Thelma Haley is a 72 y.o. female.     Diabetes   She has type 2 diabetes mellitus. No MedicAlert identification noted. Her disease course has been improving. There are no hypoglycemic associated symptoms. Pertinent negatives for hypoglycemia include no confusion, dizziness or headaches. There are no diabetic associated symptoms. Pertinent negatives for diabetes include no chest pain, no fatigue and no weakness. There are no hypoglycemic complications. Symptoms are improving. Pertinent negatives for diabetic complications include no autonomic neuropathy, peripheral neuropathy or retinopathy. Risk factors for coronary artery disease include diabetes mellitus, dyslipidemia, obesity and post-menopausal. Current diabetic treatment includes diet and oral agent (dual therapy). She is compliant with treatment all of the time. She is following a diabetic diet. Meal planning includes ADA exchanges. She rarely participates in exercise. Her overall blood glucose range is 110-130 mg/dl. An ACE inhibitor/angiotensin II receptor blocker is being taken. She does not see a podiatrist.Eye exam is current.        The following portions of the patient's history were reviewed and updated as appropriate: allergies, current medications, past family history, past medical history, past social history, past surgical history and problem list.    Social History     Social History   • Marital status:      Spouse name: N/A   • Number of children: N/A   • Years of education: N/A     Occupational History   • Not on file.     Social History Main Topics   • Smoking status: Never Smoker   • Smokeless tobacco: Never Used   • Alcohol use No   • Drug use: No   • Sexual activity: Defer     Other Topics Concern   • Not on file     Social History Narrative   • No narrative on file       Immunization History   Administered Date(s) Administered   • Influenza, Unspecified 10/29/2008, 10/02/2014, 12/17/2015   • Pneumococcal Conjugate  13-Valent (PCV13) 12/17/2015   • Pneumococcal Polysaccharide (PPSV23) 10/02/2014   • Tetanus 07/10/2014        Current Outpatient Prescriptions on File Prior to Visit   Medication Sig Dispense Refill   • albuterol (PROVENTIL HFA;VENTOLIN HFA) 108 (90 BASE) MCG/ACT inhaler Inhale 2 puffs Every 4 (Four) Hours As Needed for wheezing. 6.7 g 11   • albuterol (VENTOLIN HFA) 108 (90 BASE) MCG/ACT inhaler 2 puffs every 4 hours as needed for breathing 1 inhaler 5   • Biotin (BIOTIN MAXIMUM STRENGTH) 10 MG tablet Take 10 mg by mouth Daily.     • budesonide-formoterol (SYMBICORT) 160-4.5 MCG/ACT inhaler 2 puffs twice a day 1 inhaler 5   • budesonide-formoterol (SYMBICORT) 80-4.5 MCG/ACT inhaler Inhale.     • CETIRIZINE HCL PO Take 10 mg by mouth Daily.     • Cholecalciferol (VITAMIN D3) 1000 UNITS capsule Take 1,000 Units by mouth Daily.     • diazePAM (VALIUM) 5 MG tablet TK 1/2 TO 1 T PO Q 8 H PRN FOR PAIN  0   • dorzolamide-timolol (COSOPT) 22.3-6.8 MG/ML ophthalmic solution INSTILL 1 DROP INTO EACH EYE BID  0   • fluticasone (FLONASE) 50 MCG/ACT nasal spray 2 sprays into each nostril Daily. Administer 2 sprays in each nostril for each dose. 1 each 5   • gabapentin (NEURONTIN) 100 MG capsule Take 1 capsule by mouth 3 (Three) Times a Day. 90 capsule 2   • glucose monitoring kit (FREESTYLE) monitoring kit 1 each As Needed (diabetes). 1 each 2   • hydrochlorothiazide (HYDRODIURIL) 25 MG tablet Take 1 tablet by mouth Daily. 90 tablet 3   • JANUMET XR  MG tablet TAKE 1 TABLET BY MOUTH DAILY 30 tablet 0   • lisinopril (PRINIVIL,ZESTRIL) 5 MG tablet Take 1 tablet by mouth Daily. 90 tablet 3   • lovastatin (MEVACOR) 20 MG tablet Take 1 tablet by mouth Every Night. 90 tablet 3   • methIMAzole (TAPAZOLE) 5 MG tablet Take 2.5 mg by mouth Daily.     • methIMAzole (TAPAZOLE) 5 MG tablet TAKE 1/2 TABLET BY MOUTH DAILY 45 tablet 0   • omeprazole (priLOSEC) 20 MG capsule Take 20 mg by mouth.     • ONE TOUCH ULTRA TEST test strip  TEST BLOOD SUGAR TWICE DAILY 100 each 12   • ONETOUCH DELICA LANCETS 33G misc USE AS DIRECTED TO TEST BLOOD SUGAR TWICE DAILY 100 each 12   • SITagliptin-MetFORMIN HCl ER (JANUMET XR)  MG tablet Take 1 tablet by mouth Daily. 90 tablet 3   • tiZANidine (ZANAFLEX) 4 MG tablet TK 1 T PO Q 6 HOURS PRN P  0     No current facility-administered medications on file prior to visit.        Review of Systems   Constitutional: Negative for activity change, appetite change, fatigue and fever.   HENT: Negative for ear pain and sore throat.    Eyes: Negative for pain and visual disturbance.   Respiratory: Negative for cough and shortness of breath.    Cardiovascular: Negative for chest pain and palpitations.   Gastrointestinal: Negative for abdominal pain and nausea.   Endocrine: Negative for cold intolerance and heat intolerance.   Genitourinary: Negative for difficulty urinating and dysuria.   Musculoskeletal: Negative for arthralgias and gait problem.   Skin: Negative for color change and rash.   Neurological: Negative for dizziness, weakness and headaches.   Hematological: Negative for adenopathy. Does not bruise/bleed easily.   Psychiatric/Behavioral: Negative for agitation, confusion and sleep disturbance.       Objective   Physical Exam   Constitutional: She is oriented to person, place, and time. She appears well-developed and well-nourished.   HENT:   Head: Normocephalic and atraumatic.   Eyes: Conjunctivae, EOM and lids are normal. Pupils are equal, round, and reactive to light.   Neck: Normal range of motion. Neck supple.   Cardiovascular: Normal rate, regular rhythm and normal heart sounds.  Exam reveals no gallop and no friction rub.    No murmur heard.  Pulmonary/Chest: Effort normal and breath sounds normal.   Abdominal: Soft. Normal appearance and bowel sounds are normal. There is no tenderness.   Musculoskeletal: Normal range of motion.    Thelma had a diabetic foot exam performed today.   During the foot  exam she had a monofilament test performed.    Neurological Sensory Findings -  No right ankle/foot altered proprioception and no left ankle/foot altered proprioception  Vascular Status -  Her right foot exhibits normal foot vasculature . Her left foot exhibits normal foot vasculature .  Skin Integrity  -  Her right foot skin is intact.Her left foot skin is intact..   Foot Structure and Biomechanics -  Her right foot has no hallux valgus and no hammer toes present. Her left foot has no hallux valgus and no hammer toes.  Neurological: She is alert and oriented to person, place, and time.   Skin: Skin is warm, dry and intact.   Psychiatric: She has a normal mood and affect. Her speech is normal and behavior is normal. Judgment and thought content normal. Cognition and memory are normal.       Lab Results (most recent)     None          Vitals:    04/20/18 1341   BP: 122/72   Pulse: 77   SpO2: 98%       Assessment/Plan   Thelma was seen today for diabetes.    Diagnoses and all orders for this visit:    Type 2 diabetes mellitus without complication, without long-term current use of insulin  -     Microalbumin / Creatinine Urine Ratio - Urine, Clean Catch  -     Lipid Panel  -     Basic metabolic panel  -     CBC Auto Differential  -     Vitamin B12  -     Folate    Fatigue, unspecified type  -     Vitamin B12  -     Folate    Encounter for screening mammogram for malignant neoplasm of breast   -     Mammo Screening Bilateral With CAD               GOALS:  Goals     • Less pain          BARRIERS TO GOALS:  Patient recently had back surgery but is having a lot of pain afterwards.    Preventative:  Female Preventative: Mammogram is due    up to date and documented   Recommended:Varicella  never smoker  does not drink  eat more fruits and vegetables, decrease soda or juice intake, increase water intake and increase physical activity    RISK SCORE: 4        This document has been electronically signed by Gina Page  MD Melani on April 20, 2018 1:59 PM

## 2018-04-24 DIAGNOSIS — M25.562 LEFT KNEE PAIN, UNSPECIFIED CHRONICITY: Primary | ICD-10-CM

## 2018-04-25 ENCOUNTER — OFFICE VISIT (OUTPATIENT)
Dept: ENDOCRINOLOGY | Facility: CLINIC | Age: 73
End: 2018-04-25

## 2018-04-25 ENCOUNTER — OFFICE VISIT (OUTPATIENT)
Dept: ORTHOPEDIC SURGERY | Facility: CLINIC | Age: 73
End: 2018-04-25

## 2018-04-25 VITALS
OXYGEN SATURATION: 95 % | SYSTOLIC BLOOD PRESSURE: 110 MMHG | HEIGHT: 64 IN | BODY MASS INDEX: 25.78 KG/M2 | HEART RATE: 74 BPM | WEIGHT: 151 LBS | DIASTOLIC BLOOD PRESSURE: 68 MMHG

## 2018-04-25 VITALS — HEIGHT: 64 IN | BODY MASS INDEX: 25.61 KG/M2 | WEIGHT: 150 LBS

## 2018-04-25 DIAGNOSIS — E05.90 SUBCLINICAL HYPERTHYROIDISM: ICD-10-CM

## 2018-04-25 DIAGNOSIS — M17.0 PRIMARY OSTEOARTHRITIS OF BOTH KNEES: Primary | ICD-10-CM

## 2018-04-25 DIAGNOSIS — E55.9 VITAMIN D DEFICIENCY: ICD-10-CM

## 2018-04-25 DIAGNOSIS — E11.9 TYPE 2 DIABETES MELLITUS WITHOUT COMPLICATION, WITHOUT LONG-TERM CURRENT USE OF INSULIN (HCC): Primary | ICD-10-CM

## 2018-04-25 DIAGNOSIS — E04.1 RIGHT THYROID NODULE: ICD-10-CM

## 2018-04-25 DIAGNOSIS — M25.562 LEFT KNEE PAIN, UNSPECIFIED CHRONICITY: ICD-10-CM

## 2018-04-25 LAB
GLUCOSE BLDC GLUCOMTR-MCNC: 123 MG/DL (ref 70–130)
TSH SERPL DL<=0.05 MIU/L-ACNC: 2.73 MIU/ML (ref 0.46–4.68)

## 2018-04-25 PROCEDURE — 99214 OFFICE O/P EST MOD 30 MIN: CPT | Performed by: NURSE PRACTITIONER

## 2018-04-25 PROCEDURE — 82962 GLUCOSE BLOOD TEST: CPT | Performed by: INTERNAL MEDICINE

## 2018-04-25 PROCEDURE — 20610 DRAIN/INJ JOINT/BURSA W/O US: CPT | Performed by: NURSE PRACTITIONER

## 2018-04-25 PROCEDURE — 99214 OFFICE O/P EST MOD 30 MIN: CPT | Performed by: INTERNAL MEDICINE

## 2018-04-25 RX ORDER — TRIAMCINOLONE ACETONIDE 40 MG/ML
40 INJECTION, SUSPENSION INTRA-ARTICULAR; INTRAMUSCULAR
Status: COMPLETED | OUTPATIENT
Start: 2018-04-25 | End: 2018-04-25

## 2018-04-25 RX ORDER — LOVASTATIN 10 MG/1
10 TABLET ORAL NIGHTLY
Qty: 90 TABLET | Refills: 3 | Status: SHIPPED | OUTPATIENT
Start: 2018-04-25 | End: 2018-04-30 | Stop reason: SDUPTHER

## 2018-04-25 RX ORDER — LIDOCAINE HYDROCHLORIDE 10 MG/ML
2 INJECTION, SOLUTION INFILTRATION; PERINEURAL
Status: COMPLETED | OUTPATIENT
Start: 2018-04-25 | End: 2018-04-25

## 2018-04-25 RX ADMIN — TRIAMCINOLONE ACETONIDE 40 MG: 40 INJECTION, SUSPENSION INTRA-ARTICULAR; INTRAMUSCULAR at 15:43

## 2018-04-25 RX ADMIN — TRIAMCINOLONE ACETONIDE 40 MG: 40 INJECTION, SUSPENSION INTRA-ARTICULAR; INTRAMUSCULAR at 15:44

## 2018-04-25 RX ADMIN — LIDOCAINE HYDROCHLORIDE 2 ML: 10 INJECTION, SOLUTION INFILTRATION; PERINEURAL at 15:43

## 2018-04-25 NOTE — PROGRESS NOTES
Thelma Haley is a 72 y.o. female returns for     Chief Complaint   Patient presents with   • Left Knee - Follow-up     Xray today       HISTORY OF PRESENT ILLNESS:  Steroid injection 10/19/17 into both knees.  Right knee hurts more than the left.       CONCURRENT MEDICAL HISTORY:    Past Medical History:   Diagnosis Date   • Abnormal results of thyroid function studies    • Acute pain of left shoulder    • Acute pain of right shoulder    • Allergic rhinitis due to pollen    • Asthma      uncomplicated      • Cataract    • Chronic rhinitis    • Essential hypertension    • Extrinsic asthma with status asthmaticus    • Glaucoma    • Hyperlipidemia    • Impingement syndrome of right shoulder    • Neuropathy    • Nuclear senile cataract    • Obesity    • Overweight with body mass index (BMI) 25.0-29.9    • Primary fibromyalgia syndrome    • Primary open angle glaucoma    • Rosacea    • Sinus headache    • Temporomandibular joint disorder        Allergies   Allergen Reactions   • Ciprofloxacin    • Hydrocodone    • Lortab [Hydrocodone-Acetaminophen]    • Metronidazole    • Oxycodone      Dizziness and doesn't decrease pain   • Ultram [Tramadol Hcl]          Current Outpatient Prescriptions:   •  albuterol (PROVENTIL HFA;VENTOLIN HFA) 108 (90 BASE) MCG/ACT inhaler, Inhale 2 puffs Every 4 (Four) Hours As Needed for wheezing., Disp: 6.7 g, Rfl: 11  •  albuterol (VENTOLIN HFA) 108 (90 BASE) MCG/ACT inhaler, 2 puffs every 4 hours as needed for breathing, Disp: 1 inhaler, Rfl: 5  •  budesonide-formoterol (SYMBICORT) 160-4.5 MCG/ACT inhaler, 2 puffs twice a day, Disp: 1 inhaler, Rfl: 5  •  CETIRIZINE HCL PO, Take 10 mg by mouth Daily., Disp: , Rfl:   •  Cholecalciferol (VITAMIN D3) 1000 UNITS capsule, Take 1,000 Units by mouth Daily., Disp: , Rfl:   •  dorzolamide-timolol (COSOPT) 22.3-6.8 MG/ML ophthalmic solution, INSTILL 1 DROP INTO EACH EYE BID, Disp: , Rfl: 0  •  fluticasone (FLONASE) 50 MCG/ACT nasal spray, 2 sprays  into each nostril Daily. Administer 2 sprays in each nostril for each dose., Disp: 1 each, Rfl: 5  •  gabapentin (NEURONTIN) 100 MG capsule, Take 1 capsule by mouth 3 (Three) Times a Day., Disp: 90 capsule, Rfl: 2  •  glucose monitoring kit (FREESTYLE) monitoring kit, 1 each As Needed (diabetes)., Disp: 1 each, Rfl: 2  •  hydrochlorothiazide (HYDRODIURIL) 25 MG tablet, Take 1 tablet by mouth Daily., Disp: 90 tablet, Rfl: 3  •  JANUMET XR  MG tablet, TAKE 1 TABLET BY MOUTH DAILY, Disp: 30 tablet, Rfl: 0  •  lisinopril (PRINIVIL,ZESTRIL) 5 MG tablet, Take 1 tablet by mouth Daily., Disp: 90 tablet, Rfl: 3  •  methIMAzole (TAPAZOLE) 5 MG tablet, TAKE 1/2 TABLET BY MOUTH DAILY, Disp: 45 tablet, Rfl: 0  •  omeprazole (priLOSEC) 20 MG capsule, Take 20 mg by mouth., Disp: , Rfl:   •  ONE TOUCH ULTRA TEST test strip, TEST BLOOD SUGAR TWICE DAILY, Disp: 100 each, Rfl: 12  •  ONETOUCH DELICA LANCETS 33G misc, USE AS DIRECTED TO TEST BLOOD SUGAR TWICE DAILY, Disp: 100 each, Rfl: 12  •  lovastatin (MEVACOR) 10 MG tablet, Take 1 tablet by mouth Every Night., Disp: 90 tablet, Rfl: 3    Past Surgical History:   Procedure Laterality Date   • CARPAL TUNNEL RELEASE      Bilateral carpal tunnel release.)   03/27/2000    • CATARACT EXTRACTION      Bilateral   • INJECTION OF MEDICATION  06/17/2015    celestone(betamethasone) (2)    • INJECTION OF MEDICATION  04/14/2016    depo medrol ( methylprednisone) (20)   • OTHER SURGICAL HISTORY      hysterosope procedure   • RIGHT OOPHORECTOMY     • ROTATOR CUFF REPAIR      Right   • SHOULDER ARTHROSCOPY Right 2/6/2017    Procedure: RIGHT SHOULDER ARTHROSCOPY SUBACROMIAL DECOMPRESSION, ROTATOR CUFF REPAIR   ;  Surgeon: Terrence Haines MD;  Location: [facility];  Service:    • SHOULDER SURGERY      Excision of 4.8 cm mass with primary intermediate closure.)   03/30/2012    • SPINAL FUSION         ROS  No fevers or chills.  No chest pain or shortness of air.  No GI or   "disturbances.    PHYSICAL EXAMINATION:       Ht 162.6 cm (64\")   Wt 68 kg (150 lb)   LMP 02/06/1980 (Approximate)   BMI 25.75 kg/m²     Physical Exam   Constitutional: She is oriented to person, place, and time. Vital signs are normal. She appears well-developed and well-nourished. She is cooperative.   HENT:   Head: Normocephalic and atraumatic.   Neck: Trachea normal and phonation normal.   Pulmonary/Chest: Effort normal. No respiratory distress.   Abdominal: Soft. Normal appearance. She exhibits no distension.   Musculoskeletal:        Right knee: She exhibits no effusion.        Left knee: She exhibits no effusion.   Neurological: She is alert and oriented to person, place, and time. GCS eye subscore is 4. GCS verbal subscore is 5. GCS motor subscore is 6.   Skin: Skin is warm, dry and intact.   Psychiatric: She has a normal mood and affect. Her speech is normal and behavior is normal. Judgment and thought content normal. Cognition and memory are normal.   Vitals reviewed.      GAIT:     [x]  Normal  []  Antalgic    Assistive device: [x]  None  []  Walker     []  Crutches  [x]  Cane     []  Wheelchair  []  Stretcher    Right Knee Exam     Tenderness   The patient is experiencing tenderness in the medial joint line and lateral joint line.    Range of Motion   Extension: normal   Flexion: abnormal     Other   Erythema: absent  Scars: absent  Sensation: normal  Pulse: present  Swelling: mild  Other tests: no effusion present      Left Knee Exam     Tenderness   The patient is experiencing tenderness in the medial joint line and lateral joint line.    Range of Motion   Extension: normal   Flexion: abnormal     Other   Erythema: absent  Scars: absent  Sensation: normal  Pulse: present  Swelling: mild  Effusion: no effusion present              No results found.    Large Joint Arthrocentesis  Date/Time: 4/25/2018 3:43 PM  Consent given by: patient  Site marked: site marked  Timeout: Immediately prior to procedure a " time out was called to verify the correct patient, procedure, equipment, support staff and site/side marked as required   Supporting Documentation  Indications: pain   Procedure Details  Location: knee - R knee  Preparation: Patient was prepped and draped in the usual sterile fashion  Needle size: 22 G  Approach: anteromedial  Medications administered: 40 mg triamcinolone acetonide 40 MG/ML; 2 mL lidocaine 1 %  Patient tolerance: patient tolerated the procedure well with no immediate complications    Large Joint Arthrocentesis  Date/Time: 4/25/2018 3:44 PM  Consent given by: patient  Site marked: site marked  Timeout: Immediately prior to procedure a time out was called to verify the correct patient, procedure, equipment, support staff and site/side marked as required   Supporting Documentation  Indications: pain   Procedure Details  Location: knee - L knee  Preparation: Patient was prepped and draped in the usual sterile fashion  Needle size: 22 G  Approach: anteromedial  Medications administered: 40 mg triamcinolone acetonide 40 MG/ML  Patient tolerance: patient tolerated the procedure well with no immediate complications          ASSESSMENT:    Diagnoses and all orders for this visit:    Primary osteoarthritis of both knees    Left knee pain, unspecified chronicity    Other orders  -     Cancel: Orthopedic Injury Treatment  -     Arthocentesis  -     Large Joint Arthrocentesis  -     Large Joint Arthrocentesis          PLAN  Reviewed xray's results with the patient and recommended continued modified weight baring, bilateral injections of steroids follow by visco supplementation.   No Follow-up on file.    Michael Wong, APRN

## 2018-04-25 NOTE — PROGRESS NOTES
Thelma Haley is a 72 y.o. female who presents for  evaluation of   Chief Complaint   Patient presents with   • Follow-up         Primary Care / Referring Provider  Gina Polo MD    Hyperthyroidism     Duration since 2015    Appears to be Graves with right subcm thyroid nodule    Now on methimazole    Also t2dm well controlled with nephropathy       Past Medical History:   Diagnosis Date   • Abnormal results of thyroid function studies    • Acute pain of left shoulder    • Acute pain of right shoulder    • Allergic rhinitis due to pollen    • Asthma      uncomplicated      • Cataract    • Chronic rhinitis    • Essential hypertension    • Extrinsic asthma with status asthmaticus    • Glaucoma    • Hyperlipidemia    • Impingement syndrome of right shoulder    • Neuropathy    • Nuclear senile cataract    • Obesity    • Overweight with body mass index (BMI) 25.0-29.9    • Primary fibromyalgia syndrome    • Primary open angle glaucoma    • Rosacea    • Sinus headache    • Temporomandibular joint disorder      Family History   Problem Relation Age of Onset   • Heart disease Other    • Hypertension Other    • COPD Mother      Social History   Substance Use Topics   • Smoking status: Never Smoker   • Smokeless tobacco: Never Used   • Alcohol use No         Current Outpatient Prescriptions:   •  albuterol (PROVENTIL HFA;VENTOLIN HFA) 108 (90 BASE) MCG/ACT inhaler, Inhale 2 puffs Every 4 (Four) Hours As Needed for wheezing., Disp: 6.7 g, Rfl: 11  •  albuterol (VENTOLIN HFA) 108 (90 BASE) MCG/ACT inhaler, 2 puffs every 4 hours as needed for breathing, Disp: 1 inhaler, Rfl: 5  •  budesonide-formoterol (SYMBICORT) 160-4.5 MCG/ACT inhaler, 2 puffs twice a day, Disp: 1 inhaler, Rfl: 5  •  CETIRIZINE HCL PO, Take 10 mg by mouth Daily., Disp: , Rfl:   •  Cholecalciferol (VITAMIN D3) 1000 UNITS capsule, Take 1,000 Units by mouth Daily., Disp: , Rfl:   •  dorzolamide-timolol (COSOPT) 22.3-6.8 MG/ML ophthalmic  solution, INSTILL 1 DROP INTO EACH EYE BID, Disp: , Rfl: 0  •  fluticasone (FLONASE) 50 MCG/ACT nasal spray, 2 sprays into each nostril Daily. Administer 2 sprays in each nostril for each dose., Disp: 1 each, Rfl: 5  •  gabapentin (NEURONTIN) 100 MG capsule, Take 1 capsule by mouth 3 (Three) Times a Day., Disp: 90 capsule, Rfl: 2  •  glucose monitoring kit (FREESTYLE) monitoring kit, 1 each As Needed (diabetes)., Disp: 1 each, Rfl: 2  •  hydrochlorothiazide (HYDRODIURIL) 25 MG tablet, Take 1 tablet by mouth Daily., Disp: 90 tablet, Rfl: 3  •  JANUMET XR  MG tablet, TAKE 1 TABLET BY MOUTH DAILY, Disp: 30 tablet, Rfl: 0  •  lisinopril (PRINIVIL,ZESTRIL) 5 MG tablet, Take 1 tablet by mouth Daily., Disp: 90 tablet, Rfl: 3  •  methIMAzole (TAPAZOLE) 5 MG tablet, TAKE 1/2 TABLET BY MOUTH DAILY, Disp: 45 tablet, Rfl: 0  •  omeprazole (priLOSEC) 20 MG capsule, Take 20 mg by mouth., Disp: , Rfl:   •  ONE TOUCH ULTRA TEST test strip, TEST BLOOD SUGAR TWICE DAILY, Disp: 100 each, Rfl: 12  •  ONETOUCH DELICA LANCETS 33G misc, USE AS DIRECTED TO TEST BLOOD SUGAR TWICE DAILY, Disp: 100 each, Rfl: 12    Review of Systems    Review of Systems   Constitutional: Positive for fatigue and unexpected weight change. Negative for activity change, appetite change, chills, diaphoresis and fever.   HENT: Negative for congestion, drooling, ear discharge, ear pain, facial swelling, mouth sores, nosebleeds, postnasal drip, sinus pressure, sneezing, sore throat, tinnitus, trouble swallowing and voice change.    Eyes: Negative.  Negative for photophobia, pain, discharge, redness and itching.   Respiratory: Negative.  Negative for apnea, cough, choking, chest tightness, shortness of breath, wheezing and stridor.    Cardiovascular: Negative.  Negative for chest pain, palpitations and leg swelling.   Gastrointestinal: Negative.  Negative for abdominal distention, abdominal pain, constipation, diarrhea, nausea and vomiting.   Endocrine:  "Negative.  Negative for cold intolerance, heat intolerance, polydipsia, polyphagia and polyuria.   Genitourinary: Negative for difficulty urinating, dysuria, flank pain and frequency.   Musculoskeletal: Negative for arthralgias, back pain, gait problem, joint swelling, myalgias, neck pain and neck stiffness.   Skin: Negative for color change, pallor, rash and wound.   Allergic/Immunologic: Negative for environmental allergies, food allergies and immunocompromised state.   Neurological: Positive for tremors. Negative for dizziness, seizures, syncope, facial asymmetry, speech difficulty, weakness, light-headedness, numbness and headaches.   Hematological: Negative for adenopathy. Does not bruise/bleed easily.   Psychiatric/Behavioral: Negative for agitation, behavioral problems, confusion, decreased concentration, dysphoric mood, hallucinations, self-injury, sleep disturbance and suicidal ideas. The patient is not nervous/anxious and is not hyperactive.         Objective:     /68 (BP Location: Left arm, Patient Position: Sitting, Cuff Size: Large Adult)   Pulse 74   Ht 162.6 cm (64\")   Wt 68.5 kg (151 lb)   LMP 02/06/1980 (Approximate)   SpO2 95%   BMI 25.92 kg/m²     Physical Exam   Constitutional: She is oriented to person, place, and time. She appears well-developed.   HENT:   Head: Normocephalic.   Right Ear: External ear normal.   Left Ear: External ear normal.   Nose: Nose normal.   Eyes: Conjunctivae and EOM are normal. No scleral icterus.   Neck: Normal range of motion. Neck supple. No tracheal deviation present. Thyromegaly present.   Cardiovascular: Normal rate, regular rhythm, normal heart sounds and intact distal pulses.  Exam reveals no gallop and no friction rub.    No murmur heard.  Pulmonary/Chest: Effort normal and breath sounds normal. No stridor. No respiratory distress. She has no wheezes. She has no rales. She exhibits no tenderness.   Abdominal: Soft. Bowel sounds are normal. She " exhibits no distension and no mass. There is no tenderness. There is no rebound and no guarding.   Musculoskeletal: Normal range of motion. She exhibits no tenderness or deformity.   Lymphadenopathy:     She has no cervical adenopathy.   Neurological: She is alert and oriented to person, place, and time. She displays normal reflexes. She exhibits normal muscle tone. Coordination normal.   Skin: No rash noted. No erythema. No pallor.   Psychiatric: She has a normal mood and affect. Her behavior is normal. Judgment and thought content normal.       Lab Review    Results for orders placed or performed in visit on 04/25/18   POC Glucose   Result Value Ref Range    Glucose 123 70 - 130 mg/dL           Assessment/Plan       ICD-10-CM ICD-9-CM   1. Type 2 diabetes mellitus without complication, without long-term current use of insulin E11.9 250.00   2. Vitamin D deficiency E55.9 268.9   3. Subclinical hyperthyroidism E05.90 242.90       Glycemic Management:   Lab Results   Component Value Date    HGBA1C 6.0 (H) 04/12/2018    HGBA1C 8.7 (H) 07/31/2017    HGBA1C 5.8 (H) 06/03/2015     Lab Results   Component Value Date    GLUCOSE 95 04/20/2018    BUN 35 (H) 04/20/2018    CREATININE 1.24 (H) 04/20/2018    EGFRIFNONA 42 04/20/2018    BCR 28.2 (H) 04/20/2018    CO2 22.0 04/20/2018    CALCIUM 10.1 04/20/2018    ALBUMIN 3.40 07/24/2017    LABIL2 1.2 07/24/2017    AST 22 07/24/2017    ALT 44 07/24/2017     Lab Results   Component Value Date    WBC 7.20 04/20/2018    HGB 12.3 04/20/2018    HCT 35.2 04/20/2018    MCV 84.0 04/20/2018     04/20/2018     Janumet XR 50/1000 , 1 tab daily , she has CKD stage III      Lipid Management  Lab Results   Component Value Date    CHOL 197 04/20/2018    CHOL 191 07/24/2017     Lab Results   Component Value Date    TRIG 251 (H) 04/20/2018    TRIG 135 07/24/2017    TRIG 147 09/02/2016     Lab Results   Component Value Date    HDL 36 (L) 04/20/2018    HDL 50 (L) 07/24/2017    HDL 40 (L)  09/02/2016     No components found for: LDLCALC  Lab Results   Component Value Date     04/20/2018     07/24/2017    LDL 82 09/02/2016       On lovastatin before , stopped restart      Blood Pressure Management:    Vitals:    04/25/18 1154   BP: 110/68   Pulse: 74   SpO2: 95%     Lab Results   Component Value Date    GLUCOSE 95 04/20/2018    CALCIUM 10.1 04/20/2018     04/20/2018    K 4.6 04/20/2018    CO2 22.0 04/20/2018     04/20/2018    BUN 35 (H) 04/20/2018    CREATININE 1.24 (H) 04/20/2018    EGFRIFNONA 42 04/20/2018    BCR 28.2 (H) 04/20/2018    ANIONGAP 17.0 (H) 04/20/2018     Lab Results   Component Value Date    GLUCOSE 95 04/20/2018    BUN 35 (H) 04/20/2018    CREATININE 1.24 (H) 04/20/2018    EGFRIFNONA 42 04/20/2018    BCR 28.2 (H) 04/20/2018    CO2 22.0 04/20/2018    CALCIUM 10.1 04/20/2018    ALBUMIN 3.40 07/24/2017    LABIL2 1.2 07/24/2017    AST 22 07/24/2017    ALT 44 07/24/2017     On lisinopril and controlled     Preventive Care:      nonsmoker    Weight Related:   Wt Readings from Last 3 Encounters:   04/25/18 68.5 kg (151 lb)   04/25/18 68 kg (150 lb)   04/20/18 67.3 kg (148 lb 5 oz)     Body mass index is 25.92 kg/m².    Aim for 1200 calories daily   Walk 30 min daily     Bone Health    Lab Results   Component Value Date    CALCIUM 10.1 04/20/2018     Takes vit D 5th u daily     Order dxa due to hyperthyroidism - done in 12-16 and nl    Start calcium 600 mg w supper     Thyroid Health  Lab Results   Component Value Date    TSH 0.02 (L) 09/02/2016     Subclinical hyperthyroidism    Indication for tx     TSH less than 0.1 and history of osteopenia    12-16 small subcm thyroid nodule on the right - inferior       Other Diabetes Related Aspects       Lab Results   Component Value Date    [id number] 332 04/20/2018           I reviewed and summarized records from Gina Polo MD from 2016 and I reviewed / ordered labs.     Orders Placed This Encounter    Procedures   • POC Glucose         A copy of my note was sent to Gina Polo MD    Please see my above opinion and suggestions.

## 2018-04-30 ENCOUNTER — APPOINTMENT (OUTPATIENT)
Dept: ULTRASOUND IMAGING | Facility: HOSPITAL | Age: 73
End: 2018-04-30
Attending: INTERNAL MEDICINE

## 2018-04-30 ENCOUNTER — TELEPHONE (OUTPATIENT)
Dept: ENDOCRINOLOGY | Facility: CLINIC | Age: 73
End: 2018-04-30

## 2018-04-30 RX ORDER — LOVASTATIN 10 MG/1
20 TABLET ORAL NIGHTLY
Qty: 90 TABLET | Refills: 3 | Status: SHIPPED | OUTPATIENT
Start: 2018-04-30 | End: 2018-07-10 | Stop reason: DRUGHIGH

## 2018-04-30 NOTE — TELEPHONE ENCOUNTER
----- Message from Nikolai Duncan MD sent at 4/29/2018 11:23 PM CDT -----  Please correct for me 20 mg   ----- Message -----  From: Deann Matos MA  Sent: 4/26/2018   7:48 AM  To: Nikolai Duncan MD    Patient is wondering if you meant to change her Lovastatin to 10 mg as she was on 20mg previously. She just wants to verify.  Thanks, Deann

## 2018-05-01 ENCOUNTER — TELEPHONE (OUTPATIENT)
Dept: FAMILY MEDICINE CLINIC | Facility: CLINIC | Age: 73
End: 2018-05-01

## 2018-05-02 ENCOUNTER — TELEPHONE (OUTPATIENT)
Dept: FAMILY MEDICINE CLINIC | Facility: CLINIC | Age: 73
End: 2018-05-02

## 2018-05-03 ENCOUNTER — CLINICAL SUPPORT (OUTPATIENT)
Dept: ORTHOPEDIC SURGERY | Facility: CLINIC | Age: 73
End: 2018-05-03

## 2018-05-03 ENCOUNTER — APPOINTMENT (OUTPATIENT)
Dept: LAB | Facility: HOSPITAL | Age: 73
End: 2018-05-03

## 2018-05-03 VITALS — WEIGHT: 146 LBS | BODY MASS INDEX: 24.92 KG/M2 | HEIGHT: 64 IN

## 2018-05-03 DIAGNOSIS — Z51.81 MEDICATION MONITORING ENCOUNTER: Primary | ICD-10-CM

## 2018-05-03 DIAGNOSIS — M17.0 PRIMARY OSTEOARTHRITIS OF BOTH KNEES: Primary | ICD-10-CM

## 2018-05-03 PROCEDURE — G0481 DRUG TEST DEF 8-14 CLASSES: HCPCS | Performed by: STUDENT IN AN ORGANIZED HEALTH CARE EDUCATION/TRAINING PROGRAM

## 2018-05-03 PROCEDURE — 80307 DRUG TEST PRSMV CHEM ANLYZR: CPT | Performed by: STUDENT IN AN ORGANIZED HEALTH CARE EDUCATION/TRAINING PROGRAM

## 2018-05-03 PROCEDURE — 20610 DRAIN/INJ JOINT/BURSA W/O US: CPT | Performed by: NURSE PRACTITIONER

## 2018-05-03 RX ORDER — GABAPENTIN 100 MG/1
100 CAPSULE ORAL 3 TIMES DAILY
Qty: 90 CAPSULE | Refills: 2 | Status: SHIPPED | OUTPATIENT
Start: 2018-05-03 | End: 2018-09-14 | Stop reason: SDUPTHER

## 2018-05-03 NOTE — PROGRESS NOTES
Thelma Haley is a 72 y.o. female returns for     Chief Complaint   Patient presents with   • Right Knee - Injections   • Left Knee - Injections       HISTORY OF PRESENT ILLNESS: Pt being seen for bilateral knee pain. Wants Synvisc injections.        CONCURRENT MEDICAL HISTORY:    Past Medical History:   Diagnosis Date   • Abnormal results of thyroid function studies    • Acute pain of left shoulder    • Acute pain of right shoulder    • Allergic rhinitis due to pollen    • Asthma      uncomplicated      • Cataract    • Chronic rhinitis    • Essential hypertension    • Extrinsic asthma with status asthmaticus    • Glaucoma    • Hyperlipidemia    • Impingement syndrome of right shoulder    • Neuropathy    • Nuclear senile cataract    • Obesity    • Overweight with body mass index (BMI) 25.0-29.9    • Primary fibromyalgia syndrome    • Primary open angle glaucoma    • Rosacea    • Sinus headache    • Temporomandibular joint disorder        Allergies   Allergen Reactions   • Ciprofloxacin    • Hydrocodone    • Lortab [Hydrocodone-Acetaminophen]    • Metronidazole    • Oxycodone      Dizziness and doesn't decrease pain   • Ultram [Tramadol Hcl]          Current Outpatient Prescriptions:   •  albuterol (PROVENTIL HFA;VENTOLIN HFA) 108 (90 BASE) MCG/ACT inhaler, Inhale 2 puffs Every 4 (Four) Hours As Needed for wheezing., Disp: 6.7 g, Rfl: 11  •  albuterol (VENTOLIN HFA) 108 (90 BASE) MCG/ACT inhaler, 2 puffs every 4 hours as needed for breathing, Disp: 1 inhaler, Rfl: 5  •  budesonide-formoterol (SYMBICORT) 160-4.5 MCG/ACT inhaler, 2 puffs twice a day, Disp: 1 inhaler, Rfl: 5  •  CETIRIZINE HCL PO, Take 10 mg by mouth Daily., Disp: , Rfl:   •  Cholecalciferol (VITAMIN D3) 1000 UNITS capsule, Take 1,000 Units by mouth Daily., Disp: , Rfl:   •  dorzolamide-timolol (COSOPT) 22.3-6.8 MG/ML ophthalmic solution, INSTILL 1 DROP INTO EACH EYE BID, Disp: , Rfl: 0  •  fluticasone (FLONASE) 50 MCG/ACT nasal spray, 2 sprays into  each nostril Daily. Administer 2 sprays in each nostril for each dose., Disp: 1 each, Rfl: 5  •  gabapentin (NEURONTIN) 100 MG capsule, Take 1 capsule by mouth 3 (Three) Times a Day., Disp: 90 capsule, Rfl: 2  •  glucose monitoring kit (FREESTYLE) monitoring kit, 1 each As Needed (diabetes)., Disp: 1 each, Rfl: 2  •  hydrochlorothiazide (HYDRODIURIL) 25 MG tablet, Take 1 tablet by mouth Daily., Disp: 90 tablet, Rfl: 3  •  JANUMET XR  MG tablet, TAKE 1 TABLET BY MOUTH DAILY, Disp: 30 tablet, Rfl: 0  •  lisinopril (PRINIVIL,ZESTRIL) 5 MG tablet, Take 1 tablet by mouth Daily., Disp: 90 tablet, Rfl: 3  •  lovastatin (MEVACOR) 10 MG tablet, Take 2 tablets by mouth Every Night., Disp: 90 tablet, Rfl: 3  •  methIMAzole (TAPAZOLE) 5 MG tablet, TAKE 1/2 TABLET BY MOUTH DAILY, Disp: 45 tablet, Rfl: 0  •  omeprazole (priLOSEC) 20 MG capsule, Take 20 mg by mouth., Disp: , Rfl:   •  ONE TOUCH ULTRA TEST test strip, TEST BLOOD SUGAR TWICE DAILY, Disp: 100 each, Rfl: 12  •  ONETOUCH DELICA LANCETS 33G misc, USE AS DIRECTED TO TEST BLOOD SUGAR TWICE DAILY, Disp: 100 each, Rfl: 12    Past Surgical History:   Procedure Laterality Date   • CARPAL TUNNEL RELEASE      Bilateral carpal tunnel release.)   03/27/2000    • CATARACT EXTRACTION      Bilateral   • INJECTION OF MEDICATION  06/17/2015    celestone(betamethasone) (2)    • INJECTION OF MEDICATION  04/14/2016    depo medrol ( methylprednisone) (20)   • OTHER SURGICAL HISTORY      hysterosope procedure   • RIGHT OOPHORECTOMY     • ROTATOR CUFF REPAIR      Right   • SHOULDER ARTHROSCOPY Right 2/6/2017    Procedure: RIGHT SHOULDER ARTHROSCOPY SUBACROMIAL DECOMPRESSION, ROTATOR CUFF REPAIR   ;  Surgeon: Terrence Haines MD;  Location: [facility];  Service:    • SHOULDER SURGERY      Excision of 4.8 cm mass with primary intermediate closure.)   03/30/2012    • SPINAL FUSION         ROS  No fevers or chills.  No chest pain or shortness of air.  No GI or   "disturbances.    PHYSICAL EXAMINATION:       Ht 162.6 cm (64\")   Wt 66.2 kg (146 lb)   LMP 02/06/1980 (Approximate)   BMI 25.06 kg/m²     Physical Exam    GAIT:     []  Normal  []  Antalgic    Assistive device: []  None  []  Walker     []  Crutches  []  Cane     []  Wheelchair  []  Stretcher    Ortho Exam      Xr Knee 1 Or 2 View Left    Result Date: 4/26/2018  Narrative: Standing AP of both knees with lateral views of the left knee only reveals severe end-stage tricompartmental degenerative changes of the left knee without any acute radiological abnormality noted.  There are no comparison views on file. 04/26/18 at 8:44 AM by JUAN Hathaway    Xr Knee Bilateral Ap Standing    Result Date: 4/26/2018  Narrative: Standing AP of both knees with lateral views of the left knee only reveals severe end-stage tricompartmental degenerative changes of the left knee without any acute radiological abnormality noted.  There are no comparison views on file. 04/26/18 at 8:44 AM by JUAN Hathaway             Large Joint Arthrocentesis  Date/Time: 5/3/2018 1:07 PM  Consent given by: patient  Site marked: site marked  Timeout: Immediately prior to procedure a time out was called to verify the correct patient, procedure, equipment, support staff and site/side marked as required   Supporting Documentation  Indications: pain   Procedure Details  Location: knee - R knee  Preparation: Patient was prepped and draped in the usual sterile fashion  Needle size: 22 G  Approach: anteromedial  Medications administered: 48 mg hylan 48 MG/6ML      Large Joint Arthrocentesis  Date/Time: 5/3/2018 1:09 PM  Consent given by: patient  Site marked: site marked  Timeout: Immediately prior to procedure a time out was called to verify the correct patient, procedure, equipment, support staff and site/side marked as required   Supporting Documentation  Indications: pain   Procedure Details  Location: knee - L knee  Preparation: Patient " was prepped and draped in the usual sterile fashion  Needle size: 22 G  Approach: anteromedial  Medications administered: 48 mg hylan 48 MG/6ML            ASSESSMENT:    Diagnoses and all orders for this visit:    Primary osteoarthritis of both knees  -     Large Joint Arthrocentesis  -     Large Joint Arthrocentesis          PLAN  Progress motion and activity as tolerated based on pain.   Follow up six weeks for recheck.   No Follow-up on file.    Michael Wong, APRN

## 2018-05-04 ENCOUNTER — HOSPITAL ENCOUNTER (OUTPATIENT)
Dept: ULTRASOUND IMAGING | Facility: HOSPITAL | Age: 73
Discharge: HOME | End: 2018-05-04
Attending: INTERNAL MEDICINE | Admitting: INTERNAL MEDICINE

## 2018-05-04 PROCEDURE — 76536 US EXAM OF HEAD AND NECK: CPT

## 2018-05-08 LAB — CONV REPORT SUMMARY: NORMAL

## 2018-05-11 ENCOUNTER — OFFICE VISIT (OUTPATIENT)
Dept: ORTHOPEDIC SURGERY | Facility: CLINIC | Age: 73
End: 2018-05-11

## 2018-05-11 VITALS — BODY MASS INDEX: 25.1 KG/M2 | HEIGHT: 64 IN | WEIGHT: 147 LBS

## 2018-05-11 DIAGNOSIS — M75.121 COMPLETE TEAR OF RIGHT ROTATOR CUFF: Primary | ICD-10-CM

## 2018-05-11 DIAGNOSIS — M25.511 CHRONIC RIGHT SHOULDER PAIN: ICD-10-CM

## 2018-05-11 DIAGNOSIS — G89.29 CHRONIC RIGHT SHOULDER PAIN: ICD-10-CM

## 2018-05-11 PROCEDURE — 99214 OFFICE O/P EST MOD 30 MIN: CPT | Performed by: NURSE PRACTITIONER

## 2018-05-11 PROCEDURE — 20610 DRAIN/INJ JOINT/BURSA W/O US: CPT | Performed by: NURSE PRACTITIONER

## 2018-05-11 RX ORDER — TRIAMCINOLONE ACETONIDE 40 MG/ML
40 INJECTION, SUSPENSION INTRA-ARTICULAR; INTRAMUSCULAR
Status: COMPLETED | OUTPATIENT
Start: 2018-05-11 | End: 2018-05-11

## 2018-05-11 RX ORDER — LIDOCAINE HYDROCHLORIDE 10 MG/ML
2 INJECTION, SOLUTION EPIDURAL; INFILTRATION; INTRACAUDAL; PERINEURAL
Status: COMPLETED | OUTPATIENT
Start: 2018-05-11 | End: 2018-05-11

## 2018-05-11 RX ADMIN — TRIAMCINOLONE ACETONIDE 40 MG: 40 INJECTION, SUSPENSION INTRA-ARTICULAR; INTRAMUSCULAR at 09:47

## 2018-05-11 RX ADMIN — LIDOCAINE HYDROCHLORIDE 2 ML: 10 INJECTION, SOLUTION EPIDURAL; INFILTRATION; INTRACAUDAL; PERINEURAL at 09:47

## 2018-05-11 NOTE — PROGRESS NOTES
Thelma Haley is a 72 y.o. female returns for     Chief Complaint   Patient presents with   • Left Shoulder - Follow-up   • Right Shoulder - Follow-up       HISTORY OF PRESENT ILLNESS: Patient being seen for bilateral shoulder follow up. Patient would like an injection today.        CONCURRENT MEDICAL HISTORY:    Past Medical History:   Diagnosis Date   • Abnormal results of thyroid function studies    • Acute pain of left shoulder    • Acute pain of right shoulder    • Allergic rhinitis due to pollen    • Asthma      uncomplicated      • Cataract    • Chronic rhinitis    • Essential hypertension    • Extrinsic asthma with status asthmaticus    • Glaucoma    • Hyperlipidemia    • Impingement syndrome of right shoulder    • Neuropathy    • Nuclear senile cataract    • Obesity    • Overweight with body mass index (BMI) 25.0-29.9    • Primary fibromyalgia syndrome    • Primary open angle glaucoma    • Rosacea    • Sinus headache    • Temporomandibular joint disorder        Allergies   Allergen Reactions   • Ciprofloxacin    • Hydrocodone    • Lortab [Hydrocodone-Acetaminophen]    • Metronidazole    • Oxycodone      Dizziness and doesn't decrease pain   • Ultram [Tramadol Hcl]          Current Outpatient Prescriptions:   •  albuterol (PROVENTIL HFA;VENTOLIN HFA) 108 (90 BASE) MCG/ACT inhaler, Inhale 2 puffs Every 4 (Four) Hours As Needed for wheezing., Disp: 6.7 g, Rfl: 11  •  albuterol (VENTOLIN HFA) 108 (90 BASE) MCG/ACT inhaler, 2 puffs every 4 hours as needed for breathing, Disp: 1 inhaler, Rfl: 5  •  budesonide-formoterol (SYMBICORT) 160-4.5 MCG/ACT inhaler, 2 puffs twice a day, Disp: 1 inhaler, Rfl: 5  •  CETIRIZINE HCL PO, Take 10 mg by mouth Daily., Disp: , Rfl:   •  Cholecalciferol (VITAMIN D3) 1000 UNITS capsule, Take 1,000 Units by mouth Daily., Disp: , Rfl:   •  dorzolamide-timolol (COSOPT) 22.3-6.8 MG/ML ophthalmic solution, INSTILL 1 DROP INTO EACH EYE BID, Disp: , Rfl: 0  •  fluticasone (FLONASE) 50  MCG/ACT nasal spray, 2 sprays into each nostril Daily. Administer 2 sprays in each nostril for each dose., Disp: 1 each, Rfl: 5  •  gabapentin (NEURONTIN) 100 MG capsule, Take 1 capsule by mouth 3 (Three) Times a Day., Disp: 90 capsule, Rfl: 2  •  glucose monitoring kit (FREESTYLE) monitoring kit, 1 each As Needed (diabetes)., Disp: 1 each, Rfl: 2  •  hydrochlorothiazide (HYDRODIURIL) 25 MG tablet, Take 1 tablet by mouth Daily., Disp: 90 tablet, Rfl: 3  •  JANUMET XR  MG tablet, TAKE 1 TABLET BY MOUTH DAILY, Disp: 30 tablet, Rfl: 0  •  lisinopril (PRINIVIL,ZESTRIL) 5 MG tablet, Take 1 tablet by mouth Daily., Disp: 90 tablet, Rfl: 3  •  lovastatin (MEVACOR) 10 MG tablet, Take 2 tablets by mouth Every Night., Disp: 90 tablet, Rfl: 3  •  methIMAzole (TAPAZOLE) 5 MG tablet, TAKE 1/2 TABLET BY MOUTH DAILY, Disp: 45 tablet, Rfl: 0  •  omeprazole (priLOSEC) 20 MG capsule, Take 20 mg by mouth., Disp: , Rfl:   •  ONE TOUCH ULTRA TEST test strip, TEST BLOOD SUGAR TWICE DAILY, Disp: 100 each, Rfl: 12  •  ONETOUCH DELICA LANCETS 33G misc, USE AS DIRECTED TO TEST BLOOD SUGAR TWICE DAILY, Disp: 100 each, Rfl: 12    Past Surgical History:   Procedure Laterality Date   • CARPAL TUNNEL RELEASE      Bilateral carpal tunnel release.)   03/27/2000    • CATARACT EXTRACTION      Bilateral   • INJECTION OF MEDICATION  06/17/2015    celestone(betamethasone) (2)    • INJECTION OF MEDICATION  04/14/2016    depo medrol ( methylprednisone) (20)   • OTHER SURGICAL HISTORY      hysterosope procedure   • RIGHT OOPHORECTOMY     • ROTATOR CUFF REPAIR      Right   • SHOULDER ARTHROSCOPY Right 2/6/2017    Procedure: RIGHT SHOULDER ARTHROSCOPY SUBACROMIAL DECOMPRESSION, ROTATOR CUFF REPAIR   ;  Surgeon: Terrence Haines MD;  Location: [facility];  Service:    • SHOULDER SURGERY      Excision of 4.8 cm mass with primary intermediate closure.)   03/30/2012    • SPINAL FUSION         ROS  No fevers or chills.  No chest pain or shortness of  "air.  No GI or  disturbances.    PHYSICAL EXAMINATION:       Ht 162.6 cm (64\")   Wt 66.7 kg (147 lb)   LMP 02/06/1980 (Approximate)   BMI 25.23 kg/m²     Physical Exam   Constitutional: She is oriented to person, place, and time. Vital signs are normal. She appears well-developed and well-nourished. She is cooperative.   HENT:   Head: Normocephalic and atraumatic.   Neck: Trachea normal and phonation normal.   Pulmonary/Chest: Effort normal. No respiratory distress.   Abdominal: Soft. Normal appearance. She exhibits no distension.   Neurological: She is alert and oriented to person, place, and time. GCS eye subscore is 4. GCS verbal subscore is 5. GCS motor subscore is 6.   Skin: Skin is warm, dry and intact.   Psychiatric: She has a normal mood and affect. Her speech is normal and behavior is normal. Judgment and thought content normal. Cognition and memory are normal.   Vitals reviewed.      GAIT:     []  Normal  []  Antalgic    Assistive device: [x]  None  []  Walker     []  Crutches  []  Cane     []  Wheelchair  []  Stretcher    Right Shoulder Exam     Tenderness   The patient is experiencing tenderness in the acromioclavicular joint.    Range of Motion   Active Abduction: abnormal   Forward Flexion: abnormal   External Rotation: abnormal   Internal Rotation 0 degrees: abnormal     Muscle Strength   Abduction: 4/5   Internal Rotation: 4/5   External Rotation: 4/5   Supraspinatus: 4/5     Tests   Drop Arm: positive    Other   Erythema: absent  Scars: absent  Sensation: normal  Pulse: present              Large Joint Arthrocentesis  Date/Time: 5/11/2018 9:47 AM  Consent given by: patient  Site marked: site marked  Timeout: Immediately prior to procedure a time out was called to verify the correct patient, procedure, equipment, support staff and site/side marked as required   Supporting Documentation  Indications: pain   Procedure Details  Location: shoulder - R subacromial bursa  Preparation: Patient was " prepped and draped in the usual sterile fashion  Needle size: 22 G  Approach: posterior  Medications administered: 40 mg triamcinolone acetonide 40 MG/ML; 2 mL lidocaine PF 1% 1 %  Patient tolerance: patient tolerated the procedure well with no immediate complications              ASSESSMENT:    Diagnoses and all orders for this visit:    Complete tear of right rotator cuff    Chronic right shoulder pain  -     Large Joint Arthrocentesis          PLAN  Repeated right shoulder injection today and recommended progression of rom and activity as tolerated based on pain.   Follow up as needed.   No Follow-up on file.    Michael Wong, APRN

## 2018-06-14 ENCOUNTER — OFFICE VISIT (OUTPATIENT)
Dept: ORTHOPEDIC SURGERY | Facility: CLINIC | Age: 73
End: 2018-06-14

## 2018-06-14 VITALS — BODY MASS INDEX: 24.75 KG/M2 | WEIGHT: 145 LBS | HEIGHT: 64 IN

## 2018-06-14 DIAGNOSIS — M17.0 PRIMARY OSTEOARTHRITIS OF BOTH KNEES: Primary | ICD-10-CM

## 2018-06-14 PROCEDURE — 99213 OFFICE O/P EST LOW 20 MIN: CPT | Performed by: NURSE PRACTITIONER

## 2018-06-14 NOTE — PROGRESS NOTES
Thelma Haley is a 72 y.o. female returns for     Chief Complaint   Patient presents with   • Right Knee - Follow-up, Pain   • Left Knee - Follow-up, Pain       HISTORY OF PRESENT ILLNESS:     72-year-old  female in the office today for follow-up of bilateral knee pains after the Synvisc 1 injections.  She reports that her pain as an average about 2 out of 10 since the injections       CONCURRENT MEDICAL HISTORY:    Past Medical History:   Diagnosis Date   • Abnormal results of thyroid function studies    • Acute pain of left shoulder    • Acute pain of right shoulder    • Allergic rhinitis due to pollen    • Asthma      uncomplicated      • Cataract    • Chronic rhinitis    • Essential hypertension    • Extrinsic asthma with status asthmaticus    • Glaucoma    • Hyperlipidemia    • Impingement syndrome of right shoulder    • Neuropathy    • Nuclear senile cataract    • Obesity    • Overweight with body mass index (BMI) 25.0-29.9    • Primary fibromyalgia syndrome    • Primary open angle glaucoma    • Rosacea    • Sinus headache    • Temporomandibular joint disorder        Allergies   Allergen Reactions   • Ciprofloxacin    • Hydrocodone    • Lortab [Hydrocodone-Acetaminophen]    • Metronidazole    • Oxycodone      Dizziness and doesn't decrease pain   • Ultram [Tramadol Hcl]          Current Outpatient Prescriptions:   •  albuterol (PROVENTIL HFA;VENTOLIN HFA) 108 (90 BASE) MCG/ACT inhaler, Inhale 2 puffs Every 4 (Four) Hours As Needed for wheezing., Disp: 6.7 g, Rfl: 11  •  albuterol (VENTOLIN HFA) 108 (90 BASE) MCG/ACT inhaler, 2 puffs every 4 hours as needed for breathing, Disp: 1 inhaler, Rfl: 5  •  budesonide-formoterol (SYMBICORT) 160-4.5 MCG/ACT inhaler, 2 puffs twice a day, Disp: 1 inhaler, Rfl: 5  •  CETIRIZINE HCL PO, Take 10 mg by mouth Daily., Disp: , Rfl:   •  Cholecalciferol (VITAMIN D3) 1000 UNITS capsule, Take 1,000 Units by mouth Daily., Disp: , Rfl:   •  dorzolamide-timolol (COSOPT)  22.3-6.8 MG/ML ophthalmic solution, INSTILL 1 DROP INTO EACH EYE BID, Disp: , Rfl: 0  •  fluticasone (FLONASE) 50 MCG/ACT nasal spray, 2 sprays into each nostril Daily. Administer 2 sprays in each nostril for each dose., Disp: 1 each, Rfl: 5  •  gabapentin (NEURONTIN) 100 MG capsule, Take 1 capsule by mouth 3 (Three) Times a Day., Disp: 90 capsule, Rfl: 2  •  glucose monitoring kit (FREESTYLE) monitoring kit, 1 each As Needed (diabetes)., Disp: 1 each, Rfl: 2  •  hydrochlorothiazide (HYDRODIURIL) 25 MG tablet, Take 1 tablet by mouth Daily., Disp: 90 tablet, Rfl: 3  •  JANUMET XR  MG tablet, TAKE 1 TABLET BY MOUTH DAILY, Disp: 30 tablet, Rfl: 0  •  lisinopril (PRINIVIL,ZESTRIL) 5 MG tablet, Take 1 tablet by mouth Daily., Disp: 90 tablet, Rfl: 3  •  lovastatin (MEVACOR) 10 MG tablet, Take 2 tablets by mouth Every Night., Disp: 90 tablet, Rfl: 3  •  methIMAzole (TAPAZOLE) 5 MG tablet, TAKE 1/2 TABLET BY MOUTH DAILY, Disp: 45 tablet, Rfl: 0  •  omeprazole (priLOSEC) 20 MG capsule, Take 20 mg by mouth., Disp: , Rfl:   •  ONE TOUCH ULTRA TEST test strip, TEST BLOOD SUGAR TWICE DAILY, Disp: 100 each, Rfl: 12  •  ONETOUCH DELICA LANCETS 33G misc, USE AS DIRECTED TO TEST BLOOD SUGAR TWICE DAILY, Disp: 100 each, Rfl: 12    Past Surgical History:   Procedure Laterality Date   • CARPAL TUNNEL RELEASE      Bilateral carpal tunnel release.)   03/27/2000    • CATARACT EXTRACTION      Bilateral   • INJECTION OF MEDICATION  06/17/2015    celestone(betamethasone) (2)    • INJECTION OF MEDICATION  04/14/2016    depo medrol ( methylprednisone) (20)   • OOPHORECTOMY     • OTHER SURGICAL HISTORY      hysterosope procedure   • RIGHT OOPHORECTOMY     • ROTATOR CUFF REPAIR      Right   • SHOULDER ARTHROSCOPY Right 2/6/2017    Procedure: RIGHT SHOULDER ARTHROSCOPY SUBACROMIAL DECOMPRESSION, ROTATOR CUFF REPAIR   ;  Surgeon: Terrence Haines MD;  Location: [facility];  Service:    • SHOULDER SURGERY      Excision of 4.8 cm mass  "with primary intermediate closure.)   03/30/2012    • SPINAL FUSION         ROS  No fevers or chills.  No chest pain or shortness of air.  No GI or  disturbances.    PHYSICAL EXAMINATION:       Ht 162.6 cm (64\")   Wt 65.8 kg (145 lb)   LMP 02/06/1980 (Approximate)   BMI 24.89 kg/m²     Physical Exam   Constitutional: She is oriented to person, place, and time. Vital signs are normal. She appears well-developed and well-nourished. She is cooperative.   HENT:   Head: Normocephalic and atraumatic.   Neck: Trachea normal and phonation normal.   Pulmonary/Chest: Effort normal. No respiratory distress.   Abdominal: Soft. Normal appearance. She exhibits no distension.   Musculoskeletal:        Right knee: She exhibits no effusion.   Neurological: She is alert and oriented to person, place, and time. GCS eye subscore is 4. GCS verbal subscore is 5. GCS motor subscore is 6.   Skin: Skin is warm, dry and intact.   Psychiatric: She has a normal mood and affect. Her speech is normal and behavior is normal. Judgment and thought content normal. Cognition and memory are normal.   Vitals reviewed.      GAIT:     [x]  Normal  []  Antalgic    Assistive device: [x]  None  []  Walker     []  Crutches  []  Cane     []  Wheelchair  []  Stretcher    Right Knee Exam     Tenderness   The patient is experiencing no tenderness.         Range of Motion   Extension: normal   Right knee flexion: 100.     Other   Erythema: absent  Scars: absent  Sensation: normal  Pulse: present  Swelling: none  Other tests: no effusion present      Left Knee Exam     Tenderness   The patient is experiencing no tenderness.         Range of Motion   Left knee flexion: 100.     Other   Erythema: absent  Scars: absent  Sensation: normal  Pulse: present  Swelling: none                        ASSESSMENT:    Diagnoses and all orders for this visit:    Primary osteoarthritis of both knees          PLAN  Recommend progression a range of motion and activity as " tolerated based on pain follow-up as needed.  No Follow-up on file.    Michael Wong, APRN

## 2018-07-10 ENCOUNTER — OFFICE VISIT (OUTPATIENT)
Dept: CARDIOLOGY | Facility: CLINIC | Age: 73
End: 2018-07-10

## 2018-07-10 VITALS
HEIGHT: 64 IN | WEIGHT: 145 LBS | SYSTOLIC BLOOD PRESSURE: 128 MMHG | BODY MASS INDEX: 24.75 KG/M2 | DIASTOLIC BLOOD PRESSURE: 64 MMHG | HEART RATE: 88 BPM

## 2018-07-10 DIAGNOSIS — I10 ESSENTIAL HYPERTENSION: ICD-10-CM

## 2018-07-10 DIAGNOSIS — E78.2 MIXED HYPERLIPIDEMIA: Primary | ICD-10-CM

## 2018-07-10 PROCEDURE — 99214 OFFICE O/P EST MOD 30 MIN: CPT | Performed by: INTERNAL MEDICINE

## 2018-07-10 RX ORDER — LOVASTATIN 20 MG/1
20 TABLET ORAL NIGHTLY
Qty: 30 TABLET | Refills: 6 | Status: SHIPPED | OUTPATIENT
Start: 2018-07-10 | End: 2018-07-10 | Stop reason: SDUPTHER

## 2018-07-10 RX ORDER — LOVASTATIN 20 MG/1
20 TABLET ORAL NIGHTLY
Qty: 30 TABLET | Refills: 6 | Status: SHIPPED | OUTPATIENT
Start: 2018-07-10 | End: 2018-10-23 | Stop reason: SDUPTHER

## 2018-07-10 RX ORDER — GLUCOSAMINE/D3/BOSWELLIA SERRA 1500MG-400
1 TABLET ORAL DAILY
COMMUNITY

## 2018-07-10 RX ORDER — LOVASTATIN 20 MG/1
20 TABLET ORAL NIGHTLY
COMMUNITY
End: 2018-07-10 | Stop reason: SDUPTHER

## 2018-07-10 NOTE — PROGRESS NOTES
Thelma Haley  72 y.o. female    07/10/2018  1. Mixed hyperlipidemia    2. Essential hypertension        History of Present Illness    Mrs. Haley is a 72-year-old  lady was being seen by me after very long interval.  She was a patient of Dr. Duarte in the past.  Her history is remarkable for hypertension, hyperlipidemia and has history of fibromyalgia.  She has been evaluated for chest pain in the past by both nuclear stress testing and CT angiogram of the coronary arteries.  She is known to have less than 50% stenosis in the Left Anterior Descending Coronary Artery.  She is done well from a cardiac standpoint with no chest pain or shortness of breath.  Her blood pressures in the normal range.  She's been compliant with her medications.      SUBJECTIVE    Allergies   Allergen Reactions   • Ciprofloxacin    • Hydrocodone    • Lortab [Hydrocodone-Acetaminophen]    • Metronidazole    • Oxycodone      Dizziness and doesn't decrease pain   • Ultram [Tramadol Hcl]          Past Medical History:   Diagnosis Date   • Abnormal results of thyroid function studies    • Acute pain of left shoulder    • Acute pain of right shoulder    • Allergic rhinitis due to pollen    • Asthma      uncomplicated      • Cataract    • Chronic rhinitis    • Essential hypertension    • Extrinsic asthma with status asthmaticus    • Glaucoma    • Hyperlipidemia    • Impingement syndrome of right shoulder    • Neuropathy    • Nuclear senile cataract    • Obesity    • Overweight with body mass index (BMI) 25.0-29.9    • Primary fibromyalgia syndrome    • Primary open angle glaucoma    • Rosacea    • Sinus headache    • Temporomandibular joint disorder          Past Surgical History:   Procedure Laterality Date   • CARPAL TUNNEL RELEASE      Bilateral carpal tunnel release.)   03/27/2000    • CATARACT EXTRACTION      Bilateral   • INJECTION OF MEDICATION  06/17/2015    celestone(betamethasone) (2)    • INJECTION OF MEDICATION   04/14/2016    depo medrol ( methylprednisone) (20)   • OOPHORECTOMY     • OTHER SURGICAL HISTORY      hysterosope procedure   • RIGHT OOPHORECTOMY     • ROTATOR CUFF REPAIR      Right   • SHOULDER ARTHROSCOPY Right 2/6/2017    Procedure: RIGHT SHOULDER ARTHROSCOPY SUBACROMIAL DECOMPRESSION, ROTATOR CUFF REPAIR   ;  Surgeon: Terrence Haines MD;  Location: [facility];  Service:    • SHOULDER SURGERY      Excision of 4.8 cm mass with primary intermediate closure.)   03/30/2012    • SPINAL FUSION           Family History   Problem Relation Age of Onset   • Heart disease Other    • Hypertension Other    • COPD Mother          Social History     Social History   • Marital status:      Spouse name: N/A   • Number of children: N/A   • Years of education: N/A     Occupational History   • Not on file.     Social History Main Topics   • Smoking status: Never Smoker   • Smokeless tobacco: Never Used   • Alcohol use No   • Drug use: No   • Sexual activity: Defer     Other Topics Concern   • Not on file     Social History Narrative   • No narrative on file         Current Outpatient Prescriptions   Medication Sig Dispense Refill   • albuterol (PROVENTIL HFA;VENTOLIN HFA) 108 (90 BASE) MCG/ACT inhaler Inhale 2 puffs Every 4 (Four) Hours As Needed for wheezing. 6.7 g 11   • Biotin 84150 MCG tablet Take 1 tablet by mouth Daily.     • budesonide-formoterol (SYMBICORT) 160-4.5 MCG/ACT inhaler 2 puffs twice a day 1 inhaler 5   • Calcium 500-100 MG-UNIT chewable tablet Chew 1 tablet 2 (Two) Times a Day.     • CETIRIZINE HCL PO Take 10 mg by mouth Daily.     • Cholecalciferol (VITAMIN D3) 1000 UNITS capsule Take 5,000 Units by mouth Daily.     • dorzolamide-timolol (COSOPT) 22.3-6.8 MG/ML ophthalmic solution INSTILL 1 DROP INTO EACH EYE BID  0   • fluticasone (FLONASE) 50 MCG/ACT nasal spray 2 sprays into each nostril Daily. Administer 2 sprays in each nostril for each dose. 1 each 5   • gabapentin (NEURONTIN) 100 MG capsule  "Take 1 capsule by mouth 3 (Three) Times a Day. 90 capsule 2   • glucose monitoring kit (FREESTYLE) monitoring kit 1 each As Needed (diabetes). 1 each 2   • hydrochlorothiazide (HYDRODIURIL) 25 MG tablet Take 1 tablet by mouth Daily. 90 tablet 3   • JANUMET XR  MG tablet TAKE 1 TABLET BY MOUTH DAILY 30 tablet 0   • lisinopril (PRINIVIL,ZESTRIL) 5 MG tablet Take 1 tablet by mouth Daily. 90 tablet 3   • methIMAzole (TAPAZOLE) 5 MG tablet TAKE 1/2 TABLET BY MOUTH DAILY 45 tablet 0   • omeprazole (priLOSEC) 20 MG capsule Take 20 mg by mouth.     • ONE TOUCH ULTRA TEST test strip TEST BLOOD SUGAR TWICE DAILY 100 each 12   • ONETOUCH DELICA LANCETS 33G misc USE AS DIRECTED TO TEST BLOOD SUGAR TWICE DAILY 100 each 12   • albuterol (VENTOLIN HFA) 108 (90 BASE) MCG/ACT inhaler 2 puffs every 4 hours as needed for breathing 1 inhaler 5   • lovastatin (MEVACOR) 20 MG tablet Take 1 tablet by mouth Every Night. 30 tablet 6     No current facility-administered medications for this visit.          OBJECTIVE    /64   Pulse 88   Ht 162.6 cm (64\")   Wt 65.8 kg (145 lb)   LMP 02/06/1980 (Approximate)   BMI 24.89 kg/m²         Review of Systems     Constitutional:  Denies recent weight loss, weight gain, fever or chills, no change in exercise tolerance     HENT:  Denies any hearing loss, epistaxis, hoarseness, or difficulty speaking.     Eyes: Wears eyeglasses or contact lenses     Respiratory:  Denies dyspnea with exertion,no cough, wheezing, or hemoptysis.     Cardiovascular: Negative for palpations, chest pain, orthopnea, PND, peripheral edema, syncope, or claudication.     Gastrointestinal:  Denies change in bowel habits, dyspepsia, ulcer disease, hematochezia, or melena.     Endocrine: Negative for cold intolerance, heat intolerance, polydipsia, polyphagia and polyuria.    Genitourinary: Negative.      Musculoskeletal: Denies any history of arthritic symptoms or back problems     Skin:  Denies any change in hair or " nails, rashes, or skin lesions.     Allergic/Immunologic: Negative.  Negative for environmental allergies, food allergies and immunocompromised state.     Neurological:  Denies any history of recurrent headaches, strokes, TIA, or seizure disorder.     Hematological: Denies any food allergies, seasonal allergies, bleeding disorders, or lymphadenopathy.     Psychiatric/Behavioral: Denies any history of depression, substance abuse, or change in cognitive function.         Physical Exam     Constitutional: Cooperative, alert and oriented, in no acute distress.     HENT:   Head: Normocephalic, normal hair patterns, no masses or tenderness.  Ears, Nose, and Throat: No gross abnormalities. No pallor or cyanosis.   Eyes: EOMS intact, PERRL, conjunctivae and lids unremarkable. Fundoscopic exam and visual fields not performed.   Neck: No palpable masses or adenopathy, no thyromegaly, no JVD, carotid pulses are full and equal bilaterally and without  Bruits.     Cardiovascular: Regular rhythm, S1 and S2 normal, no S3 or S4.  No murmurs, gallops, or rubs detected.     Pulmonary/Chest: Chest: normal symmetry, no tenderness to palpation, normal respiratory excursion, no intercostal retraction, no use of accessory muscles.            Pulmonary: Normal breath sounds. No rales or ronchi.    Abdominal: Abdomen soft, bowel sounds normoactive, no masses, no hepatosplenomegaly, non-tender, no bruits.     Musculoskeletal: No deformities, clubbing, cyanosis, erythema, or edema observed.    Neurological: No gross motor or sensory deficits noted, affect appropriate, oriented to time, person, place.     Skin: Warm and dry to the touch, no apparent skin lesions or masses noted.     Psychiatric: She has a normal mood and affect. Her behavior is normal. Judgment and thought content normal.         Procedures      Lab Results   Component Value Date    WBC 7.20 04/20/2018    HGB 12.3 04/20/2018    HCT 35.2 04/20/2018    MCV 84.0 04/20/2018    PLT  262 04/20/2018     Lab Results   Component Value Date    GLUCOSE 95 04/20/2018    BUN 35 (H) 04/20/2018    CREATININE 1.24 (H) 04/20/2018    EGFRIFNONA 42 04/20/2018    BCR 28.2 (H) 04/20/2018    CO2 22.0 04/20/2018    CALCIUM 10.1 04/20/2018    ALBUMIN 3.40 07/24/2017    LABIL2 1.2 07/24/2017    AST 22 07/24/2017    ALT 44 07/24/2017     Lab Results   Component Value Date    CHOL 197 04/20/2018    CHOL 191 07/24/2017     Lab Results   Component Value Date    TRIG 251 (H) 04/20/2018    TRIG 135 07/24/2017    TRIG 147 09/02/2016     Lab Results   Component Value Date    HDL 36 (L) 04/20/2018    HDL 50 (L) 07/24/2017    HDL 40 (L) 09/02/2016     No components found for: LDLCALC  Lab Results   Component Value Date     04/20/2018     07/24/2017    LDL 82 09/02/2016     No results found for: HDLLDLRATIO  No components found for: CHOLHDL  Lab Results   Component Value Date    HGBA1C 6.0 (H) 04/12/2018     Lab Results   Component Value Date    TSH 2.730 04/20/2018           ASSESSMENT AND PLAN  Mrs. Haley is stable with regards to her heart and her present antihypertensive therapy with lisinopril, HCTZ and lipid-lowering therapy with lovastatin has been continued.  No cardiac workup is indicated at this time.    Thelma was seen today for follow-up.    Diagnoses and all orders for this visit:    Mixed hyperlipidemia    Essential hypertension        Manju Ramirez MD  7/10/2018  12:49 PM

## 2018-07-17 RX ORDER — METHIMAZOLE 5 MG/1
TABLET ORAL
Qty: 45 TABLET | Refills: 0 | Status: SHIPPED | OUTPATIENT
Start: 2018-07-17 | End: 2018-10-13 | Stop reason: SDUPTHER

## 2018-07-20 RX ORDER — LISINOPRIL 5 MG/1
5 TABLET ORAL DAILY
Qty: 90 TABLET | Refills: 0 | Status: SHIPPED | OUTPATIENT
Start: 2018-07-20 | End: 2018-09-14 | Stop reason: SDUPTHER

## 2018-07-20 RX ORDER — HYDROCHLOROTHIAZIDE 25 MG/1
25 TABLET ORAL DAILY
Qty: 90 TABLET | Refills: 0 | Status: SHIPPED | OUTPATIENT
Start: 2018-07-20 | End: 2018-09-14 | Stop reason: SDUPTHER

## 2018-07-26 ENCOUNTER — TELEPHONE (OUTPATIENT)
Dept: FAMILY MEDICINE CLINIC | Facility: CLINIC | Age: 73
End: 2018-07-26

## 2018-08-06 NOTE — TELEPHONE ENCOUNTER
PT CALLED AND SAID SHE THOUGHT SHE WAS SUPPOSED TO HAVE HER A1C CHECKED EVERY THREE MONTHS. SHE ISN'T DUE TO SEE ANYONE UNTIL October, SO WANTED TO SEE IF SHE COULD GET A LAB ORDER SENT OVER FOR HER TO GET HER A1C CHECKED.     THANKS   No

## 2018-09-06 NOTE — TELEPHONE ENCOUNTER
Called pt made appt for her for 9/14.  Pt stated that she was told back in May that she did not need to be seen and to come in and be seen in October.   Was told that all meds would be refilled prior to that appt.

## 2018-09-07 RX ORDER — GABAPENTIN 100 MG/1
CAPSULE ORAL
Qty: 90 CAPSULE | Refills: 0 | OUTPATIENT
Start: 2018-09-07

## 2018-09-14 ENCOUNTER — OFFICE VISIT (OUTPATIENT)
Dept: FAMILY MEDICINE CLINIC | Facility: CLINIC | Age: 73
End: 2018-09-14

## 2018-09-14 ENCOUNTER — APPOINTMENT (OUTPATIENT)
Dept: LAB | Facility: HOSPITAL | Age: 73
End: 2018-09-14

## 2018-09-14 VITALS
WEIGHT: 148.5 LBS | SYSTOLIC BLOOD PRESSURE: 120 MMHG | HEART RATE: 68 BPM | BODY MASS INDEX: 25.35 KG/M2 | DIASTOLIC BLOOD PRESSURE: 60 MMHG | HEIGHT: 64 IN | OXYGEN SATURATION: 98 %

## 2018-09-14 DIAGNOSIS — I10 ESSENTIAL HYPERTENSION: Primary | ICD-10-CM

## 2018-09-14 DIAGNOSIS — E11.9 TYPE 2 DIABETES MELLITUS WITHOUT COMPLICATION, WITHOUT LONG-TERM CURRENT USE OF INSULIN (HCC): ICD-10-CM

## 2018-09-14 DIAGNOSIS — G62.9 NEUROPATHY: ICD-10-CM

## 2018-09-14 DIAGNOSIS — Z51.81 MEDICATION MONITORING ENCOUNTER: ICD-10-CM

## 2018-09-14 PROCEDURE — G0481 DRUG TEST DEF 8-14 CLASSES: HCPCS | Performed by: FAMILY MEDICINE

## 2018-09-14 PROCEDURE — 80307 DRUG TEST PRSMV CHEM ANLYZR: CPT | Performed by: FAMILY MEDICINE

## 2018-09-14 PROCEDURE — 99213 OFFICE O/P EST LOW 20 MIN: CPT | Performed by: FAMILY MEDICINE

## 2018-09-14 RX ORDER — HYDROCHLOROTHIAZIDE 25 MG/1
25 TABLET ORAL DAILY
Qty: 90 TABLET | Refills: 2 | Status: SHIPPED | OUTPATIENT
Start: 2018-09-14 | End: 2019-01-15 | Stop reason: SDUPTHER

## 2018-09-14 RX ORDER — GABAPENTIN 100 MG/1
100 CAPSULE ORAL 3 TIMES DAILY
Qty: 90 CAPSULE | Refills: 5 | Status: SHIPPED | OUTPATIENT
Start: 2018-09-14 | End: 2019-02-21 | Stop reason: SDUPTHER

## 2018-09-14 RX ORDER — LISINOPRIL 5 MG/1
5 TABLET ORAL DAILY
Qty: 90 TABLET | Refills: 2 | Status: SHIPPED | OUTPATIENT
Start: 2018-09-14 | End: 2019-01-15 | Stop reason: SDUPTHER

## 2018-09-14 NOTE — PROGRESS NOTES
Subjective:     Thelma Haley is a 73 y.o. female who presents for follow up for diabetic neuropathy    Patient has intermittent numbness and tingling from neuropathy that she has had for several years. She is currently taking gabapentin which significantly reduces her symptoms. She has symptoms now only sporadically and they are mild. She currently sees Dr. Jevon Young for her diabetes and for hypothyroidism with stable thyroid nodule. We discussed lab values from last April, her A1c which is currently stable on her current medication regimen, and results of her thyroid US which showed no demonstrable interval change and recommended yearly repeat.         Past Medical Hx:  Past Medical History:   Diagnosis Date   • Abnormal results of thyroid function studies    • Acute pain of left shoulder    • Acute pain of right shoulder    • Allergic rhinitis due to pollen    • Asthma      uncomplicated      • Cataract    • Chronic rhinitis    • Essential hypertension    • Extrinsic asthma with status asthmaticus    • Glaucoma    • Hyperlipidemia    • Impingement syndrome of right shoulder    • Neuropathy    • Nuclear senile cataract    • Obesity    • Overweight with body mass index (BMI) 25.0-29.9    • Primary fibromyalgia syndrome    • Primary open angle glaucoma    • Rosacea    • Sinus headache    • Temporomandibular joint disorder        Past Surgical Hx:  Past Surgical History:   Procedure Laterality Date   • CARPAL TUNNEL RELEASE      Bilateral carpal tunnel release.)   03/27/2000    • CATARACT EXTRACTION      Bilateral   • INJECTION OF MEDICATION  06/17/2015    celestone(betamethasone) (2)    • INJECTION OF MEDICATION  04/14/2016    depo medrol ( methylprednisone) (20)   • OOPHORECTOMY     • OTHER SURGICAL HISTORY      hysterosope procedure   • RIGHT OOPHORECTOMY     • ROTATOR CUFF REPAIR      Right   • SHOULDER ARTHROSCOPY Right 2/6/2017    Procedure: RIGHT SHOULDER ARTHROSCOPY SUBACROMIAL DECOMPRESSION,  ROTATOR CUFF REPAIR   ;  Surgeon: Terrence Haines MD;  Location: [facility];  Service:    • SHOULDER SURGERY      Excision of 4.8 cm mass with primary intermediate closure.)   03/30/2012    • SPINAL FUSION         Health Maintenance:  Health Maintenance   Topic Date Due   • ZOSTER VACCINE (1 of 2) 08/31/1993   • MEDICARE ANNUAL WELLNESS  08/24/2016   • INFLUENZA VACCINE  08/01/2018   • HEMOGLOBIN A1C  10/12/2018   • DIABETIC EYE EXAM  02/01/2019   • DIABETIC FOOT EXAM  04/20/2019   • LIPID PANEL  04/20/2019   • URINE MICROALBUMIN  04/20/2019   • MAMMOGRAM  05/25/2020   • COLONOSCOPY  05/21/2022   • TDAP/TD VACCINES (2 - Td) 07/10/2024   • PNEUMOCOCCAL VACCINES (65+ LOW/MEDIUM RISK)  Completed       Current Meds:    Current Outpatient Prescriptions:   •  albuterol (PROVENTIL HFA;VENTOLIN HFA) 108 (90 BASE) MCG/ACT inhaler, Inhale 2 puffs Every 4 (Four) Hours As Needed for wheezing., Disp: 6.7 g, Rfl: 11  •  albuterol (VENTOLIN HFA) 108 (90 BASE) MCG/ACT inhaler, 2 puffs every 4 hours as needed for breathing, Disp: 1 inhaler, Rfl: 5  •  Biotin 98031 MCG tablet, Take 1 tablet by mouth Daily., Disp: , Rfl:   •  budesonide-formoterol (SYMBICORT) 160-4.5 MCG/ACT inhaler, 2 puffs twice a day, Disp: 1 inhaler, Rfl: 5  •  Calcium 500-100 MG-UNIT chewable tablet, Chew 1 tablet 2 (Two) Times a Day., Disp: , Rfl:   •  CETIRIZINE HCL PO, Take 10 mg by mouth Daily., Disp: , Rfl:   •  Cholecalciferol (VITAMIN D3) 1000 UNITS capsule, Take 5,000 Units by mouth Daily., Disp: , Rfl:   •  dorzolamide-timolol (COSOPT) 22.3-6.8 MG/ML ophthalmic solution, INSTILL 1 DROP INTO EACH EYE BID, Disp: , Rfl: 0  •  fluticasone (FLONASE) 50 MCG/ACT nasal spray, 2 sprays into each nostril Daily. Administer 2 sprays in each nostril for each dose., Disp: 1 each, Rfl: 5  •  gabapentin (NEURONTIN) 100 MG capsule, Take 1 capsule by mouth 3 (Three) Times a Day., Disp: 90 capsule, Rfl: 2  •  hydrochlorothiazide (HYDRODIURIL) 25 MG tablet, TAKE 1  TABLET BY MOUTH DAILY, Disp: 90 tablet, Rfl: 0  •  JANUMET XR  MG tablet, TAKE 1 TABLET BY MOUTH DAILY, Disp: 30 tablet, Rfl: 0  •  lisinopril (PRINIVIL,ZESTRIL) 5 MG tablet, TAKE 1 TABLET BY MOUTH DAILY, Disp: 90 tablet, Rfl: 0  •  lovastatin (MEVACOR) 20 MG tablet, Take 1 tablet by mouth Every Night., Disp: 30 tablet, Rfl: 6  •  methIMAzole (TAPAZOLE) 5 MG tablet, TAKE 1/2 TABLET BY MOUTH DAILY, Disp: 45 tablet, Rfl: 0  •  omeprazole (priLOSEC) 20 MG capsule, Take 20 mg by mouth., Disp: , Rfl:   •  ONE TOUCH ULTRA TEST test strip, TEST BLOOD SUGAR TWICE DAILY, Disp: 100 each, Rfl: 12  •  ONETOUCH DELICA LANCETS 33G misc, USE AS DIRECTED TO TEST BLOOD SUGAR TWICE DAILY, Disp: 100 each, Rfl: 12    Allergies:  Ciprofloxacin; Hydrocodone; Lortab [hydrocodone-acetaminophen]; Metronidazole; Oxycodone; and Ultram [tramadol hcl]    Family Hx:  Family History   Problem Relation Age of Onset   • Heart disease Other    • Hypertension Other    • COPD Mother         Social History:  Social History     Social History   • Marital status:      Spouse name: N/A   • Number of children: N/A   • Years of education: N/A     Occupational History   • Not on file.     Social History Main Topics   • Smoking status: Never Smoker   • Smokeless tobacco: Never Used   • Alcohol use No   • Drug use: No   • Sexual activity: Defer     Other Topics Concern   • Not on file     Social History Narrative   • No narrative on file       Review of Systems  Review of Systems   Constitutional: Negative for activity change, appetite change, fatigue and fever.   HENT: Negative for ear pain and sore throat.    Eyes: Negative for pain and visual disturbance.   Respiratory: Negative for cough and shortness of breath.    Cardiovascular: Negative for chest pain and palpitations.   Gastrointestinal: Negative for abdominal pain and nausea.   Endocrine: Negative for cold intolerance and heat intolerance.   Genitourinary: Negative for difficulty  "urinating and dysuria.   Musculoskeletal: Negative for arthralgias and gait problem.   Skin: Negative for color change and rash.   Neurological: Negative for dizziness, weakness and headaches.   Hematological: Negative for adenopathy. Bruises/bleeds easily.   Psychiatric/Behavioral: Negative for agitation, confusion and sleep disturbance.       Objective:     /60 (BP Location: Left arm, Patient Position: Sitting, Cuff Size: Adult)   Pulse 68   Ht 162.6 cm (64\")   Wt 67.4 kg (148 lb 8 oz)   LMP 02/06/1980 (Approximate)   SpO2 98%   BMI 25.49 kg/m²     Physical Exam   Constitutional: She is oriented to person, place, and time. She appears well-developed and well-nourished.   HENT:   Head: Normocephalic and atraumatic.   Eyes: Pupils are equal, round, and reactive to light. Conjunctivae, EOM and lids are normal.   Neck: Normal range of motion. Neck supple.   Cardiovascular: Normal rate, regular rhythm and normal heart sounds.  Exam reveals no gallop and no friction rub.    No murmur heard.  Pulmonary/Chest: Effort normal and breath sounds normal.   Abdominal: Soft. Normal appearance and bowel sounds are normal. There is no tenderness.   Musculoskeletal: Normal range of motion.   Neurological: She is alert and oriented to person, place, and time.   Skin: Skin is warm, dry and intact.   Psychiatric: She has a normal mood and affect. Her speech is normal and behavior is normal. Judgment and thought content normal. Cognition and memory are normal.       No exam data present  Assessment/Plan:     There are no diagnoses linked to this encounter.       Follow-up:     No Follow-up on file.      Goals     • Less pain           Preventative:    Vaccines Recommended at this visit:   Influenza    Screenings Recommended at this visit:  No Screenings offered today. Patient is up to date on all screenings at this time.     Smoking Status:  Patient has never smoked.    Alcohol Intake:  Patient does not drink    Patient's " Body mass index is 25.49 kg/m². BMI is within normal parameters. No follow-up required.        RISK SCORE: 3      Signature:  Gina Polo MD [facility] PGY3  [place] [address]  Office: [phone]          This document has been electronically signed by Gina Polo MD on September 14, 2018 9:19 AM

## 2018-09-16 NOTE — PROGRESS NOTES
I have reviewed the notes, assessments, and/or procedures performed by Gina Polo MD , I concur with her/his documentation of Thelma Haley.

## 2018-09-18 ENCOUNTER — LAB (OUTPATIENT)
Dept: LAB | Facility: HOSPITAL | Age: 73
End: 2018-09-18

## 2018-09-18 ENCOUNTER — OFFICE VISIT (OUTPATIENT)
Dept: ORTHOPEDIC SURGERY | Facility: CLINIC | Age: 73
End: 2018-09-18

## 2018-09-18 VITALS — HEIGHT: 64 IN | BODY MASS INDEX: 24.92 KG/M2 | WEIGHT: 146 LBS

## 2018-09-18 DIAGNOSIS — M25.562 LEFT KNEE PAIN, UNSPECIFIED CHRONICITY: ICD-10-CM

## 2018-09-18 DIAGNOSIS — E04.1 RIGHT THYROID NODULE: ICD-10-CM

## 2018-09-18 DIAGNOSIS — E55.9 VITAMIN D DEFICIENCY: ICD-10-CM

## 2018-09-18 DIAGNOSIS — E11.9 TYPE 2 DIABETES MELLITUS WITHOUT COMPLICATION, WITHOUT LONG-TERM CURRENT USE OF INSULIN (HCC): ICD-10-CM

## 2018-09-18 DIAGNOSIS — E05.90 SUBCLINICAL HYPERTHYROIDISM: ICD-10-CM

## 2018-09-18 DIAGNOSIS — M17.0 PRIMARY OSTEOARTHRITIS OF BOTH KNEES: Primary | ICD-10-CM

## 2018-09-18 LAB
25(OH)D3 SERPL-MCNC: 68.1 NG/ML (ref 30–100)
ALBUMIN SERPL-MCNC: 4.1 G/DL (ref 3.4–4.8)
ALBUMIN UR-MCNC: 2 MG/L
ALBUMIN/GLOB SERPL: 1.3 G/DL (ref 1.1–1.8)
ALP SERPL-CCNC: 64 U/L (ref 38–126)
ALT SERPL W P-5'-P-CCNC: 22 U/L (ref 9–52)
ANION GAP SERPL CALCULATED.3IONS-SCNC: 9 MMOL/L (ref 5–15)
ARTICHOKE IGE QN: 74 MG/DL (ref 1–129)
AST SERPL-CCNC: 30 U/L (ref 14–36)
BASOPHILS # BLD AUTO: 0.04 10*3/MM3 (ref 0–0.2)
BASOPHILS NFR BLD AUTO: 0.6 % (ref 0–2)
BILIRUB SERPL-MCNC: 0.4 MG/DL (ref 0.2–1.3)
BUN BLD-MCNC: 28 MG/DL (ref 7–21)
BUN/CREAT SERPL: 25 (ref 7–25)
CALCIUM SPEC-SCNC: 9.5 MG/DL (ref 8.4–10.2)
CHLORIDE SERPL-SCNC: 106 MMOL/L (ref 95–110)
CHOLEST SERPL-MCNC: 149 MG/DL (ref 0–199)
CO2 SERPL-SCNC: 27 MMOL/L (ref 22–31)
CREAT BLD-MCNC: 1.12 MG/DL (ref 0.5–1)
CREAT UR-MCNC: 120.5 MG/DL
DEPRECATED RDW RBC AUTO: 40.7 FL (ref 36.4–46.3)
EOSINOPHIL # BLD AUTO: 0.19 10*3/MM3 (ref 0–0.7)
EOSINOPHIL NFR BLD AUTO: 2.7 % (ref 0–7)
ERYTHROCYTE [DISTWIDTH] IN BLOOD BY AUTOMATED COUNT: 12.8 % (ref 11.5–14.5)
GFR SERPL CREATININE-BSD FRML MDRD: 47 ML/MIN/1.73 (ref 39–90)
GLOBULIN UR ELPH-MCNC: 3.2 GM/DL (ref 2.3–3.5)
GLUCOSE BLD-MCNC: 92 MG/DL (ref 60–100)
HBA1C MFR BLD: 5.8 % (ref 4–5.6)
HCT VFR BLD AUTO: 34.2 % (ref 35–45)
HDLC SERPL-MCNC: 40 MG/DL (ref 60–200)
HGB BLD-MCNC: 11.7 G/DL (ref 12–15.5)
IMM GRANULOCYTES # BLD: 0.02 10*3/MM3 (ref 0–0.02)
IMM GRANULOCYTES NFR BLD: 0.3 % (ref 0–0.5)
LDLC/HDLC SERPL: 1.87 {RATIO} (ref 0–3.22)
LYMPHOCYTES # BLD AUTO: 1.79 10*3/MM3 (ref 0.6–4.2)
LYMPHOCYTES NFR BLD AUTO: 25.1 % (ref 10–50)
MCH RBC QN AUTO: 30.2 PG (ref 26.5–34)
MCHC RBC AUTO-ENTMCNC: 34.2 G/DL (ref 31.4–36)
MCV RBC AUTO: 88.4 FL (ref 80–98)
MICROALBUMIN/CREAT UR: 16.6 MG/G (ref 0–30)
MONOCYTES # BLD AUTO: 0.46 10*3/MM3 (ref 0–0.9)
MONOCYTES NFR BLD AUTO: 6.5 % (ref 0–12)
NEUTROPHILS # BLD AUTO: 4.63 10*3/MM3 (ref 2–8.6)
NEUTROPHILS NFR BLD AUTO: 64.8 % (ref 37–80)
PLATELET # BLD AUTO: 266 10*3/MM3 (ref 150–450)
PMV BLD AUTO: 11.8 FL (ref 8–12)
POTASSIUM BLD-SCNC: 4.2 MMOL/L (ref 3.5–5.1)
PROT SERPL-MCNC: 7.3 G/DL (ref 6.3–8.6)
RBC # BLD AUTO: 3.87 10*6/MM3 (ref 3.77–5.16)
SODIUM BLD-SCNC: 142 MMOL/L (ref 137–145)
TRIGL SERPL-MCNC: 171 MG/DL (ref 20–199)
TSH SERPL DL<=0.05 MIU/L-ACNC: 0.99 MIU/ML (ref 0.46–4.68)
VIT B12 BLD-MCNC: 481 PG/ML (ref 239–931)
WBC NRBC COR # BLD: 7.13 10*3/MM3 (ref 3.2–9.8)

## 2018-09-18 PROCEDURE — 84443 ASSAY THYROID STIM HORMONE: CPT

## 2018-09-18 PROCEDURE — 82570 ASSAY OF URINE CREATININE: CPT

## 2018-09-18 PROCEDURE — 83036 HEMOGLOBIN GLYCOSYLATED A1C: CPT

## 2018-09-18 PROCEDURE — 36415 COLL VENOUS BLD VENIPUNCTURE: CPT

## 2018-09-18 PROCEDURE — 82306 VITAMIN D 25 HYDROXY: CPT

## 2018-09-18 PROCEDURE — 80053 COMPREHEN METABOLIC PANEL: CPT

## 2018-09-18 PROCEDURE — 80061 LIPID PANEL: CPT

## 2018-09-18 PROCEDURE — 82043 UR ALBUMIN QUANTITATIVE: CPT

## 2018-09-18 PROCEDURE — 85025 COMPLETE CBC W/AUTO DIFF WBC: CPT

## 2018-09-18 PROCEDURE — 99214 OFFICE O/P EST MOD 30 MIN: CPT | Performed by: NURSE PRACTITIONER

## 2018-09-18 PROCEDURE — 20610 DRAIN/INJ JOINT/BURSA W/O US: CPT | Performed by: NURSE PRACTITIONER

## 2018-09-18 PROCEDURE — 82607 VITAMIN B-12: CPT

## 2018-09-18 RX ORDER — LIDOCAINE HYDROCHLORIDE 20 MG/ML
2 INJECTION, SOLUTION INFILTRATION; PERINEURAL
Status: COMPLETED | OUTPATIENT
Start: 2018-09-18 | End: 2018-09-18

## 2018-09-18 RX ORDER — TRIAMCINOLONE ACETONIDE 40 MG/ML
40 INJECTION, SUSPENSION INTRA-ARTICULAR; INTRAMUSCULAR
Status: COMPLETED | OUTPATIENT
Start: 2018-09-18 | End: 2018-09-18

## 2018-09-18 RX ADMIN — LIDOCAINE HYDROCHLORIDE 2 ML: 20 INJECTION, SOLUTION INFILTRATION; PERINEURAL at 09:13

## 2018-09-18 RX ADMIN — TRIAMCINOLONE ACETONIDE 40 MG: 40 INJECTION, SUSPENSION INTRA-ARTICULAR; INTRAMUSCULAR at 09:13

## 2018-09-18 NOTE — PROGRESS NOTES
"Thelma Haley is a 73 y.o. female returns for     Chief Complaint   Patient presents with   • Left Knee - Follow-up   • Right Knee - Follow-up       HISTORY OF PRESENT ILLNESS:     73-year-old  female in the office today for follow-up for bilateral knee injections.  She is requesting a repeat in her Visco supplementations and time.  She reports that the pain continues and that these injections in the past had improved her symptoms.       CONCURRENT MEDICAL HISTORY:    The following portions of the patient's history were reviewed and updated as appropriate: allergies, current medications, past family history, past medical history, past social history, past surgical history and problem list.     ROS  No fevers or chills.  No chest pain or shortness of air.  No GI or  disturbances.    PHYSICAL EXAMINATION:       Ht 162.6 cm (64\")   Wt 66.2 kg (146 lb)   LMP 02/06/1980 (Approximate)   BMI 25.06 kg/m²     Physical Exam   Constitutional: She is oriented to person, place, and time. Vital signs are normal. She appears well-developed and well-nourished. She is cooperative.   HENT:   Head: Normocephalic and atraumatic.   Neck: Trachea normal and phonation normal.   Pulmonary/Chest: Effort normal. No respiratory distress.   Abdominal: Soft. Normal appearance. She exhibits no distension.   Neurological: She is alert and oriented to person, place, and time. GCS eye subscore is 4. GCS verbal subscore is 5. GCS motor subscore is 6.   Skin: Skin is warm, dry and intact.   Psychiatric: She has a normal mood and affect. Her speech is normal and behavior is normal. Judgment and thought content normal. Cognition and memory are normal.   Vitals reviewed.      GAIT:     [x]  Normal  []  Antalgic    Assistive device: [x]  None  []  Walker     []  Crutches  []  Cane     []  Wheelchair  []  Stretcher    Ortho Exam      No results found.          ASSESSMENT:    Diagnoses and all orders for this visit:    Primary " osteoarthritis of both knees  -     Large Joint Arthrocentesis  -     Large Joint Arthrocentesis    Left knee pain, unspecified chronicity  -     Large Joint Arthrocentesis  -     Large Joint Arthrocentesis          Large Joint Arthrocentesis  Date/Time: 9/18/2018 9:13 AM  Consent given by: patient  Site marked: site marked  Timeout: Immediately prior to procedure a time out was called to verify the correct patient, procedure, equipment, support staff and site/side marked as required   Supporting Documentation  Indications: pain   Procedure Details  Location: knee - R knee  Preparation: Patient was prepped and draped in the usual sterile fashion  Needle size: 22 G  Approach: anteromedial  Medications administered: 40 mg triamcinolone acetonide 40 MG/ML; 2 mL lidocaine 2%  Patient tolerance: patient tolerated the procedure well with no immediate complications    Large Joint Arthrocentesis  Date/Time: 9/18/2018 9:13 AM  Consent given by: patient  Site marked: site marked  Timeout: Immediately prior to procedure a time out was called to verify the correct patient, procedure, equipment, support staff and site/side marked as required   Supporting Documentation  Indications: pain   Procedure Details  Location: knee - L knee  Preparation: Patient was prepped and draped in the usual sterile fashion  Needle size: 22 G  Approach: anteromedial  Medications administered: 40 mg triamcinolone acetonide 40 MG/ML; 2 mL lidocaine 2%  Patient tolerance: patient tolerated the procedure well with no immediate complications          PLAN  Repeated the bilateral steroid injection today and recommended follow-up in 4-6 weeks at which time we can proceed with Visco supplementation injection.  No Follow-up on file.    Michael Wong, APRN

## 2018-09-20 LAB — CONV REPORT SUMMARY: NORMAL

## 2018-10-15 RX ORDER — METHIMAZOLE 5 MG/1
TABLET ORAL
Qty: 45 TABLET | Refills: 0 | Status: SHIPPED | OUTPATIENT
Start: 2018-10-15 | End: 2018-10-23 | Stop reason: SDUPTHER

## 2018-10-16 ENCOUNTER — CLINICAL SUPPORT (OUTPATIENT)
Dept: FAMILY MEDICINE CLINIC | Facility: CLINIC | Age: 73
End: 2018-10-16

## 2018-10-16 DIAGNOSIS — Z23 NEEDS FLU SHOT: Primary | ICD-10-CM

## 2018-10-16 PROCEDURE — G0008 ADMIN INFLUENZA VIRUS VAC: HCPCS | Performed by: STUDENT IN AN ORGANIZED HEALTH CARE EDUCATION/TRAINING PROGRAM

## 2018-10-16 PROCEDURE — 90662 IIV NO PRSV INCREASED AG IM: CPT | Performed by: STUDENT IN AN ORGANIZED HEALTH CARE EDUCATION/TRAINING PROGRAM

## 2018-10-17 RX ORDER — LISINOPRIL 5 MG/1
5 TABLET ORAL DAILY
Qty: 90 TABLET | Refills: 0 | Status: SHIPPED | OUTPATIENT
Start: 2018-10-17 | End: 2019-01-10

## 2018-10-17 RX ORDER — HYDROCHLOROTHIAZIDE 25 MG/1
25 TABLET ORAL DAILY
Qty: 90 TABLET | Refills: 0 | Status: SHIPPED | OUTPATIENT
Start: 2018-10-17 | End: 2018-10-23 | Stop reason: SDUPTHER

## 2018-10-22 ENCOUNTER — EPISODE CHANGES (OUTPATIENT)
Dept: CASE MANAGEMENT | Facility: OTHER | Age: 73
End: 2018-10-22

## 2018-10-23 ENCOUNTER — OFFICE VISIT (OUTPATIENT)
Dept: ENDOCRINOLOGY | Facility: CLINIC | Age: 73
End: 2018-10-23

## 2018-10-23 VITALS
HEIGHT: 64 IN | DIASTOLIC BLOOD PRESSURE: 66 MMHG | SYSTOLIC BLOOD PRESSURE: 114 MMHG | WEIGHT: 146.4 LBS | BODY MASS INDEX: 25 KG/M2 | OXYGEN SATURATION: 97 % | HEART RATE: 80 BPM

## 2018-10-23 DIAGNOSIS — E55.9 VITAMIN D DEFICIENCY: ICD-10-CM

## 2018-10-23 DIAGNOSIS — E05.90 SUBCLINICAL HYPERTHYROIDISM: ICD-10-CM

## 2018-10-23 DIAGNOSIS — E11.9 TYPE 2 DIABETES MELLITUS WITHOUT COMPLICATION, WITHOUT LONG-TERM CURRENT USE OF INSULIN (HCC): Primary | ICD-10-CM

## 2018-10-23 PROCEDURE — 99214 OFFICE O/P EST MOD 30 MIN: CPT | Performed by: INTERNAL MEDICINE

## 2018-10-23 RX ORDER — LOVASTATIN 20 MG/1
20 TABLET ORAL NIGHTLY
Qty: 90 TABLET | Refills: 3 | Status: SHIPPED | OUTPATIENT
Start: 2018-10-23 | End: 2019-02-21 | Stop reason: SDUPTHER

## 2018-10-23 RX ORDER — METHIMAZOLE 5 MG/1
2.5 TABLET ORAL DAILY
Qty: 45 TABLET | Refills: 3 | Status: SHIPPED | OUTPATIENT
Start: 2018-10-23 | End: 2019-01-16 | Stop reason: SDUPTHER

## 2018-10-23 RX ORDER — LOVASTATIN 20 MG/1
20 TABLET ORAL NIGHTLY
Qty: 90 TABLET | Refills: 3 | Status: SHIPPED | OUTPATIENT
Start: 2018-10-23 | End: 2018-10-23 | Stop reason: SDUPTHER

## 2018-10-23 NOTE — PROGRESS NOTES
Thelma Haley is a 73 y.o. female who presents for  evaluation of   Chief Complaint   Patient presents with   • Diabetes     bs 114         Primary Care / Referring Provider  Gina Polo MD    Hyperthyroidism     Duration since 2015    Appears to be Graves with right subcm thyroid nodule    Now on methimazole    Also t2dm well controlled with nephropathy       Past Medical History:   Diagnosis Date   • Abnormal results of thyroid function studies    • Acute pain of left shoulder    • Acute pain of right shoulder    • Allergic rhinitis due to pollen    • Asthma      uncomplicated      • Cataract    • Chronic rhinitis    • Essential hypertension    • Extrinsic asthma with status asthmaticus    • Glaucoma    • Hyperlipidemia    • Impingement syndrome of right shoulder    • Neuropathy    • Nuclear senile cataract    • Obesity    • Overweight with body mass index (BMI) 25.0-29.9    • Primary fibromyalgia syndrome    • Primary open angle glaucoma    • Rosacea    • Sinus headache    • Temporomandibular joint disorder      Family History   Problem Relation Age of Onset   • Heart disease Other    • Hypertension Other    • COPD Mother      Social History   Substance Use Topics   • Smoking status: Never Smoker   • Smokeless tobacco: Never Used   • Alcohol use No         Current Outpatient Prescriptions:   •  albuterol (PROVENTIL HFA;VENTOLIN HFA) 108 (90 BASE) MCG/ACT inhaler, Inhale 2 puffs Every 4 (Four) Hours As Needed for wheezing., Disp: 6.7 g, Rfl: 11  •  albuterol (VENTOLIN HFA) 108 (90 BASE) MCG/ACT inhaler, 2 puffs every 4 hours as needed for breathing, Disp: 1 inhaler, Rfl: 5  •  Biotin 85445 MCG tablet, Take 1 tablet by mouth Daily., Disp: , Rfl:   •  budesonide-formoterol (SYMBICORT) 160-4.5 MCG/ACT inhaler, 2 puffs twice a day, Disp: 1 inhaler, Rfl: 5  •  Calcium 500-100 MG-UNIT chewable tablet, Chew 1 tablet 2 (Two) Times a Day., Disp: , Rfl:   •  CETIRIZINE HCL PO, Take 10 mg by mouth  Daily., Disp: , Rfl:   •  Cholecalciferol (VITAMIN D3) 1000 UNITS capsule, Take 5,000 Units by mouth Daily., Disp: , Rfl:   •  dorzolamide-timolol (COSOPT) 22.3-6.8 MG/ML ophthalmic solution, INSTILL 1 DROP INTO EACH EYE BID, Disp: , Rfl: 0  •  fluticasone (FLONASE) 50 MCG/ACT nasal spray, 2 sprays into each nostril Daily. Administer 2 sprays in each nostril for each dose., Disp: 1 each, Rfl: 5  •  gabapentin (NEURONTIN) 100 MG capsule, Take 1 capsule by mouth 3 (Three) Times a Day., Disp: 90 capsule, Rfl: 5  •  hydrochlorothiazide (HYDRODIURIL) 25 MG tablet, Take 1 tablet by mouth Daily., Disp: 90 tablet, Rfl: 2  •  lisinopril (PRINIVIL,ZESTRIL) 5 MG tablet, Take 1 tablet by mouth Daily., Disp: 90 tablet, Rfl: 2  •  lisinopril (PRINIVIL,ZESTRIL) 5 MG tablet, TAKE 1 TABLET BY MOUTH DAILY, Disp: 90 tablet, Rfl: 0  •  lovastatin (MEVACOR) 20 MG tablet, Take 1 tablet by mouth Every Night., Disp: 30 tablet, Rfl: 6  •  methIMAzole (TAPAZOLE) 5 MG tablet, TAKE 1/2 TABLET BY MOUTH DAILY, Disp: 45 tablet, Rfl: 0  •  omeprazole (priLOSEC) 20 MG capsule, Take 20 mg by mouth., Disp: , Rfl:   •  ONE TOUCH ULTRA TEST test strip, TEST BLOOD SUGAR TWICE DAILY, Disp: 100 each, Rfl: 12  •  ONETOUCH DELICA LANCETS 33G misc, USE AS DIRECTED TO TEST BLOOD SUGAR TWICE DAILY, Disp: 100 each, Rfl: 12  •  SITagliptin-MetFORMIN HCl ER (JANUMET XR)  MG tablet, Take 1 tablet by mouth Daily., Disp: 30 tablet, Rfl: 5    Review of Systems    Review of Systems   Constitutional: Positive for fatigue and unexpected weight change. Negative for activity change, appetite change, chills, diaphoresis and fever.   HENT: Negative for congestion, drooling, ear discharge, ear pain, facial swelling, mouth sores, nosebleeds, postnasal drip, sinus pressure, sneezing, sore throat, tinnitus, trouble swallowing and voice change.    Eyes: Negative.  Negative for photophobia, pain, discharge, redness and itching.   Respiratory: Negative.  Negative for apnea,  "cough, choking, chest tightness, shortness of breath, wheezing and stridor.    Cardiovascular: Negative.  Negative for chest pain, palpitations and leg swelling.   Gastrointestinal: Negative.  Negative for abdominal distention, abdominal pain, constipation, diarrhea, nausea and vomiting.   Endocrine: Negative.  Negative for cold intolerance, heat intolerance, polydipsia, polyphagia and polyuria.   Genitourinary: Negative for difficulty urinating, dysuria, flank pain and frequency.   Musculoskeletal: Negative for arthralgias, back pain, gait problem, joint swelling, myalgias, neck pain and neck stiffness.   Skin: Negative for color change, pallor, rash and wound.   Allergic/Immunologic: Negative for environmental allergies, food allergies and immunocompromised state.   Neurological: Positive for tremors. Negative for dizziness, seizures, syncope, facial asymmetry, speech difficulty, weakness, light-headedness, numbness and headaches.   Hematological: Negative for adenopathy. Does not bruise/bleed easily.   Psychiatric/Behavioral: Negative for agitation, behavioral problems, confusion, decreased concentration, dysphoric mood, hallucinations, self-injury, sleep disturbance and suicidal ideas. The patient is not nervous/anxious and is not hyperactive.         Objective:     /66   Pulse 80   Ht 162.6 cm (64\")   Wt 66.4 kg (146 lb 6.4 oz)   LMP 02/06/1980 (Approximate)   SpO2 97%   BMI 25.13 kg/m²     Physical Exam   Constitutional: She is oriented to person, place, and time. She appears well-developed.   HENT:   Head: Normocephalic.   Right Ear: External ear normal.   Left Ear: External ear normal.   Nose: Nose normal.   Eyes: Conjunctivae and EOM are normal. No scleral icterus.   Neck: Normal range of motion. Neck supple. No tracheal deviation present. Thyromegaly present.   Cardiovascular: Normal rate, regular rhythm, normal heart sounds and intact distal pulses.  Exam reveals no gallop and no friction rub. "    No murmur heard.  Pulmonary/Chest: Effort normal and breath sounds normal. No stridor. No respiratory distress. She has no wheezes. She has no rales. She exhibits no tenderness.   Abdominal: Soft. Bowel sounds are normal. She exhibits no distension and no mass. There is no tenderness. There is no rebound and no guarding.   Musculoskeletal: Normal range of motion. She exhibits no tenderness or deformity.   Lymphadenopathy:     She has no cervical adenopathy.   Neurological: She is alert and oriented to person, place, and time. She displays normal reflexes. She exhibits normal muscle tone. Coordination normal.   Skin: No rash noted. No erythema. No pallor.   Psychiatric: She has a normal mood and affect. Her behavior is normal. Judgment and thought content normal.       Lab Review    Results for orders placed or performed in visit on 09/18/18   CBC Auto Differential   Result Value Ref Range    WBC 7.13 3.20 - 9.80 10*3/mm3    RBC 3.87 3.77 - 5.16 10*6/mm3    Hemoglobin 11.7 (L) 12.0 - 15.5 g/dL    Hematocrit 34.2 (L) 35.0 - 45.0 %    MCV 88.4 80.0 - 98.0 fL    MCH 30.2 26.5 - 34.0 pg    MCHC 34.2 31.4 - 36.0 g/dL    RDW 12.8 11.5 - 14.5 %    RDW-SD 40.7 36.4 - 46.3 fl    MPV 11.8 8.0 - 12.0 fL    Platelets 266 150 - 450 10*3/mm3    Neutrophil % 64.8 37.0 - 80.0 %    Lymphocyte % 25.1 10.0 - 50.0 %    Monocyte % 6.5 0.0 - 12.0 %    Eosinophil % 2.7 0.0 - 7.0 %    Basophil % 0.6 0.0 - 2.0 %    Immature Grans % 0.3 0.0 - 0.5 %    Neutrophils, Absolute 4.63 2.00 - 8.60 10*3/mm3    Lymphocytes, Absolute 1.79 0.60 - 4.20 10*3/mm3    Monocytes, Absolute 0.46 0.00 - 0.90 10*3/mm3    Eosinophils, Absolute 0.19 0.00 - 0.70 10*3/mm3    Basophils, Absolute 0.04 0.00 - 0.20 10*3/mm3    Immature Grans, Absolute 0.02 0.00 - 0.02 10*3/mm3   Comprehensive Metabolic Panel   Result Value Ref Range    Glucose 92 60 - 100 mg/dL    BUN 28 (H) 7 - 21 mg/dL    Creatinine 1.12 (H) 0.50 - 1.00 mg/dL    Sodium 142 137 - 145 mmol/L     Potassium 4.2 3.5 - 5.1 mmol/L    Chloride 106 95 - 110 mmol/L    CO2 27.0 22.0 - 31.0 mmol/L    Calcium 9.5 8.4 - 10.2 mg/dL    Total Protein 7.3 6.3 - 8.6 g/dL    Albumin 4.10 3.40 - 4.80 g/dL    ALT (SGPT) 22 9 - 52 U/L    AST (SGOT) 30 14 - 36 U/L    Alkaline Phosphatase 64 38 - 126 U/L    Total Bilirubin 0.4 0.2 - 1.3 mg/dL    eGFR Non African Amer 47 39 - 90 mL/min/1.73    Globulin 3.2 2.3 - 3.5 gm/dL    A/G Ratio 1.3 1.1 - 1.8 g/dL    BUN/Creatinine Ratio 25.0 7.0 - 25.0    Anion Gap 9.0 5.0 - 15.0 mmol/L   Hemoglobin A1c   Result Value Ref Range    Hemoglobin A1C 5.8 (H) 4 - 5.6 %   Lipid Panel   Result Value Ref Range    Total Cholesterol 149 0 - 199 mg/dL    Triglycerides 171 20 - 199 mg/dL    HDL Cholesterol 40 (L) 60 - 200 mg/dL    LDL Cholesterol  74 1 - 129 mg/dL    LDL/HDL Ratio 1.87 0.00 - 3.22   TSH   Result Value Ref Range    TSH 0.990 0.460 - 4.680 mIU/mL   Microalbumin / Creatinine Urine Ratio - Urine, Clean Catch   Result Value Ref Range    Microalbumin/Creatinine Ratio 16.6 0.0 - 30.0 mg/g    Creatinine, Urine 120.5 mg/dL    Microalbumin, Urine 2.0 mg/L   Vitamin B12   Result Value Ref Range    Vitamin B-12 481 239 - 931 pg/mL   Vitamin D 25 Hydroxy   Result Value Ref Range    25 Hydroxy, Vitamin D 68.1 30.0 - 100.0 ng/ml           Assessment/Plan       ICD-10-CM ICD-9-CM   1. Type 2 diabetes mellitus without complication, without long-term current use of insulin (CMS/HCC) E11.9 250.00   2. Vitamin D deficiency E55.9 268.9   3. Subclinical hyperthyroidism E05.90 242.90   4. Right thyroid nodule E04.1 241.0   5. Prediabetes R73.03 790.29   6. Simple goiter E04.0 240.0       Glycemic Management:   Lab Results   Component Value Date    BA1C 5.8 (H) 09/18/2018    HGBA1C 6.0 (H) 04/12/2018    HGBA1C 8.7 (H) 07/31/2017     Lab Results   Component Value Date    GLUCOSE 92 09/18/2018    BUN 28 (H) 09/18/2018    CREATININE 1.12 (H) 09/18/2018    EGFRIFNONA 47 09/18/2018    BCR 25.0 09/18/2018    CO2  27.0 09/18/2018    CALCIUM 9.5 09/18/2018    ALBUMIN 4.10 09/18/2018    AST 30 09/18/2018    ALT 22 09/18/2018     Lab Results   Component Value Date    WBC 7.13 09/18/2018    HGB 11.7 (L) 09/18/2018    HCT 34.2 (L) 09/18/2018    MCV 88.4 09/18/2018     09/18/2018     Janumet XR 50/1000 , 1 tab daily , she has CKD stage III      Lipid Management  Lab Results   Component Value Date    CHOL 149 09/18/2018    CHOL 197 04/20/2018    CHOL 191 07/24/2017     Lab Results   Component Value Date    TRIG 171 09/18/2018    TRIG 251 (H) 04/20/2018    TRIG 135 07/24/2017     Lab Results   Component Value Date    HDL 40 (L) 09/18/2018    HDL 36 (L) 04/20/2018    HDL 50 (L) 07/24/2017     No components found for: LDLCALC  Lab Results   Component Value Date    LDL 74 09/18/2018     04/20/2018     07/24/2017       On lovastatin before , stopped restarted at 20 mg qhs         Blood Pressure Management:    Vitals:    10/23/18 1138   BP: 114/66   Pulse: 80   SpO2: 97%     Lab Results   Component Value Date    GLUCOSE 92 09/18/2018    CALCIUM 9.5 09/18/2018     09/18/2018    K 4.2 09/18/2018    CO2 27.0 09/18/2018     09/18/2018    BUN 28 (H) 09/18/2018    CREATININE 1.12 (H) 09/18/2018    EGFRIFNONA 47 09/18/2018    BCR 25.0 09/18/2018    ANIONGAP 9.0 09/18/2018     Lab Results   Component Value Date    GLUCOSE 92 09/18/2018    BUN 28 (H) 09/18/2018    CREATININE 1.12 (H) 09/18/2018    EGFRIFNONA 47 09/18/2018    BCR 25.0 09/18/2018    CO2 27.0 09/18/2018    CALCIUM 9.5 09/18/2018    ALBUMIN 4.10 09/18/2018    AST 30 09/18/2018    ALT 22 09/18/2018     On lisinopril and controlled     Preventive Care:      nonsmoker    Weight Related:   Wt Readings from Last 3 Encounters:   10/23/18 66.4 kg (146 lb 6.4 oz)   09/18/18 66.2 kg (146 lb)   09/14/18 67.4 kg (148 lb 8 oz)     Body mass index is 25.13 kg/m².    Aim for 1200 calories daily   Walk 30 min daily     Bone Health      Lab Results   Component Value  Date    WYON03IV 68.1 09/18/2018    BJUB15CV 49.6 09/02/2016    QTEB20CM 41.1 06/03/2015       Lab Results   Component Value Date    CALCIUM 9.5 09/18/2018     Takes vit D 5th u daily     Order dxa due to hyperthyroidism - done in 12-16 and nl    Start calcium 600 mg w supper     Thyroid Health  Lab Results   Component Value Date    TSH 0.990 09/18/2018     Subclinical hyperthyroidism, US multinodular goiter, subcm     Indication for tx     TSH less than 0.1 and history of osteopenia now nl on methimazole 2.5 mg daily     12-16 small subcm thyroid nodule on the right - inferior     Stop biotin before next testing       Other Diabetes Related Aspects       Lab Results   Component Value Date    [id number] 481 09/18/2018           I reviewed and summarized records from Gina Polo MD from 2016 and I reviewed / ordered labs.     No orders of the defined types were placed in this encounter.        A copy of my note was sent to Gina Polo MD    Please see my above opinion and suggestions.

## 2018-10-30 NOTE — PROGRESS NOTES
Shots only visit for the flu shot.  Well-tolerated.        This document has been electronically signed by Venu Knapp MD on October 30, 2018 11:01 AM

## 2018-11-05 ENCOUNTER — TELEPHONE (OUTPATIENT)
Dept: ORTHOPEDIC SURGERY | Facility: CLINIC | Age: 73
End: 2018-11-05

## 2018-11-05 NOTE — TELEPHONE ENCOUNTER
PATIENT CALLED STATING SHE WAS DUE TO HAVE THE SYNVISC INJECTION THIS MONTH.  THE LAST OFFICE VISIT STATED TO COME BACK IN 4-6WEEKS, BUT SHE WANT TO KNOW IF YOU CAN ORDER IT WITHOUT HER COMING IN?

## 2018-11-26 ENCOUNTER — CLINICAL SUPPORT (OUTPATIENT)
Dept: ORTHOPEDIC SURGERY | Facility: CLINIC | Age: 73
End: 2018-11-26

## 2018-11-26 VITALS — WEIGHT: 145 LBS | BODY MASS INDEX: 24.75 KG/M2 | HEIGHT: 64 IN

## 2018-11-26 DIAGNOSIS — M25.561 PAIN IN BOTH KNEES, UNSPECIFIED CHRONICITY: ICD-10-CM

## 2018-11-26 DIAGNOSIS — M17.0 PRIMARY OSTEOARTHRITIS OF BOTH KNEES: Primary | ICD-10-CM

## 2018-11-26 DIAGNOSIS — M25.562 LEFT KNEE PAIN, UNSPECIFIED CHRONICITY: ICD-10-CM

## 2018-11-26 DIAGNOSIS — M25.562 PAIN IN BOTH KNEES, UNSPECIFIED CHRONICITY: ICD-10-CM

## 2018-11-26 PROCEDURE — 20610 DRAIN/INJ JOINT/BURSA W/O US: CPT | Performed by: NURSE PRACTITIONER

## 2018-11-26 NOTE — PROGRESS NOTES
"Thelma Haley is a 73 y.o. female returns for     Chief Complaint   Patient presents with   • Right Knee - Follow-up   • Left Knee - Follow-up   • Injections       HISTORY OF PRESENT ILLNESS: f/u on bilateral knee pain and patient needs Synvisc one injection today,        CONCURRENT MEDICAL HISTORY:    The following portions of the patient's history were reviewed and updated as appropriate: allergies, current medications, past family history, past medical history, past social history, past surgical history and problem list.     ROS  No fevers or chills.  No chest pain or shortness of air.  No GI or  disturbances.    PHYSICAL EXAMINATION:       Ht 162.6 cm (64\")   Wt 65.8 kg (145 lb)   LMP 02/06/1980 (Approximate)   BMI 24.89 kg/m²     Physical Exam    GAIT:     []  Normal  [x]  Antalgic    Assistive device: []  None  []  Walker     []  Crutches  []  Cane     []  Wheelchair  []  Stretcher    Ortho Exam      No results found.          ASSESSMENT:    Diagnoses and all orders for this visit:    Primary osteoarthritis of both knees    Pain in both knees, unspecified chronicity  -     Large Joint Arthrocentesis: R knee  -     Large Joint Arthrocentesis: L knee    Left knee pain, unspecified chronicity          PLAN  Large Joint Arthrocentesis: R knee  Date/Time: 11/26/2018 9:21 AM  Consent given by: patient  Site marked: site marked  Timeout: Immediately prior to procedure a time out was called to verify the correct patient, procedure, equipment, support staff and site/side marked as required   Supporting Documentation  Indications: pain   Procedure Details  Location: knee - R knee  Needle size: 22 G  Medications administered: 48 mg hylan 48 MG/6ML      Large Joint Arthrocentesis: L knee  Date/Time: 11/26/2018 9:21 AM  Consent given by: patient  Site marked: site marked  Timeout: Immediately prior to procedure a time out was called to verify the correct patient, procedure, equipment, support staff and site/side " marked as required   Supporting Documentation  Indications: pain   Procedure Details  Location: knee - L knee  Needle size: 22 G  Medications administered: 48 mg hylan 48 MG/6ML            I repeated the bilateral Synvisc one injection today and recommended progression range of motion and activity as tolerated based on pain.  Patient did have some discomfort and a vasovagal reaction after the injection however she left ambulatory with no complaints other than some soreness.  Patient will follow up in 6 weeks for recheck    No Follow-up on file.    Michael Wong, APRN

## 2018-11-28 ENCOUNTER — OFFICE VISIT (OUTPATIENT)
Dept: ORTHOPEDIC SURGERY | Facility: CLINIC | Age: 73
End: 2018-11-28

## 2018-11-28 VITALS — WEIGHT: 147 LBS | BODY MASS INDEX: 25.1 KG/M2 | HEIGHT: 64 IN

## 2018-11-28 DIAGNOSIS — M75.42 IMPINGEMENT SYNDROME, SHOULDER, LEFT: ICD-10-CM

## 2018-11-28 DIAGNOSIS — M75.121 COMPLETE TEAR OF RIGHT ROTATOR CUFF: ICD-10-CM

## 2018-11-28 DIAGNOSIS — G89.29 CHRONIC PAIN OF BOTH SHOULDERS: Primary | ICD-10-CM

## 2018-11-28 DIAGNOSIS — M25.512 CHRONIC PAIN OF BOTH SHOULDERS: Primary | ICD-10-CM

## 2018-11-28 DIAGNOSIS — M19.011 ARTHRITIS OF RIGHT ACROMIOCLAVICULAR JOINT: ICD-10-CM

## 2018-11-28 DIAGNOSIS — M25.511 CHRONIC PAIN OF BOTH SHOULDERS: Primary | ICD-10-CM

## 2018-11-28 PROCEDURE — 99214 OFFICE O/P EST MOD 30 MIN: CPT | Performed by: NURSE PRACTITIONER

## 2018-11-28 PROCEDURE — 20610 DRAIN/INJ JOINT/BURSA W/O US: CPT | Performed by: NURSE PRACTITIONER

## 2018-11-28 RX ORDER — LIDOCAINE HYDROCHLORIDE 20 MG/ML
2 INJECTION, SOLUTION INFILTRATION; PERINEURAL
Status: COMPLETED | OUTPATIENT
Start: 2018-11-28 | End: 2018-11-28

## 2018-11-28 RX ORDER — TRIAMCINOLONE ACETONIDE 40 MG/ML
40 INJECTION, SUSPENSION INTRA-ARTICULAR; INTRAMUSCULAR
Status: COMPLETED | OUTPATIENT
Start: 2018-11-28 | End: 2018-11-28

## 2018-11-28 RX ADMIN — TRIAMCINOLONE ACETONIDE 40 MG: 40 INJECTION, SUSPENSION INTRA-ARTICULAR; INTRAMUSCULAR at 10:29

## 2018-11-28 RX ADMIN — LIDOCAINE HYDROCHLORIDE 2 ML: 20 INJECTION, SOLUTION INFILTRATION; PERINEURAL at 10:29

## 2018-11-28 NOTE — PROGRESS NOTES
"Thelma Haley is a 73 y.o. female returns for     Chief Complaint   Patient presents with   • Left Shoulder - Follow-up, Injections   • Right Shoulder - Follow-up, Injections       HISTORY OF PRESENT ILLNESS:     73-year-old  female in the office today for follow-up evaluation of bilateral shoulder pain.  Patient's requesting repeated bilateral shoulder injections that haven't improved her symptoms in the past.  She denies any new injuries since the past evaluation     CONCURRENT MEDICAL HISTORY:    The following portions of the patient's history were reviewed and updated as appropriate: allergies, current medications, past family history, past medical history, past social history, past surgical history and problem list.     ROS  No fevers or chills.  No chest pain or shortness of air.  No GI or  disturbances.    PHYSICAL EXAMINATION:       Ht 162.6 cm (64\")   Wt 66.7 kg (147 lb)   LMP 02/06/1980 (Approximate)   BMI 25.23 kg/m²     Physical Exam   Constitutional: She is oriented to person, place, and time. Vital signs are normal. She appears well-developed and well-nourished. She is cooperative.   HENT:   Head: Normocephalic and atraumatic.   Neck: Trachea normal and phonation normal.   Pulmonary/Chest: Effort normal. No respiratory distress.   Abdominal: Soft. Normal appearance. She exhibits no distension.   Neurological: She is alert and oriented to person, place, and time. GCS eye subscore is 4. GCS verbal subscore is 5. GCS motor subscore is 6.   Skin: Skin is warm, dry and intact.   Psychiatric: She has a normal mood and affect. Her speech is normal and behavior is normal. Judgment and thought content normal. Cognition and memory are normal.   Vitals reviewed.      GAIT:     [x]  Normal  []  Antalgic    Assistive device: [x]  None  []  Walker     []  Crutches  []  Cane     []  Wheelchair  []  Stretcher    Right Shoulder Exam     Tenderness   The patient is experiencing tenderness in the " acromioclavicular joint.    Range of Motion   Active abduction: abnormal   External rotation: abnormal   Forward flexion: abnormal   Internal rotation 0 degrees: abnormal     Muscle Strength   Abduction: 4/5   Internal rotation: 4/5   External rotation: 4/5   Supraspinatus: 4/5     Tests   Drop arm: positive    Other   Erythema: absent  Scars: absent  Sensation: normal  Pulse: present      Left Shoulder Exam     Tenderness   The patient is experiencing no tenderness.     Range of Motion   Active abduction: abnormal   External rotation: abnormal   Forward flexion: abnormal   Internal rotation 0 degrees: abnormal     Muscle Strength   Abduction: 4/5   Internal rotation: 4/5   External rotation: 4/5   Supraspinatus: 4/5     Tests   Impingement: positive    Other   Erythema: absent  Scars: absent  Sensation: normal  Pulse: present               No results found.          ASSESSMENT:    Diagnoses and all orders for this visit:    Chronic pain of both shoulders  -     Large Joint Arthrocentesis: R glenohumeral    Complete tear of right rotator cuff  -     Large Joint Arthrocentesis: R glenohumeral    Impingement syndrome, shoulder, left  -     Large Joint Arthrocentesis: L glenohumeral    Arthritis of right acromioclavicular joint  -     Large Joint Arthrocentesis: R glenohumeral      Large Joint Arthrocentesis: R glenohumeral  Date/Time: 11/28/2018 10:29 AM  Consent given by: patient  Site marked: site marked  Timeout: Immediately prior to procedure a time out was called to verify the correct patient, procedure, equipment, support staff and site/side marked as required   Supporting Documentation  Indications: pain   Procedure Details  Location: shoulder - R glenohumeral  Preparation: Patient was prepped and draped in the usual sterile fashion  Needle size: 22 G  Approach: anteromedial  Medications administered: 40 mg triamcinolone acetonide 40 MG/ML; 2 mL lidocaine 2%  Patient tolerance: patient tolerated the procedure  well with no immediate complications    Large Joint Arthrocentesis: L glenohumeral  Date/Time: 11/28/2018 10:29 AM  Consent given by: patient  Site marked: site marked  Timeout: Immediately prior to procedure a time out was called to verify the correct patient, procedure, equipment, support staff and site/side marked as required   Supporting Documentation  Indications: pain   Procedure Details  Location: shoulder - L glenohumeral  Preparation: Patient was prepped and draped in the usual sterile fashion  Needle size: 22 G  Approach: anteromedial  Medications administered: 40 mg triamcinolone acetonide 40 MG/ML; 2 mL lidocaine 2%  Patient tolerance: patient tolerated the procedure well with no immediate complications            PLAN  Repeated bilateral subacromial injection into both shoulders today and recommended progression range of motion and activity as tolerated based on pain follow-up as needed.  No Follow-up on file.    Michael Wong, APRN

## 2019-01-10 ENCOUNTER — TELEPHONE (OUTPATIENT)
Dept: FAMILY MEDICINE CLINIC | Facility: CLINIC | Age: 74
End: 2019-01-10

## 2019-01-10 ENCOUNTER — APPOINTMENT (OUTPATIENT)
Dept: LAB | Facility: HOSPITAL | Age: 74
End: 2019-01-10

## 2019-01-10 ENCOUNTER — OFFICE VISIT (OUTPATIENT)
Dept: ENDOCRINOLOGY | Facility: CLINIC | Age: 74
End: 2019-01-10

## 2019-01-10 ENCOUNTER — OFFICE VISIT (OUTPATIENT)
Dept: ORTHOPEDIC SURGERY | Facility: CLINIC | Age: 74
End: 2019-01-10

## 2019-01-10 VITALS
WEIGHT: 145.2 LBS | HEIGHT: 64 IN | DIASTOLIC BLOOD PRESSURE: 80 MMHG | HEART RATE: 66 BPM | BODY MASS INDEX: 24.79 KG/M2 | OXYGEN SATURATION: 98 % | SYSTOLIC BLOOD PRESSURE: 142 MMHG

## 2019-01-10 VITALS — BODY MASS INDEX: 24.59 KG/M2 | WEIGHT: 144 LBS | HEIGHT: 64 IN

## 2019-01-10 DIAGNOSIS — E05.90 SUBCLINICAL HYPERTHYROIDISM: ICD-10-CM

## 2019-01-10 DIAGNOSIS — E04.1 RIGHT THYROID NODULE: ICD-10-CM

## 2019-01-10 DIAGNOSIS — M25.511 CHRONIC PAIN OF BOTH SHOULDERS: ICD-10-CM

## 2019-01-10 DIAGNOSIS — M25.512 CHRONIC PAIN OF BOTH SHOULDERS: ICD-10-CM

## 2019-01-10 DIAGNOSIS — E55.9 VITAMIN D DEFICIENCY: ICD-10-CM

## 2019-01-10 DIAGNOSIS — M17.0 PRIMARY OSTEOARTHRITIS OF BOTH KNEES: Primary | ICD-10-CM

## 2019-01-10 DIAGNOSIS — E11.9 TYPE 2 DIABETES MELLITUS WITHOUT COMPLICATION, WITHOUT LONG-TERM CURRENT USE OF INSULIN (HCC): Primary | ICD-10-CM

## 2019-01-10 DIAGNOSIS — G89.29 CHRONIC PAIN OF BOTH SHOULDERS: ICD-10-CM

## 2019-01-10 PROCEDURE — 99213 OFFICE O/P EST LOW 20 MIN: CPT | Performed by: NURSE PRACTITIONER

## 2019-01-10 PROCEDURE — 99214 OFFICE O/P EST MOD 30 MIN: CPT | Performed by: INTERNAL MEDICINE

## 2019-01-10 NOTE — PROGRESS NOTES
Thelma Haley is a 73 y.o. female who presents for  evaluation of   Chief Complaint   Patient presents with   • Diabetes         Primary Care / Referring Provider  Gina Polo MD    Hyperthyroidism     Duration since 2015    Appears to be Graves with right subcm thyroid nodule    Now on methimazole    Also t2dm well controlled with nephropathy       Past Medical History:   Diagnosis Date   • Abnormal results of thyroid function studies    • Acute pain of left shoulder    • Acute pain of right shoulder    • Allergic rhinitis due to pollen    • Asthma      uncomplicated      • Cataract    • Chronic rhinitis    • Essential hypertension    • Extrinsic asthma with status asthmaticus    • Glaucoma    • Hyperlipidemia    • Impingement syndrome of right shoulder    • Neuropathy    • Nuclear senile cataract    • Obesity    • Overweight with body mass index (BMI) 25.0-29.9    • Primary fibromyalgia syndrome    • Primary open angle glaucoma    • Rosacea    • Sinus headache    • Temporomandibular joint disorder      Family History   Problem Relation Age of Onset   • Heart disease Other    • Hypertension Other    • COPD Mother      Social History     Tobacco Use   • Smoking status: Never Smoker   • Smokeless tobacco: Never Used   Substance Use Topics   • Alcohol use: No   • Drug use: No         Current Outpatient Medications:   •  albuterol (PROVENTIL HFA;VENTOLIN HFA) 108 (90 BASE) MCG/ACT inhaler, Inhale 2 puffs Every 4 (Four) Hours As Needed for wheezing., Disp: 6.7 g, Rfl: 11  •  albuterol (VENTOLIN HFA) 108 (90 BASE) MCG/ACT inhaler, 2 puffs every 4 hours as needed for breathing, Disp: 1 inhaler, Rfl: 5  •  Biotin 00083 MCG tablet, Take 1 tablet by mouth Daily., Disp: , Rfl:   •  budesonide-formoterol (SYMBICORT) 160-4.5 MCG/ACT inhaler, 2 puffs twice a day, Disp: 1 inhaler, Rfl: 5  •  Calcium 500-100 MG-UNIT chewable tablet, Chew 1 tablet 2 (Two) Times a Day., Disp: , Rfl:   •  CETIRIZINE HCL PO, Take  10 mg by mouth Daily., Disp: , Rfl:   •  Cholecalciferol (VITAMIN D3) 1000 UNITS capsule, Take 5,000 Units by mouth Daily., Disp: , Rfl:   •  dorzolamide-timolol (COSOPT) 22.3-6.8 MG/ML ophthalmic solution, INSTILL 1 DROP INTO EACH EYE BID, Disp: , Rfl: 0  •  fluticasone (FLONASE) 50 MCG/ACT nasal spray, 2 sprays into each nostril Daily. Administer 2 sprays in each nostril for each dose., Disp: 1 each, Rfl: 5  •  gabapentin (NEURONTIN) 100 MG capsule, Take 1 capsule by mouth 3 (Three) Times a Day., Disp: 90 capsule, Rfl: 5  •  hydrochlorothiazide (HYDRODIURIL) 25 MG tablet, Take 1 tablet by mouth Daily., Disp: 90 tablet, Rfl: 2  •  lisinopril (PRINIVIL,ZESTRIL) 5 MG tablet, Take 1 tablet by mouth Daily., Disp: 90 tablet, Rfl: 2  •  lovastatin (MEVACOR) 20 MG tablet, Take 1 tablet by mouth Every Night., Disp: 90 tablet, Rfl: 3  •  methIMAzole (TAPAZOLE) 5 MG tablet, Take 0.5 tablets by mouth Daily., Disp: 45 tablet, Rfl: 3  •  omeprazole (priLOSEC) 20 MG capsule, Take 20 mg by mouth., Disp: , Rfl:   •  ONE TOUCH ULTRA TEST test strip, TEST BLOOD SUGAR TWICE DAILY, Disp: 100 each, Rfl: 12  •  ONETOUCH DELICA LANCETS 33G misc, USE AS DIRECTED TO TEST BLOOD SUGAR TWICE DAILY, Disp: 100 each, Rfl: 12  •  SITagliptin-MetFORMIN HCl ER (JANUMET XR)  MG tablet, Take 1 tablet by mouth Daily., Disp: 90 tablet, Rfl: 3    Review of Systems    Review of Systems   Constitutional: Positive for fatigue and unexpected weight change. Negative for activity change, appetite change, chills, diaphoresis and fever.   HENT: Negative for congestion, drooling, ear discharge, ear pain, facial swelling, mouth sores, nosebleeds, postnasal drip, sinus pressure, sneezing, sore throat, tinnitus, trouble swallowing and voice change.    Eyes: Negative.  Negative for photophobia, pain, discharge, redness and itching.   Respiratory: Negative.  Negative for apnea, cough, choking, chest tightness, shortness of breath, wheezing and stridor.   "  Cardiovascular: Negative.  Negative for chest pain, palpitations and leg swelling.   Gastrointestinal: Negative.  Negative for abdominal distention, abdominal pain, constipation, diarrhea, nausea and vomiting.   Endocrine: Negative.  Negative for cold intolerance, heat intolerance, polydipsia, polyphagia and polyuria.   Genitourinary: Negative for difficulty urinating, dysuria, flank pain and frequency.   Musculoskeletal: Negative for arthralgias, back pain, gait problem, joint swelling, myalgias, neck pain and neck stiffness.   Skin: Negative for color change, pallor, rash and wound.   Allergic/Immunologic: Negative for environmental allergies, food allergies and immunocompromised state.   Neurological: Positive for tremors. Negative for dizziness, seizures, syncope, facial asymmetry, speech difficulty, weakness, light-headedness, numbness and headaches.   Hematological: Negative for adenopathy. Does not bruise/bleed easily.   Psychiatric/Behavioral: Negative for agitation, behavioral problems, confusion, decreased concentration, dysphoric mood, hallucinations, self-injury, sleep disturbance and suicidal ideas. The patient is not nervous/anxious and is not hyperactive.         Objective:     /80   Pulse 66   Ht 162.6 cm (64\")   Wt 65.9 kg (145 lb 3.2 oz)   LMP 02/06/1980 (Approximate)   SpO2 98%   BMI 24.92 kg/m²     Physical Exam   Constitutional: She is oriented to person, place, and time. She appears well-developed.   HENT:   Head: Normocephalic.   Right Ear: External ear normal.   Left Ear: External ear normal.   Nose: Nose normal.   Eyes: Conjunctivae and EOM are normal. No scleral icterus.   Neck: Normal range of motion. Neck supple. No tracheal deviation present. Thyromegaly present.   Cardiovascular: Normal rate, regular rhythm, normal heart sounds and intact distal pulses. Exam reveals no gallop and no friction rub.   No murmur heard.  Pulmonary/Chest: Effort normal and breath sounds normal. " No stridor. No respiratory distress. She has no wheezes. She has no rales. She exhibits no tenderness.   Abdominal: Soft. Bowel sounds are normal. She exhibits no distension and no mass. There is no tenderness. There is no rebound and no guarding.   Musculoskeletal: Normal range of motion. She exhibits no tenderness or deformity.   Lymphadenopathy:     She has no cervical adenopathy.   Neurological: She is alert and oriented to person, place, and time. She displays normal reflexes. She exhibits normal muscle tone. Coordination normal.   Skin: No rash noted. No erythema. No pallor.   Psychiatric: She has a normal mood and affect. Her behavior is normal. Judgment and thought content normal.       Lab Review    Results for orders placed or performed in visit on 09/18/18   CBC Auto Differential   Result Value Ref Range    WBC 7.13 3.20 - 9.80 10*3/mm3    RBC 3.87 3.77 - 5.16 10*6/mm3    Hemoglobin 11.7 (L) 12.0 - 15.5 g/dL    Hematocrit 34.2 (L) 35.0 - 45.0 %    MCV 88.4 80.0 - 98.0 fL    MCH 30.2 26.5 - 34.0 pg    MCHC 34.2 31.4 - 36.0 g/dL    RDW 12.8 11.5 - 14.5 %    RDW-SD 40.7 36.4 - 46.3 fl    MPV 11.8 8.0 - 12.0 fL    Platelets 266 150 - 450 10*3/mm3    Neutrophil % 64.8 37.0 - 80.0 %    Lymphocyte % 25.1 10.0 - 50.0 %    Monocyte % 6.5 0.0 - 12.0 %    Eosinophil % 2.7 0.0 - 7.0 %    Basophil % 0.6 0.0 - 2.0 %    Immature Grans % 0.3 0.0 - 0.5 %    Neutrophils, Absolute 4.63 2.00 - 8.60 10*3/mm3    Lymphocytes, Absolute 1.79 0.60 - 4.20 10*3/mm3    Monocytes, Absolute 0.46 0.00 - 0.90 10*3/mm3    Eosinophils, Absolute 0.19 0.00 - 0.70 10*3/mm3    Basophils, Absolute 0.04 0.00 - 0.20 10*3/mm3    Immature Grans, Absolute 0.02 0.00 - 0.02 10*3/mm3   Comprehensive Metabolic Panel   Result Value Ref Range    Glucose 92 60 - 100 mg/dL    BUN 28 (H) 7 - 21 mg/dL    Creatinine 1.12 (H) 0.50 - 1.00 mg/dL    Sodium 142 137 - 145 mmol/L    Potassium 4.2 3.5 - 5.1 mmol/L    Chloride 106 95 - 110 mmol/L    CO2 27.0 22.0 -  31.0 mmol/L    Calcium 9.5 8.4 - 10.2 mg/dL    Total Protein 7.3 6.3 - 8.6 g/dL    Albumin 4.10 3.40 - 4.80 g/dL    ALT (SGPT) 22 9 - 52 U/L    AST (SGOT) 30 14 - 36 U/L    Alkaline Phosphatase 64 38 - 126 U/L    Total Bilirubin 0.4 0.2 - 1.3 mg/dL    eGFR Non African Amer 47 39 - 90 mL/min/1.73    Globulin 3.2 2.3 - 3.5 gm/dL    A/G Ratio 1.3 1.1 - 1.8 g/dL    BUN/Creatinine Ratio 25.0 7.0 - 25.0    Anion Gap 9.0 5.0 - 15.0 mmol/L   Hemoglobin A1c   Result Value Ref Range    Hemoglobin A1C 5.8 (H) 4 - 5.6 %   Lipid Panel   Result Value Ref Range    Total Cholesterol 149 0 - 199 mg/dL    Triglycerides 171 20 - 199 mg/dL    HDL Cholesterol 40 (L) 60 - 200 mg/dL    LDL Cholesterol  74 1 - 129 mg/dL    LDL/HDL Ratio 1.87 0.00 - 3.22   TSH   Result Value Ref Range    TSH 0.990 0.460 - 4.680 mIU/mL   Microalbumin / Creatinine Urine Ratio - Urine, Clean Catch   Result Value Ref Range    Microalbumin/Creatinine Ratio 16.6 0.0 - 30.0 mg/g    Creatinine, Urine 120.5 mg/dL    Microalbumin, Urine 2.0 mg/L   Vitamin B12   Result Value Ref Range    Vitamin B-12 481 239 - 931 pg/mL   Vitamin D 25 Hydroxy   Result Value Ref Range    25 Hydroxy, Vitamin D 68.1 30.0 - 100.0 ng/ml           Assessment/Plan       ICD-10-CM ICD-9-CM   1. Type 2 diabetes mellitus without complication, without long-term current use of insulin (CMS/[facility]) E11.9 250.00   2. Vitamin D deficiency E55.9 268.9   3. Subclinical hyperthyroidism E05.90 242.90   4. Right thyroid nodule E04.1 241.0       Glycemic Management:   Lab Results   Component Value Date    HGBA1C 5.8 (H) 09/18/2018    HGBA1C 6.0 (H) 04/12/2018    HGBA1C 8.7 (H) 07/31/2017     Lab Results   Component Value Date    GLUCOSE 92 09/18/2018    BUN 28 (H) 09/18/2018    CREATININE 1.12 (H) 09/18/2018    EGFRIFNONA 47 09/18/2018    BCR 25.0 09/18/2018    CO2 27.0 09/18/2018    CALCIUM 9.5 09/18/2018    ALBUMIN 4.10 09/18/2018    AST 30 09/18/2018    ALT 22 09/18/2018     Lab Results   Component Value  Date    WBC 7.13 09/18/2018    HGB 11.7 (L) 09/18/2018    HCT 34.2 (L) 09/18/2018    MCV 88.4 09/18/2018     09/18/2018     Janumet XR 50/1000 , 1 tab daily , she has CKD stage III - stop since fasting elevated    Change to trulicity 0.75 mg weekly     And add metformin 500 mg xr , 2 tabs w bkfast       Lipid Management  Lab Results   Component Value Date    CHOL 149 09/18/2018    CHOL 197 04/20/2018    CHOL 191 07/24/2017     Lab Results   Component Value Date    TRIG 171 09/18/2018    TRIG 251 (H) 04/20/2018    TRIG 135 07/24/2017     Lab Results   Component Value Date    HDL 40 (L) 09/18/2018    HDL 36 (L) 04/20/2018    HDL 50 (L) 07/24/2017     No components found for: LDLCALC  Lab Results   Component Value Date    LDL 74 09/18/2018     04/20/2018     07/24/2017       On lovastatin before , stopped restarted at 20 mg qhs         Blood Pressure Management:    Vitals:    01/10/19 1014   BP: 142/80   Pulse: 66   SpO2: 98%     Lab Results   Component Value Date    GLUCOSE 92 09/18/2018    CALCIUM 9.5 09/18/2018     09/18/2018    K 4.2 09/18/2018    CO2 27.0 09/18/2018     09/18/2018    BUN 28 (H) 09/18/2018    CREATININE 1.12 (H) 09/18/2018    EGFRIFNONA 47 09/18/2018    BCR 25.0 09/18/2018    ANIONGAP 9.0 09/18/2018     Lab Results   Component Value Date    GLUCOSE 92 09/18/2018    BUN 28 (H) 09/18/2018    CREATININE 1.12 (H) 09/18/2018    EGFRIFNONA 47 09/18/2018    BCR 25.0 09/18/2018    CO2 27.0 09/18/2018    CALCIUM 9.5 09/18/2018    ALBUMIN 4.10 09/18/2018    AST 30 09/18/2018    ALT 22 09/18/2018     On lisinopril and controlled     Preventive Care:      nonsmoker    Weight Related:   Wt Readings from Last 3 Encounters:   01/10/19 65.9 kg (145 lb 3.2 oz)   01/10/19 65.3 kg (144 lb)   11/28/18 66.7 kg (147 lb)     Body mass index is 24.92 kg/m².    Aim for 1200 calories daily   Walk 30 min daily     Bone Health      Lab Results   Component Value Date    MBGE15UL 68.1 09/18/2018     DONV70UK 49.6 09/02/2016    RFFK02MP 41.1 06/03/2015       Lab Results   Component Value Date    CALCIUM 9.5 09/18/2018     Takes vit D 5th u daily     Order dxa due to hyperthyroidism - done in 12-16 and nl    Start calcium 600 mg w supper     Thyroid Health  Lab Results   Component Value Date    TSH 0.990 09/18/2018     Subclinical hyperthyroidism, US multinodular goiter, subcm     Indication for tx     TSH less than 0.1 and history of osteopenia now nl on methimazole 2.5 mg daily     12-16 small subcm thyroid nodule on the right - inferior     Stop biotin before next testing       Other Diabetes Related Aspects       Lab Results   Component Value Date    UZCKQBUF33 481 09/18/2018           I reviewed and summarized records from Gina Polo MD from 2016 and I reviewed / ordered labs.     No orders of the defined types were placed in this encounter.        A copy of my note was sent to Gina Polo MD    Please see my above opinion and suggestions.

## 2019-01-10 NOTE — TELEPHONE ENCOUNTER
AMIRA ISIDRO SEEN TODAY AND IS SUPPOSE TO HAVE PRESP FOR METFORMIN XR 500MG TO TAKE 2 TABS AT BREAKFAST...  [facility]..PLEASE SEND AS SHE WILL NOT HAVE ANY FOR TOMORROW  CALLBACK

## 2019-01-10 NOTE — PROGRESS NOTES
"Thelma Haley is a 73 y.o. female returns for     Chief Complaint   Patient presents with   • Left Knee - Pain   • Right Knee - Pain       HISTORY OF PRESENT ILLNESS: f/u on bilateral knee pain. Patient had synvisc one injection in both knees on 11/26/2018       CONCURRENT MEDICAL HISTORY:    The following portions of the patient's history were reviewed and updated as appropriate: allergies, current medications, past family history, past medical history, past social history, past surgical history and problem list.     ROS  No fevers or chills.  No chest pain or shortness of air.  No GI or  disturbances.    PHYSICAL EXAMINATION:       Ht 162.6 cm (64\")   Wt 65.3 kg (144 lb)   LMP 02/06/1980 (Approximate)   BMI 24.72 kg/m²     Physical Exam   Constitutional: She is oriented to person, place, and time. Vital signs are normal. She appears well-developed and well-nourished. She is cooperative.   HENT:   Head: Normocephalic and atraumatic.   Neck: Trachea normal and phonation normal.   Pulmonary/Chest: Effort normal. No respiratory distress.   Abdominal: Soft. Normal appearance. She exhibits no distension.   Musculoskeletal:        Right knee: She exhibits no effusion.        Left knee: She exhibits no effusion.   Neurological: She is alert and oriented to person, place, and time. GCS eye subscore is 4. GCS verbal subscore is 5. GCS motor subscore is 6.   Skin: Skin is warm, dry and intact. Capillary refill takes less than 2 seconds.   Psychiatric: She has a normal mood and affect. Her speech is normal and behavior is normal. Judgment and thought content normal. Cognition and memory are normal.   Vitals reviewed.      GAIT:     [x]  Normal  [x]  Antalgic    Assistive device: [x]  None  []  Walker     []  Crutches  []  Cane     []  Wheelchair  []  Stretcher    Right Knee Exam     Tenderness   The patient is experiencing tenderness in the medial joint line.    Range of Motion   Extension: abnormal   Flexion: " abnormal     Other   Erythema: absent  Scars: absent  Sensation: normal  Pulse: present  Swelling: mild  Effusion: no effusion present      Left Knee Exam     Tenderness   The patient is experiencing tenderness in the medial joint line.    Range of Motion   Extension: abnormal   Flexion: abnormal     Other   Erythema: absent  Scars: absent  Sensation: normal  Pulse: present  Swelling: mild  Effusion: no effusion present              No results found.          ASSESSMENT:    Diagnoses and all orders for this visit:    Primary osteoarthritis of both knees    Chronic pain of both shoulders          PLAN  Procedures    Progress range of motion and activity as tolerated based on pain follow-up as needed.    No Follow-up on file.    Michael Wong, APRN

## 2019-01-11 ENCOUNTER — TELEPHONE (OUTPATIENT)
Dept: FAMILY MEDICINE CLINIC | Facility: CLINIC | Age: 74
End: 2019-01-11

## 2019-01-11 RX ORDER — METFORMIN HYDROCHLORIDE 500 MG/1
1000 TABLET, EXTENDED RELEASE ORAL
Qty: 60 TABLET | Refills: 11 | Status: SHIPPED | OUTPATIENT
Start: 2019-01-11 | End: 2019-02-21

## 2019-01-11 NOTE — TELEPHONE ENCOUNTER
She called and said she was seen yesterday and Dr Walters changed to Trulicity pen- She said she has been reading the side effects and saw where can cause thyroid cancer and wants to know if there is something safer that Dr Walters can prescribe.She wants to check before she goes and get the trulicity pen. She uses [employer] [facility] and can be reached at [phone]-would like to be called back soon.         Documentation

## 2019-01-11 NOTE — TELEPHONE ENCOUNTER
She called and said she was seen yesterday and Dr Walters changed to Trulicity pen- She said she has been reading the side effects and saw where can cause thyroid cancer and wants to know if there is something safer that Dr Walters can prescribe.She wants to check before she goes and get the trulicity pen. She uses [facility] and can be reached at [phone]-would like to be called back soon.

## 2019-01-14 ENCOUNTER — TELEPHONE (OUTPATIENT)
Dept: ENDOCRINOLOGY | Facility: CLINIC | Age: 74
End: 2019-01-14

## 2019-01-14 NOTE — TELEPHONE ENCOUNTER
Please , tell her that respectfully that is the problem with reading the labels.     There has been only 1 case reported.     This is seen mainly in rats at doses that are 58 x the maximal recommended human dose.     If I thought it could cause cancer , I wouldn't have prescribed it.     It is contraindicated if there is family history of genetic cancer . Since she doesn't have family h o genetic cancer, then she is not at risk.     If she still doesn't feel comfortable just keep janumet as she was taking

## 2019-01-14 NOTE — TELEPHONE ENCOUNTER
She has not decided about the Trulicity. If she needs refills on the Janument she will let us know.

## 2019-01-15 ENCOUNTER — OFFICE VISIT (OUTPATIENT)
Dept: CARDIOLOGY | Facility: CLINIC | Age: 74
End: 2019-01-15

## 2019-01-15 VITALS
DIASTOLIC BLOOD PRESSURE: 52 MMHG | SYSTOLIC BLOOD PRESSURE: 110 MMHG | HEIGHT: 64 IN | WEIGHT: 145 LBS | BODY MASS INDEX: 24.75 KG/M2 | OXYGEN SATURATION: 99 % | HEART RATE: 63 BPM

## 2019-01-15 DIAGNOSIS — I10 ESSENTIAL HYPERTENSION: ICD-10-CM

## 2019-01-15 DIAGNOSIS — I25.10 ATHEROSCLEROSIS OF NATIVE CORONARY ARTERY OF NATIVE HEART WITHOUT ANGINA PECTORIS: ICD-10-CM

## 2019-01-15 DIAGNOSIS — E78.2 MIXED HYPERLIPIDEMIA: Primary | ICD-10-CM

## 2019-01-15 PROCEDURE — 99214 OFFICE O/P EST MOD 30 MIN: CPT | Performed by: INTERNAL MEDICINE

## 2019-01-15 NOTE — PROGRESS NOTES
Thelma Haley  73 y.o. female    01/15/2019  1. Mixed hyperlipidemia    2. Essential hypertension        History of Present Illness    Mrs. Haley is a 72-year-old  lady who was a patient of  in the past.  Her history is remarkable for hypertension, hyperlipidemia and has history of fibromyalgia.  She has been evaluated for chest pain in the past by both nuclear stress testing and CT angiogram of the coronary arteries.  She is known to have less than 50% stenosis in the Left Anterior Descending Coronary Artery.  She is done well from a cardiac standpoint with no chest pain or shortness of breath.  She reports that her diabetes has not been under good control and has seen Dr. Duncan.  Medication adjustments have been made.  Clinical exam today was unremarkable.  Blood pressure was in the normal range.            SUBJECTIVE    Allergies   Allergen Reactions   • Ciprofloxacin    • Hydrocodone    • Lortab [Hydrocodone-Acetaminophen]    • Metronidazole    • Oxycodone      Dizziness and doesn't decrease pain   • Ultram [Tramadol Hcl]          Past Medical History:   Diagnosis Date   • Abnormal results of thyroid function studies    • Acute pain of left shoulder    • Acute pain of right shoulder    • Allergic rhinitis due to pollen    • Asthma      uncomplicated      • Cataract    • Chronic rhinitis    • Essential hypertension    • Extrinsic asthma with status asthmaticus    • Glaucoma    • Hyperlipidemia    • Impingement syndrome of right shoulder    • Neuropathy    • Nuclear senile cataract    • Obesity    • Overweight with body mass index (BMI) 25.0-29.9    • Primary fibromyalgia syndrome    • Primary open angle glaucoma    • Rosacea    • Sinus headache    • Temporomandibular joint disorder          Past Surgical History:   Procedure Laterality Date   • CARPAL TUNNEL RELEASE      Bilateral carpal tunnel release.)   03/27/2000    • CATARACT EXTRACTION      Bilateral   • INJECTION OF MEDICATION   06/17/2015    celestone(betamethasone) (2)    • INJECTION OF MEDICATION  04/14/2016    depo medrol ( methylprednisone) (20)   • OOPHORECTOMY     • OTHER SURGICAL HISTORY      hysterosope procedure   • RIGHT OOPHORECTOMY     • ROTATOR CUFF REPAIR      Right   • SHOULDER ARTHROSCOPY Right 2/6/2017    Procedure: RIGHT SHOULDER ARTHROSCOPY SUBACROMIAL DECOMPRESSION, ROTATOR CUFF REPAIR   ;  Surgeon: Terrence Haines MD;  Location: [facility];  Service:    • SHOULDER SURGERY      Excision of 4.8 cm mass with primary intermediate closure.)   03/30/2012    • SPINAL FUSION           Family History   Problem Relation Age of Onset   • Heart disease Other    • Hypertension Other    • COPD Mother          Social History     Socioeconomic History   • Marital status:      Spouse name: Not on file   • Number of children: Not on file   • Years of education: Not on file   • Highest education level: Not on file   Social Needs   • Financial resource strain: Not on file   • Food insecurity - worry: Not on file   • Food insecurity - inability: Not on file   • Transportation needs - medical: Not on file   • Transportation needs - non-medical: Not on file   Occupational History   • Not on file   Tobacco Use   • Smoking status: Never Smoker   • Smokeless tobacco: Never Used   Substance and Sexual Activity   • Alcohol use: No   • Drug use: No   • Sexual activity: Defer   Other Topics Concern   • Not on file   Social History Narrative   • Not on file         Current Outpatient Medications   Medication Sig Dispense Refill   • albuterol (PROVENTIL HFA;VENTOLIN HFA) 108 (90 BASE) MCG/ACT inhaler Inhale 2 puffs Every 4 (Four) Hours As Needed for wheezing. 6.7 g 11   • albuterol (VENTOLIN HFA) 108 (90 BASE) MCG/ACT inhaler 2 puffs every 4 hours as needed for breathing 1 inhaler 5   • Biotin 16532 MCG tablet Take 1 tablet by mouth Daily.     • budesonide-formoterol (SYMBICORT) 160-4.5 MCG/ACT inhaler 2 puffs twice a day 1 inhaler 5   •  "Calcium 500-100 MG-UNIT chewable tablet Chew 1 tablet 2 (Two) Times a Day.     • CETIRIZINE HCL PO Take 10 mg by mouth Daily.     • Cholecalciferol (VITAMIN D3) 1000 UNITS capsule Take 5,000 Units by mouth Daily.     • dorzolamide-timolol (COSOPT) 22.3-6.8 MG/ML ophthalmic solution INSTILL 1 DROP INTO EACH EYE BID  0   • Dulaglutide 0.75 MG/0.5ML solution pen-injector Inject 0.75 mg under the skin into the appropriate area as directed 1 (One) Time Per Week. 4 pen 11   • fluticasone (FLONASE) 50 MCG/ACT nasal spray 2 sprays into each nostril Daily. Administer 2 sprays in each nostril for each dose. 1 each 5   • gabapentin (NEURONTIN) 100 MG capsule Take 1 capsule by mouth 3 (Three) Times a Day. 90 capsule 5   • hydrochlorothiazide (HYDRODIURIL) 25 MG tablet Take 1 tablet by mouth Daily. 90 tablet 2   • lisinopril (PRINIVIL,ZESTRIL) 5 MG tablet Take 1 tablet by mouth Daily. 90 tablet 2   • lovastatin (MEVACOR) 20 MG tablet Take 1 tablet by mouth Every Night. 90 tablet 3   • metFORMIN ER (GLUCOPHAGE-XR) 500 MG 24 hr tablet Take 2 tablets by mouth Daily With Breakfast. 60 tablet 11   • methIMAzole (TAPAZOLE) 5 MG tablet Take 0.5 tablets by mouth Daily. 45 tablet 3   • omeprazole (priLOSEC) 20 MG capsule Take 20 mg by mouth.     • ONE TOUCH ULTRA TEST test strip TEST BLOOD SUGAR TWICE DAILY 100 each 12   • ONETOUCH DELICA LANCETS 33G misc USE AS DIRECTED TO TEST BLOOD SUGAR TWICE DAILY 100 each 12     No current facility-administered medications for this visit.          OBJECTIVE    /52   Pulse 63   Ht 162.6 cm (64.02\")   Wt 65.8 kg (145 lb)   LMP 02/06/1980 (Approximate)   SpO2 99%   BMI 24.88 kg/m²         Review of Systems     Constitutional:  Denies recent weight loss, weight gain, fever or chills, no change in exercise tolerance     HENT:  Denies any hearing loss, epistaxis, hoarseness, or difficulty speaking.     Eyes: Wears eyeglasses or contact lenses     Respiratory:  Denies dyspnea with exertion,no " cough, wheezing, or hemoptysis.     Cardiovascular: Negative for palpations, chest pain, orthopnea, PND, peripheral edema, syncope, or claudication.     Gastrointestinal:  Denies change in bowel habits, dyspepsia, ulcer disease, hematochezia, or melena.     Endocrine: Negative for cold intolerance, heat intolerance, polydipsia, polyphagia and polyuria. Diabetes Mellitus not under good control    Genitourinary: Negative.      Musculoskeletal: DJD, Fibromyalgia    Skin:  Denies any change in hair or nails, rashes, or skin lesions.     Allergic/Immunologic: Negative.  Negative for environmental allergies, food allergies and immunocompromised state.     Neurological:  Denies any history of recurrent headaches, strokes, TIA, or seizure disorder.     Hematological: Denies any food allergies, seasonal allergies, bleeding disorders, or lymphadenopathy.     Psychiatric/Behavioral: Denies any history of depression, substance abuse, or change in cognitive function.         Physical Exam     Constitutional: Cooperative, alert and oriented,  in no acute distress.     HENT:   Head: Normocephalic, normal hair patterns, no masses or tenderness.  Ears, Nose, and Throat: No gross abnormalities. No pallor or cyanosis.   Eyes: EOMS intact, PERRL, conjunctivae and lids unremarkable. Fundoscopic exam and visual fields not performed.   Neck: No palpable masses or adenopathy, no thyromegaly, no JVD, carotid pulses are full and equal bilaterally and without  Bruits.     Cardiovascular: Regular rhythm, S1 and S2 normal, no S3 or S4.  No murmurs, gallops, or rubs detected.     Pulmonary/Chest: Chest: normal symmetry,  normal respiratory excursion, no intercostal retraction, no use of accessory muscles.            Pulmonary: Normal breath sounds. No rales or ronchi.    Abdominal: Abdomen soft, bowel sounds normoactive, no masses, no hepatosplenomegaly, non-tender, no bruits.     Musculoskeletal: No deformities, clubbing, cyanosis, erythema, or  edema observed.     Neurological: No gross motor or sensory deficits noted, affect appropriate, oriented to time, person, place.     Skin: Warm and dry to the touch, no apparent skin lesions or masses noted.     Psychiatric: She has a normal mood and affect. Her behavior is normal. Judgment and thought content normal.         Procedures      Lab Results   Component Value Date    WBC 7.13 09/18/2018    HGB 11.7 (L) 09/18/2018    HCT 34.2 (L) 09/18/2018    MCV 88.4 09/18/2018     09/18/2018     Lab Results   Component Value Date    GLUCOSE 92 09/18/2018    BUN 28 (H) 09/18/2018    CREATININE 1.12 (H) 09/18/2018    EGFRIFNONA 47 09/18/2018    BCR 25.0 09/18/2018    CO2 27.0 09/18/2018    CALCIUM 9.5 09/18/2018    ALBUMIN 4.10 09/18/2018    AST 30 09/18/2018    ALT 22 09/18/2018     Lab Results   Component Value Date    CHOL 149 09/18/2018    CHOL 197 04/20/2018    CHOL 191 07/24/2017     Lab Results   Component Value Date    TRIG 171 09/18/2018    TRIG 251 (H) 04/20/2018    TRIG 135 07/24/2017     Lab Results   Component Value Date    HDL 40 (L) 09/18/2018    HDL 36 (L) 04/20/2018    HDL 50 (L) 07/24/2017     No components found for: LDLCALC  Lab Results   Component Value Date    LDL 74 09/18/2018     04/20/2018     07/24/2017     No results found for: HDLLDLRATIO  No components found for: CHOLHDL  Lab Results   Component Value Date    HGBA1C 5.8 (H) 09/18/2018     Lab Results   Component Value Date    TSH 0.990 09/18/2018           ASSESSMENT AND PLAN  Mrs. Haley is stable with regards to her heart with no evidence of angina, arrhythmia or congestive heart failure.  Aggressive risk factor modifications recommended.  Management of diabetes, antihypertensive therapy with lisinopril, HCTZ and lipid-lowering therapy with lovastatin have been continued.  Her lipid profiles within the normal range when checked in September 2018.    Thelma was seen today for follow-up.    Diagnoses and all orders for  this visit:    Mixed hyperlipidemia    Essential hypertension        Patient's Body mass index is 24.88 kg/m².  BMI within normal limits      Manju Ramirez MD  1/15/2019  12:34 PM

## 2019-01-16 RX ORDER — METHIMAZOLE 5 MG/1
TABLET ORAL
Qty: 45 TABLET | Refills: 0 | Status: SHIPPED | OUTPATIENT
Start: 2019-01-16 | End: 2019-02-21 | Stop reason: SDUPTHER

## 2019-01-16 RX ORDER — LISINOPRIL 5 MG/1
5 TABLET ORAL DAILY
Qty: 90 TABLET | Refills: 2 | Status: SHIPPED | OUTPATIENT
Start: 2019-01-16 | End: 2019-02-21 | Stop reason: SDUPTHER

## 2019-01-16 RX ORDER — LISINOPRIL 5 MG/1
5 TABLET ORAL DAILY
Qty: 90 TABLET | Refills: 0
Start: 2019-01-16 | End: 2019-02-21

## 2019-01-16 RX ORDER — HYDROCHLOROTHIAZIDE 25 MG/1
25 TABLET ORAL DAILY
Qty: 90 TABLET | Refills: 0
Start: 2019-01-16 | End: 2019-02-21

## 2019-01-16 RX ORDER — HYDROCHLOROTHIAZIDE 25 MG/1
25 TABLET ORAL DAILY
Qty: 90 TABLET | Refills: 2 | Status: SHIPPED | OUTPATIENT
Start: 2019-01-16 | End: 2019-02-21 | Stop reason: SDUPTHER

## 2019-02-18 ENCOUNTER — APPOINTMENT (OUTPATIENT)
Dept: LAB | Facility: HOSPITAL | Age: 74
End: 2019-02-18

## 2019-02-18 LAB
25(OH)D3 SERPL-MCNC: 53.1 NG/ML (ref 30–100)
ALBUMIN SERPL-MCNC: 4.4 G/DL (ref 3.4–4.8)
ALBUMIN UR-MCNC: 5 MG/L
ALBUMIN/GLOB SERPL: 1.5 G/DL (ref 1.1–1.8)
ALP SERPL-CCNC: 61 U/L (ref 38–126)
ALT SERPL W P-5'-P-CCNC: 15 U/L (ref 9–52)
ANION GAP SERPL CALCULATED.3IONS-SCNC: 7 MMOL/L (ref 5–15)
ARTICHOKE IGE QN: 92 MG/DL (ref 1–129)
AST SERPL-CCNC: 33 U/L (ref 14–36)
BASOPHILS # BLD AUTO: 0.05 10*3/MM3 (ref 0–0.2)
BASOPHILS NFR BLD AUTO: 0.7 % (ref 0–1.5)
BILIRUB SERPL-MCNC: 0.5 MG/DL (ref 0.2–1.3)
BUN BLD-MCNC: 29 MG/DL (ref 7–21)
BUN/CREAT SERPL: 24.6 (ref 7–25)
CALCIUM SPEC-SCNC: 9.9 MG/DL (ref 8.4–10.2)
CHLORIDE SERPL-SCNC: 103 MMOL/L (ref 95–110)
CHOLEST SERPL-MCNC: 168 MG/DL (ref 0–199)
CO2 SERPL-SCNC: 27 MMOL/L (ref 22–31)
CREAT BLD-MCNC: 1.18 MG/DL (ref 0.5–1)
CREAT UR-MCNC: 104.2 MG/DL
DEPRECATED RDW RBC AUTO: 40.5 FL (ref 37–54)
EOSINOPHIL # BLD AUTO: 0.19 10*3/MM3 (ref 0–0.4)
EOSINOPHIL NFR BLD AUTO: 2.7 % (ref 0.3–6.2)
ERYTHROCYTE [DISTWIDTH] IN BLOOD BY AUTOMATED COUNT: 13.1 % (ref 12.3–15.4)
GFR SERPL CREATININE-BSD FRML MDRD: 45 ML/MIN/1.73 (ref 39–90)
GLOBULIN UR ELPH-MCNC: 3 GM/DL (ref 2.3–3.5)
GLUCOSE BLD-MCNC: 87 MG/DL (ref 60–100)
HBA1C MFR BLD: 6.1 % (ref 4–5.6)
HCT VFR BLD AUTO: 34.1 % (ref 34–46.6)
HDLC SERPL-MCNC: 45 MG/DL (ref 60–200)
HGB BLD-MCNC: 11.8 G/DL (ref 12–15.9)
IMM GRANULOCYTES # BLD AUTO: 0.04 10*3/MM3 (ref 0–0.05)
IMM GRANULOCYTES NFR BLD AUTO: 0.6 % (ref 0–0.5)
LDLC/HDLC SERPL: 2.15 {RATIO} (ref 0–3.22)
LYMPHOCYTES # BLD AUTO: 2.02 10*3/MM3 (ref 0.7–3.1)
LYMPHOCYTES NFR BLD AUTO: 28.7 % (ref 19.6–45.3)
MCH RBC QN AUTO: 29.8 PG (ref 26.6–33)
MCHC RBC AUTO-ENTMCNC: 34.6 G/DL (ref 31.5–35.7)
MCV RBC AUTO: 86.1 FL (ref 79–97)
MICROALBUMIN/CREAT UR: 48 MG/G (ref 0–30)
MONOCYTES # BLD AUTO: 0.38 10*3/MM3 (ref 0.1–0.9)
MONOCYTES NFR BLD AUTO: 5.4 % (ref 5–12)
NEUTROPHILS # BLD AUTO: 4.36 10*3/MM3 (ref 1.4–7)
NEUTROPHILS NFR BLD AUTO: 61.9 % (ref 42.7–76)
NRBC BLD AUTO-RTO: 0 /100 WBC (ref 0–0)
PLATELET # BLD AUTO: 274 10*3/MM3 (ref 140–450)
PMV BLD AUTO: 12.5 FL (ref 6–12)
POTASSIUM BLD-SCNC: 3.8 MMOL/L (ref 3.5–5.1)
PROT SERPL-MCNC: 7.4 G/DL (ref 6.3–8.6)
RBC # BLD AUTO: 3.96 10*6/MM3 (ref 3.77–5.28)
SODIUM BLD-SCNC: 137 MMOL/L (ref 137–145)
TRIGL SERPL-MCNC: 132 MG/DL (ref 20–199)
TSH SERPL DL<=0.05 MIU/L-ACNC: 4.21 MIU/ML (ref 0.46–4.68)
VIT B12 BLD-MCNC: 394 PG/ML (ref 239–931)
WBC NRBC COR # BLD: 7.04 10*3/MM3 (ref 3.4–10.8)

## 2019-02-18 PROCEDURE — 36415 COLL VENOUS BLD VENIPUNCTURE: CPT | Performed by: INTERNAL MEDICINE

## 2019-02-18 PROCEDURE — 84443 ASSAY THYROID STIM HORMONE: CPT | Performed by: INTERNAL MEDICINE

## 2019-02-18 PROCEDURE — 86376 MICROSOMAL ANTIBODY EACH: CPT | Performed by: INTERNAL MEDICINE

## 2019-02-18 PROCEDURE — 82607 VITAMIN B-12: CPT | Performed by: INTERNAL MEDICINE

## 2019-02-18 PROCEDURE — 82570 ASSAY OF URINE CREATININE: CPT | Performed by: INTERNAL MEDICINE

## 2019-02-18 PROCEDURE — 80061 LIPID PANEL: CPT | Performed by: INTERNAL MEDICINE

## 2019-02-18 PROCEDURE — 84445 ASSAY OF TSI GLOBULIN: CPT | Performed by: INTERNAL MEDICINE

## 2019-02-18 PROCEDURE — 85025 COMPLETE CBC W/AUTO DIFF WBC: CPT | Performed by: INTERNAL MEDICINE

## 2019-02-18 PROCEDURE — 82043 UR ALBUMIN QUANTITATIVE: CPT | Performed by: INTERNAL MEDICINE

## 2019-02-18 PROCEDURE — 82306 VITAMIN D 25 HYDROXY: CPT | Performed by: INTERNAL MEDICINE

## 2019-02-18 PROCEDURE — 83036 HEMOGLOBIN GLYCOSYLATED A1C: CPT | Performed by: INTERNAL MEDICINE

## 2019-02-18 PROCEDURE — 80053 COMPREHEN METABOLIC PANEL: CPT | Performed by: INTERNAL MEDICINE

## 2019-02-18 PROCEDURE — 86800 THYROGLOBULIN ANTIBODY: CPT | Performed by: INTERNAL MEDICINE

## 2019-02-19 LAB
THYROGLOB AB SERPL-ACNC: <1 IU/ML (ref 0–0.9)
THYROPEROXIDASE AB SERPL-ACNC: <6 IU/ML (ref 0–34)
TSI SER-MCNC: <0.1 IU/L (ref 0–0.55)

## 2019-02-21 ENCOUNTER — OFFICE VISIT (OUTPATIENT)
Dept: ENDOCRINOLOGY | Facility: CLINIC | Age: 74
End: 2019-02-21

## 2019-02-21 VITALS
HEIGHT: 64 IN | BODY MASS INDEX: 24.98 KG/M2 | OXYGEN SATURATION: 98 % | DIASTOLIC BLOOD PRESSURE: 68 MMHG | HEART RATE: 79 BPM | WEIGHT: 146.3 LBS | SYSTOLIC BLOOD PRESSURE: 112 MMHG

## 2019-02-21 DIAGNOSIS — G62.9 NEUROPATHY: ICD-10-CM

## 2019-02-21 DIAGNOSIS — R93.7 ABNORMAL BONE DENSITY SCREENING: ICD-10-CM

## 2019-02-21 DIAGNOSIS — E05.90 SUBCLINICAL HYPERTHYROIDISM: ICD-10-CM

## 2019-02-21 DIAGNOSIS — E04.1 RIGHT THYROID NODULE: ICD-10-CM

## 2019-02-21 DIAGNOSIS — E11.9 TYPE 2 DIABETES MELLITUS WITHOUT COMPLICATION, WITHOUT LONG-TERM CURRENT USE OF INSULIN (HCC): Primary | ICD-10-CM

## 2019-02-21 DIAGNOSIS — E55.9 VITAMIN D DEFICIENCY: ICD-10-CM

## 2019-02-21 DIAGNOSIS — I10 ESSENTIAL HYPERTENSION: ICD-10-CM

## 2019-02-21 PROCEDURE — 99214 OFFICE O/P EST MOD 30 MIN: CPT | Performed by: INTERNAL MEDICINE

## 2019-02-21 RX ORDER — METHIMAZOLE 5 MG/1
2.5 TABLET ORAL DAILY
Qty: 45 TABLET | Refills: 3 | Status: SHIPPED | OUTPATIENT
Start: 2019-02-21 | End: 2019-04-22 | Stop reason: SDUPTHER

## 2019-02-21 RX ORDER — GABAPENTIN 100 MG/1
100 CAPSULE ORAL 3 TIMES DAILY
Qty: 90 CAPSULE | Refills: 5 | Status: SHIPPED | OUTPATIENT
Start: 2019-02-21 | End: 2019-08-14 | Stop reason: SDUPTHER

## 2019-02-21 RX ORDER — HYDROCHLOROTHIAZIDE 25 MG/1
25 TABLET ORAL DAILY
Qty: 90 TABLET | Refills: 3 | Status: SHIPPED | OUTPATIENT
Start: 2019-02-21 | End: 2019-11-04

## 2019-02-21 RX ORDER — LISINOPRIL 5 MG/1
5 TABLET ORAL DAILY
Qty: 90 TABLET | Refills: 3 | Status: SHIPPED | OUTPATIENT
Start: 2019-02-21 | End: 2019-12-19

## 2019-02-21 RX ORDER — LOVASTATIN 20 MG/1
20 TABLET ORAL NIGHTLY
Qty: 90 TABLET | Refills: 3 | Status: SHIPPED | OUTPATIENT
Start: 2019-02-21 | End: 2019-08-14 | Stop reason: SDUPTHER

## 2019-02-21 NOTE — PROGRESS NOTES
Thelma Haley is a 73 y.o. female who presents for  evaluation of   Chief Complaint   Patient presents with   • Diabetes         Primary Care / Referring Provider  Gina Polo MD    Hyperthyroidism     Duration since 2015    Appears to be Graves with right subcm thyroid nodule    Now on methimazole    Also t2dm well controlled with nephropathy       Past Medical History:   Diagnosis Date   • Abnormal results of thyroid function studies    • Acute pain of left shoulder    • Acute pain of right shoulder    • Allergic rhinitis due to pollen    • Asthma      uncomplicated      • Cataract    • Chronic rhinitis    • Essential hypertension    • Extrinsic asthma with status asthmaticus    • Glaucoma    • Hyperlipidemia    • Impingement syndrome of right shoulder    • Neuropathy    • Nuclear senile cataract    • Obesity    • Overweight with body mass index (BMI) 25.0-29.9    • Primary fibromyalgia syndrome    • Primary open angle glaucoma    • Rosacea    • Sinus headache    • Temporomandibular joint disorder      Family History   Problem Relation Age of Onset   • Heart disease Other    • Hypertension Other    • COPD Mother      Social History     Tobacco Use   • Smoking status: Never Smoker   • Smokeless tobacco: Never Used   Substance Use Topics   • Alcohol use: No   • Drug use: No         Current Outpatient Medications:   •  albuterol (PROVENTIL HFA;VENTOLIN HFA) 108 (90 BASE) MCG/ACT inhaler, Inhale 2 puffs Every 4 (Four) Hours As Needed for wheezing., Disp: 6.7 g, Rfl: 11  •  albuterol (VENTOLIN HFA) 108 (90 BASE) MCG/ACT inhaler, 2 puffs every 4 hours as needed for breathing, Disp: 1 inhaler, Rfl: 5  •  Biotin 50454 MCG tablet, Take 1 tablet by mouth Daily., Disp: , Rfl:   •  budesonide-formoterol (SYMBICORT) 160-4.5 MCG/ACT inhaler, 2 puffs twice a day, Disp: 1 inhaler, Rfl: 5  •  Calcium 500-100 MG-UNIT chewable tablet, Chew 1 tablet 2 (Two) Times a Day., Disp: , Rfl:   •  CETIRIZINE HCL PO, Take  10 mg by mouth Daily., Disp: , Rfl:   •  Cholecalciferol (VITAMIN D3) 1000 UNITS capsule, Take 5,000 Units by mouth Daily., Disp: , Rfl:   •  dorzolamide-timolol (COSOPT) 22.3-6.8 MG/ML ophthalmic solution, INSTILL 1 DROP INTO EACH EYE BID, Disp: , Rfl: 0  •  fluticasone (FLONASE) 50 MCG/ACT nasal spray, 2 sprays into each nostril Daily. Administer 2 sprays in each nostril for each dose., Disp: 1 each, Rfl: 5  •  gabapentin (NEURONTIN) 100 MG capsule, Take 1 capsule by mouth 3 (Three) Times a Day., Disp: 90 capsule, Rfl: 5  •  hydrochlorothiazide (HYDRODIURIL) 25 MG tablet, TAKE 1 TABLET BY MOUTH DAILY, Disp: 90 tablet, Rfl: 0  •  hydrochlorothiazide (HYDRODIURIL) 25 MG tablet, Take 1 tablet by mouth Daily., Disp: 90 tablet, Rfl: 2  •  lisinopril (PRINIVIL,ZESTRIL) 5 MG tablet, TAKE 1 TABLET BY MOUTH DAILY, Disp: 90 tablet, Rfl: 0  •  lisinopril (PRINIVIL,ZESTRIL) 5 MG tablet, Take 1 tablet by mouth Daily., Disp: 90 tablet, Rfl: 2  •  lovastatin (MEVACOR) 20 MG tablet, Take 1 tablet by mouth Every Night., Disp: 90 tablet, Rfl: 3  •  methIMAzole (TAPAZOLE) 5 MG tablet, TAKE 1/2 TABLET BY MOUTH DAILY, Disp: 45 tablet, Rfl: 0  •  omeprazole (priLOSEC) 20 MG capsule, Take 20 mg by mouth., Disp: , Rfl:   •  ONE TOUCH ULTRA TEST test strip, TEST BLOOD SUGAR TWICE DAILY, Disp: 100 each, Rfl: 12  •  ONETOUCH DELICA LANCETS 33G misc, USE AS DIRECTED TO TEST BLOOD SUGAR TWICE DAILY, Disp: 100 each, Rfl: 12  •  SITagliptin 50 MG tablet 1 tablet, metFORMIN 1000 MG tablet 1 tablet, Take 50-1,000 doses by mouth Daily With Breakfast., Disp: , Rfl:     Review of Systems    Review of Systems   Constitutional: Positive for fatigue and unexpected weight change. Negative for activity change, appetite change, chills, diaphoresis and fever.   HENT: Negative for congestion, drooling, ear discharge, ear pain, facial swelling, mouth sores, nosebleeds, postnasal drip, sinus pressure, sneezing, sore throat, tinnitus, trouble swallowing and  "voice change.    Eyes: Negative.  Negative for photophobia, pain, discharge, redness and itching.   Respiratory: Negative.  Negative for apnea, cough, choking, chest tightness, shortness of breath, wheezing and stridor.    Cardiovascular: Negative.  Negative for chest pain, palpitations and leg swelling.   Gastrointestinal: Negative.  Negative for abdominal distention, abdominal pain, constipation, diarrhea, nausea and vomiting.   Endocrine: Negative.  Negative for cold intolerance, heat intolerance, polydipsia, polyphagia and polyuria.   Genitourinary: Negative for difficulty urinating, dysuria, flank pain and frequency.   Musculoskeletal: Negative for arthralgias, back pain, gait problem, joint swelling, myalgias, neck pain and neck stiffness.   Skin: Negative for color change, pallor, rash and wound.   Allergic/Immunologic: Negative for environmental allergies, food allergies and immunocompromised state.   Neurological: Positive for tremors. Negative for dizziness, seizures, syncope, facial asymmetry, speech difficulty, weakness, light-headedness, numbness and headaches.   Hematological: Negative for adenopathy. Does not bruise/bleed easily.   Psychiatric/Behavioral: Negative for agitation, behavioral problems, confusion, decreased concentration, dysphoric mood, hallucinations, self-injury, sleep disturbance and suicidal ideas. The patient is not nervous/anxious and is not hyperactive.         Objective:     /68   Pulse 79   Ht 162.6 cm (64\")   Wt 66.4 kg (146 lb 4.8 oz)   LMP 02/06/1980 (Approximate)   SpO2 98%   BMI 25.11 kg/m²     Physical Exam   Constitutional: She is oriented to person, place, and time. She appears well-developed.   HENT:   Head: Normocephalic.   Right Ear: External ear normal.   Left Ear: External ear normal.   Nose: Nose normal.   Eyes: Conjunctivae and EOM are normal. No scleral icterus.   Neck: Normal range of motion. Neck supple. No tracheal deviation present. Thyromegaly " present.   Cardiovascular: Normal rate, regular rhythm, normal heart sounds and intact distal pulses. Exam reveals no gallop and no friction rub.   No murmur heard.  Pulmonary/Chest: Effort normal and breath sounds normal. No stridor. No respiratory distress. She has no wheezes. She has no rales. She exhibits no tenderness.   Abdominal: Soft. Bowel sounds are normal. She exhibits no distension and no mass. There is no tenderness. There is no rebound and no guarding.   Musculoskeletal: Normal range of motion. She exhibits no tenderness or deformity.   Lymphadenopathy:     She has no cervical adenopathy.   Neurological: She is alert and oriented to person, place, and time. She displays normal reflexes. She exhibits normal muscle tone. Coordination normal.   Skin: No rash noted. No erythema. No pallor.   Psychiatric: She has a normal mood and affect. Her behavior is normal. Judgment and thought content normal.       Lab Review    Results for orders placed or performed in visit on 10/23/18   CBC Auto Differential   Result Value Ref Range    WBC 7.04 3.40 - 10.80 10*3/mm3    RBC 3.96 3.77 - 5.28 10*6/mm3    Hemoglobin 11.8 (L) 12.0 - 15.9 g/dL    Hematocrit 34.1 34.0 - 46.6 %    MCV 86.1 79.0 - 97.0 fL    MCH 29.8 26.6 - 33.0 pg    MCHC 34.6 31.5 - 35.7 g/dL    RDW 13.1 12.3 - 15.4 %    RDW-SD 40.5 37.0 - 54.0 fl    MPV 12.5 (H) 6.0 - 12.0 fL    Platelets 274 140 - 450 10*3/mm3    Neutrophil % 61.9 42.7 - 76.0 %    Lymphocyte % 28.7 19.6 - 45.3 %    Monocyte % 5.4 5.0 - 12.0 %    Eosinophil % 2.7 0.3 - 6.2 %    Basophil % 0.7 0.0 - 1.5 %    Immature Grans % 0.6 (H) 0.0 - 0.5 %    Neutrophils, Absolute 4.36 1.40 - 7.00 10*3/mm3    Lymphocytes, Absolute 2.02 0.70 - 3.10 10*3/mm3    Monocytes, Absolute 0.38 0.10 - 0.90 10*3/mm3    Eosinophils, Absolute 0.19 0.00 - 0.40 10*3/mm3    Basophils, Absolute 0.05 0.00 - 0.20 10*3/mm3    Immature Grans, Absolute 0.04 0.00 - 0.05 10*3/mm3    nRBC 0.0 0.0 - 0.0 /100 WBC    Comprehensive Metabolic Panel   Result Value Ref Range    Glucose 87 60 - 100 mg/dL    BUN 29 (H) 7 - 21 mg/dL    Creatinine 1.18 (H) 0.50 - 1.00 mg/dL    Sodium 137 137 - 145 mmol/L    Potassium 3.8 3.5 - 5.1 mmol/L    Chloride 103 95 - 110 mmol/L    CO2 27.0 22.0 - 31.0 mmol/L    Calcium 9.9 8.4 - 10.2 mg/dL    Total Protein 7.4 6.3 - 8.6 g/dL    Albumin 4.40 3.40 - 4.80 g/dL    ALT (SGPT) 15 9 - 52 U/L    AST (SGOT) 33 14 - 36 U/L    Alkaline Phosphatase 61 38 - 126 U/L    Total Bilirubin 0.5 0.2 - 1.3 mg/dL    eGFR Non African Amer 45 39 - 90 mL/min/1.73    Globulin 3.0 2.3 - 3.5 gm/dL    A/G Ratio 1.5 1.1 - 1.8 g/dL    BUN/Creatinine Ratio 24.6 7.0 - 25.0    Anion Gap 7.0 5.0 - 15.0 mmol/L   Hemoglobin A1c   Result Value Ref Range    Hemoglobin A1C 6.1 (H) 4 - 5.6 %   Lipid Panel   Result Value Ref Range    Total Cholesterol 168 0 - 199 mg/dL    Triglycerides 132 20 - 199 mg/dL    HDL Cholesterol 45 (L) 60 - 200 mg/dL    LDL Cholesterol  92 1 - 129 mg/dL    LDL/HDL Ratio 2.15 0.00 - 3.22   Microalbumin / Creatinine Urine Ratio - Urine, Clean Catch   Result Value Ref Range    Microalbumin/Creatinine Ratio 48.0 (H) 0.0 - 30.0 mg/g    Creatinine, Urine 104.2 mg/dL    Microalbumin, Urine 5.0 mg/L   TSH   Result Value Ref Range    TSH 4.210 0.460 - 4.680 mIU/mL   Vitamin B12   Result Value Ref Range    Vitamin B-12 394 239 - 931 pg/mL   Vitamin D 25 Hydroxy   Result Value Ref Range    25 Hydroxy, Vitamin D 53.1 30.0 - 100.0 ng/ml   Thyroid Stimulating Immunoglobulin   Result Value Ref Range    Thyroid Stimulating Immunoglobulin <0.10 0.00 - 0.55 IU/L   Thyroid Antibodies   Result Value Ref Range    Thyroid Peroxidase Antibody <6 0 - 34 IU/mL    Thyroglobulin Ab <1.0 0.0 - 0.9 IU/mL           Assessment/Plan       ICD-10-CM ICD-9-CM   1. Type 2 diabetes mellitus without complication, without long-term current use of insulin (CMS/[facility]) E11.9 250.00   2. Vitamin D deficiency E55.9 268.9   3. Subclinical  hyperthyroidism E05.90 242.90   4. Right thyroid nodule E04.1 241.0       Glycemic Management:   Lab Results   Component Value Date    HGBA1C 6.1 (H) 02/18/2019    HGBA1C 5.8 (H) 09/18/2018    HGBA1C 6.0 (H) 04/12/2018     Lab Results   Component Value Date    GLUCOSE 87 02/18/2019    BUN 29 (H) 02/18/2019    CREATININE 1.18 (H) 02/18/2019    EGFRIFNONA 45 02/18/2019    BCR 24.6 02/18/2019    CO2 27.0 02/18/2019    CALCIUM 9.9 02/18/2019    ALBUMIN 4.40 02/18/2019    AST 33 02/18/2019    ALT 15 02/18/2019     Lab Results   Component Value Date    WBC 7.04 02/18/2019    HGB 11.8 (L) 02/18/2019    HCT 34.1 02/18/2019    MCV 86.1 02/18/2019     02/18/2019     Janumet XR 50/1000 , 1 tab daily , she has CKD stage III - stop since fasting elevated    Change to trulicity 0.75 mg weekly - preferred not to     Doing well on Janumet XR        Lipid Management  Lab Results   Component Value Date    CHOL 168 02/18/2019    CHOL 149 09/18/2018    CHOL 197 04/20/2018     Lab Results   Component Value Date    TRIG 132 02/18/2019    TRIG 171 09/18/2018    TRIG 251 (H) 04/20/2018     Lab Results   Component Value Date    HDL 45 (L) 02/18/2019    HDL 40 (L) 09/18/2018    HDL 36 (L) 04/20/2018     No components found for: LDLCALC  Lab Results   Component Value Date    LDL 92 02/18/2019    LDL 74 09/18/2018     04/20/2018       On lovastatin before , stopped restarted at 20 mg qhs         Blood Pressure Management:    Vitals:    02/21/19 0905   BP: 112/68   Pulse: 79   SpO2: 98%     Lab Results   Component Value Date    GLUCOSE 87 02/18/2019    CALCIUM 9.9 02/18/2019     02/18/2019    K 3.8 02/18/2019    CO2 27.0 02/18/2019     02/18/2019    BUN 29 (H) 02/18/2019    CREATININE 1.18 (H) 02/18/2019    EGFRIFNONA 45 02/18/2019    BCR 24.6 02/18/2019    ANIONGAP 7.0 02/18/2019     Lab Results   Component Value Date    GLUCOSE 87 02/18/2019    BUN 29 (H) 02/18/2019    CREATININE 1.18 (H) 02/18/2019    EGFRIFNONA 45  02/18/2019    BCR 24.6 02/18/2019    CO2 27.0 02/18/2019    CALCIUM 9.9 02/18/2019    ALBUMIN 4.40 02/18/2019    AST 33 02/18/2019    ALT 15 02/18/2019     On lisinopril and controlled     Preventive Care:      nonsmoker    Weight Related:   Wt Readings from Last 3 Encounters:   02/21/19 66.4 kg (146 lb 4.8 oz)   01/15/19 65.8 kg (145 lb)   01/10/19 65.9 kg (145 lb 3.2 oz)     Body mass index is 25.11 kg/m².    Aim for 1200 calories daily   Walk 30 min daily     Bone Health      Lab Results   Component Value Date    SSVT07OH 53.1 02/18/2019    [id number] 68.1 09/18/2018    [id number] 49.6 09/02/2016       Lab Results   Component Value Date    CALCIUM 9.9 02/18/2019     Takes vit D 5th u daily     Order dxa due to hyperthyroidism - done in 12-16 and nl    Start calcium 600 mg w supper     Thyroid Health  Lab Results   Component Value Date    TSH 4.210 02/18/2019     Subclinical hyperthyroidism, US multinodular goiter, subcm     Indication for tx     TSH less than 0.1 and history of osteopenia now nl on methimazole 2.5 mg daily     12-16 small subcm thyroid nodule on the right - inferior     Stop biotin before next testing       Other Diabetes Related Aspects       Lab Results   Component Value Date    [id number] 394 02/18/2019           I reviewed and summarized records from Gina Polo MD from 2016 and I reviewed / ordered labs.     No orders of the defined types were placed in this encounter.        A copy of my note was sent to Gina Polo MD    Please see my above opinion and suggestions.

## 2019-03-01 ENCOUNTER — OFFICE VISIT (OUTPATIENT)
Dept: ORTHOPEDIC SURGERY | Facility: CLINIC | Age: 74
End: 2019-03-01

## 2019-03-01 VITALS — WEIGHT: 146 LBS | HEIGHT: 64 IN | BODY MASS INDEX: 24.92 KG/M2

## 2019-03-01 DIAGNOSIS — M75.121 COMPLETE TEAR OF RIGHT ROTATOR CUFF: Primary | ICD-10-CM

## 2019-03-01 DIAGNOSIS — G89.29 CHRONIC RIGHT SHOULDER PAIN: ICD-10-CM

## 2019-03-01 DIAGNOSIS — M25.511 CHRONIC RIGHT SHOULDER PAIN: ICD-10-CM

## 2019-03-01 PROCEDURE — 99213 OFFICE O/P EST LOW 20 MIN: CPT | Performed by: NURSE PRACTITIONER

## 2019-03-01 PROCEDURE — 20610 DRAIN/INJ JOINT/BURSA W/O US: CPT | Performed by: NURSE PRACTITIONER

## 2019-03-01 RX ORDER — LIDOCAINE HYDROCHLORIDE 10 MG/ML
2 INJECTION, SOLUTION EPIDURAL; INFILTRATION; INTRACAUDAL; PERINEURAL
Status: COMPLETED | OUTPATIENT
Start: 2019-03-01 | End: 2019-03-01

## 2019-03-01 RX ORDER — TRIAMCINOLONE ACETONIDE 40 MG/ML
40 INJECTION, SUSPENSION INTRA-ARTICULAR; INTRAMUSCULAR
Status: COMPLETED | OUTPATIENT
Start: 2019-03-01 | End: 2019-03-01

## 2019-03-01 RX ADMIN — TRIAMCINOLONE ACETONIDE 40 MG: 40 INJECTION, SUSPENSION INTRA-ARTICULAR; INTRAMUSCULAR at 09:42

## 2019-03-01 RX ADMIN — LIDOCAINE HYDROCHLORIDE 2 ML: 10 INJECTION, SOLUTION EPIDURAL; INFILTRATION; INTRACAUDAL; PERINEURAL at 09:42

## 2019-03-01 NOTE — PROGRESS NOTES
"Thelma Haley is a 73 y.o. female returns for     Chief Complaint   Patient presents with   • Right Shoulder - Follow-up   • Left Shoulder - Follow-up       HISTORY OF PRESENT ILLNESS: f/u on bilateral shoulder pain, patient states that she has a pain of 5,        CONCURRENT MEDICAL HISTORY:    The following portions of the patient's history were reviewed and updated as appropriate: allergies, current medications, past family history, past medical history, past social history, past surgical history and problem list.     ROS  No fevers or chills.  No chest pain or shortness of air.  No GI or  disturbances.    PHYSICAL EXAMINATION:       Ht 162.6 cm (64\")   Wt 66.2 kg (146 lb)   LMP 02/06/1980 (Approximate)   BMI 25.06 kg/m²     Physical Exam   Constitutional: She is oriented to person, place, and time. Vital signs are normal. She appears well-developed and well-nourished. She is cooperative.   HENT:   Head: Normocephalic and atraumatic.   Neck: Trachea normal and phonation normal.   Pulmonary/Chest: Effort normal. No respiratory distress.   Abdominal: Soft. Normal appearance. She exhibits no distension.   Neurological: She is alert and oriented to person, place, and time. GCS eye subscore is 4. GCS verbal subscore is 5. GCS motor subscore is 6.   Skin: Skin is warm, dry and intact. Capillary refill takes less than 2 seconds.   Psychiatric: She has a normal mood and affect. Her speech is normal and behavior is normal. Judgment and thought content normal. Cognition and memory are normal.   Vitals reviewed.      GAIT:     []  Normal  []  Antalgic    Assistive device: []  None  []  Walker     []  Crutches  []  Cane     []  Wheelchair  []  Stretcher    Right Shoulder Exam     Tenderness   The patient is experiencing tenderness in the acromioclavicular joint.    Range of Motion   Active abduction: abnormal   External rotation: abnormal   Forward flexion: abnormal   Internal rotation 0 degrees: abnormal     Muscle " Strength   Abduction: 4/5   Internal rotation: 4/5   External rotation: 4/5   Supraspinatus: 4/5     Tests   Drop arm: positive    Other   Erythema: absent  Scars: absent  Sensation: normal  Pulse: present      Left Shoulder Exam     Tenderness   The patient is experiencing no tenderness.     Range of Motion   Active abduction: abnormal   External rotation: abnormal   Forward flexion: abnormal   Internal rotation 0 degrees: abnormal     Muscle Strength   Abduction: 4/5   Internal rotation: 4/5   External rotation: 4/5   Supraspinatus: 4/5     Tests   Impingement: positive    Other   Erythema: absent  Scars: absent  Sensation: normal  Pulse: present               No results found.          ASSESSMENT:    Diagnoses and all orders for this visit:    Complete tear of right rotator cuff  -     Large Joint Arthrocentesis: R subacromial bursa    Chronic right shoulder pain    Large Joint Arthrocentesis: R subacromial bursa  Date/Time: 3/1/2019 9:42 AM  Consent given by: patient  Site marked: site marked  Timeout: Immediately prior to procedure a time out was called to verify the correct patient, procedure, equipment, support staff and site/side marked as required   Supporting Documentation  Indications: pain   Procedure Details  Location: shoulder - R subacromial bursa  Preparation: Patient was prepped and draped in the usual sterile fashion  Needle size: 22 G  Approach: anteromedial  Medications administered: 2 mL lidocaine PF 1% 1 %; 40 mg triamcinolone acetonide 40 MG/ML  Patient tolerance: patient tolerated the procedure well with no immediate complications              PLAN  Repeated intra-articular injection of steroids into the right shoulder today and recommend progression range of motion activity as tolerated based on pain follow-up as needed.  No Follow-up on file.    Michael Wong, APRN

## 2019-03-22 PROBLEM — R10.31 ABDOMINAL PAIN, RIGHT LOWER QUADRANT: Status: ACTIVE | Noted: 2019-03-22

## 2019-04-15 DIAGNOSIS — E11.9 TYPE 2 DIABETES MELLITUS WITHOUT COMPLICATION, WITHOUT LONG-TERM CURRENT USE OF INSULIN (HCC): ICD-10-CM

## 2019-04-22 RX ORDER — METHIMAZOLE 5 MG/1
TABLET ORAL
Qty: 45 TABLET | Refills: 0 | Status: SHIPPED | OUTPATIENT
Start: 2019-04-22 | End: 2019-08-14 | Stop reason: SDUPTHER

## 2019-07-11 ENCOUNTER — APPOINTMENT (OUTPATIENT)
Dept: GENERAL RADIOLOGY | Facility: HOSPITAL | Age: 74
End: 2019-07-11

## 2019-07-11 ENCOUNTER — HOSPITAL ENCOUNTER (EMERGENCY)
Facility: HOSPITAL | Age: 74
Discharge: HOME | End: 2019-07-11
Attending: FAMILY MEDICINE | Admitting: FAMILY MEDICINE

## 2019-07-11 ENCOUNTER — APPOINTMENT (OUTPATIENT)
Dept: CT IMAGING | Facility: HOSPITAL | Age: 74
End: 2019-07-11

## 2019-07-11 VITALS
HEART RATE: 59 BPM | RESPIRATION RATE: 20 BRPM | TEMPERATURE: 97.7 F | WEIGHT: 147.7 LBS | OXYGEN SATURATION: 98 % | SYSTOLIC BLOOD PRESSURE: 170 MMHG | DIASTOLIC BLOOD PRESSURE: 81 MMHG | BODY MASS INDEX: 25.22 KG/M2 | HEIGHT: 64 IN

## 2019-07-11 DIAGNOSIS — S46.911A STRAIN OF RIGHT SHOULDER, INITIAL ENCOUNTER: ICD-10-CM

## 2019-07-11 DIAGNOSIS — S16.1XXA STRAIN OF NECK MUSCLE, INITIAL ENCOUNTER: ICD-10-CM
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
## 2019-07-11 PROCEDURE — 73030 X-RAY EXAM OF SHOULDER: CPT

## 2019-07-11 PROCEDURE — 99284 EMERGENCY DEPT VISIT MOD MDM: CPT

## 2019-07-11 PROCEDURE — 72125 CT NECK SPINE W/O DYE: CPT

## 2019-07-11 RX ORDER — MELOXICAM 15 MG/1
15 TABLET ORAL DAILY
Qty: 30 TABLET | Refills: 0 | OUTPATIENT
Start: 2019-07-11 | End: 2019-07-13

## 2019-07-11 RX ORDER — CYCLOBENZAPRINE HCL 10 MG
5 TABLET ORAL 3 TIMES DAILY PRN
Qty: 21 TABLET | Refills: 0 | OUTPATIENT
Start: 2019-07-11 | End: 2019-07-13
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
## 2019-08-09 ENCOUNTER — APPOINTMENT (OUTPATIENT)
Dept: LAB | Facility: HOSPITAL | Age: 74
End: 2019-08-09

## 2019-08-09 ENCOUNTER — TRANSCRIBE ORDERS (OUTPATIENT)
Dept: LAB | Facility: HOSPITAL | Age: 74
End: 2019-08-09

## 2019-08-09 DIAGNOSIS — L93.0 LUPUS ERYTHEMATOSUS, UNSPECIFIED FORM: Primary | ICD-10-CM

## 2019-08-09 PROCEDURE — 83036 HEMOGLOBIN GLYCOSYLATED A1C: CPT | Performed by: INTERNAL MEDICINE

## 2019-08-09 PROCEDURE — 85025 COMPLETE CBC W/AUTO DIFF WBC: CPT | Performed by: INTERNAL MEDICINE

## 2019-08-09 PROCEDURE — 84443 ASSAY THYROID STIM HORMONE: CPT | Performed by: INTERNAL MEDICINE

## 2019-08-09 PROCEDURE — 36415 COLL VENOUS BLD VENIPUNCTURE: CPT | Performed by: INTERNAL MEDICINE

## 2019-08-09 PROCEDURE — 86038 ANTINUCLEAR ANTIBODIES: CPT | Performed by: NURSE PRACTITIONER

## 2019-08-09 PROCEDURE — 82306 VITAMIN D 25 HYDROXY: CPT | Performed by: INTERNAL MEDICINE

## 2019-08-09 PROCEDURE — 80053 COMPREHEN METABOLIC PANEL: CPT | Performed by: INTERNAL MEDICINE

## 2019-08-09 PROCEDURE — 82607 VITAMIN B-12: CPT | Performed by: INTERNAL MEDICINE

## 2019-08-09 PROCEDURE — 80061 LIPID PANEL: CPT | Performed by: INTERNAL MEDICINE

## 2019-08-10 LAB
25(OH)D3 SERPL-MCNC: 105.7 NG/ML (ref 30–100)
ALBUMIN SERPL-MCNC: 4.9 G/DL (ref 3.5–5.2)
ALBUMIN/GLOB SERPL: 1.9 G/DL
ALP SERPL-CCNC: 48 U/L (ref 39–117)
ALT SERPL W P-5'-P-CCNC: 15 U/L (ref 1–33)
ANA SER QL IA: NEGATIVE
ANION GAP SERPL CALCULATED.3IONS-SCNC: 13.4 MMOL/L (ref 5–15)
AST SERPL-CCNC: 19 U/L (ref 1–32)
BASOPHILS # BLD AUTO: 0.01 10*3/MM3 (ref 0–0.2)
BASOPHILS NFR BLD AUTO: 0.1 % (ref 0–1.5)
BILIRUB SERPL-MCNC: 0.3 MG/DL (ref 0.2–1.2)
BUN BLD-MCNC: 33 MG/DL (ref 8–23)
BUN/CREAT SERPL: 23.9 (ref 7–25)
CALCIUM SPEC-SCNC: 9.7 MG/DL (ref 8.6–10.5)
CHLORIDE SERPL-SCNC: 101 MMOL/L (ref 98–107)
CHOLEST SERPL-MCNC: 161 MG/DL (ref 0–200)
CO2 SERPL-SCNC: 22.6 MMOL/L (ref 22–29)
CREAT BLD-MCNC: 1.38 MG/DL (ref 0.57–1)
DEPRECATED RDW RBC AUTO: 41.6 FL (ref 37–54)
DIFFERENTIAL METHOD BLD: ABNORMAL
EOSINOPHIL # BLD AUTO: 0 10*3/MM3 (ref 0–0.4)
EOSINOPHIL NFR BLD AUTO: 0 % (ref 0.3–6.2)
ERYTHROCYTE [DISTWIDTH] IN BLOOD BY AUTOMATED COUNT: 12.7 % (ref 12.3–15.4)
GFR SERPL CREATININE-BSD FRML MDRD: 37 ML/MIN/1.73
GLOBULIN UR ELPH-MCNC: 2.6 GM/DL
GLUCOSE BLD-MCNC: 134 MG/DL (ref 65–99)
HBA1C MFR BLD: 5.72 % (ref 4.8–5.6)
HCT VFR BLD AUTO: 35.8 % (ref 34–46.6)
HDLC SERPL-MCNC: 49 MG/DL (ref 40–60)
HGB BLD-MCNC: 11.8 G/DL (ref 12–15.9)
IMM GRANULOCYTES # BLD AUTO: 0.07 10*3/MM3 (ref 0–0.05)
IMM GRANULOCYTES NFR BLD AUTO: 0.6 % (ref 0–0.5)
LDLC SERPL CALC-MCNC: 89 MG/DL (ref 0–100)
LDLC/HDLC SERPL: 1.82 {RATIO}
LYMPHOCYTES # BLD AUTO: 0.93 10*3/MM3 (ref 0.7–3.1)
LYMPHOCYTES NFR BLD AUTO: 8.3 % (ref 19.6–45.3)
MCH RBC QN AUTO: 29.8 PG (ref 26.6–33)
MCHC RBC AUTO-ENTMCNC: 33 G/DL (ref 31.5–35.7)
MCV RBC AUTO: 90.4 FL (ref 79–97)
MONOCYTES # BLD AUTO: 0.23 10*3/MM3 (ref 0.1–0.9)
MONOCYTES NFR BLD AUTO: 2.1 % (ref 5–12)
NEUTROPHILS # BLD AUTO: 9.93 10*3/MM3 (ref 1.7–7)
NEUTROPHILS NFR BLD AUTO: 88.9 % (ref 42.7–76)
NRBC BLD AUTO-RTO: 0 /100 WBC (ref 0–0.2)
PLATELET # BLD AUTO: 287 10*3/MM3 (ref 140–450)
PMV BLD AUTO: 12.9 FL (ref 6–12)
POTASSIUM BLD-SCNC: 4.2 MMOL/L (ref 3.5–5.2)
PROT SERPL-MCNC: 7.5 G/DL (ref 6–8.5)
RBC # BLD AUTO: 3.96 10*6/MM3 (ref 3.77–5.28)
SODIUM BLD-SCNC: 137 MMOL/L (ref 136–145)
TRIGL SERPL-MCNC: 113 MG/DL (ref 0–150)
TSH SERPL DL<=0.05 MIU/L-ACNC: 0.58 MIU/ML (ref 0.27–4.2)
VIT B12 BLD-MCNC: 423 PG/ML (ref 211–946)
VLDLC SERPL-MCNC: 22.6 MG/DL (ref 5–40)
WBC # BLD AUTO: 11.17 10*3/MM3 (ref 3.4–10.8)
WBC NRBC COR # BLD: 11.17 10*3/MM3 (ref 3.4–10.8)

## 2019-08-14 ENCOUNTER — OFFICE VISIT (OUTPATIENT)
Dept: FAMILY MEDICINE CLINIC | Facility: CLINIC | Age: 74
End: 2019-08-14

## 2019-08-14 VITALS
HEIGHT: 64 IN | SYSTOLIC BLOOD PRESSURE: 150 MMHG | TEMPERATURE: 98.8 F | BODY MASS INDEX: 24.41 KG/M2 | OXYGEN SATURATION: 98 % | HEART RATE: 63 BPM | DIASTOLIC BLOOD PRESSURE: 80 MMHG | WEIGHT: 143 LBS

## 2019-08-14 DIAGNOSIS — I10 ESSENTIAL HYPERTENSION: ICD-10-CM

## 2019-08-14 DIAGNOSIS — E11.42 TYPE 2 DIABETES MELLITUS WITH DIABETIC POLYNEUROPATHY, WITHOUT LONG-TERM CURRENT USE OF INSULIN (HCC): Primary | ICD-10-CM

## 2019-08-14 DIAGNOSIS — G62.9 NEUROPATHY: ICD-10-CM

## 2019-08-14 PROCEDURE — 99214 OFFICE O/P EST MOD 30 MIN: CPT | Performed by: FAMILY MEDICINE

## 2019-08-14 RX ORDER — LOVASTATIN 20 MG/1
20 TABLET ORAL NIGHTLY
Qty: 90 TABLET | Refills: 3 | Status: SHIPPED | OUTPATIENT
Start: 2019-08-14 | End: 2019-12-27 | Stop reason: SDUPTHER

## 2019-08-14 RX ORDER — METHIMAZOLE 5 MG/1
2.5 TABLET ORAL DAILY
Qty: 45 TABLET | Refills: 1 | Status: SHIPPED | OUTPATIENT
Start: 2019-08-14 | End: 2019-10-18 | Stop reason: SDUPTHER

## 2019-08-14 RX ORDER — GABAPENTIN 100 MG/1
100 CAPSULE ORAL 3 TIMES DAILY
Qty: 90 CAPSULE | Refills: 3 | Status: SHIPPED | OUTPATIENT
Start: 2019-08-14 | End: 2020-10-29 | Stop reason: SDUPTHER

## 2019-08-14 NOTE — PROGRESS NOTES
"  Subjective:     Thelma Haley is a 73 y.o. female former Dr. Polo patient who presents to [facility] care for well controlled Type 2 diabetes mellitus and hypertension.    Patient reports her hypertension has been well controlled on Lisinopril/HCTZ, she reports it is elevated today because she was running around and stressed out trying to get to her office visit.      The patient was initially diagnosed with Type 2 diabetes mellitus based on the following criteria:  Elevated A1C, symptoms of neuropathy.    Known diabetic complications: peripheral neuropathy  Cardiovascular risk factors: advanced age (older than 55 for men, 65 for women), diabetes mellitus and hypertension  Current diabetic medications include Janumet XR 50-1000mg monitored by Endocrinology.     Eye exam current (within one year): yes - records requested  Weight trend: stable  Current diet: in general, a \"healthy\" diet    Current exercise: walking    Current monitoring regimen: home blood tests - 3 times daily  Home blood sugar records: fasting range: 70s  Any episodes of hypoglycemia? no     ACE inhibitor or angiotensin II receptor blocker?     Yes - Lisinopril   Statin? Yes - Lovastatin   ASA?     No     Hemoglobin A1C (%)   Date Value   08/09/2019 5.72 (H)   02/18/2019 6.1 (H)   09/18/2018 5.8 (H)   04/12/2018 6.0 (H)     COMORBID CONDITIONS:     ObesityNo    HypothyroidismSubclinical - followed by Endocrine    HyperlipidemiaYes    CADNo    Cerebrovascular diseaseNo   PVDNo    HTNYes    Sexual dysfunctionNo    DepressionNo      The following portions of the patient's history were reviewed and updated as appropriate: allergies, current medications, past family history, past medical history, past social history, past surgical history and problem list.    Preventative:  Over the past 2 weeks, have you felt down, depressed, or hopeless?No   Over the past 2 weeks, have you felt little interest or pleasure in doing things?No    On " osteoporosis therapy?No     Past Medical Hx:  Past Medical History:   Diagnosis Date   • Abnormal results of thyroid function studies    • Allergic rhinitis due to pollen    • Asthma      uncomplicated      • Cataract    • Chronic rhinitis    • Diabetes mellitus (CMS/HCC)    • Disease of thyroid gland    • Essential hypertension    • Extrinsic asthma with status asthmaticus    • Glaucoma    • Hyperlipidemia    • Impingement syndrome of right shoulder    • Injury of back     L4 L5 bulging disc   • Neuropathy    • Nuclear senile cataract    • Overweight with body mass index (BMI) 25.0-29.9    • Primary fibromyalgia syndrome    • Primary open angle glaucoma    • Rosacea    • Temporomandibular joint disorder        Past Surgical Hx:  Past Surgical History:   Procedure Laterality Date   • BACK SURGERY     • CARPAL TUNNEL RELEASE      Bilateral carpal tunnel release.)   03/27/2000    • CATARACT EXTRACTION      Bilateral   • INJECTION OF MEDICATION  06/17/2015    celestone(betamethasone) (2)    • INJECTION OF MEDICATION  04/14/2016    depo medrol ( methylprednisone) (20)   • OOPHORECTOMY     • OTHER SURGICAL HISTORY      hysterosope procedure   • RIGHT OOPHORECTOMY     • ROTATOR CUFF REPAIR      Right   • SHOULDER ARTHROSCOPY Right 2/6/2017    Procedure: RIGHT SHOULDER ARTHROSCOPY SUBACROMIAL DECOMPRESSION, ROTATOR CUFF REPAIR   ;  Surgeon: Terrence Haines MD;  Location: [facility];  Service:    • SHOULDER SURGERY      Excision of 4.8 cm mass with primary intermediate closure.)   03/30/2012    • SPINAL FUSION         Health Maintenance:  Health Maintenance   Topic Date Due   • ZOSTER VACCINE (1 of 2) 08/31/1993   • MEDICARE ANNUAL WELLNESS  08/24/2016   • DIABETIC FOOT EXAM  04/20/2019   • INFLUENZA VACCINE  08/01/2019   • HEMOGLOBIN A1C  02/09/2020   • URINE MICROALBUMIN  02/18/2020   • DIABETIC EYE EXAM  04/16/2020   • MAMMOGRAM  05/25/2020   • LIPID PANEL  08/09/2020   • COLONOSCOPY  05/21/2022   • TDAP/TD  VACCINES (2 - Td) 07/10/2024   • PNEUMOCOCCAL VACCINES (65+ LOW/MEDIUM RISK)  Completed       Current Meds:    Current Outpatient Medications:   •  Biotin 17489 MCG tablet, Take 1 tablet by mouth Daily., Disp: , Rfl:   •  Calcium 500-100 MG-UNIT chewable tablet, Chew 1 tablet 2 (Two) Times a Day., Disp: , Rfl:   •  CETIRIZINE HCL PO, Take 10 mg by mouth Daily., Disp: , Rfl:   •  dorzolamide-timolol (COSOPT) 22.3-6.8 MG/ML ophthalmic solution, INSTILL 1 DROP INTO EACH EYE BID, Disp: , Rfl: 0  •  gabapentin (NEURONTIN) 100 MG capsule, Take 1 capsule by mouth 3 (Three) Times a Day., Disp: 90 capsule, Rfl: 5  •  hydrochlorothiazide (HYDRODIURIL) 25 MG tablet, Take 1 tablet by mouth Daily., Disp: 90 tablet, Rfl: 3  •  hydrOXYzine (ATARAX) 25 MG tablet, Take 1 tablet by mouth Every 6 (Six) Hours As Needed for Itching., Disp: 30 tablet, Rfl: 0  •  lisinopril (PRINIVIL,ZESTRIL) 5 MG tablet, Take 1 tablet by mouth Daily., Disp: 90 tablet, Rfl: 3  •  lovastatin (MEVACOR) 20 MG tablet, Take 1 tablet by mouth Every Night., Disp: 90 tablet, Rfl: 3  •  methIMAzole (TAPAZOLE) 5 MG tablet, TAKE 1/2 TABLET BY MOUTH DAILY, Disp: 45 tablet, Rfl: 0  •  ONE TOUCH ULTRA TEST test strip, USE TO TEST BLOOD SUGAR TWICE DAILY, Disp: 60 each, Rfl: 5  •  ONETOUCH DELICA LANCETS FINE misc, USE TO TEST TWICE DAILY, Disp: 60 each, Rfl: 5  •  SITagliptin-metFORMIN HCl ER (JANUMET XR)  MG tablet, Take 1 tablet by mouth Daily With Breakfast., Disp: 90 tablet, Rfl: 3    Allergies:  Ciprofloxacin; Hydrocodone; Lortab [hydrocodone-acetaminophen]; Metronidazole; Oxycodone; and Ultram [tramadol hcl]    Family Hx:  Family History   Problem Relation Age of Onset   • Heart disease Other    • Hypertension Other    • COPD Mother         Social History:  Social History     Socioeconomic History   • Marital status:      Spouse name: Not on file   • Number of children: Not on file   • Years of education: Not on file   • Highest education level: Not  "on file   Tobacco Use   • Smoking status: Never Smoker   • Smokeless tobacco: Never Used   Substance and Sexual Activity   • Alcohol use: No   • Drug use: No   • Sexual activity: Defer       Review of Systems  Review of Systems   Constitutional: Negative for activity change, chills, fatigue and fever.   HENT: Negative for congestion and sore throat.    Eyes: Negative for photophobia and visual disturbance.   Respiratory: Negative for cough, shortness of breath and wheezing.    Cardiovascular: Negative for chest pain, palpitations and leg swelling.   Gastrointestinal: Negative for abdominal pain, constipation, diarrhea, nausea and vomiting.   Genitourinary: Negative for decreased urine volume, difficulty urinating and dysuria.   Musculoskeletal: Negative for arthralgias and myalgias.   Skin: Negative for color change, rash and wound.   Neurological: Negative for dizziness, weakness, light-headedness, numbness and headaches.   Psychiatric/Behavioral: Negative for confusion, decreased concentration and dysphoric mood.   All other systems reviewed and are negative.      Objective:     /80 (BP Location: Left arm)   Pulse 63   Temp 98.8 °F (37.1 °C)   Ht 162.6 cm (64.02\")   Wt 64.9 kg (143 lb)   LMP 02/06/1980 (Approximate)   SpO2 98%   BMI 24.53 kg/m²      Physical Exam   Constitutional: She is oriented to person, place, and time. Vital signs are normal. She appears well-developed and well-nourished. No distress.   HENT:   Head: Normocephalic and atraumatic.   Right Ear: External ear normal.   Left Ear: External ear normal.   Nose: Nose normal.   Mouth/Throat: Oropharynx is clear and moist.   Eyes: EOM are normal. Pupils are equal, round, and reactive to light.   Neck: Normal range of motion.   Cardiovascular: Normal rate, regular rhythm, normal heart sounds and intact distal pulses.   Pulmonary/Chest: Effort normal and breath sounds normal. No respiratory distress.   Abdominal: Soft. Bowel sounds are " normal.   Musculoskeletal: Normal range of motion. She exhibits no edema.    Thelma had a diabetic foot exam performed (abnormal vibratory sense L>R) today.   During the foot exam she had a monofilament test performed (normal).    Neurological Sensory Findings - Unaltered hot/cold right ankle/foot discrimination and unaltered hot/cold left ankle/foot discrimination. Unaltered sharp/dull right ankle/foot discrimination and unaltered sharp/dull left ankle/foot discrimination. No right ankle/foot altered proprioception and no left ankle/foot altered proprioception  Vascular Status -  Her right foot exhibits normal foot vasculature  and no edema. Her left foot exhibits normal foot vasculature  and no edema.  Skin Integrity  -  Her right foot skin is intact.Her left foot skin is intact..  Neurological: She is alert and oriented to person, place, and time. She has normal strength.   Skin: Skin is warm. Capillary refill takes less than 2 seconds.   Psychiatric: She has a normal mood and affect. Her speech is normal and behavior is normal.     Assessment:     Diabetes Mellitus type II, under excellent control.    Thelma was seen today for establish care, diabetes and hypertension.    Diagnoses and all orders for this visit:    Type 2 diabetes mellitus with diabetic polyneuropathy, without long-term current use of insulin (CMS/[facility])    Neuropathy  -     gabapentin (NEURONTIN) 100 MG capsule; Take 1 capsule by mouth 3 (Three) Times a Day. For diabetic neuropathy    Essential hypertension    Other orders  -     lovastatin (MEVACOR) 20 MG tablet; Take 1 tablet by mouth Every Night.  -     methIMAzole (TAPAZOLE) 5 MG tablet; Take 0.5 tablets by mouth Daily.        Plan:     CHERYL#[id number] reviewed. Patient requests to have me fill her Neurontin, drug contract signed & scanned into medical record.  1.  Rx changes: none  2.  Education: Reviewed ‘ABCs’ of diabetes management (respective goals in parentheses):  A1C (<7), preprandial  glucose (70 mg/dl - 130 mg/dl), postprandial glucose (< 180 mg/dl), blood pressure (<130/80), and cholesterol (LDL <100 mg/dl; HDL > 50 mg/dl; Trig < 150 mg/dl).  3.  Issues reviewed with Thelma Haley: foot care discussed and Podiatry visits discussed and annual eye examinations at Ophthalmology discussed.  4.  Compliance at present is estimated to be excellent. Efforts to improve compliance (if necessary) will be directed at increased exercise.    Return in about 4 months (around 12/14/2019) for Diabetes.    Goals     • Less pain           Preventative:  Female Preventative: up to date on preventative screening including mammogram/cscope. Pap not indicated.  Recommended Vaccines:Zostavax - pt declined    Nonsmoker  does not drink  increase physical activity    RISK SCORE: 3     Signature  [place] [address]  Office: [phone]    This document has been electronically signed by Jaky Prieto MD on August 15, 2019 7:39 AM

## 2019-08-15 ENCOUNTER — TELEPHONE (OUTPATIENT)
Dept: FAMILY MEDICINE CLINIC | Facility: CLINIC | Age: 74
End: 2019-08-15

## 2019-08-15 DIAGNOSIS — E11.9 TYPE 2 DIABETES MELLITUS WITHOUT COMPLICATION, WITHOUT LONG-TERM CURRENT USE OF INSULIN (HCC): ICD-10-CM

## 2019-08-15 NOTE — TELEPHONE ENCOUNTER
Kari called about patients scripts for her lancets and her test strips.  They would like a call back when possible at [phone].    Thanks,  Caryl

## 2019-08-16 ENCOUNTER — TELEPHONE (OUTPATIENT)
Dept: FAMILY MEDICINE CLINIC | Facility: CLINIC | Age: 74
End: 2019-08-16

## 2019-08-16 NOTE — TELEPHONE ENCOUNTER
Pt pharmacy called and and left a voicemail; they sent over the CMN for this patient and they are needing it filled out and faxed back by Dr. Prieto.     I tried to call back to see what the CMN was, but they put me on hold for a while and I had to hang up due to patient volume.     Thank you    Kari [facility]  [phone]

## 2019-08-21 ENCOUNTER — OFFICE VISIT (OUTPATIENT)
Dept: ENDOCRINOLOGY | Facility: CLINIC | Age: 74
End: 2019-08-21

## 2019-08-21 VITALS
HEART RATE: 75 BPM | WEIGHT: 145.3 LBS | OXYGEN SATURATION: 98 % | DIASTOLIC BLOOD PRESSURE: 72 MMHG | HEIGHT: 64 IN | SYSTOLIC BLOOD PRESSURE: 128 MMHG | BODY MASS INDEX: 24.81 KG/M2

## 2019-08-21 DIAGNOSIS — E05.90 SUBCLINICAL HYPERTHYROIDISM: ICD-10-CM

## 2019-08-21 DIAGNOSIS — G62.9 NEUROPATHY: ICD-10-CM

## 2019-08-21 DIAGNOSIS — I10 ESSENTIAL HYPERTENSION: ICD-10-CM

## 2019-08-21 DIAGNOSIS — E55.9 VITAMIN D DEFICIENCY: ICD-10-CM

## 2019-08-21 DIAGNOSIS — E11.9 TYPE 2 DIABETES MELLITUS WITHOUT COMPLICATION, WITHOUT LONG-TERM CURRENT USE OF INSULIN (HCC): Primary | ICD-10-CM

## 2019-08-21 DIAGNOSIS — R93.7 ABNORMAL BONE DENSITY SCREENING: ICD-10-CM

## 2019-08-21 DIAGNOSIS — E04.1 RIGHT THYROID NODULE: ICD-10-CM

## 2019-08-21 PROCEDURE — 99214 OFFICE O/P EST MOD 30 MIN: CPT | Performed by: INTERNAL MEDICINE

## 2019-08-21 NOTE — PATIENT INSTRUCTIONS
1. Stop vitamin E    2. Vitamin D , decrease from 5000 units daily to 1000 units daily     3. Stop hydrochlorothiazide and use only as needed, don't exceed 3 per week    4. Avoid Naproxen      Goal in diabetes    A. Waking 80 to 130    B. 2 hours post 80 to 180

## 2019-08-21 NOTE — PROGRESS NOTES
Thelma Haley is a 73 y.o. female who presents for  evaluation of   Chief Complaint   Patient presents with   • Diabetes     bs 121         Primary Care / Referring Provider  Jaky Prieto MD    Hyperthyroidism     Duration since 2015    Appears to be Graves with right subcm thyroid nodule    Now on methimazole    Also t2dm well controlled with nephropathy       Past Medical History:   Diagnosis Date   • Abnormal results of thyroid function studies    • Allergic rhinitis due to pollen    • Asthma      uncomplicated      • Cataract    • Chronic rhinitis    • Diabetes mellitus (CMS/HCC)    • Disease of thyroid gland    • Essential hypertension    • Extrinsic asthma with status asthmaticus    • Glaucoma    • Hyperlipidemia    • Impingement syndrome of right shoulder    • Injury of back     L4 L5 bulging disc   • Neuropathy    • Nuclear senile cataract    • Overweight with body mass index (BMI) 25.0-29.9    • Primary fibromyalgia syndrome    • Primary open angle glaucoma    • Rosacea    • Temporomandibular joint disorder      Family History   Problem Relation Age of Onset   • Heart disease Other    • Hypertension Other    • COPD Mother      Social History     Tobacco Use   • Smoking status: Never Smoker   • Smokeless tobacco: Never Used   Substance Use Topics   • Alcohol use: No   • Drug use: No         Current Outpatient Medications:   •  Biotin 52627 MCG tablet, Take 1 tablet by mouth Daily., Disp: , Rfl:   •  Calcium 500-100 MG-UNIT chewable tablet, Chew 1 tablet 2 (Two) Times a Day., Disp: , Rfl:   •  CETIRIZINE HCL PO, Take 10 mg by mouth Daily., Disp: , Rfl:   •  dorzolamide-timolol (COSOPT) 22.3-6.8 MG/ML ophthalmic solution, INSTILL 1 DROP INTO EACH EYE BID, Disp: , Rfl: 0  •  gabapentin (NEURONTIN) 100 MG capsule, Take 1 capsule by mouth 3 (Three) Times a Day. For diabetic neuropathy, Disp: 90 capsule, Rfl: 3  •  glucose blood (ONE TOUCH ULTRA TEST) test strip, Use to test blood sugar twice a day.  ICD E11.9, Disp: 100 each, Rfl: 5  •  hydrochlorothiazide (HYDRODIURIL) 25 MG tablet, Take 1 tablet by mouth Daily., Disp: 90 tablet, Rfl: 3  •  hydrOXYzine (ATARAX) 25 MG tablet, Take 1 tablet by mouth Every 6 (Six) Hours As Needed for Itching., Disp: 30 tablet, Rfl: 0  •  lisinopril (PRINIVIL,ZESTRIL) 5 MG tablet, Take 1 tablet by mouth Daily., Disp: 90 tablet, Rfl: 3  •  lovastatin (MEVACOR) 20 MG tablet, Take 1 tablet by mouth Every Night., Disp: 90 tablet, Rfl: 3  •  methIMAzole (TAPAZOLE) 5 MG tablet, Take 0.5 tablets by mouth Daily., Disp: 45 tablet, Rfl: 1  •  ONETOUCH DELICA LANCETS FINE misc, Use to check blood sugar twice a day. ICD E11.9, Disp: 100 each, Rfl: 5  •  SITagliptin-metFORMIN HCl ER (JANUMET XR)  MG tablet, Take 1 tablet by mouth Daily With Breakfast., Disp: 90 tablet, Rfl: 3    Review of Systems    Review of Systems   Constitutional: Positive for fatigue and unexpected weight change. Negative for activity change, appetite change, chills, diaphoresis and fever.   HENT: Negative for congestion, drooling, ear discharge, ear pain, facial swelling, mouth sores, nosebleeds, postnasal drip, sinus pressure, sneezing, sore throat, tinnitus, trouble swallowing and voice change.    Eyes: Negative.  Negative for photophobia, pain, discharge, redness and itching.   Respiratory: Negative.  Negative for apnea, cough, choking, chest tightness, shortness of breath, wheezing and stridor.    Cardiovascular: Negative.  Negative for chest pain, palpitations and leg swelling.   Gastrointestinal: Negative.  Negative for abdominal distention, abdominal pain, constipation, diarrhea, nausea and vomiting.   Endocrine: Negative.  Negative for cold intolerance, heat intolerance, polydipsia, polyphagia and polyuria.   Genitourinary: Negative for difficulty urinating, dysuria, flank pain and frequency.   Musculoskeletal: Negative for arthralgias, back pain, gait problem, joint swelling, myalgias, neck pain and neck  "stiffness.   Skin: Negative for color change, pallor, rash and wound.   Allergic/Immunologic: Negative for environmental allergies, food allergies and immunocompromised state.   Neurological: Positive for tremors. Negative for dizziness, seizures, syncope, facial asymmetry, speech difficulty, weakness, light-headedness, numbness and headaches.   Hematological: Negative for adenopathy. Does not bruise/bleed easily.   Psychiatric/Behavioral: Negative for agitation, behavioral problems, confusion, decreased concentration, dysphoric mood, hallucinations, self-injury, sleep disturbance and suicidal ideas. The patient is not nervous/anxious and is not hyperactive.         Objective:     /72   Pulse 75   Ht 162.6 cm (64\")   Wt 65.9 kg (145 lb 4.8 oz)   LMP 02/06/1980 (Approximate)   SpO2 98%   BMI 24.94 kg/m²     Physical Exam   Constitutional: She is oriented to person, place, and time. She appears well-developed.   HENT:   Head: Normocephalic.   Right Ear: External ear normal.   Left Ear: External ear normal.   Nose: Nose normal.   Eyes: Conjunctivae and EOM are normal. No scleral icterus.   Neck: Normal range of motion. Neck supple. No tracheal deviation present. Thyromegaly present.   Cardiovascular: Normal rate, regular rhythm, normal heart sounds and intact distal pulses. Exam reveals no gallop and no friction rub.   No murmur heard.  Pulmonary/Chest: Effort normal and breath sounds normal. No stridor. No respiratory distress. She has no wheezes. She has no rales. She exhibits no tenderness.   Abdominal: Soft. Bowel sounds are normal. She exhibits no distension and no mass. There is no tenderness. There is no rebound and no guarding.   Musculoskeletal: Normal range of motion. She exhibits no tenderness or deformity.   Lymphadenopathy:     She has no cervical adenopathy.   Neurological: She is alert and oriented to person, place, and time. She displays normal reflexes. She exhibits normal muscle tone. " Coordination normal.   Skin: No rash noted. No erythema. No pallor.   Psychiatric: She has a normal mood and affect. Her behavior is normal. Judgment and thought content normal.       Lab Review    Results for orders placed or performed in visit on 08/09/19   Nuclear Antigen Antibody, IFA   Result Value Ref Range    CORDELL Negative            Assessment/Plan       ICD-10-CM ICD-9-CM   1. Type 2 diabetes mellitus without complication, without long-term current use of insulin (CMS/[facility]) E11.9 250.00   2. Vitamin D deficiency E55.9 268.9   3. Subclinical hyperthyroidism E05.90 242.90   4. Right thyroid nodule E04.1 241.0   5. Neuropathy G62.9 355.9   6. Essential hypertension I10 401.9   7. Abnormal bone density screening R93.7 794.9       Glycemic Management:   Lab Results   Component Value Date    HGBA1C 5.72 (H) 08/09/2019    HGBA1C 6.1 (H) 02/18/2019    HGBA1C 5.8 (H) 09/18/2018     Lab Results   Component Value Date    GLUCOSE 134 (H) 08/09/2019    BUN 33 (H) 08/09/2019    CREATININE 1.38 (H) 08/09/2019    EGFRIFNONA 37 (L) 08/09/2019    BCR 23.9 08/09/2019    CO2 22.6 08/09/2019    CALCIUM 9.7 08/09/2019    ALBUMIN 4.90 08/09/2019    AST 19 08/09/2019    ALT 15 08/09/2019     Lab Results   Component Value Date    WBC 11.17 (H) 08/09/2019    WBC 11.17 (H) 08/09/2019    HGB 11.8 (L) 08/09/2019    HCT 35.8 08/09/2019    MCV 90.4 08/09/2019     08/09/2019     Janumet XR 50/1000 , 1 tab daily , she has CKD stage III - stop since fasting elevated    Change to trulicity 0.75 mg weekly - preferred not to     Doing well on Janumet XR     I still would like to add for renal benefits or even invokana but hesitant      Check only once weekly     Lipid Management  Lab Results   Component Value Date    CHOL 161 08/09/2019    CHOL 168 02/18/2019    CHOL 149 09/18/2018     Lab Results   Component Value Date    TRIG 113 08/09/2019    TRIG 132 02/18/2019    TRIG 171 09/18/2018     Lab Results   Component Value Date    HDL 49  08/09/2019    HDL 45 (L) 02/18/2019    HDL 40 (L) 09/18/2018     No components found for: LDLCALC  Lab Results   Component Value Date    LDL 89 08/09/2019    LDL 92 02/18/2019    LDL 74 09/18/2018       On lovastatin before , stopped restarted at 20 mg qhs         Blood Pressure Management:    Vitals:    08/21/19 0957   BP: 128/72   Pulse: 75   SpO2: 98%     Lab Results   Component Value Date    GLUCOSE 134 (H) 08/09/2019    CALCIUM 9.7 08/09/2019     08/09/2019    K 4.2 08/09/2019    CO2 22.6 08/09/2019     08/09/2019    BUN 33 (H) 08/09/2019    CREATININE 1.38 (H) 08/09/2019    EGFRIFNONA 37 (L) 08/09/2019    BCR 23.9 08/09/2019    ANIONGAP 13.4 08/09/2019     Lab Results   Component Value Date    GLUCOSE 134 (H) 08/09/2019    BUN 33 (H) 08/09/2019    CREATININE 1.38 (H) 08/09/2019    EGFRIFNONA 37 (L) 08/09/2019    BCR 23.9 08/09/2019    CO2 22.6 08/09/2019    CALCIUM 9.7 08/09/2019    ALBUMIN 4.90 08/09/2019    AST 19 08/09/2019    ALT 15 08/09/2019     On lisinopril 5 mg     Taking hctz 25 mg daily --- stop it and use it only as needed     Has worsening of CKD , stop naproxen       Preventive Care:      nonsmoker    Weight Related:   Wt Readings from Last 3 Encounters:   08/21/19 65.9 kg (145 lb 4.8 oz)   08/14/19 64.9 kg (143 lb)   07/13/19 67.3 kg (148 lb 6 oz)     Body mass index is 24.94 kg/m².    Aim for 1200 calories daily   Walk 30 min daily     Bone Health      Lab Results   Component Value Date    PGIM92UH 105.7 (H) 08/09/2019    ZQUK72XH 53.1 02/18/2019    FYWI47TC 68.1 09/18/2018       Lab Results   Component Value Date    CALCIUM 9.7 08/09/2019     Takes vit D 5th u daily -- decrease to 1000 units daily         Order dxa due to hyperthyroidism - done in 12-16 and nl    Start calcium 600 mg w supper     Thyroid Health  Lab Results   Component Value Date    TSH 0.579 08/09/2019     Subclinical hyperthyroidism, US multinodular goiter, subcm     Indication for tx     TSH less than 0.1 and  history of osteopenia now nl on methimazole 2.5 mg daily     12-16 small subcm thyroid nodule on the right - inferior     Stop biotin before   testing       Other Diabetes Related Aspects       Lab Results   Component Value Date    [id number] 423 08/09/2019     Patient instructions  1. Stop vitamin E    2. Vitamin D , decrease from 5000 units daily to 1000 units daily     3. Stop hydrochlorothiazide and use only as needed, don't exceed 3 per week    4. Avoid Naproxen      Goal in diabetes    A. Waking 80 to 130    B. 2 hours post 80 to 180                       I reviewed and summarized records from Jaky Prieto MD from 2019  and I reviewed / ordered labs.     No orders of the defined types were placed in this encounter.        A copy of my note was sent to Jaky Prieto MD    Please see my above opinion and suggestions.

## 2019-10-09 DIAGNOSIS — G89.29 CHRONIC PAIN OF BOTH SHOULDERS: Primary | ICD-10-CM

## 2019-10-09 DIAGNOSIS — M25.511 CHRONIC PAIN OF BOTH SHOULDERS: Primary | ICD-10-CM

## 2019-10-09 DIAGNOSIS — M25.512 CHRONIC PAIN OF BOTH SHOULDERS: Primary | ICD-10-CM

## 2019-10-10 ENCOUNTER — OFFICE VISIT (OUTPATIENT)
Dept: ORTHOPEDIC SURGERY | Facility: CLINIC | Age: 74
End: 2019-10-10

## 2019-10-10 VITALS — HEIGHT: 64 IN | WEIGHT: 144 LBS | BODY MASS INDEX: 24.59 KG/M2

## 2019-10-10 DIAGNOSIS — M79.7 PRIMARY FIBROMYALGIA SYNDROME: ICD-10-CM

## 2019-10-10 DIAGNOSIS — G89.29 CHRONIC PAIN OF BOTH KNEES: ICD-10-CM

## 2019-10-10 DIAGNOSIS — M17.0 PRIMARY OSTEOARTHRITIS OF BOTH KNEES: Primary | ICD-10-CM

## 2019-10-10 DIAGNOSIS — M25.561 CHRONIC PAIN OF BOTH KNEES: ICD-10-CM

## 2019-10-10 DIAGNOSIS — M25.562 CHRONIC PAIN OF BOTH KNEES: ICD-10-CM

## 2019-10-10 DIAGNOSIS — I10 ESSENTIAL HYPERTENSION: ICD-10-CM

## 2019-10-10 DIAGNOSIS — E11.9 TYPE 2 DIABETES MELLITUS WITHOUT COMPLICATION, WITHOUT LONG-TERM CURRENT USE OF INSULIN (HCC): ICD-10-CM

## 2019-10-10 PROCEDURE — 99214 OFFICE O/P EST MOD 30 MIN: CPT | Performed by: ORTHOPAEDIC SURGERY

## 2019-10-10 NOTE — PROGRESS NOTES
Thelma Haley is a 74 y.o. female returns for     Chief Complaint   Patient presents with   • Right Knee - Follow-up, Pain     X-rays done today in office   HISTORY OF PRESENT ILLNESS: follow up right knee pain.  Pain scale today 7/10  The pain has not improved despite long term treatment with multiple conservative management trials.      Prior Arthritis treatments: [x]  PT    [x]  HEP      [x]  Attempt weight loss  [x]  NSAIDS      [x]  Cane   [x]  Intra-articular steroid inj      [x]  Viscosupplementation    Pain is chronic dull ache that is severe at times and worse with activity.  Difficulty with ADL's.  Patient is not happy with current quality of life and wants to discuss proceeding with surgical intervention.         CONCURRENT MEDICAL HISTORY:    Past Medical History:   Diagnosis Date   • Abnormal results of thyroid function studies    • Allergic rhinitis due to pollen    • Asthma      uncomplicated      • Cataract    • Chronic rhinitis    • Diabetes mellitus (CMS/HCC)    • Disease of thyroid gland    • Essential hypertension    • Extrinsic asthma with status asthmaticus    • Glaucoma    • Hyperlipidemia    • Impingement syndrome of right shoulder    • Injury of back     L4 L5 bulging disc   • Neuropathy    • Nuclear senile cataract    • Overweight with body mass index (BMI) 25.0-29.9    • Primary fibromyalgia syndrome    • Primary open angle glaucoma    • Rosacea    • Temporomandibular joint disorder        Allergies   Allergen Reactions   • Ciprofloxacin Nausea And Vomiting   • Hydrocodone Nausea And Vomiting   • Lortab [Hydrocodone-Acetaminophen] Nausea And Vomiting   • Metronidazole Nausea And Vomiting   • Oxycodone Nausea And Vomiting     Dizziness and doesn't decrease pain   • Ultram [Tramadol Hcl] Nausea And Vomiting       Current Outpatient Medications on File Prior to Visit   Medication Sig   • Biotin 83467 MCG tablet Take 1 tablet by mouth Daily.   • Calcium 500-100 MG-UNIT chewable tablet Chew  1 tablet 2 (Two) Times a Day.   • CETIRIZINE HCL PO Take 10 mg by mouth Daily.   • dorzolamide-timolol (COSOPT) 22.3-6.8 MG/ML ophthalmic solution INSTILL 1 DROP INTO EACH EYE BID   • gabapentin (NEURONTIN) 100 MG capsule Take 1 capsule by mouth 3 (Three) Times a Day. For diabetic neuropathy   • glucose blood (ONE TOUCH ULTRA TEST) test strip Use to test blood sugar twice a day. ICD E11.9   • hydrochlorothiazide (HYDRODIURIL) 25 MG tablet Take 1 tablet by mouth Daily.   • lisinopril (PRINIVIL,ZESTRIL) 5 MG tablet Take 1 tablet by mouth Daily.   • lovastatin (MEVACOR) 20 MG tablet Take 1 tablet by mouth Every Night.   • methIMAzole (TAPAZOLE) 5 MG tablet Take 0.5 tablets by mouth Daily.   • ONETOUCH DELICA LANCETS FINE misc Use to check blood sugar twice a day. ICD E11.9   • SITagliptin-metFORMIN HCl ER (JANUMET XR)  MG tablet Take 1 tablet by mouth Daily With Breakfast.   • hydrOXYzine (ATARAX) 25 MG tablet Take 1 tablet by mouth Every 6 (Six) Hours As Needed for Itching.     No current facility-administered medications on file prior to visit.        Past Surgical History:   Procedure Laterality Date   • BACK SURGERY     • CARPAL TUNNEL RELEASE      Bilateral carpal tunnel release.)   03/27/2000    • CATARACT EXTRACTION      Bilateral   • INJECTION OF MEDICATION  06/17/2015    celestone(betamethasone) (2)    • INJECTION OF MEDICATION  04/14/2016    depo medrol ( methylprednisone) (20)   • OOPHORECTOMY     • OTHER SURGICAL HISTORY      hysterosope procedure   • RIGHT OOPHORECTOMY     • ROTATOR CUFF REPAIR      Right   • SHOULDER ARTHROSCOPY Right 2/6/2017    Procedure: RIGHT SHOULDER ARTHROSCOPY SUBACROMIAL DECOMPRESSION, ROTATOR CUFF REPAIR   ;  Surgeon: Terrence Haines MD;  Location: [facility];  Service:    • SHOULDER SURGERY      Excision of 4.8 cm mass with primary intermediate closure.)   03/30/2012    • SPINAL FUSION         Family History   Problem Relation Age of Onset   • Heart disease Other   "  • Hypertension Other    • COPD Mother        Social History     Socioeconomic History   • Marital status:      Spouse name: Not on file   • Number of children: Not on file   • Years of education: Not on file   • Highest education level: Not on file   Tobacco Use   • Smoking status: Never Smoker   • Smokeless tobacco: Never Used   Substance and Sexual Activity   • Alcohol use: No   • Drug use: No   • Sexual activity: Defer           ROS  No fevers or chills.  No chest pain or shortness of air.  No GI or  disturbances.  Other than right knee pain, all other systems reviewed as negative.    PHYSICAL EXAMINATION:       Ht 162.6 cm (64\")   Wt 65.3 kg (144 lb)   LMP 02/06/1980 (Approximate)   BMI 24.72 kg/m²     Physical Exam   Constitutional: She is oriented to person, place, and time. She appears well-developed and well-nourished. No distress.   Cardiovascular: Normal rate, regular rhythm and normal heart sounds.   Pulmonary/Chest: Effort normal and breath sounds normal.   Abdominal: Soft. Bowel sounds are normal.   Neurological: She is alert and oriented to person, place, and time.   Psychiatric: She has a normal mood and affect. Her behavior is normal. Judgment and thought content normal.   Vitals reviewed.      GAIT:     []  Normal  [x]  Antalgic    Assistive device: []  None  []  Walker     []  Crutches  [x]  Cane     []  Wheelchair  []  Stretcher    Right Knee Exam     Muscle Strength   The patient has normal right knee strength.    Tenderness   Right knee tenderness location: diffuse.    Range of Motion   Extension: -5   Flexion: 120     Tests   Varus: negative Valgus: negative  Drawer:  Anterior - negative    Posterior - negative    Other   Sensation: normal  Pulse: present  Swelling: mild    Comments:  Crepitation on motion.  Mild to moderate pain through arc of motion      Left Knee Exam     Muscle Strength   The patient has normal left knee strength.    Tenderness   Left knee tenderness location: " diffuse.    Range of Motion   Extension: -5   Flexion: 120     Tests   Varus: negative Valgus: negative  Drawer:  Anterior - negative     Posterior - negative    Other   Sensation: normal  Pulse: present  Swelling: none    Comments:  Crepitation with motion  Mild to moderate pain through arc of motion            Body mass index is 24.72 kg/m².    Xr Knee 1 Or 2 View Right    Result Date: 10/10/2019  Narrative: Ordering Provider:  Terrence Haines MD Ordering Diagnosis/Indication:  Chronic pain of both shoulders Procedure:  XR KNEE 1 OR 2 VW RIGHT Exam Date:  10/10/19 COMPARISON:  Todays X-rays were compared to previous images dated April 25, 2018.     Impression:  AP bilateral standing of the knees with lateral of the right knee shows arthritic change in both knees with worse findings in the lateral compartment of the right knee.  Bone-on-bone findings noted in the lateral compartment with tricompartmental osteophytic changes noted.  Mild valgus positioning noted in both knees.  Mild worsening in the x-rays in comparison to prior films.  No acute bony abnormality. End-stage Osteoarthritic change in the right knee. Terrence Haines MD 10/10/19     Xr Knee Bilateral Ap Standing    Result Date: 10/10/2019  Narrative: Ordering Provider:  Terrence Haines MD Ordering Diagnosis/Indication:  Chronic pain of both shoulders Procedure:  XR KNEE BILATERAL AP STANDING Exam Date:  10/10/19 COMPARISON:  Todays X-rays were compared to previous images dated April 25, 2018.     Impression:  AP bilateral standing of the knees with lateral of the right knee shows arthritic change in both knees with worse findings in the lateral compartment of the right knee.  Bone-on-bone findings noted in the lateral compartment with tricompartmental osteophytic changes noted.  Mild valgus positioning noted in both knees.  Mild worsening in the x-rays in comparison to prior films.  No acute bony abnormality. End-stage  Osteoarthritic change in the right knee. Terrence Haines MD 10/10/19             ASSESSMENT:    Diagnoses and all orders for this visit:    Primary osteoarthritis of both knees  -     Case Request; Standing  -     MRSA Screen, PCR - Swab, Nares; Future  -     Type and screen; Future  -     Tranexamic Acid 1,000 mg in sodium chloride 0.9 % 100 mL  -     Tranexamic Acid 1,000 mg in sodium chloride 0.9 % 100 mL  -     ceFAZolin (ANCEF) 2 g in sodium chloride 0.9 % 100 mL IVPB  -     acetaminophen (TYLENOL) tablet 975 mg  -     meloxicam (MOBIC) tablet 15 mg  -     pregabalin (LYRICA) capsule 75 mg  -     oxyCODONE (oxyCONTIN) 12 hr tablet 10 mg  -     Case Request    Chronic pain of both knees  -     Case Request; Standing  -     MRSA Screen, PCR - Swab, Nares; Future  -     Type and screen; Future  -     Tranexamic Acid 1,000 mg in sodium chloride 0.9 % 100 mL  -     Tranexamic Acid 1,000 mg in sodium chloride 0.9 % 100 mL  -     ceFAZolin (ANCEF) 2 g in sodium chloride 0.9 % 100 mL IVPB  -     acetaminophen (TYLENOL) tablet 975 mg  -     meloxicam (MOBIC) tablet 15 mg  -     pregabalin (LYRICA) capsule 75 mg  -     oxyCODONE (oxyCONTIN) 12 hr tablet 10 mg  -     Case Request    Essential hypertension  -     Case Request; Standing  -     MRSA Screen, PCR - Swab, Nares; Future  -     Type and screen; Future  -     Tranexamic Acid 1,000 mg in sodium chloride 0.9 % 100 mL  -     Tranexamic Acid 1,000 mg in sodium chloride 0.9 % 100 mL  -     ceFAZolin (ANCEF) 2 g in sodium chloride 0.9 % 100 mL IVPB  -     acetaminophen (TYLENOL) tablet 975 mg  -     meloxicam (MOBIC) tablet 15 mg  -     pregabalin (LYRICA) capsule 75 mg  -     oxyCODONE (oxyCONTIN) 12 hr tablet 10 mg  -     Case Request    Type 2 diabetes mellitus without complication, without long-term current use of insulin (CMS/[facility])  -     Case Request; Standing  -     MRSA Screen, PCR - Swab, Nares; Future  -     Type and screen; Future  -     Tranexamic Acid  1,000 mg in sodium chloride 0.9 % 100 mL  -     Tranexamic Acid 1,000 mg in sodium chloride 0.9 % 100 mL  -     ceFAZolin (ANCEF) 2 g in sodium chloride 0.9 % 100 mL IVPB  -     acetaminophen (TYLENOL) tablet 975 mg  -     meloxicam (MOBIC) tablet 15 mg  -     pregabalin (LYRICA) capsule 75 mg  -     oxyCODONE (oxyCONTIN) 12 hr tablet 10 mg  -     Case Request    Primary fibromyalgia syndrome  -     Case Request; Standing  -     MRSA Screen, PCR - Swab, Nares; Future  -     Type and screen; Future  -     Tranexamic Acid 1,000 mg in sodium chloride 0.9 % 100 mL  -     Tranexamic Acid 1,000 mg in sodium chloride 0.9 % 100 mL  -     ceFAZolin (ANCEF) 2 g in sodium chloride 0.9 % 100 mL IVPB  -     acetaminophen (TYLENOL) tablet 975 mg  -     meloxicam (MOBIC) tablet 15 mg  -     pregabalin (LYRICA) capsule 75 mg  -     oxyCODONE (oxyCONTIN) 12 hr tablet 10 mg  -     Case Request    Other orders  -     Outpatient In A Bed; Standing  -     Follow Anesthesia Guidelines / Standing Orders; Future  -     Provide instructions to patient regarding NPO status  -     Follow Anesthesia Guidelines / Standing Orders; Standing  -     Verify NPO Status; Standing  -     POC Glucose Once; Standing  -     Clip operative site; Standing  -     Obtain informed consent (if not collected inpatient or PAT); Standing  -     NPO After Midnight          PLAN    The patient voiced understanding of the risks, benefits, and alternative forms of treatment that were discussed and the patient consents to proceed with surgery.  All risks, benefits and alternatives were discussed.  Risks including but not exclusive to anesthetic complications, including death, MI, CVA, infection, bleeding DVT, fracture, residual pain and need for future surgery.    This discussion was held with the patient by Terrence Haines MD and all questions were answered.    Plan right TKA with adductor canal block    All questions answered.    Patient states she gets very sick with  pain medications and therefore we will not use the long acting oxycontin.    Return for Post-operative eval.    Terrence Haines MD

## 2019-10-11 RX ORDER — PREGABALIN 75 MG/1
75 CAPSULE ORAL ONCE
Status: CANCELLED | OUTPATIENT
Start: 2019-11-13 | End: 2019-10-11

## 2019-10-11 RX ORDER — ACETAMINOPHEN 325 MG/1
1000 TABLET ORAL ONCE
Status: CANCELLED | OUTPATIENT
Start: 2019-11-13 | End: 2019-10-11

## 2019-10-11 RX ORDER — MELOXICAM 15 MG/1
15 TABLET ORAL ONCE
Status: CANCELLED | OUTPATIENT
Start: 2019-11-13 | End: 2019-10-11

## 2019-10-11 RX ORDER — OXYCODONE HCL 10 MG/1
10 TABLET, FILM COATED, EXTENDED RELEASE ORAL ONCE
Status: CANCELLED | OUTPATIENT
Start: 2019-11-13 | End: 2019-10-11

## 2019-10-11 RX ORDER — BUPIVACAINE HCL/0.9 % NACL/PF 0.1 %
2 PLASTIC BAG, INJECTION (ML) EPIDURAL ONCE
Status: CANCELLED | OUTPATIENT
Start: 2019-11-13 | End: 2019-10-11

## 2019-10-15 PROBLEM — M25.562 CHRONIC PAIN OF BOTH KNEES: Status: ACTIVE | Noted: 2019-10-15

## 2019-10-15 PROBLEM — M25.561 CHRONIC PAIN OF BOTH KNEES: Status: ACTIVE | Noted: 2019-10-15

## 2019-10-15 PROBLEM — G89.29 CHRONIC PAIN OF BOTH KNEES: Status: ACTIVE | Noted: 2019-10-15

## 2019-10-18 ENCOUNTER — TELEPHONE (OUTPATIENT)
Dept: ENDOCRINOLOGY | Facility: CLINIC | Age: 74
End: 2019-10-18

## 2019-10-18 RX ORDER — METHIMAZOLE 5 MG/1
2.5 TABLET ORAL DAILY
Qty: 45 TABLET | Refills: 1 | Status: SHIPPED | OUTPATIENT
Start: 2019-10-18 | End: 2019-12-27 | Stop reason: SDUPTHER

## 2019-11-04 ENCOUNTER — APPOINTMENT (OUTPATIENT)
Dept: PREADMISSION TESTING | Facility: HOSPITAL | Age: 74
End: 2019-11-04

## 2019-11-04 ENCOUNTER — OFFICE VISIT (OUTPATIENT)
Dept: ORTHOPEDIC SURGERY | Facility: CLINIC | Age: 74
End: 2019-11-04

## 2019-11-04 VITALS — BODY MASS INDEX: 24.09 KG/M2 | HEIGHT: 64 IN | WEIGHT: 141.1 LBS

## 2019-11-04 VITALS
HEART RATE: 69 BPM | DIASTOLIC BLOOD PRESSURE: 75 MMHG | WEIGHT: 141 LBS | SYSTOLIC BLOOD PRESSURE: 174 MMHG | OXYGEN SATURATION: 98 % | HEIGHT: 64 IN | RESPIRATION RATE: 16 BRPM | BODY MASS INDEX: 24.07 KG/M2

## 2019-11-04 DIAGNOSIS — G89.29 CHRONIC PAIN OF BOTH KNEES: ICD-10-CM

## 2019-11-04 DIAGNOSIS — M79.7 PRIMARY FIBROMYALGIA SYNDROME: ICD-10-CM

## 2019-11-04 DIAGNOSIS — M25.561 PAIN IN BOTH KNEES, UNSPECIFIED CHRONICITY: ICD-10-CM

## 2019-11-04 DIAGNOSIS — M17.0 PRIMARY OSTEOARTHRITIS OF BOTH KNEES: Primary | ICD-10-CM

## 2019-11-04 DIAGNOSIS — E11.9 TYPE 2 DIABETES MELLITUS WITHOUT COMPLICATION, WITHOUT LONG-TERM CURRENT USE OF INSULIN (HCC): ICD-10-CM

## 2019-11-04 DIAGNOSIS — M25.562 CHRONIC PAIN OF BOTH KNEES: ICD-10-CM

## 2019-11-04 DIAGNOSIS — I10 ESSENTIAL HYPERTENSION: ICD-10-CM

## 2019-11-04 DIAGNOSIS — M25.561 CHRONIC PAIN OF BOTH KNEES: ICD-10-CM

## 2019-11-04 DIAGNOSIS — M25.562 PAIN IN BOTH KNEES, UNSPECIFIED CHRONICITY: ICD-10-CM

## 2019-11-04 DIAGNOSIS — M17.0 PRIMARY OSTEOARTHRITIS OF BOTH KNEES: ICD-10-CM

## 2019-11-04 LAB
ABO GROUP BLD: NORMAL
ANION GAP SERPL CALCULATED.3IONS-SCNC: 13 MMOL/L (ref 5–15)
BLD GP AB SCN SERPL QL: NEGATIVE
BUN BLD-MCNC: 23 MG/DL (ref 8–23)
BUN/CREAT SERPL: 23.7 (ref 7–25)
CALCIUM SPEC-SCNC: 9.7 MG/DL (ref 8.6–10.5)
CHLORIDE SERPL-SCNC: 107 MMOL/L (ref 98–107)
CO2 SERPL-SCNC: 24 MMOL/L (ref 22–29)
CREAT BLD-MCNC: 0.97 MG/DL (ref 0.57–1)
DEPRECATED RDW RBC AUTO: 39.6 FL (ref 37–54)
ERYTHROCYTE [DISTWIDTH] IN BLOOD BY AUTOMATED COUNT: 12.4 % (ref 12.3–15.4)
GFR SERPL CREATININE-BSD FRML MDRD: 56 ML/MIN/1.73
GLUCOSE BLD-MCNC: 117 MG/DL (ref 65–99)
HCT VFR BLD AUTO: 33.8 % (ref 34–46.6)
HGB BLD-MCNC: 11.4 G/DL (ref 12–15.9)
Lab: NORMAL
MCH RBC QN AUTO: 29.4 PG (ref 26.6–33)
MCHC RBC AUTO-ENTMCNC: 33.7 G/DL (ref 31.5–35.7)
MCV RBC AUTO: 87.1 FL (ref 79–97)
MRSA DNA SPEC QL NAA+PROBE: NEGATIVE
PLATELET # BLD AUTO: 258 10*3/MM3 (ref 140–450)
PMV BLD AUTO: 11.8 FL (ref 6–12)
POTASSIUM BLD-SCNC: 4 MMOL/L (ref 3.5–5.2)
RBC # BLD AUTO: 3.88 10*6/MM3 (ref 3.77–5.28)
RH BLD: POSITIVE
SODIUM BLD-SCNC: 144 MMOL/L (ref 136–145)
T&S EXPIRATION DATE: NORMAL
WBC NRBC COR # BLD: 6.11 10*3/MM3 (ref 3.4–10.8)

## 2019-11-04 PROCEDURE — 86850 RBC ANTIBODY SCREEN: CPT | Performed by: ORTHOPAEDIC SURGERY

## 2019-11-04 PROCEDURE — 86900 BLOOD TYPING SEROLOGIC ABO: CPT | Performed by: ORTHOPAEDIC SURGERY

## 2019-11-04 PROCEDURE — 86901 BLOOD TYPING SEROLOGIC RH(D): CPT | Performed by: ORTHOPAEDIC SURGERY

## 2019-11-04 PROCEDURE — 85027 COMPLETE CBC AUTOMATED: CPT | Performed by: ANESTHESIOLOGY

## 2019-11-04 PROCEDURE — 99213 OFFICE O/P EST LOW 20 MIN: CPT | Performed by: NURSE PRACTITIONER

## 2019-11-04 PROCEDURE — 93010 ELECTROCARDIOGRAM REPORT: CPT | Performed by: INTERNAL MEDICINE

## 2019-11-04 PROCEDURE — 36415 COLL VENOUS BLD VENIPUNCTURE: CPT

## 2019-11-04 PROCEDURE — 87641 MR-STAPH DNA AMP PROBE: CPT | Performed by: ORTHOPAEDIC SURGERY

## 2019-11-04 PROCEDURE — 93005 ELECTROCARDIOGRAM TRACING: CPT

## 2019-11-04 PROCEDURE — 80048 BASIC METABOLIC PNL TOTAL CA: CPT | Performed by: ANESTHESIOLOGY

## 2019-11-04 RX ORDER — PNV NO.95/FERROUS FUM/FOLIC AC 28MG-0.8MG
1 TABLET ORAL DAILY
COMMUNITY

## 2019-11-04 RX ORDER — DORZOLAMIDE HYDROCHLORIDE AND TIMOLOL MALEATE 20; 5 MG/ML; MG/ML
1 SOLUTION/ DROPS OPHTHALMIC 2 TIMES DAILY
COMMUNITY

## 2019-11-04 RX ORDER — SODIUM CHLORIDE 9 MG/ML
1000 INJECTION, SOLUTION INTRAVENOUS CONTINUOUS
Status: CANCELLED | OUTPATIENT
Start: 2019-11-13

## 2019-11-04 ASSESSMENT — KOOS JR
KOOS JR SCORE: 24
KOOS JR SCORE: 24.875

## 2019-11-04 NOTE — PROGRESS NOTES
Thelma Haley is a 74 y.o. female returns for     Chief Complaint   Patient presents with   • Right Knee - Post-op       HISTORY OF PRESENT ILLNESS    74-year-old  female in the office today for follow-up evaluation and preoperative evaluation for right total knee arthroplasty with Dr. Haines later on this month.  She denies any fever chills or evidence of infection.  She is recently reported some worsening lumbar spine pain however denies any new injury.     CONCURRENT MEDICAL HISTORY:    Past Medical History:   Diagnosis Date   • Abnormal results of thyroid function studies    • Allergic rhinitis due to pollen    • Asthma      uncomplicated      • Cataract    • Chronic rhinitis    • Diabetes mellitus (CMS/HCC)    • Disease of thyroid gland    • Essential hypertension    • Extrinsic asthma with status asthmaticus    • Glaucoma    • Hyperlipidemia    • Impingement syndrome of right shoulder    • Injury of back     L4 L5 bulging disc   • Neuropathy    • Nuclear senile cataract    • Overweight with body mass index (BMI) 25.0-29.9    • Primary fibromyalgia syndrome    • Primary open angle glaucoma    • Rosacea    • Temporomandibular joint disorder        ROS  No fevers or chills.  No chest pain or shortness of air.  No GI or  disturbances.    Allergies   Allergen Reactions   • Ciprofloxacin Nausea And Vomiting   • Hydrocodone Nausea And Vomiting   • Lortab [Hydrocodone-Acetaminophen] Nausea And Vomiting   • Metronidazole Nausea And Vomiting   • Oxycodone Nausea And Vomiting     Dizziness and doesn't decrease pain   • Ultram [Tramadol Hcl] Nausea And Vomiting         Current Outpatient Medications:   •  Biotin 85228 MCG tablet, Take 1 tablet by mouth Daily., Disp: , Rfl:   •  Calcium 500-100 MG-UNIT chewable tablet, Chew 1 tablet 2 (Two) Times a Day., Disp: , Rfl:   •  CETIRIZINE HCL PO, Take 10 mg by mouth Daily., Disp: , Rfl:   •  dorzolamide-timolol (COSOPT) 22.3-6.8 MG/ML ophthalmic solution, INSTILL 1  "DROP INTO EACH EYE BID, Disp: , Rfl: 0  •  gabapentin (NEURONTIN) 100 MG capsule, Take 1 capsule by mouth 3 (Three) Times a Day. For diabetic neuropathy, Disp: 90 capsule, Rfl: 3  •  glucose blood (ONE TOUCH ULTRA TEST) test strip, Use to test blood sugar twice a day. ICD E11.9, Disp: 100 each, Rfl: 5  •  hydrOXYzine (ATARAX) 25 MG tablet, Take 1 tablet by mouth Every 6 (Six) Hours As Needed for Itching., Disp: 30 tablet, Rfl: 0  •  lisinopril (PRINIVIL,ZESTRIL) 5 MG tablet, Take 1 tablet by mouth Daily., Disp: 90 tablet, Rfl: 3  •  lovastatin (MEVACOR) 20 MG tablet, Take 1 tablet by mouth Every Night., Disp: 90 tablet, Rfl: 3  •  methIMAzole (TAPAZOLE) 5 MG tablet, Take 0.5 tablets by mouth Daily., Disp: 45 tablet, Rfl: 1  •  ONETOUCH DELICA LANCETS FINE misc, Use to check blood sugar twice a day. ICD E11.9, Disp: 100 each, Rfl: 5  •  SITagliptin-metFORMIN HCl ER (JANUMET XR)  MG tablet, Take 1 tablet by mouth Daily With Breakfast., Disp: 90 tablet, Rfl: 3    PHYSICAL EXAMINATION:       Ht 162.6 cm (64\")   Wt 64 kg (141 lb 1.6 oz)   LMP 02/06/1980 (Approximate)   BMI 24.22 kg/m²     Physical Exam   Constitutional: She is oriented to person, place, and time. Vital signs are normal. She appears well-developed and well-nourished. She is cooperative.   HENT:   Head: Normocephalic and atraumatic.   Neck: Trachea normal and phonation normal.   Cardiovascular: Regular rhythm, S1 normal, S2 normal and normal heart sounds.   Pulmonary/Chest: Effort normal and breath sounds normal. No respiratory distress.   Abdominal: Soft. Normal appearance. She exhibits no distension.   Neurological: She is alert and oriented to person, place, and time. GCS eye subscore is 4. GCS verbal subscore is 5. GCS motor subscore is 6.   Skin: Skin is warm, dry and intact. Capillary refill takes less than 2 seconds.   Psychiatric: She has a normal mood and affect. Her speech is normal and behavior is normal. Judgment and thought content " normal. Cognition and memory are normal.   Vitals reviewed.      GAIT:     []  Normal  [x]  Antalgic    Assistive device: []  None  []  Walker     []  Crutches  [x]  Cane     []  Wheelchair  []  Stretcher    Right Knee Exam     Muscle Strength   The patient has normal right knee strength.    Tenderness   Right knee tenderness location: diffuse.    Range of Motion   Extension: -5   Flexion: 120     Tests   Varus: negative Valgus: negative  Drawer:  Anterior - negative    Posterior - negative    Other   Sensation: normal  Pulse: present  Swelling: mild    Comments:  Crepitation on motion.  Mild to moderate pain through arc of motion      Left Knee Exam     Muscle Strength   The patient has normal left knee strength.    Tenderness   Left knee tenderness location: diffuse.    Range of Motion   Extension: -5   Flexion: 120     Tests   Varus: negative Valgus: negative  Drawer:  Anterior - negative     Posterior - negative    Other   Sensation: normal  Pulse: present  Swelling: none    Comments:  Crepitation with motion  Mild to moderate pain through arc of motion              Xr Knee 1 Or 2 View Right    Result Date: 10/10/2019  Narrative: Ordering Provider:  Terrence Haines MD Ordering Diagnosis/Indication:  Chronic pain of both shoulders Procedure:  XR KNEE 1 OR 2 VW RIGHT Exam Date:  10/10/19 COMPARISON:  Todays X-rays were compared to previous images dated April 25, 2018.     Impression:  AP bilateral standing of the knees with lateral of the right knee shows arthritic change in both knees with worse findings in the lateral compartment of the right knee.  Bone-on-bone findings noted in the lateral compartment with tricompartmental osteophytic changes noted.  Mild valgus positioning noted in both knees.  Mild worsening in the x-rays in comparison to prior films.  No acute bony abnormality. End-stage Osteoarthritic change in the right knee. Terrence Haines MD 10/10/19     Xr Knee Bilateral Ap  Standing    Result Date: 10/10/2019  Narrative: Ordering Provider:  Terrence Haines MD Ordering Diagnosis/Indication:  Chronic pain of both shoulders Procedure:  XR KNEE BILATERAL AP STANDING Exam Date:  10/10/19 COMPARISON:  Todays X-rays were compared to previous images dated April 25, 2018.     Impression:  AP bilateral standing of the knees with lateral of the right knee shows arthritic change in both knees with worse findings in the lateral compartment of the right knee.  Bone-on-bone findings noted in the lateral compartment with tricompartmental osteophytic changes noted.  Mild valgus positioning noted in both knees.  Mild worsening in the x-rays in comparison to prior films.  No acute bony abnormality. End-stage Osteoarthritic change in the right knee. Terrence Haines MD 10/10/19             ASSESSMENT:    Diagnoses and all orders for this visit:    Primary osteoarthritis of both knees    Chronic pain of both knees    Pain in both knees, unspecified chronicity    Type 2 diabetes mellitus without complication, without long-term current use of insulin (CMS/[facility])          PLAN      The patient voiced understanding of the risks, benefits, and alternative forms of treatment that were discussed and the patient consents to proceed with a right total knee arthoplasty with Dr. Haines.  All risks, benefits and alternatives were discussed.  Risks including but not exclusive to anesthetic complications, including death, MI, CVA, infection, bleeding DVT, fracture, residual pain and need for future surgery.    This discussion was held with the patient by Terrence Haines MD and all questions were answered.    No Follow-up on file.    JUAN Hathaway

## 2019-11-04 NOTE — PAT
Dr. Gillespie here to review EKG and talk with patient. No new orders received. Chlorhexidine wash and pre-op instructions given, patient voiced understanding.

## 2019-11-04 NOTE — DISCHARGE INSTRUCTIONS
[facility]  Pre-op Information and Guidelines    You will be called after 2 p.m. the day before your surgery (Friday for Monday surgery) and notified of your time for arrival and approximate surgery time.  If you have not received a call by 4P.M., please contact Same Day Surgery at [phone] of if outside [facility] call [phone].    Please Follow these Important Safety Guidelines:    • The morning of your procedure, take only the medications listed below with   A sip of water:_____________________________________________       ___CETIRIZINE, EYE DROP, METHIMAZOLE, GABAPENTIN_    • DO NOT eat or drink anything after 12:00 midnight the night before surgery  Specific instructions concerning drinking clear liquids will be discussed during  the pre-surgery instruction call the day before your surgery.    • If you take a blood thinner (ex. Plavix, Coumadin, aspirin), ask your doctor when to stop it before surgery  STOP DATE: _________________    • Only 2 visitors are allowed in patient rooms at a time  Your visitors will be asked to wait in the lobby until the admission process is complete with the exception of a parent with a child and patients in need of special assistance.    • YOU CANNOT DRIVE YOURSELF HOME  You must be accompanied by someone who will be responsible for driving you home after surgery and for your care at home.    • DO NOT chew gum, use breath mints, hard candy, or smoke the day of surgery  • DO NOT drink alcohol for at least 24 hours before your surgery  • DO NOT wear any jewelry and remove all body piercing before coming to the hospital  • DO NOT wear make-up to the hospital  • If you are having surgery on an extremity (arm/leg/foot) remove nail polish/artificial nails on the surgical side  • Clothing, glasses, contacts, dentures, and hairpieces must be removed before surgery  • Bathe the night before or the morning of your surgery and do not use powders/lotions on  skin.

## 2019-11-04 NOTE — H&P (VIEW-ONLY)
Thelam Haley is a 74 y.o. female returns for     Chief Complaint   Patient presents with   • Right Knee - Post-op       HISTORY OF PRESENT ILLNESS    74-year-old  female in the office today for follow-up evaluation and preoperative evaluation for right total knee arthroplasty with Dr. Haines later on this month.  She denies any fever chills or evidence of infection.  She is recently reported some worsening lumbar spine pain however denies any new injury.     CONCURRENT MEDICAL HISTORY:    Past Medical History:   Diagnosis Date   • Abnormal results of thyroid function studies    • Allergic rhinitis due to pollen    • Asthma      uncomplicated      • Cataract    • Chronic rhinitis    • Diabetes mellitus (CMS/HCC)    • Disease of thyroid gland    • Essential hypertension    • Extrinsic asthma with status asthmaticus    • Glaucoma    • Hyperlipidemia    • Impingement syndrome of right shoulder    • Injury of back     L4 L5 bulging disc   • Neuropathy    • Nuclear senile cataract    • Overweight with body mass index (BMI) 25.0-29.9    • Primary fibromyalgia syndrome    • Primary open angle glaucoma    • Rosacea    • Temporomandibular joint disorder        ROS  No fevers or chills.  No chest pain or shortness of air.  No GI or  disturbances.    Allergies   Allergen Reactions   • Ciprofloxacin Nausea And Vomiting   • Hydrocodone Nausea And Vomiting   • Lortab [Hydrocodone-Acetaminophen] Nausea And Vomiting   • Metronidazole Nausea And Vomiting   • Oxycodone Nausea And Vomiting     Dizziness and doesn't decrease pain   • Ultram [Tramadol Hcl] Nausea And Vomiting         Current Outpatient Medications:   •  Biotin 09430 MCG tablet, Take 1 tablet by mouth Daily., Disp: , Rfl:   •  Calcium 500-100 MG-UNIT chewable tablet, Chew 1 tablet 2 (Two) Times a Day., Disp: , Rfl:   •  CETIRIZINE HCL PO, Take 10 mg by mouth Daily., Disp: , Rfl:   •  dorzolamide-timolol (COSOPT) 22.3-6.8 MG/ML ophthalmic solution, INSTILL 1  "DROP INTO EACH EYE BID, Disp: , Rfl: 0  •  gabapentin (NEURONTIN) 100 MG capsule, Take 1 capsule by mouth 3 (Three) Times a Day. For diabetic neuropathy, Disp: 90 capsule, Rfl: 3  •  glucose blood (ONE TOUCH ULTRA TEST) test strip, Use to test blood sugar twice a day. ICD E11.9, Disp: 100 each, Rfl: 5  •  hydrOXYzine (ATARAX) 25 MG tablet, Take 1 tablet by mouth Every 6 (Six) Hours As Needed for Itching., Disp: 30 tablet, Rfl: 0  •  lisinopril (PRINIVIL,ZESTRIL) 5 MG tablet, Take 1 tablet by mouth Daily., Disp: 90 tablet, Rfl: 3  •  lovastatin (MEVACOR) 20 MG tablet, Take 1 tablet by mouth Every Night., Disp: 90 tablet, Rfl: 3  •  methIMAzole (TAPAZOLE) 5 MG tablet, Take 0.5 tablets by mouth Daily., Disp: 45 tablet, Rfl: 1  •  ONETOUCH DELICA LANCETS FINE misc, Use to check blood sugar twice a day. ICD E11.9, Disp: 100 each, Rfl: 5  •  SITagliptin-metFORMIN HCl ER (JANUMET XR)  MG tablet, Take 1 tablet by mouth Daily With Breakfast., Disp: 90 tablet, Rfl: 3    PHYSICAL EXAMINATION:       Ht 162.6 cm (64\")   Wt 64 kg (141 lb 1.6 oz)   LMP 02/06/1980 (Approximate)   BMI 24.22 kg/m²     Physical Exam   Constitutional: She is oriented to person, place, and time. Vital signs are normal. She appears well-developed and well-nourished. She is cooperative.   HENT:   Head: Normocephalic and atraumatic.   Neck: Trachea normal and phonation normal.   Cardiovascular: Regular rhythm, S1 normal, S2 normal and normal heart sounds.   Pulmonary/Chest: Effort normal and breath sounds normal. No respiratory distress.   Abdominal: Soft. Normal appearance. She exhibits no distension.   Neurological: She is alert and oriented to person, place, and time. GCS eye subscore is 4. GCS verbal subscore is 5. GCS motor subscore is 6.   Skin: Skin is warm, dry and intact. Capillary refill takes less than 2 seconds.   Psychiatric: She has a normal mood and affect. Her speech is normal and behavior is normal. Judgment and thought content " normal. Cognition and memory are normal.   Vitals reviewed.      GAIT:     []  Normal  [x]  Antalgic    Assistive device: []  None  []  Walker     []  Crutches  [x]  Cane     []  Wheelchair  []  Stretcher    Right Knee Exam     Muscle Strength   The patient has normal right knee strength.    Tenderness   Right knee tenderness location: diffuse.    Range of Motion   Extension: -5   Flexion: 120     Tests   Varus: negative Valgus: negative  Drawer:  Anterior - negative    Posterior - negative    Other   Sensation: normal  Pulse: present  Swelling: mild    Comments:  Crepitation on motion.  Mild to moderate pain through arc of motion      Left Knee Exam     Muscle Strength   The patient has normal left knee strength.    Tenderness   Left knee tenderness location: diffuse.    Range of Motion   Extension: -5   Flexion: 120     Tests   Varus: negative Valgus: negative  Drawer:  Anterior - negative     Posterior - negative    Other   Sensation: normal  Pulse: present  Swelling: none    Comments:  Crepitation with motion  Mild to moderate pain through arc of motion              Xr Knee 1 Or 2 View Right    Result Date: 10/10/2019  Narrative: Ordering Provider:  Terrence Haines MD Ordering Diagnosis/Indication:  Chronic pain of both shoulders Procedure:  XR KNEE 1 OR 2 VW RIGHT Exam Date:  10/10/19 COMPARISON:  Todays X-rays were compared to previous images dated April 25, 2018.     Impression:  AP bilateral standing of the knees with lateral of the right knee shows arthritic change in both knees with worse findings in the lateral compartment of the right knee.  Bone-on-bone findings noted in the lateral compartment with tricompartmental osteophytic changes noted.  Mild valgus positioning noted in both knees.  Mild worsening in the x-rays in comparison to prior films.  No acute bony abnormality. End-stage Osteoarthritic change in the right knee. Terrence Haines MD 10/10/19     Xr Knee Bilateral Ap  Standing    Result Date: 10/10/2019  Narrative: Ordering Provider:  Terrence Haines MD Ordering Diagnosis/Indication:  Chronic pain of both shoulders Procedure:  XR KNEE BILATERAL AP STANDING Exam Date:  10/10/19 COMPARISON:  Todays X-rays were compared to previous images dated April 25, 2018.     Impression:  AP bilateral standing of the knees with lateral of the right knee shows arthritic change in both knees with worse findings in the lateral compartment of the right knee.  Bone-on-bone findings noted in the lateral compartment with tricompartmental osteophytic changes noted.  Mild valgus positioning noted in both knees.  Mild worsening in the x-rays in comparison to prior films.  No acute bony abnormality. End-stage Osteoarthritic change in the right knee. Terrence Haines MD 10/10/19             ASSESSMENT:    Diagnoses and all orders for this visit:    Primary osteoarthritis of both knees    Chronic pain of both knees    Pain in both knees, unspecified chronicity    Type 2 diabetes mellitus without complication, without long-term current use of insulin (CMS/[facility])          PLAN      The patient voiced understanding of the risks, benefits, and alternative forms of treatment that were discussed and the patient consents to proceed with a right total knee arthoplasty with Dr. Haines.  All risks, benefits and alternatives were discussed.  Risks including but not exclusive to anesthetic complications, including death, MI, CVA, infection, bleeding DVT, fracture, residual pain and need for future surgery.    This discussion was held with the patient by Terrence Haines MD and all questions were answered.    No Follow-up on file.    JUAN Hahtaway

## 2019-11-13 ENCOUNTER — APPOINTMENT (OUTPATIENT)
Dept: GENERAL RADIOLOGY | Facility: HOSPITAL | Age: 74
End: 2019-11-13

## 2019-11-13 ENCOUNTER — ANESTHESIA (OUTPATIENT)
Dept: PERIOP | Facility: HOSPITAL | Age: 74
End: 2019-11-13

## 2019-11-13 ENCOUNTER — HOSPITAL ENCOUNTER (INPATIENT)
Facility: HOSPITAL | Age: 74
LOS: 1 days | Discharge: HOME | End: 2019-11-14
Attending: ORTHOPAEDIC SURGERY | Admitting: ORTHOPAEDIC SURGERY

## 2019-11-13 ENCOUNTER — ANESTHESIA EVENT (OUTPATIENT)
Dept: PERIOP | Facility: HOSPITAL | Age: 74
End: 2019-11-13

## 2019-11-13 DIAGNOSIS — M79.7 PRIMARY FIBROMYALGIA SYNDROME: ICD-10-CM

## 2019-11-13 DIAGNOSIS — G89.29 CHRONIC PAIN OF BOTH KNEES: ICD-10-CM

## 2019-11-13 DIAGNOSIS — Z74.09 IMPAIRED MOBILITY AND ADLS: ICD-10-CM

## 2019-11-13 DIAGNOSIS — E11.9 TYPE 2 DIABETES MELLITUS WITHOUT COMPLICATION, WITHOUT LONG-TERM CURRENT USE OF INSULIN (HCC): ICD-10-CM

## 2019-11-13 DIAGNOSIS — Z74.09 IMPAIRED FUNCTIONAL MOBILITY, BALANCE, GAIT, AND ENDURANCE: ICD-10-CM

## 2019-11-13 DIAGNOSIS — Z79.01 ANTICOAGULANT LONG-TERM USE: ICD-10-CM

## 2019-11-13 DIAGNOSIS — M17.0 PRIMARY OSTEOARTHRITIS OF BOTH KNEES: Primary | ICD-10-CM

## 2019-11-13 DIAGNOSIS — I10 ESSENTIAL HYPERTENSION: ICD-10-CM

## 2019-11-13 DIAGNOSIS — M25.562 CHRONIC PAIN OF BOTH KNEES: ICD-10-CM

## 2019-11-13 DIAGNOSIS — Z78.9 IMPAIRED MOBILITY AND ADLS: ICD-10-CM

## 2019-11-13 DIAGNOSIS — M25.561 CHRONIC PAIN OF BOTH KNEES: ICD-10-CM

## 2019-11-13 LAB
ABO GROUP BLD: NORMAL
BLD GP AB SCN SERPL QL: NEGATIVE
GLUCOSE BLDC GLUCOMTR-MCNC: 123 MG/DL (ref 70–130)
GLUCOSE BLDC GLUCOMTR-MCNC: 141 MG/DL (ref 70–130)
GLUCOSE BLDC GLUCOMTR-MCNC: 229 MG/DL (ref 70–130)
GLUCOSE BLDC GLUCOMTR-MCNC: 94 MG/DL (ref 70–130)
HCT VFR BLD AUTO: 32.2 % (ref 34–46.6)
HGB BLD-MCNC: 10.7 G/DL (ref 12–15.9)
Lab: NORMAL
RH BLD: POSITIVE
T&S EXPIRATION DATE: NORMAL

## 2019-11-13 PROCEDURE — 86850 RBC ANTIBODY SCREEN: CPT | Performed by: ORTHOPAEDIC SURGERY

## 2019-11-13 PROCEDURE — 63710000001 MELOXICAM 15 MG TABLET: Performed by: ORTHOPAEDIC SURGERY

## 2019-11-13 PROCEDURE — 86900 BLOOD TYPING SEROLOGIC ABO: CPT | Performed by: ORTHOPAEDIC SURGERY

## 2019-11-13 PROCEDURE — 97162 PT EVAL MOD COMPLEX 30 MIN: CPT

## 2019-11-13 PROCEDURE — 63710000001 ACETAMINOPHEN 325 MG TABLET: Performed by: ORTHOPAEDIC SURGERY

## 2019-11-13 PROCEDURE — 97166 OT EVAL MOD COMPLEX 45 MIN: CPT

## 2019-11-13 PROCEDURE — 25010000002 HYDROMORPHONE 1 MG/ML SOLUTION: Performed by: NURSE ANESTHETIST, CERTIFIED REGISTERED

## 2019-11-13 PROCEDURE — 0SRC0J9 REPLACEMENT OF RIGHT KNEE JOINT WITH SYNTHETIC SUBSTITUTE, CEMENTED, OPEN APPROACH: ICD-10-PCS | Performed by: ORTHOPAEDIC SURGERY

## 2019-11-13 PROCEDURE — 88305 TISSUE EXAM BY PATHOLOGIST: CPT | Performed by: ORTHOPAEDIC SURGERY

## 2019-11-13 PROCEDURE — 63710000001 INSULIN ASPART PER 5 UNITS: Performed by: ORTHOPAEDIC SURGERY

## 2019-11-13 PROCEDURE — A9270 NON-COVERED ITEM OR SERVICE: HCPCS | Performed by: ORTHOPAEDIC SURGERY

## 2019-11-13 PROCEDURE — 25010000002 PROPOFOL 10 MG/ML EMULSION: Performed by: NURSE ANESTHETIST, CERTIFIED REGISTERED

## 2019-11-13 PROCEDURE — 73560 X-RAY EXAM OF KNEE 1 OR 2: CPT

## 2019-11-13 PROCEDURE — 27447 TOTAL KNEE ARTHROPLASTY: CPT | Performed by: ORTHOPAEDIC SURGERY

## 2019-11-13 PROCEDURE — 25010000002 MIDAZOLAM PER 1 MG: Performed by: NURSE ANESTHETIST, CERTIFIED REGISTERED

## 2019-11-13 PROCEDURE — 82962 GLUCOSE BLOOD TEST: CPT

## 2019-11-13 PROCEDURE — 88305 TISSUE EXAM BY PATHOLOGIST: CPT | Performed by: PATHOLOGY

## 2019-11-13 PROCEDURE — 25010000002 ONDANSETRON PER 1 MG: Performed by: ORTHOPAEDIC SURGERY

## 2019-11-13 PROCEDURE — 94760 N-INVAS EAR/PLS OXIMETRY 1: CPT

## 2019-11-13 PROCEDURE — 25010000002 KETOROLAC TROMETHAMINE PER 15 MG: Performed by: NURSE ANESTHETIST, CERTIFIED REGISTERED

## 2019-11-13 PROCEDURE — 25010000002 ROPIVACAINE PER 1 MG: Performed by: ANESTHESIOLOGY

## 2019-11-13 PROCEDURE — 85014 HEMATOCRIT: CPT | Performed by: ORTHOPAEDIC SURGERY

## 2019-11-13 PROCEDURE — C1776 JOINT DEVICE (IMPLANTABLE): HCPCS | Performed by: ORTHOPAEDIC SURGERY

## 2019-11-13 PROCEDURE — 25010000002 HYDROMORPHONE PER 4 MG: Performed by: NURSE ANESTHETIST, CERTIFIED REGISTERED

## 2019-11-13 PROCEDURE — 25010000002 HALOPERIDOL LACTATE PER 5 MG: Performed by: NURSE ANESTHETIST, CERTIFIED REGISTERED

## 2019-11-13 PROCEDURE — 85018 HEMOGLOBIN: CPT | Performed by: ORTHOPAEDIC SURGERY

## 2019-11-13 PROCEDURE — C1713 ANCHOR/SCREW BN/BN,TIS/BN: HCPCS | Performed by: ORTHOPAEDIC SURGERY

## 2019-11-13 PROCEDURE — 86901 BLOOD TYPING SEROLOGIC RH(D): CPT | Performed by: ORTHOPAEDIC SURGERY

## 2019-11-13 PROCEDURE — 63710000001 PREGABALIN 75 MG CAPSULE: Performed by: ORTHOPAEDIC SURGERY

## 2019-11-13 PROCEDURE — 94799 UNLISTED PULMONARY SVC/PX: CPT

## 2019-11-13 DEVICE — ATTUNE KNEE SYSTEM TIBIAL INSERT ROTATING PLATFORM POSTERIOR STABILIZED 4 5MM AOX
Type: IMPLANTABLE DEVICE | Site: KNEE | Status: FUNCTIONAL
Brand: ATTUNE

## 2019-11-13 DEVICE — ATTUNE KNEE SYSTEM TIBIAL BASE ROTATING PLATFORM SIZE 4 CEMENTED
Type: IMPLANTABLE DEVICE | Site: KNEE | Status: FUNCTIONAL
Brand: ATTUNE

## 2019-11-13 DEVICE — ATTUNE KNEE SYSTEM FEMORAL POSTERIOR STABILIZED SIZE 4 RIGHT CEMENTED
Type: IMPLANTABLE DEVICE | Site: KNEE | Status: FUNCTIONAL
Brand: ATTUNE

## 2019-11-13 DEVICE — ATTUNE PATELLA MEDIALIZED DOME 32MM CEMENTED AOX
Type: IMPLANTABLE DEVICE | Site: KNEE | Status: FUNCTIONAL
Brand: ATTUNE

## 2019-11-13 DEVICE — TOTL KN ATTUNE DEPUY 9527038: Type: IMPLANTABLE DEVICE | Site: KNEE | Status: FUNCTIONAL

## 2019-11-13 DEVICE — SMARTSET HV HIGH VISCOSITY BONE CEMENT 40G
Type: IMPLANTABLE DEVICE | Site: KNEE | Status: FUNCTIONAL
Brand: SMARTSET

## 2019-11-13 RX ORDER — NALOXONE HCL 0.4 MG/ML
0.1 VIAL (ML) INJECTION
Status: DISCONTINUED | OUTPATIENT
Start: 2019-11-13 | End: 2019-11-14 | Stop reason: HOSPADM

## 2019-11-13 RX ORDER — METFORMIN HYDROCHLORIDE 500 MG/1
1000 TABLET, EXTENDED RELEASE ORAL
Status: DISCONTINUED | OUTPATIENT
Start: 2019-11-14 | End: 2019-11-14 | Stop reason: HOSPADM

## 2019-11-13 RX ORDER — PROMETHAZINE HYDROCHLORIDE 25 MG/ML
12.5 INJECTION, SOLUTION INTRAMUSCULAR; INTRAVENOUS ONCE AS NEEDED
Status: DISCONTINUED | OUTPATIENT
Start: 2019-11-13 | End: 2019-11-13 | Stop reason: HOSPADM

## 2019-11-13 RX ORDER — CLINDAMYCIN PHOSPHATE 600 MG/50ML
600 INJECTION INTRAVENOUS EVERY 8 HOURS
Status: COMPLETED | OUTPATIENT
Start: 2019-11-13 | End: 2019-11-13

## 2019-11-13 RX ORDER — SODIUM CHLORIDE 0.9 % (FLUSH) 0.9 %
3 SYRINGE (ML) INJECTION EVERY 12 HOURS SCHEDULED
Status: DISCONTINUED | OUTPATIENT
Start: 2019-11-13 | End: 2019-11-14 | Stop reason: HOSPADM

## 2019-11-13 RX ORDER — KETOROLAC TROMETHAMINE 30 MG/ML
INJECTION, SOLUTION INTRAMUSCULAR; INTRAVENOUS AS NEEDED
Status: DISCONTINUED | OUTPATIENT
Start: 2019-11-13 | End: 2019-11-13 | Stop reason: SURG

## 2019-11-13 RX ORDER — HYDROMORPHONE HYDROCHLORIDE 2 MG/1
2 TABLET ORAL EVERY 4 HOURS PRN
Status: DISCONTINUED | OUTPATIENT
Start: 2019-11-13 | End: 2019-11-14 | Stop reason: HOSPADM

## 2019-11-13 RX ORDER — PREGABALIN 75 MG/1
75 CAPSULE ORAL ONCE
Status: COMPLETED | OUTPATIENT
Start: 2019-11-13 | End: 2019-11-13

## 2019-11-13 RX ORDER — ACETAMINOPHEN 325 MG/1
1000 TABLET ORAL ONCE
Status: COMPLETED | OUTPATIENT
Start: 2019-11-13 | End: 2019-11-13

## 2019-11-13 RX ORDER — FLUMAZENIL 0.1 MG/ML
0.2 INJECTION INTRAVENOUS AS NEEDED
Status: DISCONTINUED | OUTPATIENT
Start: 2019-11-13 | End: 2019-11-13 | Stop reason: HOSPADM

## 2019-11-13 RX ORDER — ACETAMINOPHEN 500 MG
1000 TABLET ORAL 4 TIMES DAILY
Status: DISCONTINUED | OUTPATIENT
Start: 2019-11-13 | End: 2019-11-14 | Stop reason: HOSPADM

## 2019-11-13 RX ORDER — METHIMAZOLE 5 MG/1
2.5 TABLET ORAL DAILY
Status: DISCONTINUED | OUTPATIENT
Start: 2019-11-13 | End: 2019-11-14 | Stop reason: HOSPADM

## 2019-11-13 RX ORDER — FAMOTIDINE 40 MG/1
40 TABLET, FILM COATED ORAL DAILY
Status: DISCONTINUED | OUTPATIENT
Start: 2019-11-13 | End: 2019-11-14 | Stop reason: HOSPADM

## 2019-11-13 RX ORDER — ATORVASTATIN CALCIUM 10 MG/1
10 TABLET, FILM COATED ORAL DAILY
Status: DISCONTINUED | OUTPATIENT
Start: 2019-11-13 | End: 2019-11-14 | Stop reason: HOSPADM

## 2019-11-13 RX ORDER — HYDROMORPHONE HCL 110MG/55ML
0.5 PATIENT CONTROLLED ANALGESIA SYRINGE INTRAVENOUS
Status: DISCONTINUED | OUTPATIENT
Start: 2019-11-13 | End: 2019-11-14 | Stop reason: HOSPADM

## 2019-11-13 RX ORDER — ACETAMINOPHEN 650 MG/1
650 SUPPOSITORY RECTAL ONCE AS NEEDED
Status: DISCONTINUED | OUTPATIENT
Start: 2019-11-13 | End: 2019-11-13 | Stop reason: HOSPADM

## 2019-11-13 RX ORDER — SODIUM CHLORIDE 9 MG/ML
1000 INJECTION, SOLUTION INTRAVENOUS CONTINUOUS
Status: DISCONTINUED | OUTPATIENT
Start: 2019-11-13 | End: 2019-11-13

## 2019-11-13 RX ORDER — ONDANSETRON 2 MG/ML
4 INJECTION INTRAMUSCULAR; INTRAVENOUS EVERY 6 HOURS PRN
Status: DISCONTINUED | OUTPATIENT
Start: 2019-11-13 | End: 2019-11-14 | Stop reason: HOSPADM

## 2019-11-13 RX ORDER — LISINOPRIL 5 MG/1
5 TABLET ORAL DAILY
Status: DISCONTINUED | OUTPATIENT
Start: 2019-11-13 | End: 2019-11-14 | Stop reason: HOSPADM

## 2019-11-13 RX ORDER — DOCUSATE SODIUM 100 MG/1
100 CAPSULE, LIQUID FILLED ORAL 2 TIMES DAILY PRN
Status: DISCONTINUED | OUTPATIENT
Start: 2019-11-13 | End: 2019-11-14 | Stop reason: HOSPADM

## 2019-11-13 RX ORDER — BISACODYL 5 MG/1
10 TABLET, DELAYED RELEASE ORAL DAILY PRN
Status: DISCONTINUED | OUTPATIENT
Start: 2019-11-14 | End: 2019-11-14 | Stop reason: HOSPADM

## 2019-11-13 RX ORDER — SODIUM CHLORIDE 0.9 % (FLUSH) 0.9 %
3-10 SYRINGE (ML) INJECTION AS NEEDED
Status: DISCONTINUED | OUTPATIENT
Start: 2019-11-13 | End: 2019-11-14 | Stop reason: HOSPADM

## 2019-11-13 RX ORDER — EPHEDRINE SULFATE 50 MG/ML
5 INJECTION, SOLUTION INTRAVENOUS ONCE AS NEEDED
Status: DISCONTINUED | OUTPATIENT
Start: 2019-11-13 | End: 2019-11-13 | Stop reason: HOSPADM

## 2019-11-13 RX ORDER — KETAMINE HYDROCHLORIDE 100 MG/ML
INJECTION INTRAMUSCULAR; INTRAVENOUS AS NEEDED
Status: DISCONTINUED | OUTPATIENT
Start: 2019-11-13 | End: 2019-11-13 | Stop reason: SURG

## 2019-11-13 RX ORDER — DEXTROSE MONOHYDRATE 25 G/50ML
25 INJECTION, SOLUTION INTRAVENOUS
Status: DISCONTINUED | OUTPATIENT
Start: 2019-11-13 | End: 2019-11-14 | Stop reason: HOSPADM

## 2019-11-13 RX ORDER — ONDANSETRON 4 MG/1
4 TABLET, FILM COATED ORAL EVERY 6 HOURS PRN
Status: DISCONTINUED | OUTPATIENT
Start: 2019-11-13 | End: 2019-11-14 | Stop reason: HOSPADM

## 2019-11-13 RX ORDER — BUPIVACAINE HCL/0.9 % NACL/PF 0.1 %
2 PLASTIC BAG, INJECTION (ML) EPIDURAL ONCE
Status: COMPLETED | OUTPATIENT
Start: 2019-11-13 | End: 2019-11-13

## 2019-11-13 RX ORDER — HYDROMORPHONE HCL 110MG/55ML
0.5 PATIENT CONTROLLED ANALGESIA SYRINGE INTRAVENOUS
Status: DISCONTINUED | OUTPATIENT
Start: 2019-11-13 | End: 2019-11-13 | Stop reason: SDUPTHER

## 2019-11-13 RX ORDER — PROMETHAZINE HYDROCHLORIDE 25 MG/1
25 SUPPOSITORY RECTAL ONCE AS NEEDED
Status: DISCONTINUED | OUTPATIENT
Start: 2019-11-13 | End: 2019-11-13 | Stop reason: HOSPADM

## 2019-11-13 RX ORDER — OXYCODONE HCL 10 MG/1
10 TABLET, FILM COATED, EXTENDED RELEASE ORAL ONCE
Status: DISCONTINUED | OUTPATIENT
Start: 2019-11-13 | End: 2019-11-13 | Stop reason: HOSPADM

## 2019-11-13 RX ORDER — MELOXICAM 15 MG/1
15 TABLET ORAL ONCE
Status: COMPLETED | OUTPATIENT
Start: 2019-11-13 | End: 2019-11-13

## 2019-11-13 RX ORDER — ONDANSETRON 2 MG/ML
4 INJECTION INTRAMUSCULAR; INTRAVENOUS ONCE AS NEEDED
Status: DISCONTINUED | OUTPATIENT
Start: 2019-11-13 | End: 2019-11-13 | Stop reason: HOSPADM

## 2019-11-13 RX ORDER — PROMETHAZINE HYDROCHLORIDE 25 MG/1
25 TABLET ORAL ONCE AS NEEDED
Status: DISCONTINUED | OUTPATIENT
Start: 2019-11-13 | End: 2019-11-13 | Stop reason: HOSPADM

## 2019-11-13 RX ORDER — DORZOLAMIDE HYDROCHLORIDE AND TIMOLOL MALEATE 20; 5 MG/ML; MG/ML
1 SOLUTION/ DROPS OPHTHALMIC 2 TIMES DAILY
Status: DISCONTINUED | OUTPATIENT
Start: 2019-11-13 | End: 2019-11-14 | Stop reason: HOSPADM

## 2019-11-13 RX ORDER — MIDAZOLAM HYDROCHLORIDE 1 MG/ML
INJECTION INTRAMUSCULAR; INTRAVENOUS AS NEEDED
Status: DISCONTINUED | OUTPATIENT
Start: 2019-11-13 | End: 2019-11-13 | Stop reason: SURG

## 2019-11-13 RX ORDER — HALOPERIDOL 5 MG/ML
2 INJECTION INTRAMUSCULAR ONCE
Status: COMPLETED | OUTPATIENT
Start: 2019-11-13 | End: 2019-11-13

## 2019-11-13 RX ORDER — LABETALOL HYDROCHLORIDE 5 MG/ML
5 INJECTION, SOLUTION INTRAVENOUS
Status: DISCONTINUED | OUTPATIENT
Start: 2019-11-13 | End: 2019-11-13 | Stop reason: HOSPADM

## 2019-11-13 RX ORDER — FERROUS SULFATE TAB EC 324 MG (65 MG FE EQUIVALENT) 324 (65 FE) MG
324 TABLET DELAYED RESPONSE ORAL
Status: DISCONTINUED | OUTPATIENT
Start: 2019-11-14 | End: 2019-11-14 | Stop reason: HOSPADM

## 2019-11-13 RX ORDER — WARFARIN SODIUM 3 MG/1
3 TABLET ORAL
Status: DISCONTINUED | OUTPATIENT
Start: 2019-11-13 | End: 2019-11-14 | Stop reason: HOSPADM

## 2019-11-13 RX ORDER — ACETAMINOPHEN 325 MG/1
650 TABLET ORAL ONCE AS NEEDED
Status: DISCONTINUED | OUTPATIENT
Start: 2019-11-13 | End: 2019-11-13 | Stop reason: HOSPADM

## 2019-11-13 RX ORDER — DIPHENHYDRAMINE HYDROCHLORIDE 50 MG/ML
12.5 INJECTION INTRAMUSCULAR; INTRAVENOUS
Status: DISCONTINUED | OUTPATIENT
Start: 2019-11-13 | End: 2019-11-13 | Stop reason: HOSPADM

## 2019-11-13 RX ORDER — HYDROMORPHONE HCL 110MG/55ML
PATIENT CONTROLLED ANALGESIA SYRINGE INTRAVENOUS AS NEEDED
Status: DISCONTINUED | OUTPATIENT
Start: 2019-11-13 | End: 2019-11-13 | Stop reason: SURG

## 2019-11-13 RX ORDER — NALOXONE HCL 0.4 MG/ML
0.4 VIAL (ML) INJECTION AS NEEDED
Status: DISCONTINUED | OUTPATIENT
Start: 2019-11-13 | End: 2019-11-13 | Stop reason: HOSPADM

## 2019-11-13 RX ORDER — SODIUM CHLORIDE 9 MG/ML
100 INJECTION, SOLUTION INTRAVENOUS CONTINUOUS
Status: DISCONTINUED | OUTPATIENT
Start: 2019-11-13 | End: 2019-11-14 | Stop reason: HOSPADM

## 2019-11-13 RX ORDER — PROPOFOL 10 MG/ML
VIAL (ML) INTRAVENOUS AS NEEDED
Status: DISCONTINUED | OUTPATIENT
Start: 2019-11-13 | End: 2019-11-13 | Stop reason: SURG

## 2019-11-13 RX ORDER — NICOTINE POLACRILEX 4 MG
15 LOZENGE BUCCAL
Status: DISCONTINUED | OUTPATIENT
Start: 2019-11-13 | End: 2019-11-14 | Stop reason: HOSPADM

## 2019-11-13 RX ORDER — GABAPENTIN 100 MG/1
100 CAPSULE ORAL 3 TIMES DAILY
Status: DISCONTINUED | OUTPATIENT
Start: 2019-11-13 | End: 2019-11-14 | Stop reason: HOSPADM

## 2019-11-13 RX ORDER — ROPIVACAINE HYDROCHLORIDE 5 MG/ML
INJECTION, SOLUTION EPIDURAL; INFILTRATION; PERINEURAL
Status: COMPLETED | OUTPATIENT
Start: 2019-11-13 | End: 2019-11-13

## 2019-11-13 RX ADMIN — ATORVASTATIN CALCIUM 10 MG: 10 TABLET, FILM COATED ORAL at 17:08

## 2019-11-13 RX ADMIN — SODIUM CHLORIDE 100 ML/HR: 9 INJECTION, SOLUTION INTRAVENOUS at 13:07

## 2019-11-13 RX ADMIN — PROPOFOL 90 MG: 10 INJECTION, EMULSION INTRAVENOUS at 09:47

## 2019-11-13 RX ADMIN — GABAPENTIN 100 MG: 100 CAPSULE ORAL at 21:45

## 2019-11-13 RX ADMIN — HYDROMORPHONE HYDROCHLORIDE 0.5 MG: 2 INJECTION INTRAMUSCULAR; INTRAVENOUS; SUBCUTANEOUS at 09:30

## 2019-11-13 RX ADMIN — HALOPERIDOL LACTATE 1 MG: 5 INJECTION, SOLUTION INTRAMUSCULAR at 10:15

## 2019-11-13 RX ADMIN — KETAMINE HYDROCHLORIDE 10 MG: 100 INJECTION INTRAMUSCULAR; INTRAVENOUS at 09:30

## 2019-11-13 RX ADMIN — HYDROMORPHONE HYDROCHLORIDE 0.5 MG: 2 INJECTION INTRAMUSCULAR; INTRAVENOUS; SUBCUTANEOUS at 10:10

## 2019-11-13 RX ADMIN — SODIUM CHLORIDE: 900 INJECTION, SOLUTION INTRAVENOUS at 09:00

## 2019-11-13 RX ADMIN — KETOROLAC TROMETHAMINE 15 MG: 30 INJECTION, SOLUTION INTRAMUSCULAR at 11:39

## 2019-11-13 RX ADMIN — HYDROMORPHONE HYDROCHLORIDE 0.5 MG: 1 INJECTION, SOLUTION INTRAMUSCULAR; INTRAVENOUS; SUBCUTANEOUS at 12:25

## 2019-11-13 RX ADMIN — ROPIVACAINE HYDROCHLORIDE 20 ML: 5 INJECTION, SOLUTION EPIDURAL; INFILTRATION; PERINEURAL at 09:40

## 2019-11-13 RX ADMIN — SODIUM CHLORIDE, PRESERVATIVE FREE 3 ML: 5 INJECTION INTRAVENOUS at 14:49

## 2019-11-13 RX ADMIN — TRANEXAMIC ACID 1000 MG: 100 INJECTION, SOLUTION INTRAVENOUS at 11:05

## 2019-11-13 RX ADMIN — PROPOFOL 10 MG: 10 INJECTION, EMULSION INTRAVENOUS at 09:35

## 2019-11-13 RX ADMIN — ACETAMINOPHEN 1000 MG: 500 TABLET ORAL at 21:45

## 2019-11-13 RX ADMIN — PREGABALIN 75 MG: 75 CAPSULE ORAL at 08:12

## 2019-11-13 RX ADMIN — ONDANSETRON 4 MG: 2 INJECTION INTRAMUSCULAR; INTRAVENOUS at 13:43

## 2019-11-13 RX ADMIN — SODIUM CHLORIDE 1000 ML: 900 INJECTION, SOLUTION INTRAVENOUS at 08:09

## 2019-11-13 RX ADMIN — MIDAZOLAM HYDROCHLORIDE 2 MG: 2 INJECTION, SOLUTION INTRAMUSCULAR; INTRAVENOUS at 09:20

## 2019-11-13 RX ADMIN — SODIUM CHLORIDE 100 ML/HR: 9 INJECTION, SOLUTION INTRAVENOUS at 18:24

## 2019-11-13 RX ADMIN — PROPOFOL 40 MCG/KG/MIN: 10 INJECTION, EMULSION INTRAVENOUS at 10:12

## 2019-11-13 RX ADMIN — KETAMINE HYDROCHLORIDE 20 MG: 100 INJECTION INTRAMUSCULAR; INTRAVENOUS at 09:50

## 2019-11-13 RX ADMIN — CLINDAMYCIN IN 5 PERCENT DEXTROSE 600 MG: 12 INJECTION, SOLUTION INTRAVENOUS at 22:40

## 2019-11-13 RX ADMIN — HYDROMORPHONE HYDROCHLORIDE 2 MG: 2 TABLET ORAL at 14:48

## 2019-11-13 RX ADMIN — INSULIN ASPART 3 UNITS: 100 INJECTION, SOLUTION INTRAVENOUS; SUBCUTANEOUS at 21:45

## 2019-11-13 RX ADMIN — TRANEXAMIC ACID 1000 MG: 100 INJECTION, SOLUTION INTRAVENOUS at 08:29

## 2019-11-13 RX ADMIN — Medication 2 G: at 09:35

## 2019-11-13 RX ADMIN — WARFARIN SODIUM 3 MG: 3 TABLET ORAL at 17:08

## 2019-11-13 RX ADMIN — DORZOLAMIDE HYDROCHLORIDE AND TIMOLOL MALEATE 1 DROP: 20; 5 SOLUTION/ DROPS OPHTHALMIC at 21:45

## 2019-11-13 RX ADMIN — LISINOPRIL 5 MG: 5 TABLET ORAL at 14:48

## 2019-11-13 RX ADMIN — FAMOTIDINE 40 MG: 40 TABLET ORAL at 14:48

## 2019-11-13 RX ADMIN — ACETAMINOPHEN 1000 MG: 500 TABLET ORAL at 17:09

## 2019-11-13 RX ADMIN — MELOXICAM 15 MG: 15 TABLET ORAL at 08:12

## 2019-11-13 RX ADMIN — ACETAMINOPHEN 975 MG: 325 TABLET, FILM COATED ORAL at 08:12

## 2019-11-13 RX ADMIN — Medication 2.5 MG: at 14:48

## 2019-11-13 RX ADMIN — CLINDAMYCIN IN 5 PERCENT DEXTROSE 600 MG: 12 INJECTION, SOLUTION INTRAVENOUS at 14:49

## 2019-11-13 NOTE — THERAPY EVALUATION
Acute Care - Occupational Therapy Initial Evaluation  [facility]     Patient Name: Thelma Haley  : 1945  MRN: [id number]  Today's Date: 2019  Onset of Illness/Injury or Date of Surgery: 19  Date of Referral to OT: 19  Referring Physician: Dr. Haines    Admit Date: 2019       ICD-10-CM ICD-9-CM   1. Primary osteoarthritis of both knees M17.0 715.16   2. Chronic pain of both knees M25.561 719.46    M25.562 338.29    G89.29    3. Essential hypertension I10 401.9   4. Type 2 diabetes mellitus without complication, without long-term current use of insulin (CMS/[facility]) E11.9 250.00   5. Primary fibromyalgia syndrome M79.7 729.1   6. Impaired mobility and ADLs Z74.09 799.89     Patient Active Problem List   Diagnosis   • Primary fibromyalgia syndrome   • Obesity   • Neuropathy   • Hyperlipidemia   • Essential hypertension   • Glaucoma   • Chronic rhinitis   • Allergic rhinitis due to pollen   • Abnormal results of thyroid function studies   • Acute pain of left shoulder   • Acute pain of right shoulder   • Impingement syndrome of right shoulder   • Diverticulitis of intestine without perforation or abscess   • Chest pain   • Vitamin D deficiency   • Subclinical hyperthyroidism   • Type 2 diabetes mellitus without complication, without long-term current use of insulin (CMS/[facility])   • Simple goiter   • Disorder of bone    • Impaired fasting glucose    • Complete tear of right rotator cuff   • Arthritis of right acromioclavicular joint   • Primary osteoarthritis of both knees   • Asthma   • Bronchitis, acute   • Neck pain   • Localized edema   • Sciatica of left side   • Chronic bilateral low back pain   • Pre-operative clearance   • Lumbar disc herniation with radiculopathy   • Impingement syndrome, shoulder, left   • Pain in both knees   • Atherosclerosis of native coronary artery of native heart without angina pectoris   • Chronic right shoulder pain   • Abdominal pain, right lower  quadrant   • Diverticulosis of colon   • History of colonic polyps   • Irritable colon   • Other and unspecified noninfectious gastroenteritis and colitis   • Chronic pain of both knees     Past Medical History:   Diagnosis Date   • Abnormal results of thyroid function studies    • Allergic rhinitis due to pollen    • Arthritis    • Asthma      uncomplicated      • Cataract    • Chronic rhinitis    • Diabetes mellitus (CMS/HCC)    • Disease of thyroid gland    • Essential hypertension    • Extrinsic asthma with status asthmaticus    • Glaucoma    • Hyperlipidemia    • Impingement syndrome of right shoulder    • Injury of back     L4 L5 bulging disc   • Neuropathy    • Nuclear senile cataract    • Overweight with body mass index (BMI) 25.0-29.9    • Primary fibromyalgia syndrome    • Primary open angle glaucoma    • Rosacea    • Temporomandibular joint disorder      Past Surgical History:   Procedure Laterality Date   • BACK SURGERY     • CARPAL TUNNEL RELEASE      Bilateral carpal tunnel release.)   03/27/2000    • CARPAL TUNNEL RELEASE Bilateral    • CATARACT EXTRACTION      Bilateral   • CERVICAL FUSION  1980   • COLONOSCOPY     • DILATATION AND CURETTAGE     • INJECTION OF MEDICATION  06/17/2015    celestone(betamethasone) (2)    • INJECTION OF MEDICATION  04/14/2016    depo medrol ( methylprednisone) (20)   • OOPHORECTOMY     • OTHER SURGICAL HISTORY      hysterosope procedure   • RIGHT OOPHORECTOMY     • ROTATOR CUFF REPAIR      Right   • SHOULDER ARTHROSCOPY Right 2/6/2017    Procedure: RIGHT SHOULDER ARTHROSCOPY SUBACROMIAL DECOMPRESSION, ROTATOR CUFF REPAIR   ;  Surgeon: Terrence Hainse MD;  Location: [facility];  Service:    • SHOULDER SURGERY      Excision of 4.8 cm mass with primary intermediate closure.)   03/30/2012    • SPINAL FUSION      L4 L5          OT ASSESSMENT FLOWSHEET (last 12 hours)      Occupational Therapy Evaluation     Row Name 11/13/19 9370                   OT Evaluation  Time/Intention    Subjective Information  no complaints  -        Document Type  evaluation  -        Mode of Treatment  occupational therapy  -        Total Evaluation Minutes, Occupational Therapy  49  -        Patient Effort  good  -        Symptoms Noted During/After Treatment  fatigue;dizziness  -        Comment  BP dropped got dizzy coming to EOB but BP apolinar back up and pt dizziness went less. pt had nausea at times RN aware of session and RN took pt off O2 since reading in 90s on room air   -           General Information    Patient Profile Reviewed?  yes  -        Onset of Illness/Injury or Date of Surgery  11/13/19  -        Referring Physician  Dr. Haines  -        Patient Observations  lethargic;cooperative;agree to therapy  -        Patient/Family Observations  /sister present   -        General Observations of Patient  pt supine HOB up on O2 1L, IV, bed alarm, drain   -        Prior Level of Function  independent:;all household mobility;community mobility;transfer;bed mobility;ADL's;home management;cooking;cleaning  does most of driving   -        Equipment Currently Used at Home  cane, quad;raised toilet;shower chair has a [facility],  shower head; reacher/sock aid  -        Pertinent History of Current Functional Problem  pt s/p total knee on right  -        Existing Precautions/Restrictions  fall;oxygen therapy device and L/min  -        Limitations/Impairments  safety/cognitive  -        Risks Reviewed  patient and family:;LOB;nausea/vomiting;dizziness;increased discomfort;change in vital signs  [facility]        Benefits Reviewed  patient and family:;improve function;increase independence;increase strength;increase balance;increase knowledge  -           Relationship/Environment    Lives With  spouse  -           Resource/Environmental Concerns    Current Living Arrangements  home/apartment/condo  -           Home Main Entrance    Number of Stairs, Main  Entrance  one  -        Stair Railings, Main Entrance  none  -        Stairs Comment, Main Entrance  they have split the step split into 2 steps   -           Cognitive Assessment/Interventions    Additional Documentation  Cognitive Assessment/Intervention (Group)  -           Cognitive Assessment/Intervention- PT/OT    Affect/Mental Status (Cognitive)  WFL sleepy and not feeling well   -        Orientation Status (Cognition)  oriented x 4  -        Follows Commands (Cognition)  follows one step commands;75-90% accuracy;over 90% accuracy;verbal cues/prompting required;repetition of directions required;physical/tactile prompts required;delayed response/completion;increased processing time needed  -        Safety Deficit (Cognitive)  mild deficit;moderate deficit  -        Personal Safety Interventions  fall prevention program maintained;gait belt;nonskid shoes/slippers when out of bed;supervised activity  -           Safety Issues, Functional Mobility    Safety Issues Affecting Function (Mobility)  insight into deficits/self awareness;judgment;positioning of assistive device;problem solving;safety precaution awareness;safety precautions follow-through/compliance;sequencing abilities  -        Impairments Affecting Function (Mobility)  balance;coordination;endurance/activity tolerance;motor control;motor planning;pain;postural/trunk control;range of motion (ROM);strength  -           Mobility Assessment/Treatment    Extremity Weight-bearing Status  right lower extremity  -        Right Lower Extremity (Weight-bearing Status)  weight-bearing as tolerated (WBAT)  -           Bed Mobility Assessment/Treatment    Bed Mobility Assessment/Treatment  supine-sit;sit-supine  -        Supine-Sit [place] (Bed Mobility)  moderate assist (50% patient effort);1 person assist;2 person assist;verbal cues;nonverbal cues (demo/gesture)  -        Sit-Supine [place] (Bed Mobility)  moderate assist  (50% patient effort);1 person assist;2 person assist;verbal cues;nonverbal cues (demo/gesture)  -        Bed Mobility, Safety Issues  decreased use of arms for pushing/pulling;decreased use of legs for bridging/pushing;impaired trunk control for bed mobility  -        Assistive Device (Bed Mobility)  bed rails;head of bed elevated  -           Functional Mobility    Functional Mobility- Comment  short steps min to mod assist of 2 did better with RW, slow pace increased pain pt was dizzy only went from EOB to BSC back to bed   -           Transfer Assessment/Treatment    Transfer Assessment/Treatment  stand-sit transfer;sit-stand transfer;toilet transfer  -        Comment (Transfers)  first time to toilet sit to stand no walker rest of the time a walker, did better with walker ; total assist to attend to IV   -           Sit-Stand Transfer    Sit-Stand [place] (Transfers)  moderate assist (50% patient effort);2 person assist;verbal cues;nonverbal cues (demo/gesture)  -           Stand-Sit Transfer    Stand-Sit [place] (Transfers)  moderate assist (50% patient effort);1 person assist;2 person assist;nonverbal cues (demo/gesture);verbal cues  -           Toilet Transfer    Type (Toilet Transfer)  stand-sit;sit-stand  -        [place] Level (Toilet Transfer)  moderate assist (50% patient effort);1 person assist;2 person assist;nonverbal cues (demo/gesture);verbal cues  -        Assistive Device (Toilet Transfer)  commode, bedside without drop arms;walker, front-wheeled  -           ADL Assessment/Intervention    BADL Assessment/Intervention  grooming;toileting;bathing  -           Bathing Assessment/Intervention    Comment (Bathing)  pt educated that tomorrow OT will do sponge bath and dressing; pt verbalized understanding.   -           Grooming Assessment/Training    Comment (Grooming)  set up assist to wash off hands after toileting   -           Toileting  Assessment/Training    Comment (Toileting)  total assist to attend to clothing management for toileting; CGA  for pericare with setup   -           BADL Safety/Performance    Impairments, BADL Safety/Performance  balance;endurance/activity tolerance;coordination;motor control;motor planning;pain;range of motion;strength;trunk/postural control  -           General ROM    GENERAL ROM COMMENTS  BUE WFL fair digit to thumb   -           MMT (Manual Muscle Testing)    General MMT Comments  BUE  grossly 4- to 4/5; BUE grossly 4- to 4/5   -           Motor Assessment/Interventions    Additional Documentation  Balance (Group)  -           Balance    Balance  static sitting balance;dynamic sitting balance;static standing balance  -           Static Sitting Balance    Level of [place] (Unsupported Sitting, Static Balance)  contact guard assist;minimal assist, 75% patient effort;standby assist varied throughout   -           Dynamic Sitting Balance    Level of [place], Reaches Outside Midline (Sitting, Dynamic Balance)  contact guard assist  -           Static Standing Balance    Level of [place] (Supported Standing, Static Balance)  minimal assist, 75% patient effort;moderate assist, 50 to 74% patient effort;1 person assist;2 person assist  -           Sensory Assessment/Intervention    Additional Documentation  Hearing Assessment (Group);Vision Assessment/Intervention (Group)  -           Light Touch Sensation Assessment    Left Upper Extremity: Light Touch Sensation Assessment  intact  -        Right Upper Extremity: Light Touch Sensation Assessment  intact  -           Hearing Assessment    Hearing Status  L  -           Vision Assessment/Intervention    Visual Impairment/Limitations  corrective lenses full time  -           Positioning and Restraints    Pre-Treatment Position  in bed  -        Post Treatment Position  bed  -        In Bed  notified nsg;supine;call light  within reach;encouraged to call for assist;exit alarm on;with family/caregiver;side rails up x2  -           Pain Assessment    Additional Documentation  Pain Scale: Numbers Pre/Post-Treatment (Group)  -           Pain Scale: Numbers Pre/Post-Treatment    Pain Scale: Numbers, Pretreatment  7/10  -        Pain Scale: Numbers, Post-Treatment  7/10  -BH        Pain Location - Side  Right  -        Pain Location  knee  -        Pre/Post Treatment Pain Comment  RN aware and gave pain meds   -        Pain Intervention(s)  Medication (See MAR);Repositioned;Ambulation/increased activity;Emotional support;Distraction;Prayers;Rest  -           Wound 11/13/19 1031 Right knee Incision    Wound - Properties Group Date first assessed: 11/13/19  - Time first assessed: 1031  - Present on Hospital Admission: N  -AQ Side: Right  - Location: knee  - Primary Wound Type: Incision  -       Plan of Care Review    Plan of Care Reviewed With  patient;family  -           Clinical Impression (OT)    Date of Referral to OT  11/13/19  -        OT Diagnosis  Impaired mobility and ADL   -        Prognosis (OT Eval)  fair  -        Functional Level at Time of Evaluation (OT Eval)  pt decreased strength, endurance, safety and independence with ADL   -        Patient/Family Goals Statement (OT Eval)  to go home  -        Criteria for Skilled Therapeutic Interventions Met (OT Eval)  yes;treatment indicated  -        Rehab Potential (OT Eval)  fair, will monitor progress closely  -        Therapy Frequency (OT Eval)  daily  -        Predicted Duration of Therapy Intervention (Therapy Eval)  until d/c   -        Care Plan Review (OT)  evaluation/treatment results reviewed;care plan/treatment goals reviewed;risks/benefits reviewed;current/potential barriers reviewed;patient/other agree to care plan  -        Care Plan Review, Other Participant (OT Eval)  family  -        Anticipated Discharge Disposition  (OT)  home with 24/7 care;home with OP services;home with home health  -BH           Vital Signs    Pre Systolic BP Rehab  156  -BH        Pre Treatment Diastolic BP  72  -BH        Intra Systolic BP Rehab  142  -BH        Intra Treatment Diastolic BP  75  -BH        Post Systolic BP Rehab  172  -BH        Post Treatment Diastolic BP  77  -BH        Pretreatment Heart Rate (beats/min)  60  -BH        Posttreatment Heart Rate (beats/min)  59  -BH        Pre SpO2 (%)  98  -BH        O2 Delivery Pre Treatment  supplemental O2 1L  -BH        Intra SpO2 (%)  99  -BH        O2 Delivery Intra Treatment  room air  -BH        Post SpO2 (%)  95  -BH        O2 Delivery Post Treatment  room air  -BH        Pre Patient Position  Supine  -BH        Intra Patient Position  Sitting  -BH        Post Patient Position  Supine  -BH        Recovery Time  last BP in supine was 177/80 above last one in sitting   -BH           Planned OT Interventions    Planned Therapy Interventions (OT Eval)  activity tolerance training;adaptive equipment training;BADL retraining;functional balance retraining;IADL retraining;occupation/activity based interventions;neuromuscular control/coordination retraining;passive ROM/stretching;patient/caregiver education/training;ROM/therapeutic exercise;strengthening exercise;transfer/mobility retraining  -           OT Goals    Transfer Goal Selection (OT)  transfer, OT goal 1  -        Bathing Goal Selection (OT)  bathing, OT goal 1  -BH        Dressing Goal Selection (OT)  dressing, OT goal 1  -        Toileting Goal Selection (OT)  toileting, OT goal 1  -        Functional Mobility Goal Selection (OT)  functional mobility, OT goal 1  -        Additional Documentation  Functional Mobility Selection (OT) (Row)  -           Transfer Goal 1 (OT)    Activity/Assistive Device (Transfer Goal 1, OT)  toilet  -        [place] Level/Cues Needed (Transfer Goal 1, OT)  standby assist  -        Time  Frame (Transfer Goal 1, OT)  long term goal (LTG);by discharge  -        Progress/Outcome (Transfer Goal 1, OT)  goal not met  -           Bathing Goal 1 (OT)    Activity/Assistive Device (Bathing Goal 1, OT)  bathing skills, all  -        [place] Level/Cues Needed (Bathing Goal 1, OT)  set-up required;verbal cues required;standby assist  -BH        Time Frame (Bathing Goal 1, OT)  long term goal (LTG);by discharge  -        Progress/Outcomes (Bathing Goal 1, OT)  goal not met  -           Dressing Goal 1 (OT)    Activity/Assistive Device (Dressing Goal 1, OT)  dressing skills, all  -        [place]/Cues Needed (Dressing Goal 1, OT)  set-up required;verbal cues required;standby assist  -BH        Time Frame (Dressing Goal 1, OT)  long term goal (LTG);by discharge  -        Progress/Outcome (Dressing Goal 1, OT)  goal not met  -           Toileting Goal 1 (OT)    Activity/Device (Toileting Goal 1, OT)  toileting skills, all  -        [place] Level/Cues Needed (Toileting Goal 1, OT)  standby assist  -        Time Frame (Toileting Goal 1, OT)  long term goal (LTG);by discharge  -        Progress/Outcome (Toileting Goal 1, OT)  goal not met  -           Functional Mobility Goal 1 (OT)    Activity/Assistive Device (Functional Mobility Goal 1, OT)  walker, rolling  -        [place] Level/Cues Needed (Functional Mobility Goal 1, OT)  standby assist  -        Distance Goal 1 (Functional Mobility, OT)  for ADL tasks with no LOB and good safety   -        Time Frame (Functional Mobility Goal 1, OT)  long term goal (LTG);by discharge  -        Progress/Outcome (Functional Mobility Goal 1, OT)  goal not met  -           Patient Education Goal (OT)    Activity (Patient Education Goal, OT)  Pt will communciate good home safety awareness.   -        [place]/Cues/Accuracy (Memory Goal 2, OT)  verbalizes understanding  -        Time Frame (Patient Education Goal, OT)   long term goal (LTG);by discharge  -        Progress/Outcome (Patient Education Goal, OT)  goal not met  -           Living Environment    Home Accessibility  tub/shower is not walk in;stairs to enter home  -          User Key  (r) = Recorded By, (t) = Taken By, (c) = Cosigned By    Initials Name Effective Dates     Mylene Edmondson, OTR/L 06/08/18 -     Ellie Silva RN 10/17/16 -          Occupational Therapy Education     Title: PT OT SLP Therapies (In Progress)     Topic: Occupational Therapy (In Progress)     Point: ADL training (Done)     Description: Instruct learner(s) on proper safety adaptation and remediation techniques during self care or transfers.   Instruct in proper use of assistive devices.    Learning Progress Summary           Patient Acceptance, E, VU,NR by  at 11/13/2019  3:33 PM    Comment:  Educated about OT and POC. Educated to call for assist. Educated on safety throughout, Started education on knee position.   Family Acceptance, E, VU,NR by  at 11/13/2019  3:33 PM    Comment:  Educated about OT and POC. Educated to call for assist. Educated on safety throughout, Started education on knee position.                   Point: Precautions (Done)     Description: Instruct learner(s) on prescribed precautions during self-care and functional transfers.    Learning Progress Summary           Patient Acceptance, E, VU,NR by  at 11/13/2019  3:33 PM    Comment:  Educated about OT and POC. Educated to call for assist. Educated on safety throughout, Started education on knee position.   Family Acceptance, E, VU,NR by  at 11/13/2019  3:33 PM    Comment:  Educated about OT and POC. Educated to call for assist. Educated on safety throughout, Started education on knee position.                               User Key     Initials Effective Dates Name Provider Type Discipline     06/08/18 -  Mylene Edmondson, OTR/L Occupational Therapist OT                  OT Recommendation and  Plan  Outcome Summary/Treatment Plan (OT)  Anticipated Discharge Disposition (OT): home with 24/7 care, home with OP services, home with home health  Planned Therapy Interventions (OT Eval): activity tolerance training, adaptive equipment training, BADL retraining, functional balance retraining, IADL retraining, occupation/activity based interventions, neuromuscular control/coordination retraining, passive ROM/stretching, patient/caregiver education/training, ROM/therapeutic exercise, strengthening exercise, transfer/mobility retraining  Therapy Frequency (OT Eval): daily  Plan of Care Review  Plan of Care Reviewed With: patient, family  Plan of Care Reviewed With: patient, family  Outcome Summary: OT eval completed this date, co-eval with PT. Pt lethargic but pleasant and tried to engage but had neasuea and dizziness. Pt BP was elvated but dropped with act but apolinar back. Pt dizzines got better but took extended time. Pt was mod assist of 1-2 for sup to sit to sup. Pt mod assist of 1-2 for sit to stand off bed and toilet and for short mobility from EOB to BSC back to EOB. Pt total assist to attend to clothing management for toile t/f and CGA for pericare. Pt could benefit from further skilled OT to reach PLOF/max independence with ADL. Pt may need 24/7 care and home health therapy and a RW at d/c.     Outcome Measures     Row Name 11/13/19 9995             How much help from another is currently needed...    Putting on and taking off regular lower body clothing?  2  -BH      Bathing (including washing, rinsing, and drying)  2  -BH      Toileting (which includes using toilet bed pan or urinal)  2  -BH      Putting on and taking off regular upper body clothing  3  -BH      Taking care of personal grooming (such as brushing teeth)  3  -BH      Eating meals  3  -BH      AM-PAC 6 Clicks Score (OT)  15  -BH         Functional Assessment    Outcome Measure Options  AM-PAC 6 Clicks Daily Activity (OT)  -        User Key   (r) = Recorded By, (t) = Taken By, (c) = Cosigned By    Initials Name Provider Type     Mylene Edmondson, OTR/L Occupational Therapist          Time Calculation:   Time Calculation- OT     Row Name 11/13/19 1536             Time Calculation-     OT Start Time  1429  -      OT Stop Time  1518  -      OT Time Calculation (min)  49 min  -      OT Received On  11/13/19  -      OT Goal Re-Cert Due Date  11/26/19  -        User Key  (r) = Recorded By, (t) = Taken By, (c) = Cosigned By    Initials Name Provider Type     Mylene Edmondson, OTR/L Occupational Therapist        Therapy Charges for Today     Code Description Service Date Service Provider Modifiers Qty    [id number]  OT EVAL MOD COMPLEXITY 3 11/13/2019 Mylene Edmondson OTR/L GO 1               NITIN Singh/PORSHA  11/13/2019

## 2019-11-13 NOTE — ANESTHESIA POSTPROCEDURE EVALUATION
Patient: Thelma Haley    Procedure Summary     Date:  11/13/19 Room / Location:  [facility] OR  / [facility] OR    Anesthesia Start:  0927 Anesthesia Stop:  1204    Procedure:  RIGHT TOTAL KNEE ARTHROPLASTY  with adductor canal block (Right Knee) Diagnosis:       Primary osteoarthritis of both knees      Chronic pain of both knees      Essential hypertension      Type 2 diabetes mellitus without complication, without long-term current use of insulin (CMS/HCC)      Primary fibromyalgia syndrome      (Primary osteoarthritis of both knees [M17.0])      (Chronic pain of both knees [M25.561, M25.562, G89.29])      (Essential hypertension [I10])      (Type 2 diabetes mellitus without complication, without long-term current use of insulin (CMS/HCC) [E11.9])      (Primary fibromyalgia syndrome [M79.7])    Surgeon:  Terrence Haines MD Provider:  Miguel Sue MD    Anesthesia Type:  general ASA Status:  3          Anesthesia Type: general  Last vitals  BP   (!) 186/79 (11/13/19 0803)   Temp   98.2 °F (36.8 °C) (11/13/19 0803)   Pulse   68 (11/13/19 0803)   Resp   13 (11/13/19 0803)     SpO2   99 % (11/13/19 0803)     Post Anesthesia Care and Evaluation    Patient location during evaluation: bedside  Patient participation: complete - patient participated  Level of consciousness: sleepy but conscious  Pain score: 0  Pain management: adequate  Airway patency: patent  Anesthetic complications: No anesthetic complications  PONV Status: none  Cardiovascular status: acceptable and stable  Respiratory status: acceptable  Hydration status: stable

## 2019-11-13 NOTE — NURSING NOTE
"Pt reports \"severe allergy to oxycodone\". States \"makes me deathly ill\". Oxycodone held. Will inform Dr Haines.  "

## 2019-11-13 NOTE — INTERVAL H&P NOTE
H&P reviewed. The patient was examined and there are no changes to the H&P.     Past Medical History:   Diagnosis Date   • Abnormal results of thyroid function studies    • Allergic rhinitis due to pollen    • Arthritis    • Asthma      uncomplicated      • Cataract    • Chronic rhinitis    • Diabetes mellitus (CMS/HCC)    • Disease of thyroid gland    • Essential hypertension    • Extrinsic asthma with status asthmaticus    • Glaucoma    • Hyperlipidemia    • Impingement syndrome of right shoulder    • Injury of back     L4 L5 bulging disc   • Neuropathy    • Nuclear senile cataract    • Overweight with body mass index (BMI) 25.0-29.9    • Primary fibromyalgia syndrome    • Primary open angle glaucoma    • Rosacea    • Temporomandibular joint disorder        Allergies   Allergen Reactions   • Ciprofloxacin Nausea And Vomiting   • Lortab [Hydrocodone-Acetaminophen] Nausea And Vomiting   • Metronidazole Nausea And Vomiting   • Oxycodone Nausea And Vomiting     Dizziness and doesn't decrease pain   • Ultram [Tramadol Hcl] Nausea And Vomiting       No current facility-administered medications on file prior to encounter.      Current Outpatient Medications on File Prior to Encounter   Medication Sig   • Biotin 49342 MCG tablet Take 1 tablet by mouth Daily.   • CETIRIZINE HCL PO Take 10 mg by mouth Daily.   • gabapentin (NEURONTIN) 100 MG capsule Take 1 capsule by mouth 3 (Three) Times a Day. For diabetic neuropathy   • lisinopril (PRINIVIL,ZESTRIL) 5 MG tablet Take 1 tablet by mouth Daily.   • lovastatin (MEVACOR) 20 MG tablet Take 1 tablet by mouth Every Night.   • SITagliptin-metFORMIN HCl ER (JANUMET XR)  MG tablet Take 1 tablet by mouth Daily With Breakfast.   • glucose blood (ONE TOUCH ULTRA TEST) test strip Use to test blood sugar twice a day. ICD E11.9   • ONETOUCH DELICA LANCETS FINE misc Use to check blood sugar twice a day. ICD E11.9       Past Surgical History:   Procedure Laterality Date   • BACK  SURGERY     • CARPAL TUNNEL RELEASE      Bilateral carpal tunnel release.)   03/27/2000    • CARPAL TUNNEL RELEASE Bilateral    • CATARACT EXTRACTION      Bilateral   • CERVICAL FUSION  1980   • COLONOSCOPY     • DILATATION AND CURETTAGE     • INJECTION OF MEDICATION  06/17/2015    celestone(betamethasone) (2)    • INJECTION OF MEDICATION  04/14/2016    depo medrol ( methylprednisone) (20)   • OOPHORECTOMY     • OTHER SURGICAL HISTORY      hysterosope procedure   • RIGHT OOPHORECTOMY     • ROTATOR CUFF REPAIR      Right   • SHOULDER ARTHROSCOPY Right 2/6/2017    Procedure: RIGHT SHOULDER ARTHROSCOPY SUBACROMIAL DECOMPRESSION, ROTATOR CUFF REPAIR   ;  Surgeon: Terrence aHines MD;  Location: [facility];  Service:    • SHOULDER SURGERY      Excision of 4.8 cm mass with primary intermediate closure.)   03/30/2012    • SPINAL FUSION      L4 L5       Family History   Problem Relation Age of Onset   • Heart disease Other    • Hypertension Other    • COPD Mother        Social History     Socioeconomic History   • Marital status:      Spouse name: Not on file   • Number of children: Not on file   • Years of education: Not on file   • Highest education level: Not on file   Tobacco Use   • Smoking status: Never Smoker   • Smokeless tobacco: Never Used   Substance and Sexual Activity   • Alcohol use: No   • Drug use: No   • Sexual activity: Defer     The patient voiced understanding of the risks, benefits, and alternative forms of treatment that were discussed and the patient consents to proceed with surgery.  All risks, benefits and alternatives were discussed.  Risks including but not exclusive to anesthetic complications, including death, MI, CVA, infection, bleeding DVT, fracture, residual pain and need for future surgery.    This discussion was held with the patient by Terrence Haines MD and all questions were answered.    Plan right TKA with adductor canal block    11/13/19 at 9:15 AM by Terrence Haines,  MD

## 2019-11-13 NOTE — PLAN OF CARE
Problem: Patient Care Overview  Goal: Plan of Care Review  Outcome: Ongoing (interventions implemented as appropriate)   11/13/19 1608   OTHER   Outcome Summary PT eval w/ OT; pt able to move from sup to sit to sup w/ min-mod assist of 2. Standing step to transfer to BSC WBAT RLE w/ mod assist of 2 tho dizzy. Gait return to bed w/ FWRW w/ min-mod a x 2 for 5 ft w/out LOB; dizzy through upright . RN aware. Pt's initial range ~0-95 deg. Family present, sister former caregiver here so she will be helpful asissting spouse at home post return. DC plan d/c to home w/ assist for care. HH vs OP depending on progress by d/c.   Coping/Psychosocial   Plan of Care Reviewed With patient;spous   11/13/19 1608   OTHER   Outcome Summary PT eval w/ OT; pt able to move from sup to sit to sup w/ min-mod assist of 2. Standing step to transfer to BSC WBAT RLE w/ mod assist of 2 tho dizzy. Gait return to bed w/ FWRW w/ min-mod a x 2 for 5 ft w/out LOB; dizzy through upright . RN aware. Pt's initial range ~0-95 deg. Family present, sister former caregiver here so she will be helpful asissting spouse at home post return. DC plan d/c to home w/ assist for care. HH vs OP depending on progress by d/c.   Coping/Psychosocial   Plan of Care Reviewed With patient;spouse;sibling   e;sibling

## 2019-11-13 NOTE — PROGRESS NOTES
"Anticoagulation by Pharmacy - Warfarin    Thelma Haley is a 74 y.o.female being initiated on warfarin for VTE prophylaxis following ortho procedure (knee)    Home regimen: new start  INR Goal: 1.7-2.5    Last INR: No results found for: INR    Objective:  [Ht: 162.6 cm (64.02\"); Wt: 66 kg (145 lb 8.1 oz)]  No results found for: INR, PROTIME  Lab Results   Component Value Date    HGB 10.7 (L) 11/13/2019    HGB 11.4 (L) 11/04/2019    HGB 11.8 (L) 08/09/2019    HCT 32.2 (L) 11/13/2019    HCT 33.8 (L) 11/04/2019    HCT 35.8 08/09/2019     11/04/2019     08/09/2019     02/18/2019           Assessment  Interacting medications: None significant  No INR yet  3 mg tablet started nightly  H&H will be followed    Plan:  1.  Give warfarin 3 mg tablet PO @ 1800 tonight  2.  Draw a PT/INR in AM  3.  Pharmacy will continue to follow    Avi Vasquez RPH  11/13/19 2:14 PM     "

## 2019-11-13 NOTE — ANESTHESIA PREPROCEDURE EVALUATION
Anesthesia Evaluation     Patient summary reviewed and Nursing notes reviewed                Airway   Mallampati: II  TM distance: >3 FB  Neck ROM: full  possible difficult intubation  Dental - normal exam     Pulmonary - normal exam    breath sounds clear to auscultation  (+) asthma,  Cardiovascular - normal exam  Exercise tolerance: poor (<4 METS)    NYHA Classification: III  ECG reviewed  Rhythm: regular  Rate: normal    (+) hypertension poorly controlled, CAD, hyperlipidemia,     ROS comment: EKG:NSR    Neuro/Psych  (+) headaches (Sinus headaches.), numbness, psychiatric history Anxiety,     GI/Hepatic/Renal/Endo    (+)   diabetes mellitus,   (-)  obesity, morbid obesity    Musculoskeletal     (+) back pain, chronic pain, gait problem, myalgias (Fibromyalgia), neck pain,       ROS comment: Prior lower back surgery with continued back pain and weakness;   Abdominal    Substance History - negative use     OB/GYN negative ob/gyn ROS         Other   arthritis,                        Anesthesia Plan    ASA 3     general   (Discussed peripheral nerve block for post op pain relief and patient understands possible complications,risks and agrees.)  intravenous induction     Anesthetic plan, all risks, benefits, and alternatives have been provided, discussed and informed consent has been obtained with: patient and spouse/significant other.

## 2019-11-13 NOTE — ANESTHESIA PROCEDURE NOTES
Peripheral Block      Patient location during procedure: OR  Start time: 11/13/2019 9:38 AM  Reason for block: at surgeon's request and post-op pain management  Performed by  CRNA: Felix Palacios CRNA  Preanesthetic Checklist  Completed: patient identified, site marked, surgical consent, pre-op evaluation, timeout performed, IV checked, risks and benefits discussed and monitors and equipment checked  Prep:  Pt Position: supine  Sterile barriers:cap, gloves and mask  Prep: ChloraPrep  Patient monitoring: blood pressure monitoring, continuous pulse oximetry and EKG  Procedure  Sedation:yes  Performed under: MAC  Guidance:ultrasound guided  ULTRASOUND INTERPRETATION. Using ultrasound guidance a 22 G gauge needle was placed in close proximity to the nerve, at which point, under ultrasound guidance anesthetic was injected in the area of the nerve and spread of the anesthesia was seen on ultrasound in close proximity thereto.  There were no abnormalities seen on ultrasound; a digital image was taken; and the patient tolerated the procedure with no complications. Images:still images obtained, printed/placed on chart    Laterality:right  Block Type:adductor canal block  Injection Technique:single-shot  Needle Type:echogenic and short-bevel  Needle Gauge:22 G  Resistance on Injection: none    Medications Used: ropivacaine (NAROPIN) 0.5 % injection, 20 mL  Med admintered at 11/13/2019 9:40 AM      Medications  Preservative Free Saline:0ml    Post Assessment  Injection Assessment: negative aspiration for heme, no paresthesia on injection and incremental injection  Patient Tolerance:comfortable throughout block  Complications:no

## 2019-11-13 NOTE — OP NOTE
TOTAL KNEE ARTHROPLASTY ATTUNE  Procedure Note    Name:    Thelma Haley  YOB: 1945  Date of surgery:   11/13/2019    Pre-op Diagnosis:   Primary osteoarthritis of both knees [M17.0]  Chronic pain of both knees [M25.561, M25.562, G89.29]  Essential hypertension [I10]  Type 2 diabetes mellitus without complication, without long-term current use of insulin (CMS/HCC) [E11.9]  Primary fibromyalgia syndrome [M79.7]    Post-op Diagnosis:    Post-Op Diagnosis Codes:     * Primary osteoarthritis of both knees [M17.0]     * Chronic pain of both knees [M25.561, M25.562, G89.29]     * Essential hypertension [I10]     * Type 2 diabetes mellitus without complication, without long-term current use of insulin (CMS/HCC) [E11.9]     * Primary fibromyalgia syndrome [M79.7]    Procedure:  Procedure(s):  RIGHT TOTAL KNEE ARTHROPLASTY  with adductor canal block    Surgeon(s):  Terrence Haines MD    SURGEON: Terrence Haines MD    ASSISTANT:  SUZY Subramanian    Anesthesia: General    Staff:   Circulator: Ellie Anaya RN  Scrub Person: Andrea Betts Tiffany D  Vendor Representative: Jaxon Begum  Assistant: Inga Angeles CSA    Estimated Blood Loss: 150 mL    Specimens:                ID Type Source Tests Collected by Time   A : bone and soft tissue from right knee Bone Knee, Right TISSUE PATHOLOGY EXAM Terrence Haines MD 11/13/2019 1030         Drains:   Closed/Suction Drain 1 Right;Lateral Knee Accordion 10 Fr. (Active)   Dressing Status Clean;Dry;Intact 11/13/2019 11:17 AM       Findings:  End-stage osteoarthritis Right knee    Complications: None    IMPLANTS:   Implant Name Type Inv. Item Serial No.  Lot No. LRB No. Used   CMT BONE SMARTSET HV 40GM - [id number] Implant CMT BONE SMARTSET HV 40GM  DEPUY [id number] Right 1   COMP PAT ATTUNE CMT MEDL/DOME 32MM - [id number] Implant COMP PAT ATTUNE CMT MEDL/DOME 32MM  DEPUY [id number] Right 1   COMP FEM ATTUNE  CMT PS SZ4 RT - [id number] Implant COMP FEM ATTUNE CMT PS SZ4 RT  [id number] Right 1   BASE TIB ATTUNE CMT RP S PLS SZ4 - [id number] Implant BASE TIB ATTUNE CMT RP S PLS SZ4  DEPUY [id number] Right 1   INSRT TIB ATTUNE PS RP SZ4 5MM - [id number] Implant INSRT TIB ATTUNE PS RP SZ4 5MM  DEPUY [id number] Right 1         PROCEDURE:    The patient was taken to the operating room and placed in the supine position. Preoperative antibiotics were administered. Surgical time out was performed. After adequate induction of anesthesia, the leg was prepped and draped in the usual sterile fashion, exsanguinated with an Ace bandage and the tourniquet inflated to 300 mmHg. A midline incision was performed followed by a medial parapatellar arthrotomy.    Attention was then placed to the patella. The patella was noted to track centrally through range of motion. The patella was then sized with the trials. The thickness of the patella was then measured and the cut was made in a free-hand technique. The patella protector was then placed and the surrounding osteophytes were removed.   The patella was subluxed laterally in a non-everted position.  A portion of the fat pad, ACL, and anterior horns of the meniscus were excised.     The drill hole was placed in the distal femur and the canal was then irrigated and suctioned. The IM bernardino was placed and a 5 degree distal valgus cut was performed on the femur. The femur was then sized with a sizing guide. The femoral cutting block was placed and all femoral cuts were performed. The proximal tibia was exposed. We used the extramedullary tibial cutting guide set for removal of an appropriate amount of proximal tibia. The tibial cut was performed. The posterior horns of the menisci were excised. The posterior osteophytes were removed. Flexion extension blocks were then used to balance the knee. The tibial cut surface was then sized with the sizing templates and the tibial and femoral trial were then  placed. The knee was placed in full extension and then the patella was re-addressed. The patella was resurfaced and the preoperative thickness was reproduced. The patella tracked centrally through range of motion.     At this point all trial components were removed, the knee was copiously irrigated with pulsed lavage. The cut surfaces were then dried with clean lap sponges, and the components were cemented tibia, followed by femur, then patella. The knee was held in full extension and all excess cement was removed. The knee was held still until the cement had completely hardened. We then placed the trial polyethylene spacer which resulted in full extension and excellent flexion-extension balance. We placed the final polyethylene spacer.    The knee was then copiously irrigated. The tourniquet was then released. There was excellent hemostasis. We placed a 10 Brazilian Hemovac drain. We closed the knee in multiple layers in standard fashion. Sterile dressing were applied. At the end of the case, the sponge and needle counts were reported as being correct. There were no known complications. The patient was then transported to the recovery room in satisfactory condition.      Terrence Haines MD     Date: 11/13/2019  Time: 12:11 PM

## 2019-11-13 NOTE — THERAPY EVALUATION
Acute Care - Physical Therapy Initial Evaluation  [facility]     Patient Name: Thelma Haley  : 1945  MRN: [id number]  Today's Date: 2019   Onset of Illness/Injury or Date of Surgery: 19  Date of Referral to PT: 19  Referring Physician: Zaki      Admit Date: 2019    Visit Dx:     ICD-10-CM ICD-9-CM   1. Impaired functional mobility, balance, gait, and endurance Z74.09 V49.89   2. Primary osteoarthritis of both knees M17.0 715.16   3. Chronic pain of both knees M25.561 719.46    M25.562 338.29    G89.29    4. Essential hypertension I10 401.9   5. Type 2 diabetes mellitus without complication, without long-term current use of insulin (CMS/Formerly [facility]) E11.9 250.00   6. Primary fibromyalgia syndrome M79.7 729.1   7. Impaired mobility and ADLs Z74.09 799.89     Patient Active Problem List   Diagnosis   • Primary fibromyalgia syndrome   • Obesity   • Neuropathy   • Hyperlipidemia   • Essential hypertension   • Glaucoma   • Chronic rhinitis   • Allergic rhinitis due to pollen   • Abnormal results of thyroid function studies   • Acute pain of left shoulder   • Acute pain of right shoulder   • Impingement syndrome of right shoulder   • Diverticulitis of intestine without perforation or abscess   • Chest pain   • Vitamin D deficiency   • Subclinical hyperthyroidism   • Type 2 diabetes mellitus without complication, without long-term current use of insulin (CMS/[facility])   • Simple goiter   • Disorder of bone    • Impaired fasting glucose    • Complete tear of right rotator cuff   • Arthritis of right acromioclavicular joint   • Primary osteoarthritis of both knees   • Asthma   • Bronchitis, acute   • Neck pain   • Localized edema   • Sciatica of left side   • Chronic bilateral low back pain   • Pre-operative clearance   • Lumbar disc herniation with radiculopathy   • Impingement syndrome, shoulder, left   • Pain in both knees   • Atherosclerosis of native coronary artery of native heart without  angina pectoris   • Chronic right shoulder pain   • Abdominal pain, right lower quadrant   • Diverticulosis of colon   • History of colonic polyps   • Irritable colon   • Other and unspecified noninfectious gastroenteritis and colitis   • Chronic pain of both knees     Past Medical History:   Diagnosis Date   • Abnormal results of thyroid function studies    • Allergic rhinitis due to pollen    • Arthritis    • Asthma      uncomplicated      • Cataract    • Chronic rhinitis    • Diabetes mellitus (CMS/HCC)    • Disease of thyroid gland    • Essential hypertension    • Extrinsic asthma with status asthmaticus    • Glaucoma    • Hyperlipidemia    • Impingement syndrome of right shoulder    • Injury of back     L4 L5 bulging disc   • Neuropathy    • Nuclear senile cataract    • Overweight with body mass index (BMI) 25.0-29.9    • Primary fibromyalgia syndrome    • Primary open angle glaucoma    • Rosacea    • Temporomandibular joint disorder      Past Surgical History:   Procedure Laterality Date   • BACK SURGERY     • CARPAL TUNNEL RELEASE      Bilateral carpal tunnel release.)   03/27/2000    • CARPAL TUNNEL RELEASE Bilateral    • CATARACT EXTRACTION      Bilateral   • CERVICAL FUSION  1980   • COLONOSCOPY     • DILATATION AND CURETTAGE     • INJECTION OF MEDICATION  06/17/2015    celestone(betamethasone) (2)    • INJECTION OF MEDICATION  04/14/2016    depo medrol ( methylprednisone) (20)   • OOPHORECTOMY     • OTHER SURGICAL HISTORY      hysterosope procedure   • RIGHT OOPHORECTOMY     • ROTATOR CUFF REPAIR      Right   • SHOULDER ARTHROSCOPY Right 2/6/2017    Procedure: RIGHT SHOULDER ARTHROSCOPY SUBACROMIAL DECOMPRESSION, ROTATOR CUFF REPAIR   ;  Surgeon: Terrence Haines MD;  Location: [facility];  Service:    • SHOULDER SURGERY      Excision of 4.8 cm mass with primary intermediate closure.)   03/30/2012    • SPINAL FUSION      L4 L5        PT ASSESSMENT (last 12 hours)      Physical Therapy Evaluation      Row Name 11/13/19 1511          PT Evaluation Time/Intention    Document Type  evaluation  -GB     Mode of Treatment  physical therapy;co-treatment;occupational therapy  -GB     Patient Effort  good  -GB     Row Name 11/13/19 1511          General Information    Patient Profile Reviewed?  yes  -GB     Onset of Illness/Injury or Date of Surgery  11/13/19  -GB     Referring Physician  Zaki  -GB     Patient Observations  cooperative;agree to therapy;lethargic  -GB     Patient/Family Observations  spouse and sister present who will be her caregivers  -GB     General Observations of Patient  initially on 02 per NC but d/c'ed by RN due to sats 100%  -GB     Prior Level of Function  independent:;all household mobility;gait;community mobility;ADL's  -GB     Equipment Currently Used at Home  cane, quad;raised toilet;shower chair has a BS,  shower head; reacher/sock aid  -GB     Existing Precautions/Restrictions  fall;oxygen therapy device and L/min  -GB     Limitations/Impairments  safety/cognitive  -GB     Risks Reviewed  patient and family:;LOB;dizziness;increased discomfort  -GB     Benefits Reviewed  patient and family:;improve function;increase independence;increase strength  -GB     Row Name 11/13/19 1511          Relationship/Environment    Lives With  spouse  -GB     Row Name 11/13/19 1511          Resource/Environmental Concerns    Current Living Arrangements  home/apartment/condo  -GB     Row Name 11/13/19 1511          Living Environment    Home Accessibility  stairs to enter home  -GB     Row Name 11/13/19 1511          Home Main Entrance    Number of Stairs, Main Entrance  one  -GB     Stair Railings, Main Entrance  none  -GB     Row Name 11/13/19 1511          Cognitive Assessment/Intervention- PT/OT    Affect/Mental Status (Cognitive)  WFL sleepy and not feeling well   -GB     Orientation Status (Cognition)  oriented x 4  -GB     Follows Commands (Cognition)  follows one step commands;75-90% accuracy;over  90% accuracy;verbal cues/prompting required;repetition of directions required;physical/tactile prompts required;delayed response/completion;increased processing time needed  -     Safety Deficit (Cognitive)  mild deficit;moderate deficit;ability to follow commands;awareness of need for assistance;insight into deficits/self awareness  -[facility] Name 11/13/19 1511          Safety Issues, Functional Mobility    Impairments Affecting Function (Mobility)  balance;coordination;endurance/activity tolerance;motor control;motor planning;pain;postural/trunk control;range of motion (ROM);strength  -GB     Row Name 11/13/19 1511          Mobility Assessment/Treatment    Extremity Weight-bearing Status  right lower extremity  -     Right Lower Extremity (Weight-bearing Status)  weight-bearing as tolerated (WBAT)  -GB     Row Name 11/13/19 1511          Bed Mobility Assessment/Treatment    Bed Mobility Assessment/Treatment  supine-sit;sit-supine  -     Supine-Sit [place] (Bed Mobility)  moderate assist (50% patient effort);1 person assist;2 person assist;verbal cues;nonverbal cues (demo/gesture)  -     Sit-Supine [place] (Bed Mobility)  moderate assist (50% patient effort);1 person assist;2 person assist;verbal cues;nonverbal cues (demo/gesture)  -     Bed Mobility, Safety Issues  decreased use of arms for pushing/pulling;decreased use of legs for bridging/pushing;impaired trunk control for bed mobility  -     Assistive Device (Bed Mobility)  bed rails;head of bed elevated  -[facility] Name 11/13/19 1511          Transfer Assessment/Treatment    Transfer Assessment/Treatment  stand-sit transfer;sit-stand transfer;toilet transfer  -     Comment (Transfers)  pt dizzy but willing to go to [facility] so HHA x 2 for more motor control of her if she was too weak; after being up awhile she was able to follow directions and stay up to use walker for return trip and side step to HOB; dizzy through OOB experience but BP  elevated post first upright  -GB     Sit-Stand [place] (Transfers)  moderate assist (50% patient effort);2 person assist;verbal cues;nonverbal cues (demo/gesture)  -GB     Stand-Sit [place] (Transfers)  moderate assist (50% patient effort);1 person assist;2 person assist;nonverbal cues (demo/gesture);verbal cues  -GB     [place] Level (Toilet Transfer)  moderate assist (50% patient effort);1 person assist;2 person assist;nonverbal cues (demo/gesture);verbal cues  -GB     Assistive Device (Toilet Transfer)  commode, bedside without drop arms;walker, front-wheeled  -[facility] Name 11/13/19 1511          Toilet Transfer    Type (Toilet Transfer)  stand-sit;sit-stand  -[facility] Name 11/13/19 1511          Gait/Stairs Assessment/Training    [place] Level (Gait)  minimum assist (75% patient effort);moderate assist (50% patient effort);2 person assist  -     Assistive Device (Gait)  walker, front-wheeled  -     Distance in Feet (Gait)  3,5  -GB     Pattern (Gait)  step-to  -[facility] Name 11/13/19 1511          General ROM    GENERAL ROM COMMENTS  L knee ~0-95 with flx at EOB; RLE and L hip & ankle AROM WFL  -[facility] Name 11/13/19 1511          MMT (Manual Muscle Testing)    General MMT Comments  RLE grossly 4+/5 and geoffrey DF/PF at least 4/5; please see OT re: UE's  -GB     Row Name 11/13/19 1511          Static Sitting Balance    Level of [place] (Unsupported Sitting, Static Balance)  contact guard assist;conditional independence iniitally CGA 1 but progressed to indep EOB  -[facility] Name 11/13/19 1511          Dynamic Sitting Balance    Level of [place], Reaches Outside Midline (Sitting, Dynamic Balance)  contact guard assist  -GB     Row Name 11/13/19 1511          Static Standing Balance    Level of [place] (Supported Standing, Static Balance)  minimal assist, 75% patient effort;moderate assist, 50 to 74% patient effort;2 person assist  -GB     Row Name 11/13/19 1511           Sensory Assessment/Intervention    Sensory General Assessment  no sensation deficits identified  -     Row Name 11/13/19 1511          Hearing Assessment    Hearing Status  WFL  -     Row Name 11/13/19 1511          Light Touch Sensation Assessment    Left Upper Extremity: Light Touch Sensation Assessment  intact  -GB     Right Upper Extremity: Light Touch Sensation Assessment  intact  -     Row Name 11/13/19 1511          Vision Assessment/Intervention    Visual Impairment/Limitations  corrective lenses full time  -     Row Name 11/13/19 1511          Pain Assessment    Additional Documentation  Pain Scale: Numbers Pre/Post-Treatment (Group)  -GB     Row Name 11/13/19 1511          Pain Scale: Numbers Pre/Post-Treatment    Pain Scale: Numbers, Pretreatment  7/10  -GB     Pain Scale: Numbers, Post-Treatment  7/10  -GB     Pain Location - Side  Right  -GB     Pain Location  knee  -GB     Pain Intervention(s)  Repositioned;Ambulation/increased activity;Medication (See MAR)  -     Row Name             Wound 11/13/19 1031 Right knee Incision    Wound - Properties Group Date first assessed: 11/13/19  -AQ Time first assessed: 1031  -AQ Present on Hospital Admission: N  -AQ Side: Right  -AQ Location: knee  -AQ Primary Wound Type: Incision  -AQ    Row Name 11/13/19 1511          Plan of Care Review    Plan of Care Reviewed With  patient  -     Row Name 11/13/19 1511          Physical Therapy Clinical Impression    Date of Referral to PT  11/13/19  -GB     PT Diagnosis (PT Clinical Impression)  impaired functional mobility, gait, balance endurance  -GB     Patient/Family Goals Statement (PT Clinical Impression)  home w/ family  -GB     Criteria for Skilled Interventions Met (PT Clinical Impression)  yes  -GB     Pathology/Pathophysiology Noted (Describe Specifically for Each System)  musculoskeletal  -GB     Impairments Found (describe specific impairments)  aerobic capacity/endurance;gait, locomotion, and  balance  -GB     Rehab Potential (PT Clinical Summary)  good, to achieve stated therapy goals  -GB     Predicted Duration of Therapy (PT)  3 days  -GB     Care Plan Review (PT)  patient/other agree to care plan  -GB     Care Plan Review, Other Participant (PT Clinical Impression)  family  -GB     Row Name 11/13/19 1511          Vital Signs    Pre Systolic BP Rehab  156  -GB     Pre Treatment Diastolic BP  72  -GB     Intra Systolic BP Rehab  142  -GB     Intra Treatment Diastolic BP  75  -GB     Post Systolic BP Rehab  172  -GB     Post Treatment Diastolic BP  77  -GB     Pretreatment Heart Rate (beats/min)  60  -GB     Posttreatment Heart Rate (beats/min)  59  -GB     Pre SpO2 (%)  99  -GB     O2 Delivery Pre Treatment  nasal cannula  -GB     Intra SpO2 (%)  99  -GB     O2 Delivery Intra Treatment  room air  -GB     Pre Patient Position  Supine  -GB     Intra Patient Position  Sitting  -GB     Post Patient Position  Sitting  -GB     Recovery Time  pst return to bed from [facility] 177/80; HR 66 sats 96 supine  -GB     Row Name 11/13/19 1511          Physical Therapy Goals    Bed Mobility Goal Selection (PT)  bed mobility, PT goal 1  -GB     Transfer Goal Selection (PT)  transfer, PT goal 1  -GB     Gait Training Goal Selection (PT)  gait training, PT goal 1  -GB     ROM Goal Selection (PT)  ROM, PT goal 1  -GB     Stairs Goal Selection (PT)  stairs, PT goal 1  -GB     Additional Documentation  Stairs Goal Selection (PT) (Row);ROM Goal Selection (PT) (Row)  -     Row Name 11/13/19 1511          Bed Mobility Goal 1 (PT)    Activity/Assistive Device (Bed Mobility Goal 1, PT)  bed mobility activities, all  -GB     [place] Level/Cues Needed (Bed Mobility Goal 1, PT)  conditional independence  -GB     Time Frame (Bed Mobility Goal 1, PT)  2 days  -GB     Progress/Outcomes (Bed Mobility Goal 1, PT)  continuing progress toward goal;goal not met  -     Row Name 11/13/19 1511          Transfer Goal 1 (PT)     Activity/Assistive Device (Transfer Goal 1, PT)  bed-to-chair/chair-to-bed;toilet  -GB     [place] Level/Cues Needed (Transfer Goal 1, PT)  conditional independence  -GB     Time Frame (Transfer Goal 1, PT)  3 days  -GB     Progress/Outcome (Transfer Goal 1, PT)  continuing progress toward goal;goal not met  -GB     Row Name 11/13/19 1511          Gait Training Goal 1 (PT)    Activity/Assistive Device (Gait Training Goal 1, PT)  gait (walking locomotion);assistive device use;decrease fall risk;forward stepping;increase endurance/gait distance;turning, left;turning, right;walker, rolling  -GB     [place] Level (Gait Training Goal 1, PT)  conditional independence  -GB     Distance (Gait Goal 1, PT)  150 ft or more  -GB     Time Frame (Gait Training Goal 1, PT)  by discharge  -GB     Progress/Outcome (Gait Training Goal 1, PT)  goal not met  -     Row Name 11/13/19 1511          ROM Goal 1 (PT)    ROM Goal 1 (PT)  AROM L knee 0-95  -GB     Time Frame (ROM Goal 1, PT)  by discharge  -GB     Barriers (ROM Goal 1, PT)  pain  -GB     Progress/Outcome (ROM Goal 1, PT)  goal ongoing  -GB     Row Name 11/13/19 1511          Stairs Goal 1 (PT)    Activity/Assistive Device (Stairs Goal 1, PT)  ascending stairs;descending stairs  -GB     [place] Level/Cues Needed (Stairs Goal 1, PT)  conditional independence  -GB     Number of Stairs (Stairs Goal 1, PT)  1 or more for curb   -GB     Time Frame (Stairs Goal 1, PT)  by discharge  -GB     Progress/Outcome (Stairs Goal 1, PT)  goal not met;continuing progress toward goal  -GB     Row Name 11/13/19 1511          Positioning and Restraints    Pre-Treatment Position  in bed  -GB     Post Treatment Position  bed  -GB     In Bed  supine;fowlers;call light within reach;encouraged to call for assist;exit alarm on;with family/caregiver;side rails up x2  -GB       User Key  (r) = Recorded By, (t) = Taken By, (c) = Cosigned By    Initials Name Provider Type    GB  Codi Romeo, PT Physical Therapist    Ellie Silva RN Registered Nurse        Physical Therapy Education     Title: PT OT SLP Therapies (In Progress)     Topic: Physical Therapy (Done)     Point: Mobility training (Done)     Learning Progress Summary           Patient Acceptance, D,E, NR,VU by VJ at 11/13/2019  4:08 PM    Comment:  POC; mobility instructions;                   Point: Precautions (Done)     Learning Progress Summary           Patient Acceptance, D,E, NR,VU by VJ at 11/13/2019  4:08 PM    Comment:  POC; mobility instructions;                               User Key     Initials Effective Dates Name Provider Type Discipline     04/03/18 -  Codi Romeo, PT Physical Therapist PT              PT Recommendation and Plan  Anticipated Discharge Disposition (PT): home with 24/7 care, home with OP services, home with home health(depending on progress)  Planned Therapy Interventions (PT Eval): bed mobility training, gait training, home exercise program, patient/family education, stair training, strengthening, transfer training  Therapy Frequency (PT Clinical Impression): daily  Outcome Summary/Treatment Plan (PT)  Anticipated Equipment Needs at Discharge (PT): front wheeled walker  Anticipated Discharge Disposition (PT): home with 24/7 care, home with OP services, home with home health(depending on progress)  Plan of Care Reviewed With: (P) patient, spouse, sibling  Outcome Summary: (P) PT eval w/ OT; pt able to move from sup to sit to sup w/ min-mod assist of 2. Standing step to transfer to [facility] WBAT RLE w/ mod assist of 2 tho dizzy. Gait return to bed w/ FWRW w/ min-mod a x 2 for 5 ft w/out LOB; dizzy through upright .  RN aware. Pt's initial range ~0-95 deg.  Family present, sister former caregiver here so she will be helpful asissting spouse at home post return. DC plan d/c to home w/ assist for care. HH vs OP depending on progress by d/c.  Outcome Measures     Row Name  11/13/19 1600 11/13/19 1429          How much help from another person do you currently need...    Turning from your back to your side while in flat bed without using bedrails?  3  -GB  --     Moving from lying on back to sitting on the side of a flat bed without bedrails?  3  -GB  --     Moving to and from a bed to a chair (including a wheelchair)?  2  -GB  --     Standing up from a chair using your arms (e.g., wheelchair, bedside chair)?  2  -GB  --     Climbing 3-5 steps with a railing?  1  -GB  --     To walk in hospital room?  2  -GB  --     AM-PAC 6 Clicks Score (PT)  13  -GB  --        How much help from another is currently needed...    Putting on and taking off regular lower body clothing?  --  2  -BH     Bathing (including washing, rinsing, and drying)  --  2  -BH     Toileting (which includes using toilet bed pan or urinal)  --  2  -BH     Putting on and taking off regular upper body clothing  --  3  -BH     Taking care of personal grooming (such as brushing teeth)  --  3  -BH     Eating meals  --  3  -BH     AM-PAC 6 Clicks Score (OT)  --  15  -BH        Functional Assessment    Outcome Measure Options  AM-PAC 6 Clicks Basic Mobility (PT)  -GB  AM-PAC 6 Clicks Daily Activity (OT)  -       User Key  (r) = Recorded By, (t) = Taken By, (c) = Cosigned By    Initials Name Provider Type    Codi Sr, PT Physical Therapist     Mylene Edmondson, OTR/L Occupational Therapist         Time Calculation:   PT Charges     Row Name 11/13/19 1543             Time Calculation    Start Time  1447  -GB      Stop Time  1519  -GB      Time Calculation (min)  32 min  -GB      PT Received On  11/13/19  -GB      PT Goal Re-Cert Due Date  11/22/19  -GB        User Key  (r) = Recorded By, (t) = Taken By, (c) = Cosigned By    Initials Name Provider Type    Codi Sr, PT Physical Therapist        Therapy Charges for Today     Code Description Service Date Service Provider Modifiers Qty     [id number]  PT EVAL MOD COMPLEXITY 2 11/13/2019 Codi Romeo, PT GP 1          PT G-Codes  Outcome Measure Options: AM-PAC 6 Clicks Basic Mobility (PT)  AM-PAC 6 Clicks Score (PT): 13  AM-PAC 6 Clicks Score (OT): 15      Codi Romeo, PT  11/13/2019

## 2019-11-13 NOTE — ANESTHESIA PROCEDURE NOTES
Airway  Urgency: elective    Airway not difficult    General Information and Staff    Patient location during procedure: OR    Indications and Patient Condition  Indications for airway management: airway protection and CNS depression    Preoxygenated: yes  Mask difficulty assessment: 0 - not attempted    Final Airway Details  Final airway type: supraglottic airway      Successful airway: I-gel  Size 4    Number of attempts at approach: 1  Assessment: lips, teeth, and gum same as pre-op and atraumatic intubation

## 2019-11-13 NOTE — PLAN OF CARE
Problem: Patient Care Overview  Goal: Plan of Care Review  Outcome: Ongoing (interventions implemented as appropriate)   11/13/19 8854   OTHER   Outcome Summary OT rafal completed this date, co-eval with PT. Pt lethargic but pleasant and tried to engage but had neasuea and dizziness. Pt BP was elvated but dropped with act but apolinar back. Pt dizzines got better but took extended time. Pt was mod assist of 1-2 for sup to sit to sup. Pt mod assist of 1-2 for sit to stand off bed and toilet and for short mobility from EOB to BSC back to EOB. Pt total assist to attend to clothing management for toile t/f and CGA for pericare. Pt could benefit from further skilled OT to reach PLOF/max independence with ADL. Pt may need 24/7 care and home health therapy and a RW at d/c.    Coping/Psychosocial   Plan of Care Reviewed With patient;family

## 2019-11-14 ENCOUNTER — ANTICOAGULATION VISIT (OUTPATIENT)
Dept: PHARMACY | Facility: HOSPITAL | Age: 74
End: 2019-11-14

## 2019-11-14 VITALS
RESPIRATION RATE: 18 BRPM | TEMPERATURE: 97.2 F | HEART RATE: 72 BPM | OXYGEN SATURATION: 96 % | HEIGHT: 64 IN | DIASTOLIC BLOOD PRESSURE: 77 MMHG | SYSTOLIC BLOOD PRESSURE: 157 MMHG | WEIGHT: 145.5 LBS | BODY MASS INDEX: 24.84 KG/M2

## 2019-11-14 LAB
GLUCOSE BLDC GLUCOMTR-MCNC: 160 MG/DL (ref 70–130)
GLUCOSE BLDC GLUCOMTR-MCNC: 190 MG/DL (ref 70–130)
HOLD SPECIMEN: NORMAL
INR PPP: 1.19 (ref 0.8–1.2)
LAB AP CASE REPORT: NORMAL
PATH REPORT.FINAL DX SPEC: NORMAL
PATH REPORT.GROSS SPEC: NORMAL
PROTHROMBIN TIME: 14.9 SECONDS (ref 11.1–15.3)
WHOLE BLOOD HOLD SPECIMEN: NORMAL

## 2019-11-14 PROCEDURE — 97116 GAIT TRAINING THERAPY: CPT

## 2019-11-14 PROCEDURE — 85610 PROTHROMBIN TIME: CPT | Performed by: ORTHOPAEDIC SURGERY

## 2019-11-14 PROCEDURE — 99024 POSTOP FOLLOW-UP VISIT: CPT | Performed by: ORTHOPAEDIC SURGERY

## 2019-11-14 PROCEDURE — 97535 SELF CARE MNGMENT TRAINING: CPT

## 2019-11-14 PROCEDURE — 97110 THERAPEUTIC EXERCISES: CPT

## 2019-11-14 PROCEDURE — 63710000001 INSULIN ASPART PER 5 UNITS: Performed by: ORTHOPAEDIC SURGERY

## 2019-11-14 PROCEDURE — 97530 THERAPEUTIC ACTIVITIES: CPT

## 2019-11-14 PROCEDURE — 82962 GLUCOSE BLOOD TEST: CPT

## 2019-11-14 RX ORDER — WARFARIN SODIUM 3 MG/1
3 TABLET ORAL NIGHTLY
Qty: 30 TABLET | Refills: 1 | Status: SHIPPED | OUTPATIENT
Start: 2019-11-14 | End: 2019-12-26

## 2019-11-14 RX ORDER — HYDROMORPHONE HYDROCHLORIDE 2 MG/1
2 TABLET ORAL EVERY 4 HOURS PRN
Qty: 20 TABLET | Refills: 0 | Status: SHIPPED | OUTPATIENT
Start: 2019-11-14 | End: 2019-11-23

## 2019-11-14 RX ADMIN — ATORVASTATIN CALCIUM 10 MG: 10 TABLET, FILM COATED ORAL at 08:27

## 2019-11-14 RX ADMIN — ACETAMINOPHEN 1000 MG: 500 TABLET ORAL at 11:57

## 2019-11-14 RX ADMIN — GABAPENTIN 100 MG: 100 CAPSULE ORAL at 08:27

## 2019-11-14 RX ADMIN — METFORMIN HYDROCHLORIDE 1000 MG: 500 TABLET, EXTENDED RELEASE ORAL at 08:27

## 2019-11-14 RX ADMIN — Medication 2.5 MG: at 08:27

## 2019-11-14 RX ADMIN — LISINOPRIL 5 MG: 5 TABLET ORAL at 08:27

## 2019-11-14 RX ADMIN — FAMOTIDINE 40 MG: 40 TABLET ORAL at 08:27

## 2019-11-14 RX ADMIN — DORZOLAMIDE HYDROCHLORIDE AND TIMOLOL MALEATE 1 DROP: 20; 5 SOLUTION/ DROPS OPHTHALMIC at 08:28

## 2019-11-14 RX ADMIN — INSULIN ASPART 2 UNITS: 100 INJECTION, SOLUTION INTRAVENOUS; SUBCUTANEOUS at 08:28

## 2019-11-14 RX ADMIN — FERROUS SULFATE TAB EC 324 MG (65 MG FE EQUIVALENT) 324 MG: 324 (65 FE) TABLET DELAYED RESPONSE at 08:27

## 2019-11-14 RX ADMIN — ACETAMINOPHEN 1000 MG: 500 TABLET ORAL at 08:25

## 2019-11-14 RX ADMIN — INSULIN ASPART 2 UNITS: 100 INJECTION, SOLUTION INTRAVENOUS; SUBCUTANEOUS at 11:58

## 2019-11-14 NOTE — PROGRESS NOTES
Orthopedic Total Knee Progress Note      Patient: Thelma Haley  Date of Admission: 11/13/2019  YOB: 1945  Medical Record Number: [id number]    LOS: 1    Status Post: Procedure(s) (LRB):  RIGHT TOTAL KNEE ARTHROPLASTY  with adductor canal block (Right)        Systemic or Specific Complaints: No Complaints  Complications: None  Pain Relief: some relief    Physical Exam:  74 y.o. female alert and oriented  Temp:  [95.6 °F (35.3 °C)-98.2 °F (36.8 °C)] 97.4 °F (36.3 °C)  Heart Rate:  [58-75] 70  Resp:  [13-18] 18  BP: (108-190)/(59-84) 108/59    Abd: Soft, NT, with BS +  Ext: NV intact. ROM appropriate. Calf is soft and nontender. Negative Homans Sn  Skin: dressing clean dry and intact w/out signs or  symptoms of infection.    Activity: Mobilizing Per P.T.   Weight Bearing: As Tolerated    Data Review  Admission on 11/13/2019   Component Date Value Ref Range Status   • ABO Type 11/13/2019 O   Final   • RH type 11/13/2019 Positive   Final   • Antibody Screen 11/13/2019 Negative   Final   • T&S Expiration Date 11/13/2019 11/16/2019 11:59:59 PM   Final   • Previous History 11/13/2019 Previous Record on File   Final   • Glucose 11/13/2019 94  70 - 130 mg/dL Final    : [id number] OLGE ERINMeter ID: [id number]   • Hemoglobin 11/13/2019 10.7* 12.0 - 15.9 g/dL Final   • Hematocrit 11/13/2019 32.2* 34.0 - 46.6 % Final   • Glucose 11/13/2019 123  70 - 130 mg/dL Final    RN NotifiedOperator: [id number] ABDULLAHI ANITAMeter ID: [id number]   • Glucose 11/13/2019 141* 70 - 130 mg/dL Final    : [id number] HAILEY ANGELITAMeter ID: [id number]   • Glucose 11/13/2019 229* 70 - 130 mg/dL Final    RN NotifiedOperator: [id number] JANETT PAZYMeter ID: [id number]   • Protime 11/14/2019 14.9  11.1 - 15.3 Seconds Final   • INR 11/14/2019 1.19  0.80 - 1.20 Final       No results found.    Medications    acetaminophen 1,000 mg Oral 4x Daily   atorvastatin 10 mg Oral Daily   dorzolamide-timolol 1 drop  Both Eyes BID   famotidine 40 mg Oral Daily   ferrous sulfate 324 mg Oral Daily With Breakfast   gabapentin 100 mg Oral TID   insulin aspart 0-7 Units Subcutaneous 4x Daily AC & at Bedtime   lisinopril 5 mg Oral Daily   metFORMIN ER 1,000 mg Oral Daily With Breakfast   methIMAzole 2.5 mg Oral Daily   sodium chloride 3 mL Intravenous Q12H   warfarin 3 mg Oral Daily     bisacodyl  •  dextrose  •  dextrose  •  docusate sodium  •  glucagon (human recombinant)  •  HYDROmorphone  •  HYDROmorphone  •  influenza vaccine  •  [DISCONTINUED] HYDROmorphone **AND** naloxone  •  ondansetron **OR** ondansetron  •  Pharmacy to dose warfarin  •  sodium chloride    Assessment:  Doing well following total knee replacement  Patient Active Problem List   Diagnosis   • Primary fibromyalgia syndrome   • Obesity   • Neuropathy   • Hyperlipidemia   • Essential hypertension   • Glaucoma   • Chronic rhinitis   • Allergic rhinitis due to pollen   • Abnormal results of thyroid function studies   • Acute pain of left shoulder   • Acute pain of right shoulder   • Impingement syndrome of right shoulder   • Diverticulitis of intestine without perforation or abscess   • Chest pain   • Vitamin D deficiency   • Subclinical hyperthyroidism   • Type 2 diabetes mellitus without complication, without long-term current use of insulin (CMS/[facility])   • Simple goiter   • Disorder of bone    • Impaired fasting glucose    • Complete tear of right rotator cuff   • Arthritis of right acromioclavicular joint   • Primary osteoarthritis of both knees   • Asthma   • Bronchitis, acute   • Neck pain   • Localized edema   • Sciatica of left side   • Chronic bilateral low back pain   • Pre-operative clearance   • Lumbar disc herniation with radiculopathy   • Impingement syndrome, shoulder, left   • Pain in both knees   • Atherosclerosis of native coronary artery of native heart without angina pectoris   • Chronic right shoulder pain   • Abdominal pain, right lower quadrant    • Diverticulosis of colon   • History of colonic polyps   • Irritable colon   • Other and unspecified noninfectious gastroenteritis and colitis   • Chronic pain of both knees         Plan:   Continue efforts to mobilize  Continue Pain Control Measures  Continue incisional Care  DVT prophylaxis    Discharge Plan:Home anticipate today or tomorrow.  Anticipate outpatient PT.    Terrence Haines MD    Date: 11/14/2019   Time: 7:29 AM

## 2019-11-14 NOTE — DISCHARGE PLACEMENT REQUEST
"Referral for outpatient therapy.           Amira Haley (74 y.o. Female)     Date of Birth Social Security Number Address Home Phone MRN    1945  [address] [facility] [address] [phone] [id number]    Caodaism Marital Status          Religion        Admission Date Admission Type Admitting Provider Attending Provider Department, Room/Bed    11/13/19 Elective Terrence Haines MD Dodds, James Carpenter, MD [address], [facility]/1    Discharge Date Discharge Disposition Discharge Destination         Home or Self Care              Attending Provider:  Terrence Haines MD    Allergies:  Ciprofloxacin, Lortab [Hydrocodone-acetaminophen], Metronidazole, Oxycodone, Ultram [Tramadol Hcl]    Isolation:  None   Infection:  None   Code Status:  CPR    Ht:  162.6 cm (64.02\")   Wt:  66 kg (145 lb 8.1 oz)    Admission Cmt:  None   Principal Problem:  Primary osteoarthritis of both knees [M17.0]                 Active Insurance as of 11/13/2019     Primary Coverage     Payor Plan Insurance Group Employer/Plan Group    MEDICARE MEDICARE A & B      Payor Plan Address Payor Plan Phone Number Payor Plan Fax Number Effective Dates    [address] [phone]  8/1/2008 - None Entered    [facility] [id number]       Subscriber Name Subscriber Birth Date Member ID       AMIRA HALEY 1945 [id number]           Secondary Coverage     Payor Plan Insurance Group Employer/Plan Group    CIGNA CIGNA MEDICARE SUPPLEMENT SOLUTIONS PLAN G     Payor Plan Address Payor Plan Phone Number Payor Plan Fax Number Effective Dates    [address] [address]   1/1/2014 - None Entered    [facility] [id number]       Subscriber Name Subscriber Birth Date Member ID       AMIRA HALEY 1945 [id number]                 Emergency Contacts      (Rel.) Home Phone Work Phone Mobile Phone    Heath Haley (Spouse) -- -- [phone]        [place] [address] " [facility] [address]  Phone:  [phone]  Fax:   Date: 2019      Ambulatory Referral to Physical Therapy Evaluate and treat     Patient:  Thelma Haley MRN:  [id number]   [address] [facility] [address] :  1945  SSN:    Phone: [phone] Sex:  F      INSURANCE PAYOR PLAN GROUP # SUBSCRIBER ID   Primary:  Secondary:    MEDICARE  CIGNA [id number]  [id number]    PLAN G [id number]  [id number]      Referring Provider Information:  CAITY HAINES Phone: [phone] Fax:       Referral Information:   # Visits:  1 Referral Type: Therapy [AE1]   Urgency:  Routine Referral Reason: Specialty Services Required   Start Date: 2019 End Date:  To be determined by Insurer   Diagnosis: Primary osteoarthritis of both knees (M17.0 [ICD-10-CM] 715.16 [ICD-9-CM])  Type 2 diabetes mellitus without complication, without long-term current use of insulin (CMS/[facility]) (E11.9 [ICD-10-CM] 250.00 [ICD-9-CM])  Impaired mobility and ADLs (Z74.09 [ICD-10-CM] 799.89 [ICD-9-CM])  Impaired functional mobility, balance, gait, and endurance (Z74.09 [ICD-10-CM] V49.89 [ICD-9-CM])      Refer to Dept:   Refer to Provider:   Refer to Facility:    [facility]     S/p TKA  Advance as tolerated  Swelling control  Call with questions  Specialty needed: Evaluate and treat     This document serves as a request of services and does not constitute Insurance authorization or approval of services.  To determine eligibility, please contact the members Insurance carrier to verify and review coverage.     If you have medical questions regarding this request for services. Please contact [address] at [phone] during normal business hours.       Authorizing Provider:Caity Haines MD  Authorizing Provider's NPI: [id number]  Order Entered By: Caity Haines MD 2019  7:34 AM     Electronically signed by: Caity Haines MD 2019  7:34  AM            Emergency Contact Information     Name Relation Home Work Mobile    Heath Haley Spouse   [phone]          Insurance Information                MEDICARE/MEDICARE A & B Phone: [phone]    Subscriber: Thelma Haley Subscriber#: [id number]    Group#:  Precert#:         CIGNA/CIGNA MEDICARE SUPPLEMENT SOLUTIONS Phone:     Subscriber: Thelma Haley Subscriber#: [id number]    Group#: PLAN G Precert#:           Operative/Procedure Notes (last 24 hours) (Notes from 11/13/19 1223 through 11/14/19 1223)     No notes of this type exist for this encounter.

## 2019-11-14 NOTE — THERAPY TREATMENT NOTE
Acute Care - Physical Therapy Treatment Note  [facility]     Patient Name: Thelma Haley  : 1945  MRN: [id number]  Today's Date: 2019  Onset of Illness/Injury or Date of Surgery: 19  Date of Referral to PT: 19  Referring Physician: Zaki    Admit Date: 2019    Visit Dx:    ICD-10-CM ICD-9-CM   1. Primary osteoarthritis of both knees M17.0 715.16   2. Chronic pain of both knees M25.561 719.46    M25.562 338.29    G89.29    3. Essential hypertension I10 401.9   4. Type 2 diabetes mellitus without complication, without long-term current use of insulin (CMS/[facility]) E11.9 250.00   5. Primary fibromyalgia syndrome M79.7 729.1   6. Impaired mobility and ADLs Z74.09 799.89   7. Impaired functional mobility, balance, gait, and endurance Z74.09 V49.89     Patient Active Problem List   Diagnosis   • Primary fibromyalgia syndrome   • Obesity   • Neuropathy   • Hyperlipidemia   • Essential hypertension   • Glaucoma   • Chronic rhinitis   • Allergic rhinitis due to pollen   • Abnormal results of thyroid function studies   • Acute pain of left shoulder   • Acute pain of right shoulder   • Impingement syndrome of right shoulder   • Diverticulitis of intestine without perforation or abscess   • Chest pain   • Vitamin D deficiency   • Subclinical hyperthyroidism   • Type 2 diabetes mellitus without complication, without long-term current use of insulin (CMS/[facility])   • Simple goiter   • Disorder of bone    • Impaired fasting glucose    • Complete tear of right rotator cuff   • Arthritis of right acromioclavicular joint   • Primary osteoarthritis of both knees   • Asthma   • Bronchitis, acute   • Neck pain   • Localized edema   • Sciatica of left side   • Chronic bilateral low back pain   • Pre-operative clearance   • Lumbar disc herniation with radiculopathy   • Impingement syndrome, shoulder, left   • Pain in both knees   • Atherosclerosis of native coronary artery of native heart without angina  pectoris   • Chronic right shoulder pain   • Abdominal pain, right lower quadrant   • Diverticulosis of colon   • History of colonic polyps   • Irritable colon   • Other and unspecified noninfectious gastroenteritis and colitis   • Chronic pain of both knees       Therapy Treatment    Rehabilitation Treatment Summary     Row Name 11/14/19 1024             Treatment Time/Intention    Discipline  physical therapy assistant  -TW      Document Type  therapy note (daily note)  -TW      Subjective Information  no complaints  -TW      Mode of Treatment  physical therapy;individual therapy  -TW      Patient/Family Observations  Pt with  and agreeable to therapy.  -TW      Patient Effort  good  -TW      Existing Precautions/Restrictions  fall  -TW      Recorded by [TW] Rahul Roberts, PTA 11/14/19 1212      Row Name 11/14/19 1024             Vital Signs    Post Systolic BP Rehab  156  -TW      Post Treatment Diastolic BP  77  -TW      Pretreatment Heart Rate (beats/min)  70  -TW      Posttreatment Heart Rate (beats/min)  72  -TW      Pre SpO2 (%)  96  -TW      O2 Delivery Pre Treatment  room air  -TW      Post SpO2 (%)  97  -TW      Pre Patient Position  Supine  -TW      Intra Patient Position  Standing  -TW      Post Patient Position  Supine  -TW      Recorded by [TW] Rahul Roberts, PTA 11/14/19 1218      Row Name 11/14/19 1024             Cognitive Assessment/Intervention- PT/OT    Affect/Mental Status (Cognitive)  WFL  -TW      Orientation Status (Cognition)  oriented x 4  -TW      Follows Commands (Cognition)  WFL  -TW      Personal Safety Interventions  fall prevention program maintained;gait belt;nonskid shoes/slippers when out of bed  -TW      Recorded by [TW] Rahul Roberts PTA 11/14/19 1218      Row Name 11/14/19 1024             Bed Mobility Assessment/Treatment    Supine-Sit [place] (Bed Mobility)  contact guard  -TW      Sit-Supine [place] (Bed Mobility)  contact guard  -TW       Bed Mobility, Safety Issues  decreased use of legs for bridging/pushing  -TW      Assistive Device (Bed Mobility)  -- bed flat  -TW      Recorded by [TW] Rahul Roberts, PTA 11/14/19 1218      Row Name 11/14/19 1024             Sit-Stand Transfer    Sit-Stand [place] (Transfers)  contact guard  -TW      Recorded by [TW] Rahul Roberts, PTA 11/14/19 1218      Row Name 11/14/19 1024             Stand-Sit Transfer    Stand-Sit [place] (Transfers)  contact guard  -TW      Recorded by [TW] Rahul Roberts, PTA 11/14/19 1218      Row Name 11/14/19 1024             Gait/Stairs Assessment/Training    Gait/Stairs Assessment/Training  gait/ambulation assistive device  -TW      [place] Level (Gait)  stand by assist  -TW      Assistive Device (Gait)  walker, front-wheeled  -TW      Distance in Feet (Gait)  156ft  -TW      Pattern (Gait)  step-through  -TW      Deviations/Abnormal Patterns (Gait)  antalgic;gait speed decreased  -TW      [place] Level (Stairs)  contact guard;stand by assist  -TW      Handrail Location (Stairs)  right side (ascending)  -TW      Number of Steps (Stairs)  5 x2  -TW      Ascending Technique (Stairs)  step-to-step  -TW      Descending Technique (Stairs)  step-to-step  -TW      Recorded by [TW] Rahul Roberts, PTA 11/14/19 1218      Row Name 11/14/19 1024             Therapeutic Exercise    Lower Extremity (Therapeutic Exercise)  gluteal sets;quad sets, bilateral;heel slides, right  -TW      Lower Extremity Range of Motion (Therapeutic Exercise)  ankle dorsiflexion/plantar flexion, bilateral  -TW      Exercise Type (Therapeutic Exercise)  AROM (active range of motion)  -TW      Position (Therapeutic Exercise)  supine  -TW      Sets/Reps (Therapeutic Exercise)  2/10  -TW      Comment (Therapeutic Exercise)  Pt demonstrates good understanding of HEP.  -TW      Recorded by [TW] Rahul Roberts, PTA 11/14/19 1218      Row Name 11/14/19 1024             Positioning  and Restraints    Pre-Treatment Position  in bed  -TW      Post Treatment Position  bed  -TW      In Bed  supine;call light within reach;encouraged to call for assist;exit alarm on;with family/caregiver  -TW      Recorded by [TW] Rahul Roberts PTA 11/14/19 1218      Row Name 11/14/19 1024             Pain Scale: Numbers Pre/Post-Treatment    Pain Scale: Numbers, Pretreatment  4/10  -TW      Pain Scale: Numbers, Post-Treatment  5/10  -TW      Pain Location - Side  Right  -TW      Pain Location  knee  -TW      Recorded by [TW] Rahul Roberts PTA 11/14/19 1218      Row Name                Wound 11/13/19 1031 Right knee Incision    Wound - Properties Group Date first assessed: 11/13/19 [AQ] Time first assessed: 1031 [AQ] Present on Hospital Admission: N [AQ] Side: Right [AQ] Location: knee [AQ] Primary Wound Type: Incision [AQ] Recorded by:  [AQ] Ellie Anaya RN 11/13/19 1031    Row Name 11/14/19 1024             Outcome Summary/Treatment Plan (PT)    Daily Summary of Progress (PT)  progress toward functional goals as expected  -TW      Plan for Continued Treatment (PT)  Cont  -TW      Anticipated Discharge Disposition (PT)  anticipate therapy at next level of care  -TW      Recorded by [TW] Rahul Roberts PTA 11/14/19 1218        User Key  (r) = Recorded By, (t) = Taken By, (c) = Cosigned By    Initials Name Effective Dates Discipline    AQ Ellie Anaya RN 10/17/16 -  Nurse    TW Rahul Roberts PTA 03/07/18 -  PT          Wound 11/13/19 1031 Right knee Incision (Active)   Dressing Appearance intact;dry;no drainage 11/14/2019  8:23 AM   Closure QUYEN 11/14/2019  8:23 AM   Base dressing in place, unable to visualize 11/14/2019  8:23 AM   Drainage Amount none 11/13/2019  9:40 PM       Rehab Goal Summary     Row Name 11/14/19 1024             Physical Therapy Goals    Bed Mobility Goal Selection (PT)  bed mobility, PT goal 1  -TW      Transfer Goal Selection (PT)  transfer, PT goal 1  -TW       Gait Training Goal Selection (PT)  gait training, PT goal 1  -TW      ROM Goal Selection (PT)  ROM, PT goal 1  -TW      Stairs Goal Selection (PT)  stairs, PT goal 1  -TW         Bed Mobility Goal 1 (PT)    Activity/Assistive Device (Bed Mobility Goal 1, PT)  bed mobility activities, all  -TW      Story Level/Cues Needed (Bed Mobility Goal 1, PT)  conditional independence  -TW      Time Frame (Bed Mobility Goal 1, PT)  2 days  -TW      Progress/Outcomes (Bed Mobility Goal 1, PT)  continuing progress toward goal;goal not met  -TW         Transfer Goal 1 (PT)    Activity/Assistive Device (Transfer Goal 1, PT)  bed-to-chair/chair-to-bed;toilet  -TW      Story Level/Cues Needed (Transfer Goal 1, PT)  conditional independence  -TW      Time Frame (Transfer Goal 1, PT)  3 days  -TW      Progress/Outcome (Transfer Goal 1, PT)  continuing progress toward goal;goal not met  -TW         Gait Training Goal 1 (PT)    Activity/Assistive Device (Gait Training Goal 1, PT)  gait (walking locomotion);assistive device use;decrease fall risk;forward stepping;increase endurance/gait distance;turning, left;turning, right;walker, rolling  -TW      Story Level (Gait Training Goal 1, PT)  conditional independence  -TW      Distance (Gait Goal 1, PT)  150 ft or more  -TW      Time Frame (Gait Training Goal 1, PT)  by discharge  -TW      Progress/Outcome (Gait Training Goal 1, PT)  goal not met  -TW         ROM Goal 1 (PT)    ROM Goal 1 (PT)  AROM L knee 0-95  -TW      Time Frame (ROM Goal 1, PT)  by discharge  -TW      Barriers (ROM Goal 1, PT)  pain  -TW      Progress/Outcome (ROM Goal 1, PT)  goal ongoing  -TW         Stairs Goal 1 (PT)    Activity/Assistive Device (Stairs Goal 1, PT)  ascending stairs;descending stairs  -TW      Story Level/Cues Needed (Stairs Goal 1, PT)  conditional independence  -TW      Number of Stairs (Stairs Goal 1, PT)  1 or more for curb   -TW      Time Frame (Stairs Goal 1, PT)   by discharge  -      Progress/Outcome (Stairs Goal 1, PT)  goal not met;continuing progress toward goal  -TW        User Key  (r) = Recorded By, (t) = Taken By, (c) = Cosigned By    Initials Name Provider Type Discipline    TW Rahul Roberts PTA Physical Therapy Assistant PT          Physical Therapy Education     Title: PT OT SLP Therapies (In Progress)     Topic: Physical Therapy (Done)     Point: Mobility training (Done)     Learning Progress Summary           Patient Acceptance, D,E, NR,VU by VJ at 11/13/2019  4:08 PM    Comment:  POC; mobility instructions;                   Point: Precautions (Done)     Learning Progress Summary           Patient Acceptance, D,E, NR,VU by  at 11/13/2019  4:08 PM    Comment:  POC; mobility instructions;                               User Key     Initials Effective Dates Name Provider Type Discipline     04/03/18 -  Codi Romeo, PT Physical Therapist PT                PT Recommendation and Plan  Anticipated Discharge Disposition (PT): anticipate therapy at next level of care  Outcome Summary/Treatment Plan (PT)  Daily Summary of Progress (PT): progress toward functional goals as expected  Plan for Continued Treatment (PT): Cont  Anticipated Discharge Disposition (PT): anticipate therapy at next level of care  Plan of Care Reviewed With: patient, spouse  Progress: improving  Outcome Summary: Pt agreeable to therapy. Pt with no c/o dizziness throughout tx this date. Pt able to t/f sup to sit to sup with CGA of 1 with no bed rails utilized. Pt stood with SBA/CGA and amb 20ft to w/c then performed step training and amb back to her room(156ft) with RW and SBA/CGA.Pt demonstrated good understanding of HEP and VErb good understanding of progression. Pt with 0-97* of AROM of R knee at this time. Pt would cont to benefit from therapy upon DC.  Outcome Measures     Row Name 11/14/19 1024 11/13/19 1600 11/13/19 1429       How much help from another person do you  currently need...    Turning from your back to your side while in flat bed without using bedrails?  3  -TW  3  -GB  --    Moving from lying on back to sitting on the side of a flat bed without bedrails?  3  -TW  3  -GB  --    Moving to and from a bed to a chair (including a wheelchair)?  3  -TW  2  -GB  --    Standing up from a chair using your arms (e.g., wheelchair, bedside chair)?  3  -TW  2  -GB  --    Climbing 3-5 steps with a railing?  3  -TW  1  -GB  --    To walk in hospital room?  3  -TW  2  -GB  --    AM-PAC 6 Clicks Score (PT)  18  -TW  13  -GB  --       How much help from another is currently needed...    Putting on and taking off regular lower body clothing?  --  --  2  -BH    Bathing (including washing, rinsing, and drying)  --  --  2  -BH    Toileting (which includes using toilet bed pan or urinal)  --  --  2  -BH    Putting on and taking off regular upper body clothing  --  --  3  -BH    Taking care of personal grooming (such as brushing teeth)  --  --  3  -BH    Eating meals  --  --  3  -BH    AM-PAC 6 Clicks Score (OT)  --  --  15  -BH       Functional Assessment    Outcome Measure Options  AM-PAC 6 Clicks Basic Mobility (PT)  -TW  AM-PAC 6 Clicks Basic Mobility (PT)  -GB  AM-PAC 6 Clicks Daily Activity (OT)  -      User Key  (r) = Recorded By, (t) = Taken By, (c) = Cosigned By    Initials Name Provider Type    Codi Sr, PT Physical Therapist     Mylene Edmondson, OTR/L Occupational Therapist    TW Rahul Roberts, PTA Physical Therapy Assistant         Time Calculation:   PT Charges     Row Name 11/14/19 1222             Time Calculation    Start Time  1024  -TW      Stop Time  1120  -TW      Time Calculation (min)  56 min  -TW      PT Received On  11/14/19  -TW      PT Goal Re-Cert Due Date  11/22/19  -TW         Time Calculation- PT    Total Timed Code Minutes- PT  56 minute(s)  -TW        User Key  (r) = Recorded By, (t) = Taken By, (c) = Cosigned By    Initials Name  Provider Type    TW Rahul Roberts PTA Physical Therapy Assistant        Therapy Charges for Today     Code Description Service Date Service Provider Modifiers Qty    76500665115 HC GAIT TRAINING EA 15 MIN 11/14/2019 Rahul Roberts PTA GP 1    06411442052 HC PT THERAPEUTIC ACT EA 15 MIN 11/14/2019 Rahul Roberts PTA GP 2    79064232351 HC PT THER PROC EA 15 MIN 11/14/2019 Rahul Roberts PTA GP 1          PT G-Codes  Outcome Measure Options: AM-PAC 6 Clicks Basic Mobility (PT)  AM-PAC 6 Clicks Score (PT): 18  AM-PAC 6 Clicks Score (OT): 15    Rahul Roberts PTA  11/14/2019

## 2019-11-14 NOTE — PROGRESS NOTES
Anticoagulation- Warfarin     Completed Discharge Warfarin Counseling    Discussed the followin. Reason for Medication and Length of therapy  2. Drug-Drug Interactions  3. Drug-Diet Interactions  4. Drug- Alcohol/Smoking Interactions (n/a)  5. Signs and Symptoms of Bleeding  6. Fall risk- go to the Emergency Room  7. Patient is going home with warfarin 3 mg nightly  8. Sent the Rx electronically to: [facility] for warfarin 3 mg, Number: 30  Si tablet every night or As Directed + 1 refills  9. Gave Warfarin booklet  10. Answered all Questions  11. Called in lab orders to [facility]  12.  Reviewed home medications, methimazole may diminish the thinning effects of warfarin, will initiate warfarin 3 mg nightly.  Continue Tylenol (acetaminophen) as needed, currently taking 1000 mg four times daily during this admission.    Olegario Ch [facility]  19  12:34 PM

## 2019-11-14 NOTE — PLAN OF CARE
Problem: Patient Care Overview  Goal: Plan of Care Review  Outcome: Ongoing (interventions implemented as appropriate)   11/14/19 1024   OTHER   Outcome Summary Pt agreeable to therapy. Pt with no c/o dizziness throughout tx this date. Pt able to t/f sup to sit to sup with CGA of 1 with no bed rails utilized. Pt stood with SBA/CGA and amb 20ft to w/c then performed step training and amb back to her room(156ft) with RW and SBA/CGA.Pt demonstrated good understanding of HEP and VErb good understanding of progression. Pt with 0-97* of AROM of R knee at this time. Pt would cont to benefit from therapy upon DC.   Coping/Psychosocial   Plan of Care Reviewed With patient;spouse   Plan of Care Review   Progress improving

## 2019-11-14 NOTE — PLAN OF CARE
Problem: Patient Care Overview  Goal: Plan of Care Review  Outcome: Ongoing (interventions implemented as appropriate)   11/14/19 0241   OTHER   Outcome Summary VSS, ambulating to bedside commode, complains of burning pain, sleeping well through night, continue to monitor.    Coping/Psychosocial   Plan of Care Reviewed With patient   Plan of Care Review   Progress no change     Goal: Individualization and Mutuality  Outcome: Ongoing (interventions implemented as appropriate)    Goal: Discharge Needs Assessment  Outcome: Ongoing (interventions implemented as appropriate)    Goal: Interprofessional Rounds/Family Conf  Outcome: Ongoing (interventions implemented as appropriate)      Problem: Pain, Chronic (Adult)  Goal: Identify Related Risk Factors and Signs and Symptoms  Outcome: Outcome(s) achieved Date Met: 11/14/19    Goal: Acceptable Pain/Comfort Level and Functional Ability  Outcome: Ongoing (interventions implemented as appropriate)      Problem: Fall Risk (Adult)  Goal: Identify Related Risk Factors and Signs and Symptoms  Outcome: Ongoing (interventions implemented as appropriate)    Goal: Absence of Fall  Outcome: Ongoing (interventions implemented as appropriate)      Problem: Skin Injury Risk (Adult)  Goal: Identify Related Risk Factors and Signs and Symptoms  Outcome: Ongoing (interventions implemented as appropriate)    Goal: Skin Health and Integrity  Outcome: Ongoing (interventions implemented as appropriate)      Problem: Knee Arthroplasty (Total, Partial) (Adult)  Goal: Signs and Symptoms of Listed Potential Problems Will be Absent, Minimized or Managed (Knee Arthroplasty)  Outcome: Ongoing (interventions implemented as appropriate)    Goal: Anesthesia/Sedation Recovery  Outcome: Outcome(s) achieved Date Met: 11/14/19

## 2019-11-14 NOTE — DISCHARGE PLACEMENT REQUEST
"Amira Haley (74 y.o. Female)     Date of Birth Social Security Number Address Home Phone MRN    1945  [id number] [phone] [id number]    Confucianism Marital Status          Buddhist        Admission Date Admission Type Admitting Provider Attending Provider Department, Room/Bed    11/13/19 Elective Terrence Haines MD Dodds, James Carpenter, MD [address], 376/1    Discharge Date Discharge Disposition Discharge Destination         Home or Self Care              Attending Provider:  Terrence Haines MD    Allergies:  Ciprofloxacin, Lortab [Hydrocodone-acetaminophen], Metronidazole, Oxycodone, Ultram [Tramadol Hcl]    Isolation:  None   Infection:  None   Code Status:  CPR    Ht:  162.6 cm (64.02\")   Wt:  66 kg (145 lb 8.1 oz)    Admission Cmt:  None   Principal Problem:  Primary osteoarthritis of both knees [M17.0]                 Active Insurance as of 11/13/2019     Primary Coverage     Payor Plan Insurance Group Employer/Plan Group    MEDICARE MEDICARE A & B      Payor Plan Address Payor Plan Phone Number Payor Plan Fax Number Effective Dates    [address] [phone]  8/1/2008 - None Entered    [facility] [id number]       Subscriber Name Subscriber Birth Date Member ID       AMIRA HALEY 1945 [id number]           Secondary Coverage     Payor Plan Insurance Group Employer/Plan Group    [employer] C[employer] MEDICARE SU[employer]LEMENT SOLUT[employer] PLAN G     Payor Plan Address Payor Plan Phone Number Payor Plan Fax Number Effective Dates    [address] [address]   1/1/2014 - None Entered    [facility] [address]       Subscriber Name Subscriber Birth Date Member ID       AMIRA HALEY 1945 [id number]                 Emergency Contacts      (Rel.) Home Phone Work Phone Mobile Phone    Heath Haley (Spouse) -- -- [phone]               Operative/Procedure Notes (most recent note)      Terrence Haines MD " at 11/13/19 0907          TOTAL KNEE ARTHROPLASTY ATTUNE  Procedure Note    Name:    Thelma Haley  YOB: 1945  Date of surgery:   11/13/2019    Pre-op Diagnosis:   Primary osteoarthritis of both knees [M17.0]  Chronic pain of both knees [M25.561, M25.562, G89.29]  Essential hypertension [I10]  Type 2 diabetes mellitus without complication, without long-term current use of insulin (CMS/HCC) [E11.9]  Primary fibromyalgia syndrome [M79.7]    Post-op Diagnosis:    Post-Op Diagnosis Codes:     * Primary osteoarthritis of both knees [M17.0]     * Chronic pain of both knees [M25.561, M25.562, G89.29]     * Essential hypertension [I10]     * Type 2 diabetes mellitus without complication, without long-term current use of insulin (CMS/HCC) [E11.9]     * Primary fibromyalgia syndrome [M79.7]    Procedure:  Procedure(s):  RIGHT TOTAL KNEE ARTHROPLASTY  with adductor canal block    Surgeon(s):  Terrence Haines MD    SURGEON: Terrence Haines MD    ASSISTANT:  SUZY Subramanian    Anesthesia: General    Staff:   Circulator: Ellie Anaya RN  Scrub Person: Andrea Betts Tiffany D  Vendor Representative: Jaxon Begum  Assistant: Inga Angeles CSA    Estimated Blood Loss: 150 mL    Specimens:                ID Type Source Tests Collected by Time   A : bone and soft tissue from right knee Bone Knee, Right TISSUE PATHOLOGY EXAM Terrence Haines MD 11/13/2019 1030         Drains:   Closed/Suction Drain 1 Right;Lateral Knee Accordion 10 Fr. (Active)   Dressing Status Clean;Dry;Intact 11/13/2019 11:17 AM       Findings:  End-stage osteoarthritis Right knee    Complications: None    IMPLANTS:   Implant Name Type Inv. Item Serial No.  Lot No. LRB No. Used   CMT BONE SMARTSET HV 40GM - [id number] Implant CMT BONE SMARTSET HV 40GM  DEPUY [id number] Right 1   COMP PAT ATTUNE CMT MEDL/DOME 32MM - [id number] Implant COMP PAT ATTUNE CMT MEDL/DOME 32MM  [id number]  Right 1   COMP FEM ATTUNE CMT PS SZ4 RT - [id number] Implant COMP FEM ATTUNE CMT PS SZ4 RT  DEPUY [id number] Right 1   BASE TIB ATTUNE CMT RP S PLS SZ4 - [id number] Implant BASE TIB ATTUNE CMT RP S PLS SZ4  DEPUY [id number] Right 1   INSRT TIB ATTUNE PS RP SZ4 5MM - [id number] Implant INSRT TIB ATTUNE PS RP SZ4 5MM  DEPUY [id number] Right 1         PROCEDURE:    The patient was taken to the operating room and placed in the supine position. Preoperative antibiotics were administered. Surgical time out was performed. After adequate induction of anesthesia, the leg was prepped and draped in the usual sterile fashion, exsanguinated with an Ace bandage and the tourniquet inflated to 300 mmHg. A midline incision was performed followed by a medial parapatellar arthrotomy.    Attention was then placed to the patella. The patella was noted to track centrally through range of motion. The patella was then sized with the trials. The thickness of the patella was then measured and the cut was made in a free-hand technique. The patella protector was then placed and the surrounding osteophytes were removed.   The patella was subluxed laterally in a non-everted position.  A portion of the fat pad, ACL, and anterior horns of the meniscus were excised.     The drill hole was placed in the distal femur and the canal was then irrigated and suctioned. The IM bernardino was placed and a 5 degree distal valgus cut was performed on the femur. The femur was then sized with a sizing guide. The femoral cutting block was placed and all femoral cuts were performed. The proximal tibia was exposed. We used the extramedullary tibial cutting guide set for removal of an appropriate amount of proximal tibia. The tibial cut was performed. The posterior horns of the menisci were excised. The posterior osteophytes were removed. Flexion extension blocks were then used to balance the knee. The tibial cut surface was then sized with the sizing templates and the tibial and  femoral trial were then placed. The knee was placed in full extension and then the patella was re-addressed. The patella was resurfaced and the preoperative thickness was reproduced. The patella tracked centrally through range of motion.     At this point all trial components were removed, the knee was copiously irrigated with pulsed lavage. The cut surfaces were then dried with clean lap sponges, and the components were cemented tibia, followed by femur, then patella. The knee was held in full extension and all excess cement was removed. The knee was held still until the cement had completely hardened. We then placed the trial polyethylene spacer which resulted in full extension and excellent flexion-extension balance. We placed the final polyethylene spacer.    The knee was then copiously irrigated. The tourniquet was then released. There was excellent hemostasis. We placed a 10 [place] Hemovac drain. We closed the knee in multiple layers in standard fashion. Sterile dressing were applied. At the end of the case, the sponge and needle counts were reported as being correct. There were no known complications. The patient was then transported to the recovery room in satisfactory condition.      Terrence Haines MD     Date: 11/13/2019  Time: 12:11 PM      Electronically signed by Terrence Haines MD at 11/13/19 1060

## 2019-11-14 NOTE — PLAN OF CARE
Problem: Patient Care Overview  Goal: Plan of Care Review  Outcome: Ongoing (interventions implemented as appropriate)   11/14/19 1320   OTHER   Outcome Summary Pt tolerated tx well this date. Pt was SBA with bed mobility. Pt was CGA with toilet t/f. Pt completed an ADL. Continue OT POC.   Coping/Psychosocial   Plan of Care Reviewed With patient   Plan of Care Review   Progress improving

## 2019-11-14 NOTE — DISCHARGE PLACEMENT REQUEST
"Please deliver walker to room 376. Pt. Is discharged        Amira Haley (74 y.o. Female)     Date of Birth Social Security Number Address Home Phone MRN    1945  [address] [facility] [address] [phone] [id number]    Episcopal Marital Status          Moravian        Admission Date Admission Type Admitting Provider Attending Provider Department, Room/Bed    11/13/19 Elective Terrence Hianes MD Dodds, James Carpenter, MD [address], 376/1    Discharge Date Discharge Disposition Discharge Destination         Home or Self Care              Attending Provider:  Terrence Haines MD    Allergies:  Ciprofloxacin, Lortab [Hydrocodone-acetaminophen], Metronidazole, Oxycodone, Ultram [Tramadol Hcl]    Isolation:  None   Infection:  None   Code Status:  CPR    Ht:  162.6 cm (64.02\")   Wt:  66 kg (145 lb 8.1 oz)    Admission Cmt:  None   Principal Problem:  Primary osteoarthritis of both knees [M17.0]                 Active Insurance as of 11/13/2019     Primary Coverage     Payor Plan Insurance Group Employer/Plan Group    MEDICARE MEDICARE A & B      Payor Plan Address Payor Plan Phone Number Payor Plan Fax Number Effective Dates    [address] [phone]  8/1/2008 - None Entered    [facility] [id number]       Subscriber Name Subscriber Birth Date Member ID       AMIRA HALEY 1945 [id number]           Secondary Coverage     Payor Plan Insurance Group Employer/Plan Group    CIGNA CIGNA MEDICARE SUPPLEMENT SOLUTIONS PLAN G     Payor Plan Address Payor Plan Phone Number Payor Plan Fax Number Effective Dates    [address] [address]   1/1/2014 - None Entered    [facility] [id number]       Subscriber Name Subscriber Birth Date Member ID       AMIRA HALEY 1945 [id number]                 Emergency Contacts      (Rel.) Home Phone Work Phone Mobile Phone    Heath Haley (Spouse) -- -- [phone]        [facility] " "[address] [address]  Dept. Phone:  [phone]  Dept. Fax:   Date Ordered: 2019         Patient:  Thelma Haley MRN:  [id number]   [address] [place] [id number] :  1945  SSN:    Phone: [phone] Sex:  F     Weight: 66 kg (145 lb 8.1 oz)         Ht Readings from Last 1 Encounters:   19 162.6 cm (64.02\")         Walker               (Order ID: [id number])    Diagnosis:  Primary osteoarthritis of both knees (M17.0 [ICD-10-CM] 715.16 [ICD-9-CM])  Type 2 diabetes mellitus without complication, without long-term current use of insulin (CMS/[facility]) (E11.9 [ICD-10-CM] 250.00 [ICD-9-CM])  Impaired mobility and ADLs (Z74.09 [ICD-10-CM] 799.89 [ICD-9-CM])  Impaired functional mobility, balance, gait, and endurance (Z74.09 [ICD-10-CM] V49.89 [ICD-9-CM])   Quantity:  1  Comments: FWW 5\" wheels     Equipment:  Walker Folding with Wheels  Length of Need (99 Months = Lifetime): 99 Months = Lifetime        Authorizing Provider's Phone: [phone]   Authorizing Provider:Terrence Haines MD  Authorizing Provider's NPI: [id number]  Order Entered By: Terrence Haines MD 2019  7:34 AM     Electronically signed by: Terrence Haines MD 2019  7:34 AM              Emergency Contact Information     Name Relation Home Work Mobile    Hetah Haley Spouse   [phone]          Insurance Information                MEDICARE/MEDICARE A & B Phone: [phone]    Subscriber: Tera Thelma Mcgrath Subscriber#: [id number]    Group#:  Precert#:         CIGNA/CIGNA MEDICARE SUPPLEMENT SOLUTIONS Phone:     Subscriber: Haley, Thelma Ramila Subscriber#: [id number]    Group#: PLAN [id number] Precert#:              Physical Therapy Notes (last 24 hours) (Notes from 19 1214 through 19 1214)      Codi Romeo, PT at 19 1612  Version 1 of 1         Acute Care - Physical Therapy Initial Evaluation  [facility] " [place]     Patient Name: Thelma Haley  : 1945  MRN: [id number]  Today's Date: 2019   Onset of Illness/Injury or Date of Surgery: 19  Date of Referral to PT: 19  Referring Physician: Zaki      Admit Date: 2019    Visit Dx:     ICD-10-CM ICD-9-CM   1. Impaired functional mobility, balance, gait, and endurance Z74.09 V49.89   2. Primary osteoarthritis of both knees M17.0 715.16   3. Chronic pain of both knees M25.561 719.46    M25.562 338.29    G89.29    4. Essential hypertension I10 401.9   5. Type 2 diabetes mellitus without complication, without long-term current use of insulin (CMS/[facility]) E11.9 250.00   6. Primary fibromyalgia syndrome M79.7 729.1   7. Impaired mobility and ADLs Z74.09 799.89     Patient Active Problem List   Diagnosis   • Primary fibromyalgia syndrome   • Obesity   • Neuropathy   • Hyperlipidemia   • Essential hypertension   • Glaucoma   • Chronic rhinitis   • Allergic rhinitis due to pollen   • Abnormal results of thyroid function studies   • Acute pain of left shoulder   • Acute pain of right shoulder   • Impingement syndrome of right shoulder   • Diverticulitis of intestine without perforation or abscess   • Chest pain   • Vitamin D deficiency   • Subclinical hyperthyroidism   • Type 2 diabetes mellitus without complication, without long-term current use of insulin (CMS/[facility])   • Simple goiter   • Disorder of bone    • Impaired fasting glucose    • Complete tear of right rotator cuff   • Arthritis of right acromioclavicular joint   • Primary osteoarthritis of both knees   • Asthma   • Bronchitis, acute   • Neck pain   • Localized edema   • Sciatica of left side   • Chronic bilateral low back pain   • Pre-operative clearance   • Lumbar disc herniation with radiculopathy   • Impingement syndrome, shoulder, left   • Pain in both knees   • Atherosclerosis of native coronary artery of native heart without angina pectoris   • Chronic right shoulder pain   •  Abdominal pain, right lower quadrant   • Diverticulosis of colon   • History of colonic polyps   • Irritable colon   • Other and unspecified noninfectious gastroenteritis and colitis   • Chronic pain of both knees     Past Medical History:   Diagnosis Date   • Abnormal results of thyroid function studies    • Allergic rhinitis due to pollen    • Arthritis    • Asthma      uncomplicated      • Cataract    • Chronic rhinitis    • Diabetes mellitus (CMS/HCC)    • Disease of thyroid gland    • Essential hypertension    • Extrinsic asthma with status asthmaticus    • Glaucoma    • Hyperlipidemia    • Impingement syndrome of right shoulder    • Injury of back     L4 L5 bulging disc   • Neuropathy    • Nuclear senile cataract    • Overweight with body mass index (BMI) 25.0-29.9    • Primary fibromyalgia syndrome    • Primary open angle glaucoma    • Rosacea    • Temporomandibular joint disorder      Past Surgical History:   Procedure Laterality Date   • BACK SURGERY     • CARPAL TUNNEL RELEASE      Bilateral carpal tunnel release.)   03/27/2000    • CARPAL TUNNEL RELEASE Bilateral    • CATARACT EXTRACTION      Bilateral   • CERVICAL FUSION  1980   • COLONOSCOPY     • DILATATION AND CURETTAGE     • INJECTION OF MEDICATION  06/17/2015    celestone(betamethasone) (2)    • INJECTION OF MEDICATION  04/14/2016    depo medrol ( methylprednisone) (20)   • OOPHORECTOMY     • OTHER SURGICAL HISTORY      hysterosope procedure   • RIGHT OOPHORECTOMY     • ROTATOR CUFF REPAIR      Right   • SHOULDER ARTHROSCOPY Right 2/6/2017    Procedure: RIGHT SHOULDER ARTHROSCOPY SUBACROMIAL DECOMPRESSION, ROTATOR CUFF REPAIR   ;  Surgeon: Terrence Haines MD;  Location: [facility];  Service:    • SHOULDER SURGERY      Excision of 4.8 cm mass with primary intermediate closure.)   03/30/2012    • SPINAL FUSION      L4 L5        PT ASSESSMENT (last 12 hours)      Physical Therapy Evaluation     Row Name 11/13/19 9149          PT Evaluation  Time/Intention    Document Type  evaluation  -GB     Mode of Treatment  physical therapy;co-treatment;occupational therapy  -GB     Patient Effort  good  -GB     Row Name 11/13/19 1511          General Information    Patient Profile Reviewed?  yes  -GB     Onset of Illness/Injury or Date of Surgery  11/13/19  -GB     Referring Physician  Zaki  -GB     Patient Observations  cooperative;agree to therapy;lethargic  -GB     Patient/Family Observations  spouse and sister present who will be her caregivers  -GB     General Observations of Patient  initially on 02 per NC but d/c'ed by RN due to sats 100%  -GB     Prior Level of Function  independent:;all household mobility;gait;community mobility;ADL's  -GB     Equipment Currently Used at Home  cane, quad;raised toilet;shower chair has a BS,  shower head; reacher/sock aid  -GB     Existing Precautions/Restrictions  fall;oxygen therapy device and L/min  -GB     Limitations/Impairments  safety/cognitive  -GB     Risks Reviewed  patient and family:;LOB;dizziness;increased discomfort  -GB     Benefits Reviewed  patient and family:;improve function;increase independence;increase strength  -GB     Row Name 11/13/19 1511          Relationship/Environment    Lives With  spouse  -GB     Row Name 11/13/19 1511          Resource/Environmental Concerns    Current Living Arrangements  home/apartment/condo  -GB     Row Name 11/13/19 1511          Living Environment    Home Accessibility  stairs to enter home  -GB     Row Name 11/13/19 1511          Home Main Entrance    Number of Stairs, Main Entrance  one  -GB     Stair Railings, Main Entrance  none  -GB     Row Name 11/13/19 1511          Cognitive Assessment/Intervention- PT/OT    Affect/Mental Status (Cognitive)  WFL sleepy and not feeling well   -GB     Orientation Status (Cognition)  oriented x 4  -GB     Follows Commands (Cognition)  follows one step commands;75-90% accuracy;over 90% accuracy;verbal cues/prompting  required;repetition of directions required;physical/tactile prompts required;delayed response/completion;increased processing time needed  -     Safety Deficit (Cognitive)  mild deficit;moderate deficit;ability to follow commands;awareness of need for assistance;insight into deficits/self awareness  -[facility] Name 11/13/19 1511          Safety Issues, Functional Mobility    Impairments Affecting Function (Mobility)  balance;coordination;endurance/activity tolerance;motor control;motor planning;pain;postural/trunk control;range of motion (ROM);strength  -[facility] Name 11/13/19 1511          Mobility Assessment/Treatment    Extremity Weight-bearing Status  right lower extremity  -     Right Lower Extremity (Weight-bearing Status)  weight-bearing as tolerated (WBAT)  -[facility] Name 11/13/19 1511          Bed Mobility Assessment/Treatment    Bed Mobility Assessment/Treatment  supine-sit;sit-supine  -     Supine-Sit [place] (Bed Mobility)  moderate assist (50% patient effort);1 person assist;2 person assist;verbal cues;nonverbal cues (demo/gesture)  -     Sit-Supine [place] (Bed Mobility)  moderate assist (50% patient effort);1 person assist;2 person assist;verbal cues;nonverbal cues (demo/gesture)  -     Bed Mobility, Safety Issues  decreased use of arms for pushing/pulling;decreased use of legs for bridging/pushing;impaired trunk control for bed mobility  -     Assistive Device (Bed Mobility)  bed rails;head of bed elevated  -[facility] Name 11/13/19 1511          Transfer Assessment/Treatment    Transfer Assessment/Treatment  stand-sit transfer;sit-stand transfer;toilet transfer  -     Comment (Transfers)  pt dizzy but willing to go to [facility] so HHA x 2 for more motor control of her if she was too weak; after being up awhile she was able to follow directions and stay up to use walker for return trip and side step to \[facility]\""; dizzy through OOB experience but BP elevated post first upright  -GB      Sit-Stand [place] (Transfers)  moderate assist (50% patient effort);2 person assist;verbal cues;nonverbal cues (demo/gesture)  -GB     Stand-Sit [place] (Transfers)  moderate assist (50% patient effort);1 person assist;2 person assist;nonverbal cues (demo/gesture);verbal cues  -GB     [place] Level (Toilet Transfer)  moderate assist (50% patient effort);1 person assist;2 person assist;nonverbal cues (demo/gesture);verbal cues  -GB     Assistive Device (Toilet Transfer)  commode, bedside without drop arms;walker, front-wheeled  -[facility] Name 11/13/19 1511          Toilet Transfer    Type (Toilet Transfer)  stand-sit;sit-stand  -GB     Row Name 11/13/19 1511          Gait/Stairs Assessment/Training    [place] Level (Gait)  minimum assist (75% patient effort);moderate assist (50% patient effort);2 person assist  -     Assistive Device (Gait)  walker, front-wheeled  -     Distance in Feet (Gait)  3,5  -GB     Pattern (Gait)  step-to  -[facility] Name 11/13/19 1511          General ROM    GENERAL ROM COMMENTS  L knee ~0-95 with flx at EOB; RLE and L hip & ankle AROM WFL  -[facility] Name 11/13/19 1511          MMT (Manual Muscle Testing)    General MMT Comments  RLE grossly 4+/5 and geoffrey DF/PF at least 4/5; please see OT re: UE's  -GB     Row Name 11/13/19 1511          Static Sitting Balance    Level of [place] (Unsupported Sitting, Static Balance)  contact guard assist;conditional independence iniitally CGA 1 but progressed to indep EOB  -[facility] Name 11/13/19 1511          Dynamic Sitting Balance    Level of [place], Reaches Outside Midline (Sitting, Dynamic Balance)  contact guard assist  -GB     Row Name 11/13/19 1511          Static Standing Balance    Level of [place] (Supported Standing, Static Balance)  minimal assist, 75% patient effort;moderate assist, 50 to 74% patient effort;2 person assist  -GB     Row Name 11/13/19 1511          Sensory Assessment/Intervention     Sensory General Assessment  no sensation deficits identified  -[facility] Name 11/13/19 1511          Hearing Assessment    Hearing Status  WFL  -[facility] Name 11/13/19 1511          Light Touch Sensation Assessment    Left Upper Extremity: Light Touch Sensation Assessment  intact  -     Right Upper Extremity: Light Touch Sensation Assessment  intact  -[facility] Name 11/13/19 1511          Vision Assessment/Intervention    Visual Impairment/Limitations  corrective lenses full time  -[facility] Name 11/13/19 1511          Pain Assessment    Additional Documentation  Pain Scale: Numbers Pre/Post-Treatment (Group)  -[facility] Name 11/13/19 1511          Pain Scale: Numbers Pre/Post-Treatment    Pain Scale: Numbers, Pretreatment  7/10  -GB     Pain Scale: Numbers, Post-Treatment  7/10  -GB     Pain Location - Side  Right  -GB     Pain Location  knee  -GB     Pain Intervention(s)  Repositioned;Ambulation/increased activity;Medication (See MAR)  -     Row Name             Wound 11/13/19 1031 Right knee Incision    Wound - Properties Group Date first assessed: 11/13/19  -AQ Time first assessed: 1031  -AQ Present on Hospital Admission: N  -AQ Side: Right  -AQ Location: knee  -AQ Primary Wound Type: Incision  -AQ    Row Name 11/13/19 1511          Plan of Care Review    Plan of Care Reviewed With  patient  -[facility] Name 11/13/19 1511          Physical Therapy Clinical Impression    Date of Referral to PT  11/13/19  -GB     PT Diagnosis (PT Clinical Impression)  impaired functional mobility, gait, balance endurance  -GB     Patient/Family Goals Statement (PT Clinical Impression)  home w/ family  -GB     Criteria for Skilled Interventions Met (PT Clinical Impression)  yes  -GB     Pathology/Pathophysiology Noted (Describe Specifically for Each System)  musculoskeletal  -GB     Impairments Found (describe specific impairments)  aerobic capacity/endurance;gait, locomotion, and balance  -GB     Rehab Potential (PT  Clinical Summary)  good, to achieve stated therapy goals  -GB     Predicted Duration of Therapy (PT)  3 days  -GB     Care Plan Review (PT)  patient/other agree to care plan  -GB     Care Plan Review, Other Participant (PT Clinical Impression)  family  -GB     Row Name 11/13/19 1511          Vital Signs    Pre Systolic BP Rehab  156  -GB     Pre Treatment Diastolic BP  72  -GB     Intra Systolic BP Rehab  142  -GB     Intra Treatment Diastolic BP  75  -GB     Post Systolic BP Rehab  172  -GB     Post Treatment Diastolic BP  77  -GB     Pretreatment Heart Rate (beats/min)  60  -GB     Posttreatment Heart Rate (beats/min)  59  -GB     Pre SpO2 (%)  99  -GB     O2 Delivery Pre Treatment  nasal cannula  -GB     Intra SpO2 (%)  99  -GB     O2 Delivery Intra Treatment  room air  -GB     Pre Patient Position  Supine  -GB     Intra Patient Position  Sitting  -GB     Post Patient Position  Sitting  -GB     Recovery Time  pst return to bed from [facility] 177/80; HR 66 sats 96 supine  -GB     Row Name 11/13/19 1511          Physical Therapy Goals    Bed Mobility Goal Selection (PT)  bed mobility, PT goal 1  -GB     Transfer Goal Selection (PT)  transfer, PT goal 1  -GB     Gait Training Goal Selection (PT)  gait training, PT goal 1  -GB     ROM Goal Selection (PT)  ROM, PT goal 1  -GB     Stairs Goal Selection (PT)  stairs, PT goal 1  -GB     Additional Documentation  Stairs Goal Selection (PT) (Row);ROM Goal Selection (PT) (Row)  -GB     Row Name 11/13/19 1511          Bed Mobility Goal 1 (PT)    Activity/Assistive Device (Bed Mobility Goal 1, PT)  bed mobility activities, all  -GB     [place] Level/Cues Needed (Bed Mobility Goal 1, PT)  conditional independence  -GB     Time Frame (Bed Mobility Goal 1, PT)  2 days  -GB     Progress/Outcomes (Bed Mobility Goal 1, PT)  continuing progress toward goal;goal not met  -GB     Row Name 11/13/19 1511          Transfer Goal 1 (PT)    Activity/Assistive Device (Transfer Goal 1, PT)   bed-to-chair/chair-to-bed;toilet  -GB     [place] Level/Cues Needed (Transfer Goal 1, PT)  conditional independence  -GB     Time Frame (Transfer Goal 1, PT)  3 days  -GB     Progress/Outcome (Transfer Goal 1, PT)  continuing progress toward goal;goal not met  -     Row Name 11/13/19 1511          Gait Training Goal 1 (PT)    Activity/Assistive Device (Gait Training Goal 1, PT)  gait (walking locomotion);assistive device use;decrease fall risk;forward stepping;increase endurance/gait distance;turning, left;turning, right;walker, rolling  -GB     [place] Level (Gait Training Goal 1, PT)  conditional independence  -GB     Distance (Gait Goal 1, PT)  150 ft or more  -GB     Time Frame (Gait Training Goal 1, PT)  by discharge  -GB     Progress/Outcome (Gait Training Goal 1, PT)  goal not met  -     Row Name 11/13/19 1511          ROM Goal 1 (PT)    ROM Goal 1 (PT)  AROM L knee 0-95  -GB     Time Frame (ROM Goal 1, PT)  by discharge  -GB     Barriers (ROM Goal 1, PT)  pain  -GB     Progress/Outcome (ROM Goal 1, PT)  goal ongoing  -     Row Name 11/13/19 1511          Stairs Goal 1 (PT)    Activity/Assistive Device (Stairs Goal 1, PT)  ascending stairs;descending stairs  -GB     [place] Level/Cues Needed (Stairs Goal 1, PT)  conditional independence  -GB     Number of Stairs (Stairs Goal 1, PT)  1 or more for curb   -GB     Time Frame (Stairs Goal 1, PT)  by discharge  -GB     Progress/Outcome (Stairs Goal 1, PT)  goal not met;continuing progress toward goal  -     Row Name 11/13/19 1511          Positioning and Restraints    Pre-Treatment Position  in bed  -GB     Post Treatment Position  bed  -GB     In Bed  supine;fowlers;call light within reach;encouraged to call for assist;exit alarm on;with family/caregiver;side rails up x2  -GB       User Key  (r) = Recorded By, (t) = Taken By, (c) = Cosigned By    Initials Name Provider Type    Codi Sr, PT Physical Therapist    AQ  Ellie Anaya RN Registered Nurse        Physical Therapy Education     Title: PT OT SLP Therapies (In Progress)     Topic: Physical Therapy (Done)     Point: Mobility training (Done)     Learning Progress Summary           Patient Acceptance, D,E, NR,VU by  at 11/13/2019  4:08 PM    Comment:  POC; mobility instructions;                   Point: Precautions (Done)     Learning Progress Summary           Patient Acceptance, D,E, NR,VU by VJ at 11/13/2019  4:08 PM    Comment:  POC; mobility instructions;                               User Key     Initials Effective Dates Name Provider Type Discipline     04/03/18 -  Codi Romeo, PT Physical Therapist PT              PT Recommendation and Plan  Anticipated Discharge Disposition (PT): home with 24/7 care, home with OP services, home with home health(depending on progress)  Planned Therapy Interventions (PT Eval): bed mobility training, gait training, home exercise program, patient/family education, stair training, strengthening, transfer training  Therapy Frequency (PT Clinical Impression): daily  Outcome Summary/Treatment Plan (PT)  Anticipated Equipment Needs at Discharge (PT): front wheeled walker  Anticipated Discharge Disposition (PT): home with 24/7 care, home with OP services, home with home health(depending on progress)  Plan of Care Reviewed With: (P) patient, spouse, sibling  Outcome Summary: (P) PT eval w/ OT; pt able to move from sup to sit to sup w/ min-mod assist of 2. Standing step to transfer to [facility] WBAT RLE w/ mod assist of 2 tho dizzy. Gait return to bed w/ FWRW w/ min-mod a x 2 for 5 ft w/out LOB; dizzy through upright .  RN aware. Pt's initial range ~0-95 deg.  Family present, sister former caregiver here so she will be helpful asissting spouse at home post return. DC plan d/c to home w/ assist for care. HH vs OP depending on progress by d/c.  Outcome Measures     Row Name 11/13/19 1600 11/13/19 3056          How much help from  another person do you currently need...    Turning from your back to your side while in flat bed without using bedrails?  3  -GB  --     Moving from lying on back to sitting on the side of a flat bed without bedrails?  3  -GB  --     Moving to and from a bed to a chair (including a wheelchair)?  2  -GB  --     Standing up from a chair using your arms (e.g., wheelchair, bedside chair)?  2  -GB  --     Climbing 3-5 steps with a railing?  1  -GB  --     To walk in hospital room?  2  -GB  --     AM-PAC 6 Clicks Score (PT)  13  -GB  --        How much help from another is currently needed...    Putting on and taking off regular lower body clothing?  --  2  -BH     Bathing (including washing, rinsing, and drying)  --  2  -BH     Toileting (which includes using toilet bed pan or urinal)  --  2  -BH     Putting on and taking off regular upper body clothing  --  3  -BH     Taking care of personal grooming (such as brushing teeth)  --  3  -BH     Eating meals  --  3  -BH     AM-PAC 6 Clicks Score (OT)  --  15  -BH        Functional Assessment    Outcome Measure Options  AM-PAC 6 Clicks Basic Mobility (PT)  -GB  AM-PAC 6 Clicks Daily Activity (OT)  -       User Key  (r) = Recorded By, (t) = Taken By, (c) = Cosigned By    Initials Name Provider Type    Codi Sr, PT Physical Therapist     Mylene Edmondson, OTR/L Occupational Therapist         Time Calculation:   PT Charges     Row Name 11/13/19 1543             Time Calculation    Start Time  1447  -GB      Stop Time  1519  -GB      Time Calculation (min)  32 min  -GB      PT Received On  11/13/19  -GB      PT Goal Re-Cert Due Date  11/22/19  -GB        User Key  (r) = Recorded By, (t) = Taken By, (c) = Cosigned By    Initials Name Provider Type    Codi Sr, PT Physical Therapist        Therapy Charges for Today     Code Description Service Date Service Provider Modifiers Qty    12191055549 HC PT EVAL MOD COMPLEXITY 2 11/13/2019  Codi Romeo, PT GP 1          PT G-Codes  Outcome Measure Options: AM-PAC 6 Clicks Basic Mobility (PT)  AM-PAC 6 Clicks Score (PT): 13  AM-PAC 6 Clicks Score (OT): 15      Codi Romeo, PT  11/13/2019          Electronically signed by Codi Romeo, PT at 11/13/19 1613     Codi Romeo, PT at 11/13/19 1613  Version 1 of 1         Problem: Patient Care Overview  Goal: Plan of Care Review  Outcome: Ongoing (interventions implemented as appropriate)   11/13/19 1608   OTHER   Outcome Summary PT eval w/ OT; pt able to move from sup to sit to sup w/ min-mod assist of 2. Standing step to transfer to [facility] WBAT RLE w/ mod assist of 2 tho dizzy. Gait return to bed w/ FWRW w/ min-mod a x 2 for 5 ft w/out LOB; dizzy through upright . RN aware. Pt's initial range ~0-95 deg. Family present, sister former caregiver here so she will be helpful asissting spouse at home post return. DC plan d/c to home w/ assist for care. HH vs OP depending on progress by d/c.   Coping/Psychosocial   Plan of Care Reviewed With patient;spous   11/13/19 1608   OTHER   Outcome Summary PT eval w/ OT; pt able to move from sup to sit to sup w/ min-mod assist of 2. Standing step to transfer to [facility] WBAT RLE w/ mod assist of 2 tho dizzy. Gait return to bed w/ FWRW w/ min-mod a x 2 for 5 ft w/out LOB; dizzy through upright . RN aware. Pt's initial range ~0-95 deg. Family present, sister former caregiver here so she will be helpful asissting spouse at home post return. DC plan d/c to home w/ assist for care. HH vs OP depending on progress by d/c.   Coping/Psychosocial   Plan of Care Reviewed With patient;spouse;sibling   e;sibling           Electronically signed by Codi Romeo, PT at 11/13/19 161

## 2019-11-14 NOTE — THERAPY TREATMENT NOTE
Acute Care - Occupational Therapy Treatment Note  [facility]     Patient Name: Thelma Haley  : 1945  MRN: [id number]  Today's Date: 2019  Onset of Illness/Injury or Date of Surgery: 19  Date of Referral to OT: 19  Referring Physician: Zaki    Admit Date: 2019       ICD-10-CM ICD-9-CM   1. Primary osteoarthritis of both knees M17.0 715.16   2. Chronic pain of both knees M25.561 719.46    M25.562 338.29    G89.29    3. Essential hypertension I10 401.9   4. Type 2 diabetes mellitus without complication, without long-term current use of insulin (CMS/[facility]) E11.9 250.00   5. Primary fibromyalgia syndrome M79.7 729.1   6. Impaired mobility and ADLs Z74.09 799.89   7. Impaired functional mobility, balance, gait, and endurance Z74.09 V49.89   8. Anticoagulant long-term use Z79.01 V58.61     Patient Active Problem List   Diagnosis   • Primary fibromyalgia syndrome   • Obesity   • Neuropathy   • Hyperlipidemia   • Essential hypertension   • Glaucoma   • Chronic rhinitis   • Allergic rhinitis due to pollen   • Abnormal results of thyroid function studies   • Acute pain of left shoulder   • Acute pain of right shoulder   • Impingement syndrome of right shoulder   • Diverticulitis of intestine without perforation or abscess   • Chest pain   • Vitamin D deficiency   • Subclinical hyperthyroidism   • Type 2 diabetes mellitus without complication, without long-term current use of insulin (CMS/[facility])   • Simple goiter   • Disorder of bone    • Impaired fasting glucose    • Complete tear of right rotator cuff   • Arthritis of right acromioclavicular joint   • Primary osteoarthritis of both knees   • Asthma   • Bronchitis, acute   • Neck pain   • Localized edema   • Sciatica of left side   • Chronic bilateral low back pain   • Pre-operative clearance   • Lumbar disc herniation with radiculopathy   • Impingement syndrome, shoulder, left   • Pain in both knees   • Atherosclerosis of native coronary  artery of native heart without angina pectoris   • Chronic right shoulder pain   • Abdominal pain, right lower quadrant   • Diverticulosis of colon   • History of colonic polyps   • Irritable colon   • Other and unspecified noninfectious gastroenteritis and colitis   • Chronic pain of both knees     Past Medical History:   Diagnosis Date   • Abnormal results of thyroid function studies    • Allergic rhinitis due to pollen    • Arthritis    • Asthma      uncomplicated      • Cataract    • Chronic rhinitis    • Diabetes mellitus (CMS/HCC)    • Disease of thyroid gland    • Essential hypertension    • Extrinsic asthma with status asthmaticus    • Glaucoma    • Hyperlipidemia    • Impingement syndrome of right shoulder    • Injury of back     L4 L5 bulging disc   • Neuropathy    • Nuclear senile cataract    • Overweight with body mass index (BMI) 25.0-29.9    • Primary fibromyalgia syndrome    • Primary open angle glaucoma    • Rosacea    • Temporomandibular joint disorder      Past Surgical History:   Procedure Laterality Date   • BACK SURGERY     • CARPAL TUNNEL RELEASE      Bilateral carpal tunnel release.)   03/27/2000    • CARPAL TUNNEL RELEASE Bilateral    • CATARACT EXTRACTION      Bilateral   • CERVICAL FUSION  1980   • COLONOSCOPY     • DILATATION AND CURETTAGE     • INJECTION OF MEDICATION  06/17/2015    celestone(betamethasone) (2)    • INJECTION OF MEDICATION  04/14/2016    depo medrol ( methylprednisone) (20)   • OOPHORECTOMY     • OTHER SURGICAL HISTORY      hysterosope procedure   • RIGHT OOPHORECTOMY     • ROTATOR CUFF REPAIR      Right   • SHOULDER ARTHROSCOPY Right 2/6/2017    Procedure: RIGHT SHOULDER ARTHROSCOPY SUBACROMIAL DECOMPRESSION, ROTATOR CUFF REPAIR   ;  Surgeon: Terrence Haines MD;  Location: [facility];  Service:    • SHOULDER SURGERY      Excision of 4.8 cm mass with primary intermediate closure.)   03/30/2012    • SPINAL FUSION      L4 L5       Therapy Treatment    Rehabilitation  Treatment Summary     Row Name 11/14/19 1024 11/14/19 0928          Treatment Time/Intention    Discipline  physical therapy assistant  -TW  occupational therapy assistant  -CS     Document Type  therapy note (daily note)  -TW  therapy note (daily note)  -CS     Subjective Information  no complaints  -TW  no complaints  -CS     Mode of Treatment  physical therapy;individual therapy  -TW  occupational therapy  -CS     Patient/Family Observations  Pt with  and agreeable to therapy.  -TW  --     Therapy Frequency (OT Eval)  --  daily  -CS     Patient Effort  good  -TW  good  -CS     Existing Precautions/Restrictions  fall  -TW  fall  -CS     Recorded by [TW] Rahul Roberts, SEAN 11/14/19 1212 [CS] Jeanne Day COTA/L 11/14/19 1318     Row Name 11/14/19 1024 11/14/19 0928          Vital Signs    Post Systolic BP Rehab  156  -TW  --     Post Treatment Diastolic BP  77  -TW  --     Pretreatment Heart Rate (beats/min)  70  -TW  70  -CS     Posttreatment Heart Rate (beats/min)  72  -TW  --     Pre SpO2 (%)  96  -TW  95  -CS     O2 Delivery Pre Treatment  room air  -TW  room air  -CS     Post SpO2 (%)  97  -TW  --     Pre Patient Position  Supine  -TW  Supine  -CS     Intra Patient Position  Standing  -TW  Standing  -CS     Post Patient Position  Supine  -TW  Supine  -CS     Recorded by [TW] Rahul Roberts PTA 11/14/19 1218 [CS] Jeanne Day BYRNE/L 11/14/19 1318     Row Name 11/14/19 1024 11/14/19 0928          Cognitive Assessment/Intervention- PT/OT    Affect/Mental Status (Cognitive)  WFL  -TW  WFL  -CS     Orientation Status (Cognition)  oriented x 4  -TW  oriented x 4  -CS     Follows Commands (Cognition)  WFL  -TW  WFL  -CS     Personal Safety Interventions  fall prevention program maintained;gait belt;nonskid shoes/slippers when out of bed  -TW  --     Recorded by [TW] Rahul Roberts, SEAN 11/14/19 1218 [CS] Jeanne Day BYRNE/L 11/14/19 1318     Row Name 11/14/19 1024 11/14/19  0928          Bed Mobility Assessment/Treatment    Supine-Sit [place] (Bed Mobility)  contact guard  -TW  supervision  -CS     Sit-Supine [place] (Bed Mobility)  contact guard  -TW  supervision  -CS     Bed Mobility, Safety Issues  decreased use of legs for bridging/pushing  -TW  --     Assistive Device (Bed Mobility)  -- bed flat  -TW  head of bed elevated;bed rails  -CS     Recorded by [TW] Rahul Roberts, PTA 11/14/19 1218 [CS] Jeanne Day COTA/L 11/14/19 1318     Row Name 11/14/19 0928             Transfer Assessment/Treatment    Transfer Assessment/Treatment  sit-stand transfer;stand-sit transfer;toilet transfer  -CS      Recorded by [CS] Jeanne Day COTA/L 11/14/19 1318      Row Name 11/14/19 1024 11/14/19 0928          Sit-Stand Transfer    Sit-Stand [place] (Transfers)  contact guard  -TW  supervision;verbal cues  -CS     Assistive Device (Sit-Stand Transfers)  --  walker, front-wheeled  -CS     Recorded by [TW] Rahul Roberts, PTA 11/14/19 1218 [CS] Jeanne Day COTA/L 11/14/19 1318     Row Name 11/14/19 1024 11/14/19 0928          Stand-Sit Transfer    Stand-Sit [place] (Transfers)  contact guard  -TW  supervision  -CS     Assistive Device (Stand-Sit Transfers)  --  walker, front-wheeled  -CS     Recorded by [TW] Rahul Roberts, PTA 11/14/19 1218 [CS] Jeanne Day COTA/L 11/14/19 1318     Row Name 11/14/19 0928             Toilet Transfer    Type (Toilet Transfer)  sit-stand;stand-sit  -CS      [place] Level (Toilet Transfer)  contact guard  -CS      Assistive Device (Toilet Transfer)  walker, front-wheeled  -CS      Recorded by [CS] Jeanne Day COTA/L 11/14/19 1318      Row Name 11/14/19 1024             Gait/Stairs Assessment/Training    Gait/Stairs Assessment/Training  gait/ambulation assistive device  -TW      [place] Level (Gait)  stand by assist  -TW      Assistive Device (Gait)  walker, front-wheeled  -TW      Distance in  Feet (Gait)  156ft  -TW      Pattern (Gait)  step-through  -TW      Deviations/Abnormal Patterns (Gait)  antalgic;gait speed decreased  -TW      [place] Level (Stairs)  contact guard;stand by assist  -TW      Handrail Location (Stairs)  right side (ascending)  -TW      Number of Steps (Stairs)  5 x2  -TW      Ascending Technique (Stairs)  step-to-step  -TW      Descending Technique (Stairs)  step-to-step  -TW      Recorded by [TW] Rahul Roberts PTA 11/14/19 1218      Row Name 11/14/19 0928             ADL Assessment/Intervention    BADL Assessment/Intervention  bathing;upper body dressing;lower body dressing;grooming;toileting  -CS      Recorded by [CS] Jeanne Day COTA/L 11/14/19 1318      Row Name 11/14/19 0928             Bathing Assessment/Intervention    Bathing [place] Level  bathing skills;upper body;set up;lower body;supervision  -CS      Assistive Devices (Bathing)  bath mitt  -CS      Bathing Position  edge of bed sitting;supported standing  -CS      Recorded by [CS] Jeanne Day COTA/L 11/14/19 1318      Row Name 11/14/19 0928             Upper Body Dressing Assessment/Training    Upper Body Dressing [place] Level  don;bra/undergarment;pull-over garment;set up  -CS      Upper Body Dressing Position  edge of bed sitting  -CS      Recorded by [CS] Jeanne Day COTA/L 11/14/19 1318      Row Name 11/14/19 0928             Lower Body Dressing Assessment/Training    Lower Body Dressing [place] Level  don;pants/bottoms;undergarment;contact guard assist  -CS      Lower Body Dressing Position  edge of bed sitting;supported standing  -CS      Recorded by [CS] Jeanne Day COTA/L 11/14/19 1318      Row Name 11/14/19 0928             Grooming Assessment/Training    [place] Level (Grooming)  grooming skills;hair care, combing/brushing;oral care regimen;wash face, hands;set up;supervision apply deodorant/lotion  -CS      Grooming Position  sink side;supported  standing;edge of bed sitting  -CS      Recorded by [CS] Jeanne Day COTA/L 11/14/19 1318      Row Name 11/14/19 0928             Toileting Assessment/Training    [place] Level (Toileting)  toileting skills;adjust/manage clothing;perform perineal hygiene;contact guard assist  -CS      Assistive Devices (Toileting)  grab bar/safety frame;raised toilet seat  -CS      Toileting Position  unsupported sitting;supported standing  -CS      Recorded by [CS] Jeanne Day COTA/L 11/14/19 1318      Row Name 11/14/19 1024             Therapeutic Exercise    Lower Extremity (Therapeutic Exercise)  gluteal sets;quad sets, bilateral;heel slides, right  -TW      Lower Extremity Range of Motion (Therapeutic Exercise)  ankle dorsiflexion/plantar flexion, bilateral  -TW      Exercise Type (Therapeutic Exercise)  AROM (active range of motion)  -TW      Position (Therapeutic Exercise)  supine  -TW      Sets/Reps (Therapeutic Exercise)  2/10  -TW      Comment (Therapeutic Exercise)  Pt demonstrates good understanding of HEP.  -TW      Recorded by [TW] Rahul Roberts PTA 11/14/19 1218      Row Name 11/14/19 1024 11/14/19 0928          Positioning and Restraints    Pre-Treatment Position  in bed  -TW  in bed  -CS     Post Treatment Position  bed  -TW  bed  -CS     In Bed  supine;call light within reach;encouraged to call for assist;exit alarm on;with family/caregiver  -TW  fowlers;call light within reach;encouraged to call for assist;exit alarm on;with family/caregiver  -CS     Recorded by [TW] Rahul Roberts PTA 11/14/19 1218 [CS] Jeanne Day COTA/L 11/14/19 1318     Row Name 11/14/19 1024 11/14/19 0928          Pain Scale: Numbers Pre/Post-Treatment    Pain Scale: Numbers, Pretreatment  4/10  -TW  4/10  -CS     Pain Scale: Numbers, Post-Treatment  5/10  -TW  4/10  -CS     Pain Location - Side  Right  -TW  Right  -CS     Pain Location  knee  -TW  knee  -CS     Pain Intervention(s)  --  Medication (See  MAR);Repositioned  -CS     Recorded by [TW] Rahul Roberts PTA 11/14/19 1218 [CS] Jeanne Day COTA/L 11/14/19 1318     Row Name                Wound 11/13/19 1031 Right knee Incision    Wound - Properties Group Date first assessed: 11/13/19 [AQ] Time first assessed: 1031 [AQ] Present on Hospital Admission: N [AQ] Side: Right [AQ] Location: knee [AQ] Primary Wound Type: Incision [AQ] Recorded by:  [AQ] Ellie Anaya RN 11/13/19 1031    Row Name 11/14/19 0928             Outcome Summary/Treatment Plan (OT)    Daily Summary of Progress (OT)  progress toward functional goals as expected  -CS      Plan for Continued Treatment (OT)  cont OT POC  -CS      Anticipated Discharge Disposition (OT)  home with 24/7 care;home with OP services;home with home health  -CS      Recorded by [CS] Jeanne Day COTA/L 11/14/19 1318      Row Name 11/14/19 1024             Outcome Summary/Treatment Plan (PT)    Daily Summary of Progress (PT)  progress toward functional goals as expected  -TW      Plan for Continued Treatment (PT)  Cont  -TW      Anticipated Discharge Disposition (PT)  anticipate therapy at next level of care  -TW      Recorded by [TW] Rahul Roberts PTA 11/14/19 1218        User Key  (r) = Recorded By, (t) = Taken By, (c) = Cosigned By    Initials Name Effective Dates Discipline    AQ Ellie Anaya RN 10/17/16 -  Nurse    TW Rahul Roberts PTA 03/07/18 -  PT    CS Jeanne Day COTA/PORSHA 03/07/18 -  OT        Wound 11/13/19 1031 Right knee Incision (Active)   Dressing Appearance intact;dry;no drainage 11/14/2019  8:23 AM   Closure QUYEN 11/14/2019  8:23 AM   Base dressing in place, unable to visualize 11/14/2019  8:23 AM   Drainage Amount none 11/13/2019  9:40 PM     Rehab Goal Summary     Row Name 11/14/19 1024 11/14/19 0928          Physical Therapy Goals    Bed Mobility Goal Selection (PT)  bed mobility, PT goal 1  -TW  --     Transfer Goal Selection (PT)  transfer, PT goal 1  -TW   --     Gait Training Goal Selection (PT)  gait training, PT goal 1  -TW  --     ROM Goal Selection (PT)  ROM, PT goal 1  -TW  --     Stairs Goal Selection (PT)  stairs, PT goal 1  -TW  --        Bed Mobility Goal 1 (PT)    Activity/Assistive Device (Bed Mobility Goal 1, PT)  bed mobility activities, all  -TW  --     [place] Level/Cues Needed (Bed Mobility Goal 1, PT)  conditional independence  -TW  --     Time Frame (Bed Mobility Goal 1, PT)  2 days  -TW  --     Progress/Outcomes (Bed Mobility Goal 1, PT)  continuing progress toward goal;goal not met  -TW  --        Transfer Goal 1 (PT)    Activity/Assistive Device (Transfer Goal 1, PT)  bed-to-chair/chair-to-bed;toilet  -TW  --     [place] Level/Cues Needed (Transfer Goal 1, PT)  conditional independence  -TW  --     Time Frame (Transfer Goal 1, PT)  3 days  -TW  --     Progress/Outcome (Transfer Goal 1, PT)  continuing progress toward goal;goal not met  -TW  --        Gait Training Goal 1 (PT)    Activity/Assistive Device (Gait Training Goal 1, PT)  gait (walking locomotion);assistive device use;decrease fall risk;forward stepping;increase endurance/gait distance;turning, left;turning, right;walker, rolling  -TW  --     [place] Level (Gait Training Goal 1, PT)  conditional independence  -TW  --     Distance (Gait Goal 1, PT)  150 ft or more  -TW  --     Time Frame (Gait Training Goal 1, PT)  by discharge  -TW  --     Progress/Outcome (Gait Training Goal 1, PT)  goal not met  -TW  --        ROM Goal 1 (PT)    ROM Goal 1 (PT)  AROM L knee 0-95  -TW  --     Time Frame (ROM Goal 1, PT)  by discharge  -TW  --     Barriers (ROM Goal 1, PT)  pain  -TW  --     Progress/Outcome (ROM Goal 1, PT)  goal ongoing  -TW  --        Stairs Goal 1 (PT)    Activity/Assistive Device (Stairs Goal 1, PT)  ascending stairs;descending stairs  -TW  --     [place] Level/Cues Needed (Stairs Goal 1, PT)  conditional independence  -TW  --     Number of Stairs (Stairs Goal  1, PT)  1 or more for curb   -TW  --     Time Frame (Stairs Goal 1, PT)  by discharge  -TW  --     Progress/Outcome (Stairs Goal 1, PT)  goal not met;continuing progress toward goal  -TW  --        Occupational Therapy Goals    Transfer Goal Selection (OT)  --  transfer, OT goal 1  -CS     Bathing Goal Selection (OT)  --  bathing, OT goal 1  -CS     Dressing Goal Selection (OT)  --  dressing, OT goal 1  -CS     Toileting Goal Selection (OT)  --  toileting, OT goal 1  -CS     Functional Mobility Goal Selection (OT)  --  functional mobility, OT goal 1  -CS        Transfer Goal 1 (OT)    Activity/Assistive Device (Transfer Goal 1, OT)  --  toilet  -CS     [place] Level/Cues Needed (Transfer Goal 1, OT)  --  standby assist  -CS     Time Frame (Transfer Goal 1, OT)  --  long term goal (LTG);by discharge  -CS     Progress/Outcome (Transfer Goal 1, OT)  --  goal not met;good progress toward goal  -CS        Bathing Goal 1 (OT)    Activity/Assistive Device (Bathing Goal 1, OT)  --  bathing skills, all  -CS     [place] Level/Cues Needed (Bathing Goal 1, OT)  --  set-up required;verbal cues required;standby assist  -CS     Time Frame (Bathing Goal 1, OT)  --  long term goal (LTG);by discharge  -CS     Progress/Outcomes (Bathing Goal 1, OT)  --  goal not met;good progress toward goal  -CS        Dressing Goal 1 (OT)    Activity/Assistive Device (Dressing Goal 1, OT)  --  dressing skills, all  -CS     [place]/Cues Needed (Dressing Goal 1, OT)  --  set-up required;verbal cues required;standby assist  -CS     Time Frame (Dressing Goal 1, OT)  --  long term goal (LTG);by discharge  -CS     Progress/Outcome (Dressing Goal 1, OT)  --  goal not met;good progress toward goal  -CS        Toileting Goal 1 (OT)    Activity/Device (Toileting Goal 1, OT)  --  toileting skills, all  -CS     [place] Level/Cues Needed (Toileting Goal 1, OT)  --  standby assist  -CS     Time Frame (Toileting Goal 1, OT)  --  long term goal  (LTG);by discharge  -CS     Progress/Outcome (Toileting Goal 1, OT)  --  goal not met;good progress toward goal  -CS        Functional Mobility Goal 1 (OT)    Activity/Assistive Device (Functional Mobility Goal 1, OT)  --  walker, rolling  -CS     [place] Level/Cues Needed (Functional Mobility Goal 1, OT)  --  standby assist  -CS     Distance Goal 1 (Functional Mobility, OT)  --  for ADL tasks with no LOB and good safety   -CS     Time Frame (Functional Mobility Goal 1, OT)  --  long term goal (LTG);by discharge  -CS     Progress/Outcome (Functional Mobility Goal 1, OT)  --  goal not met  -CS        Patient Education Goal (OT)    Activity (Patient Education Goal, OT)  --  Pt will communciate good home safety awareness.   -CS     [place]/Cues/Accuracy (Memory Goal 2, OT)  --  verbalizes understanding  -CS     Time Frame (Patient Education Goal, OT)  --  long term goal (LTG);by discharge  -CS     Progress/Outcome (Patient Education Goal, OT)  --  goal not met  -CS       User Key  (r) = Recorded By, (t) = Taken By, (c) = Cosigned By    Initials Name Provider Type Discipline    TW Rahul Roberts, PTA Physical Therapy Assistant PT    CS Jeanne Day COTA/PORSHA Occupational Therapy Assistant OT        Occupational Therapy Education     Title: PT OT SLP Therapies (Done)     Topic: Occupational Therapy (Done)     Point: ADL training (Done)     Description: Instruct learner(s) on proper safety adaptation and remediation techniques during self care or transfers.   Instruct in proper use of assistive devices.    Learning Progress Summary           Patient Acceptance, E,TB,D, VU by  at 11/14/2019  1:19 PM    Acceptance, E, VU,NR by  at 11/13/2019  3:33 PM    Comment:  Educated about OT and POC. Educated to call for assist. Educated on safety throughout, Started education on knee position.   Family Acceptance, E, VU,NR by  at 11/13/2019  3:33 PM    Comment:  Educated about OT and POC. Educated to call for  assist. Educated on safety throughout, Started education on knee position.                   Point: Home exercise program (Done)     Description: Instruct learner(s) on appropriate technique for monitoring, assisting and/or progressing therapeutic exercises/activities.    Learning Progress Summary           Patient Acceptance, E,TB,D, VU by  at 11/14/2019  1:19 PM                   Point: Precautions (Done)     Description: Instruct learner(s) on prescribed precautions during self-care and functional transfers.    Learning Progress Summary           Patient Acceptance, E,TB,D, VU by  at 11/14/2019  1:19 PM    Acceptance, E, VU,NR by  at 11/13/2019  3:33 PM    Comment:  Educated about OT and POC. Educated to call for assist. Educated on safety throughout, Started education on knee position.   Family Acceptance, E, VU,NR by  at 11/13/2019  3:33 PM    Comment:  Educated about OT and POC. Educated to call for assist. Educated on safety throughout, Started education on knee position.                   Point: Body mechanics (Done)     Description: Instruct learner(s) on proper positioning and spine alignment during self-care, functional mobility activities and/or exercises.    Learning Progress Summary           Patient Acceptance, E,TB,D, VU by  at 11/14/2019  1:19 PM                               User Key     Initials Effective Dates Name Provider Type Discipline     06/08/18 -  Mylene Edmondson, OTR/L Occupational Therapist OT     03/07/18 -  Jeanne Day, BYRNE/L Occupational Therapy Assistant OT                OT Recommendation and Plan  Outcome Summary/Treatment Plan (OT)  Daily Summary of Progress (OT): progress toward functional goals as expected  Plan for Continued Treatment (OT): cont OT POC  Anticipated Discharge Disposition (OT): home with 24/7 care, home with OP services, home with home health  Therapy Frequency (OT Eval): daily  Daily Summary of Progress (OT): progress toward functional  goals as expected  Plan of Care Review  Plan of Care Reviewed With: patient  Plan of Care Reviewed With: patient  Outcome Summary: Pt tolerated tx well this date. Pt was SBA with bed mobility. Pt was CGA with toilet t/f. Pt completed an ADL. Continue OT POC.  Outcome Measures     Row Name 11/14/19 1300 11/14/19 1024 11/13/19 1600       How much help from another person do you currently need...    Turning from your back to your side while in flat bed without using bedrails?  --  3  -TW  3  -GB    Moving from lying on back to sitting on the side of a flat bed without bedrails?  --  3  -TW  3  -GB    Moving to and from a bed to a chair (including a wheelchair)?  --  3  -TW  2  -GB    Standing up from a chair using your arms (e.g., wheelchair, bedside chair)?  --  3  -TW  2  -GB    Climbing 3-5 steps with a railing?  --  3  -TW  1  -GB    To walk in hospital room?  --  3  -TW  2  -GB    AM-PAC 6 Clicks Score (PT)  --  18  -TW  13  -GB       How much help from another is currently needed...    Putting on and taking off regular lower body clothing?  3  -CS  --  --    Bathing (including washing, rinsing, and drying)  3  -CS  --  --    Toileting (which includes using toilet bed pan or urinal)  3  -CS  --  --    Putting on and taking off regular upper body clothing  3  -CS  --  --    Taking care of personal grooming (such as brushing teeth)  3  -CS  --  --    Eating meals  3  -CS  --  --    AM-PAC 6 Clicks Score (OT)  18  -CS  --  --       Functional Assessment    Outcome Measure Options  --  AM-PAC 6 Clicks Basic Mobility (PT)  -TW  AM-PAC 6 Clicks Basic Mobility (PT)  -GB    Row Name 11/13/19 1429             How much help from another is currently needed...    Putting on and taking off regular lower body clothing?  2  -BH      Bathing (including washing, rinsing, and drying)  2  -BH      Toileting (which includes using toilet bed pan or urinal)  2  -BH      Putting on and taking off regular upper body clothing  3  -BH       Taking care of personal grooming (such as brushing teeth)  3  -      Eating meals  3  -      AM-PAC 6 Clicks Score (OT)  15  -         Functional Assessment    Outcome Measure Options  AM-PAC 6 Clicks Daily Activity (OT)  -        User Key  (r) = Recorded By, (t) = Taken By, (c) = Cosigned By    Initials Name Provider Type    GB Codi Romeo, PT Physical Therapist     Mylene Edmondson, OTR/L Occupational Therapist    TW Rahul Roberts, PTA Physical Therapy Assistant     Jeanne Day COTA/PORSHA Occupational Therapy Assistant           Time Calculation:   Time Calculation- OT     Row Name 11/14/19 1321             Time Calculation- OT    OT Start Time  0928  -CS      OT Stop Time  1021  -      OT Time Calculation (min)  53 min  -      Total Timed Code Minutes- OT  53 minute(s)  -      OT Received On  11/14/19  -        User Key  (r) = Recorded By, (t) = Taken By, (c) = Cosigned By    Initials Name Provider Type     Jeanne Day COTA/PORSHA Occupational Therapy Assistant        Therapy Charges for Today     Code Description Service Date Service Provider Modifiers Qty    [id number] HC OT SELF CARE/MGMT/TRAIN EA 15 MIN 11/14/2019 Jeanne Day COTA/PORSHA GO 4               HARPAL Thomas  11/14/2019

## 2019-11-15 ENCOUNTER — READMISSION MANAGEMENT (OUTPATIENT)
Dept: CALL CENTER | Facility: HOSPITAL | Age: 74
End: 2019-11-15

## 2019-11-15 NOTE — OUTREACH NOTE
Prep Survey      Responses   Facility patient discharged from?  [facility]   Is patient eligible?  Yes   Discharge diagnosis  Primary osteoarthritis of both knees, s/p right total knee arthroplasty    Does the patient have one of the following disease processes/diagnoses(primary or secondary)?  Total Joint Replacement   Does the patient have Home health ordered?  No   Is there a DME ordered?  Yes   What DME was ordered?  Lito judd [facility]   Comments regarding appointments  See AVS   Prep survey completed?  Yes          Monica Gonzalez RN

## 2019-11-18 ENCOUNTER — READMISSION MANAGEMENT (OUTPATIENT)
Dept: CALL CENTER | Facility: HOSPITAL | Age: 74
End: 2019-11-18

## 2019-11-18 ENCOUNTER — ANTICOAGULATION VISIT (OUTPATIENT)
Dept: PHARMACY | Facility: HOSPITAL | Age: 74
End: 2019-11-18

## 2019-11-18 NOTE — PROGRESS NOTES
Spoke with Mrs. Haley over the telephone    No lab to review for patient as she states she forgot to go get it checked. It seems as though patient is going to be reluctant to have blood checked. She did request moving next INR to the date of her Doctors appointment on 11/20    Pt reports no s/s of bleeding.   No changes to their medications.  No missed doses of warfarin.   No extreme changes in their diet    Instructed patient to continue taking 3 mg daily for now  Next INR on 11/20 provided by [facility]  Pt read back regimen and verbalized understanding    Avi Vasquez RPH  11/18/19  4:41 PM

## 2019-11-18 NOTE — OUTREACH NOTE
Total Joint Week 1 Survey      Responses   Facility patient discharged from?  [facility]   Does the patient have one of the following disease processes/diagnoses(primary or secondary)?  Total Joint Replacement   Is there a successful TCM telephone encounter documented?  No   Joint surgery performed?  Knee   Week 1 attempt successful?  Yes   Call start time  1709   Call end time  1717   Discharge diagnosis  Primary osteoarthritis of both knees, s/p right total knee arthroplasty    Does the patient have all medications related to this admission filled (includes all antibiotics, pain medications, etc.)  Yes   Is the patient taking all medications as directed (includes completed medication regime)?  Yes   Is the patient able to teach back alternate methods of pain control?  Ice, Knee-elevation/no pillow under knee, Reposition, Correct alignment, Short, frequent activity   Does the patient have a follow up appointment with their surgeon?  Yes   Has the patient kept scheduled appointments due by today?  N/A   Has home health visited the patient within 72 hours of discharge?  N/A   What DME was ordered?  Rolling Walker from [facility]   Psychosocial issues?  No   When is the first therapy visit scheduled (PO Day) including how many days per week   statred outpt pt on 11/15/19   Has the patient began therapy sessions (either in the home or as an out patient)?  Yes   Does the patient have a wound vac in place?  N/A   Has the patient fallen since discharge?  No   Did the patient receive a copy of their discharge instructions?  Yes   Nursing interventions  Reviewed instructions with patient   What is the patient's perception of their functional status since discharge?  Improving   Is the patient able to teach back signs and symptoms of infection?  Temp >100.4 for 24h or longer, Incisional drainage, Blisters around incision, Increased swelling or redness around incision (not associated with surgical edema), Severe discomfort  or pain, Changes in mobility, Shortness of breath or chest pain   Is the patient able to teach back how to prevent infection?  Check incision daily, Wash hands before and after touching incision, Keep incision covered if drainage, Shower only as directed by surgeon, No tub baths, hot tub or swimming   Is the patient able to teach back signs and symptoms of DVT?  Redness in calf, Area hot to touch, Shortness of breath or chest pain, Swelling in calf, Severe pain in calf   Is the patient able to teach back home safety measures?  Ability to shower, Accessibility to necessary areas in home, Modifications to reach items, Modifications with ADLs such as dressing, cooking, toileting   Did the patient implement home safety suggestions from pre-surgery classes if attended?  N/A   Is the patient/caregiver able to teach back the hierarchy of who to call/visit for symptoms/problems? PCP, Specialist, Home health nurse, Urgent Care, ED, 911  Yes   Week 1 call completed?  Yes          Caryl Gross RN

## 2019-11-18 NOTE — DISCHARGE SUMMARY
Patient Name: Thelma Haley  Patient YOB: 1945    Date of Admission:  11/13/2019  Date of Discharge:  11/14/2019  Discharge Diagnosis:   Primary osteoarthritis of both knees    Chronic pain of both knees    Essential hypertension    Type 2 diabetes mellitus without complication, without long-term current use of insulin (CMS/HCC)    Primary fibromyalgia syndrome    Impaired mobility and ADLs    Impaired functional mobility, balance, gait, and endurance    Anticoagulant long-term use    Presenting Problem/History of Present Illness: Primary osteoarthritis of both knees [M17.0]  Chronic pain of both knees [M25.561, M25.562, G89.29]  Essential hypertension [I10]  Type 2 diabetes mellitus without complication, without long-term current use of insulin (CMS/HCC) [E11.9]  Primary fibromyalgia syndrome [M79.7]  Primary osteoarthritis of both knees [M17.0]  Primary osteoarthritis of both knees [M17.0]    Procedure:  Procedure(s) (LRB):  RIGHT TOTAL KNEE ARTHROPLASTY  with adductor canal block (Right)    Admitting Physician:  Terrence Haines MD    Consults:   Consults     No orders found from 10/15/2019 to 11/14/2019.          DETAILS OF HOSPITAL STAY:  Patient is a 74 y.o. female was admitted to the floor following the above procedure and underwent an uncomplicated hospital stay.  Patient did well with physical therapy and was ambulating well without problems.  On the day of discharge the wound was clean, dry and intact and calf was soft and non tender and Homans sign was negative.  Patient was tolerating diet without problems.  Patient was discharged home.    Condition on Discharge:  Stable      LABS:      Admission on 11/13/2019, Discharged on 11/14/2019   Component Date Value Ref Range Status   • ABO Type 11/13/2019 O   Final   • RH type 11/13/2019 Positive   Final   • Antibody Screen 11/13/2019 Negative   Final   • T&S Expiration Date 11/13/2019 11/16/2019 11:59:59 PM   Final   • Previous History  11/13/2019 Previous Record on File   Final   • Glucose 11/13/2019 94  70 - 130 mg/dL Final    : [id number] OLEG ERINMeter ID: [id number]   • Case Report 11/13/2019    Final                    Value:Surgical Pathology Report                         Case: [id number]                                  Authorizing Provider:  Terrence Haines MD Collected:           11/13/2019 10:30 AM          Ordering Location:     [facility]             Received:            11/13/2019 12:06 PM                                 [place]                                                              Pathologist:           Erik Briggs MD                                                         Specimen:    Knee, Right, bone and soft tissue from right knee                                         • Final Diagnosis 11/13/2019    Final                    Value:This result contains rich text formatting which cannot be displayed here.   • Gross Description 11/13/2019    Final                    Value:This result contains rich text formatting which cannot be displayed here.   • Hemoglobin 11/13/2019 10.7* 12.0 - 15.9 g/dL Final   • Hematocrit 11/13/2019 32.2* 34.0 - 46.6 % Final   • Glucose 11/13/2019 123  70 - 130 mg/dL Final    RN NotifiedOperator: [id number] ABDULLAHI ANITAMeter ID: [id number]   • Glucose 11/13/2019 141* 70 - 130 mg/dL Final    : [id number] HAILEY ANGELITAMeter ID: [id number]   • Glucose 11/13/2019 229* 70 - 130 mg/dL Final    RN NotifiedOperator: [id number] JANETT PAZYMeter ID: [id number]   • Protime 11/14/2019 14.9  11.1 - 15.3 Seconds Final   • INR 11/14/2019 1.19  0.80 - 1.20 Final   • Extra Tube 11/14/2019 hold for add-on   Final    Auto resulted   • Extra Tube 11/14/2019 Hold for add-ons.   Final    Auto resulted.   • Glucose 11/14/2019 190* 70 - 130 mg/dL Final    RN NotifiedOperator: [id number] SUDHA Lockwood ID: [id number]   • Glucose 11/14/2019 160* 70 - 130 mg/dL  Final    RN NotifiedOperator: [id number] SUDHA Lockwood ID: [id number]       Xr Knee 1 Or 2 View Right    Result Date: 11/13/2019  Narrative: Procedure:  Left knee    Indication: Left total knee plasty Technique:  Two views   . Prior relevant exam: October 10, 2019, unavailable on PACS.. Interval placement left total knee arthroplasty. Femoral, tibial, patellar components of the prosthesis demonstrate a radiographically satisfactory appearance. Soft tissue drain noted in the anterior aspect of the knee. Skin staples noted anteriorly at the incision site.     Impression: CONCLUSION: As above. Electronically signed by:  Simone Martin MD  11/13/2019 12:30 PM [facility] Workstation: [id number]      Discharge Medications     Discharge Medications      New Medications      Instructions Start Date   HYDROmorphone 2 MG tablet  Commonly known as:  DILAUDID   2 mg, Oral, Every 4 Hours PRN      warfarin 3 MG tablet  Commonly known as:  COUMADIN   3 mg, Oral, Nightly, Take 1 tablet by mouth nightly or as directed by [facility]         Continue These Medications      Instructions Start Date   Biotin 51763 MCG tablet   1 tablet, Oral, Daily      CETIRIZINE HCL PO   10 mg, Oral, Daily      dorzolamide-timolol 22.3-6.8 MG/ML ophthalmic solution  Commonly known as:  COSOPT   1 drop, Both Eyes, 2 Times Daily      gabapentin 100 MG capsule  Commonly known as:  NEURONTIN   100 mg, Oral, 3 Times Daily, For diabetic neuropathy      glucose blood test strip  Commonly known as:  ONE TOUCH ULTRA TEST   Use to test blood sugar twice a day. ICD E11.9      Iron 325 (65 Fe) MG tablet   1 tablet, Oral, Daily      lisinopril 5 MG tablet  Commonly known as:  PRINIVIL,ZESTRIL   5 mg, Oral, Daily      lovastatin 20 MG tablet  Commonly known as:  MEVACOR   20 mg, Oral, Nightly      methIMAzole 5 MG tablet  Commonly known as:  TAPAZOLE   2.5 mg, Oral, Daily      ONETOUCH DELICA LANCETS FINE misc   Use to check blood sugar twice a day. ICD E11.9       SITagliptin-metFORMIN HCl ER  MG tablet  Commonly known as:  JANUMET XR   1 tablet, Oral, Daily With Breakfast      Vitamin D-3 125 MCG (5000 UT) tablet   1 tablet, Oral, Daily             Discharge Diet:   Diet Instructions     Diet: Regular      Discharge Diet:  Regular          Activity at Discharge:   Activity Instructions     Activity as Tolerated            Discharge Instructions: Patient is to continue with physical therapy exercises daily and continue working with the physical therapist as ordered. Patient may weight bear as tolerated. Continue ice regularly and use ace bandages from the foot toward the knee as needed for swelling. Patient instructed on frequent calf pumping exercises.  Patient also instructed on incentive spirometer during hospitalization and encouraged to continue to use at home regularly. Patient may shower on POD#2. The wound should be gently cleaned with antibacterial soap then allowed to dry.  If there is any drainage then it can be covered with a dry sterile dressing.  If there is drainage, redness or swelling, then the physician should be notified. Follow up appointment in 2 weeks - patient to call the office at [phone] to schedule.  Coumadin management per hospital pharmacy.  All other meds per the discharge med/rec    Discharge Diagnosis:    Patient Active Problem List   Diagnosis   • Primary fibromyalgia syndrome   • Obesity   • Neuropathy   • Hyperlipidemia   • Essential hypertension   • Glaucoma   • Chronic rhinitis   • Allergic rhinitis due to pollen   • Abnormal results of thyroid function studies   • Acute pain of left shoulder   • Acute pain of right shoulder   • Impingement syndrome of right shoulder   • Diverticulitis of intestine without perforation or abscess   • Chest pain   • Vitamin D deficiency   • Subclinical hyperthyroidism   • Type 2 diabetes mellitus without complication, without long-term current use of insulin (CMS/[facility])   • Simple goiter   •  "Disorder of bone    • Impaired fasting glucose    • Complete tear of right rotator cuff   • Arthritis of right acromioclavicular joint   • Primary osteoarthritis of both knees   • Asthma   • Bronchitis, acute   • Neck pain   • Localized edema   • Sciatica of left side   • Chronic bilateral low back pain   • Pre-operative clearance   • Lumbar disc herniation with radiculopathy   • Impingement syndrome, shoulder, left   • Pain in both knees   • Atherosclerosis of native coronary artery of native heart without angina pectoris   • Chronic right shoulder pain   • Abdominal pain, right lower quadrant   • Diverticulosis of colon   • History of colonic polyps   • Irritable colon   • Other and unspecified noninfectious gastroenteritis and colitis   • Chronic pain of both knees       Follow-up Appointments  Future Appointments   Date Time Provider Department Center   11/20/2019  9:45 AM Pippa Pierson MD [facility] RES None   11/27/2019 10:50 AM Kelsi Gates APRN [facility] OSCR MAD None   12/12/2019  9:00 AM Jaky Prieto MD [facility] FAC None   12/27/2019 10:45 AM Nikolai Briggs MD [facility] END MAD None   1/15/2020 10:00 AM Manju Ramirez MD [facility] CD MAD None     Additional Instructions for the Follow-ups that You Need to Schedule     Ambulatory Referral to Physical Therapy Evaluate and treat   As directed      multicare PT    S/p TKA  Advance as tolerated  Swelling control  Call with questions    Order Comments:  multicare PT S/p TKA Advance as tolerated Swelling control Call with questions     Specialty needed:  Evaluate and treat         Call MD With Problems / Concerns   As directed      Instructions:   Call the office with questions, problems, or concerns.  Remember that pain medication can not be \"called in\" but a prescription must be written and picked up from the office.   So, be sure to notify the office with a little notice prior to running out.    Order Comments:  Instructions: Call the office with " "questions, problems, or concerns. Remember that pain medication can not be \"called in\" but a prescription must be written and picked up from the office. So, be sure to notify the office with a little notice prior to running out.          Discharge Follow-up with Specified Provider: VERONIKA Keith R Peyton; 2 Weeks   As directed      To:  VERONIKA Keith R Peyton    Follow Up:  2 Weeks         Protime-INR  (Twice a Week)  Dec 25, 2019      Is Patient on anti-coag:  Yes                  Terrence Haines MD  11/18/19  7:42 AM  "

## 2019-11-20 ENCOUNTER — OFFICE VISIT (OUTPATIENT)
Dept: FAMILY MEDICINE CLINIC | Facility: CLINIC | Age: 74
End: 2019-11-20

## 2019-11-20 ENCOUNTER — ANTICOAGULATION VISIT (OUTPATIENT)
Dept: PHARMACY | Facility: HOSPITAL | Age: 74
End: 2019-11-20

## 2019-11-20 ENCOUNTER — APPOINTMENT (OUTPATIENT)
Dept: LAB | Facility: HOSPITAL | Age: 74
End: 2019-11-20

## 2019-11-20 VITALS
HEART RATE: 80 BPM | SYSTOLIC BLOOD PRESSURE: 140 MMHG | WEIGHT: 143 LBS | HEIGHT: 64 IN | DIASTOLIC BLOOD PRESSURE: 88 MMHG | BODY MASS INDEX: 24.41 KG/M2 | OXYGEN SATURATION: 99 %

## 2019-11-20 DIAGNOSIS — Z96.651 HISTORY OF ARTHROPLASTY OF RIGHT KNEE: ICD-10-CM

## 2019-11-20 DIAGNOSIS — Z79.01 ANTICOAGULATED: Primary | ICD-10-CM

## 2019-11-20 LAB
INR PPP: 1.29 (ref 0.8–1.2)
PROTHROMBIN TIME: 15.9 SECONDS (ref 11.1–15.3)

## 2019-11-20 PROCEDURE — 85610 PROTHROMBIN TIME: CPT | Performed by: STUDENT IN AN ORGANIZED HEALTH CARE EDUCATION/TRAINING PROGRAM

## 2019-11-20 PROCEDURE — 36415 COLL VENOUS BLD VENIPUNCTURE: CPT | Performed by: STUDENT IN AN ORGANIZED HEALTH CARE EDUCATION/TRAINING PROGRAM

## 2019-11-20 PROCEDURE — 99212 OFFICE O/P EST SF 10 MIN: CPT | Performed by: STUDENT IN AN ORGANIZED HEALTH CARE EDUCATION/TRAINING PROGRAM

## 2019-11-20 NOTE — PROGRESS NOTES
Spoke with Mrs. Haley. Reviewed current lab.  No s/s of bleeding. No new meds. One missed dose on Friday night. Reviewed schedule for following week. Pt read back regimen and verbalized understanding. All questions answered. Next INR on 11/26 provided by [facility] lab.    Luz Marina Parker [facility]  11/20/19  2:46 PM

## 2019-11-24 ENCOUNTER — ANTICOAGULATION VISIT (OUTPATIENT)
Dept: PHARMACY | Facility: HOSPITAL | Age: 74
End: 2019-11-24

## 2019-11-24 NOTE — PROGRESS NOTES
Patient called asking to be moved from Tuesday to Wednesday for her next INR as she has an appointment on Wednesday and would like to take care of all of it at the same time.  I told her this would work and instructed her to go ahead and take a 3 mg tablet on Tuesday night now.

## 2019-11-24 NOTE — PROGRESS NOTES
ID: Thelma Haley    CC:   Chief Complaint   Patient presents with   • Diabetes   • Knee Pain     right       Subjective:     HPI     Thelma Haley is a 74 y.o. female who presents for right knee replacement surgery follow up. Patient states it has been healing well and she has been receiving physical therapy. She states she has no complaints. She is taking warfarin 3mg daily. She has to get her inr checked twice weekly till the end of December. Patient has no other issues.     Preventative:  Over the past 2 weeks, have you felt down, depressed, or hopeless? no  Over the past 2 weeks, have you felt little interest or pleasure in doing things? no  Clinical depression screening refused by patient no    On osteoporosis therapy? no    Past Medical Hx:  Past Medical History:   Diagnosis Date   • Abnormal results of thyroid function studies    • Allergic rhinitis due to pollen    • Arthritis    • Asthma      uncomplicated      • Cataract    • Chronic rhinitis    • Diabetes mellitus (CMS/HCC)    • Disease of thyroid gland    • Essential hypertension    • Extrinsic asthma with status asthmaticus    • Glaucoma    • Hyperlipidemia    • Impingement syndrome of right shoulder    • Injury of back     L4 L5 bulging disc   • Neuropathy    • Nuclear senile cataract    • Overweight with body mass index (BMI) 25.0-29.9    • Primary fibromyalgia syndrome    • Primary open angle glaucoma    • Rosacea    • Temporomandibular joint disorder        Past Surgical Hx:  Past Surgical History:   Procedure Laterality Date   • BACK SURGERY     • CARPAL TUNNEL RELEASE      Bilateral carpal tunnel release.)   03/27/2000    • CARPAL TUNNEL RELEASE Bilateral    • CATARACT EXTRACTION      Bilateral   • CERVICAL FUSION  1980   • COLONOSCOPY     • DILATATION AND CURETTAGE     • INJECTION OF MEDICATION  06/17/2015    celestone(betamethasone) (2)    • INJECTION OF MEDICATION  04/14/2016    depo medrol ( methylprednisone) (20)   • OOPHORECTOMY      • OTHER SURGICAL HISTORY      hysterosope procedure   • RIGHT OOPHORECTOMY     • ROTATOR CUFF REPAIR      Right   • SHOULDER ARTHROSCOPY Right 2/6/2017    Procedure: RIGHT SHOULDER ARTHROSCOPY SUBACROMIAL DECOMPRESSION, ROTATOR CUFF REPAIR   ;  Surgeon: Terrence Haines MD;  Location: [facility];  Service:    • SHOULDER SURGERY      Excision of 4.8 cm mass with primary intermediate closure.)   03/30/2012    • SPINAL FUSION      L4 L5   • TOTAL KNEE ARTHROPLASTY Right 11/13/2019    Procedure: RIGHT TOTAL KNEE ARTHROPLASTY  with adductor canal block;  Surgeon: Terrence Haines MD;  Location: [facility];  Service: Orthopedics       Health Maintenance:  Health Maintenance   Topic Date Due   • ZOSTER VACCINE (1 of 2) 08/31/1993   • MEDICARE ANNUAL WELLNESS  08/24/2016   • INFLUENZA VACCINE  08/01/2019   • HEMOGLOBIN A1C  02/09/2020   • URINE MICROALBUMIN  02/18/2020   • MAMMOGRAM  05/25/2020   • LIPID PANEL  08/09/2020   • DIABETIC EYE EXAM  08/19/2020   • COLONOSCOPY  05/21/2022   • TDAP/TD VACCINES (2 - Td) 07/10/2024   • PNEUMOCOCCAL VACCINES (65+ LOW/MEDIUM RISK)  Completed       Current Meds:    Current Outpatient Medications:   •  Biotin 16549 MCG tablet, Take 1 tablet by mouth Daily., Disp: , Rfl:   •  CETIRIZINE HCL PO, Take 10 mg by mouth Daily., Disp: , Rfl:   •  Cholecalciferol (VITAMIN D-3) 125 MCG (5000 UT) tablet, Take 1 tablet by mouth Daily., Disp: , Rfl:   •  dorzolamide-timolol (COSOPT) 22.3-6.8 MG/ML ophthalmic solution, Administer 1 drop to both eyes 2 (Two) Times a Day., Disp: , Rfl:   •  Ferrous Sulfate (IRON) 325 (65 Fe) MG tablet, Take 1 tablet by mouth Daily., Disp: , Rfl:   •  gabapentin (NEURONTIN) 100 MG capsule, Take 1 capsule by mouth 3 (Three) Times a Day. For diabetic neuropathy, Disp: 90 capsule, Rfl: 3  •  glucose blood (ONE TOUCH ULTRA TEST) test strip, Use to test blood sugar twice a day. ICD E11.9, Disp: 100 each, Rfl: 5  •  lisinopril (PRINIVIL,ZESTRIL) 5 MG tablet, Take  1 tablet by mouth Daily., Disp: 90 tablet, Rfl: 3  •  lovastatin (MEVACOR) 20 MG tablet, Take 1 tablet by mouth Every Night., Disp: 90 tablet, Rfl: 3  •  methIMAzole (TAPAZOLE) 5 MG tablet, Take 0.5 tablets by mouth Daily., Disp: 45 tablet, Rfl: 1  •  ONETOUCH DELICA LANCETS FINE misc, Use to check blood sugar twice a day. ICD E11.9, Disp: 100 each, Rfl: 5  •  SITagliptin-metFORMIN HCl ER (JANUMET XR)  MG tablet, Take 1 tablet by mouth Daily With Breakfast., Disp: 90 tablet, Rfl: 3  •  warfarin (COUMADIN) 3 MG tablet, Take 1 tablet by mouth Every Night. Take 1 tablet by mouth nightly or as directed by [facility], Disp: 30 tablet, Rfl: 1    Allergies:  Ciprofloxacin; Lortab [hydrocodone-acetaminophen]; Metronidazole; Oxycodone; and Ultram [tramadol hcl]    Family Hx:  Family History   Problem Relation Age of Onset   • Heart disease Other    • Hypertension Other    • COPD Mother         Social History:  Social History     Socioeconomic History   • Marital status:      Spouse name: Not on file   • Number of children: Not on file   • Years of education: Not on file   • Highest education level: Not on file   Tobacco Use   • Smoking status: Never Smoker   • Smokeless tobacco: Never Used   Substance and Sexual Activity   • Alcohol use: No   • Drug use: No   • Sexual activity: Defer       Review of Systems   Constitutional: Negative for activity change, appetite change, chills, fatigue and fever.   HENT: Negative for drooling, ear discharge, ear pain, facial swelling, hearing loss, mouth sores, rhinorrhea and sinus pain.    Eyes: Negative for pain, redness and itching.   Respiratory: Negative for cough, choking, chest tightness, shortness of breath and stridor.    Cardiovascular: Negative for chest pain, palpitations and leg swelling.   Gastrointestinal: Negative for abdominal distention, abdominal pain, anal bleeding, blood in stool, constipation, diarrhea and nausea.   Endocrine: Negative for  "heat intolerance, polydipsia and polyphagia.   Genitourinary: Negative for dysuria, flank pain, frequency and genital sores.   Musculoskeletal: Negative for back pain, gait problem, joint swelling and myalgias.        Right knee pain    Skin: Negative for pallor and rash.   Neurological: Negative for seizures, facial asymmetry, speech difficulty, light-headedness, numbness and headaches.   Hematological: Negative for adenopathy. Does not bruise/bleed easily.   Psychiatric/Behavioral: Negative for confusion, decreased concentration, dysphoric mood and hallucinations. The patient is not nervous/anxious and is not hyperactive.            Objective:     /88   Pulse 80   Ht 162.6 cm (64\")   Wt 64.9 kg (143 lb)   LMP 02/06/1980 (Approximate)   SpO2 99%   BMI 24.55 kg/m²     Physical Exam   Constitutional: She is oriented to person, place, and time. She appears well-developed and well-nourished.   HENT:   Head: Normocephalic and atraumatic.   Right Ear: External ear normal.   Left Ear: External ear normal.   Nose: Nose normal.   Mouth/Throat: Oropharynx is clear and moist.   Eyes: Conjunctivae and EOM are normal. Pupils are equal, round, and reactive to light.   Neck: Normal range of motion. Neck supple.   Cardiovascular: Normal rate, regular rhythm, normal heart sounds and intact distal pulses.   Pulmonary/Chest: Effort normal and breath sounds normal.   Abdominal: Soft. Bowel sounds are normal.   Musculoskeletal: Normal range of motion.   Neurological: She is alert and oriented to person, place, and time.   Skin: Skin is warm and dry.   Psychiatric: She has a normal mood and affect. Her behavior is normal. Judgment and thought content normal.              Assessment/Plan:     Thelma was seen today for diabetes and knee pain.    Diagnoses and all orders for this visit:    Anticoagulated  -     Protime-INR    Right knee arthroplasty: patient is taking warfarin 3mg daily and her inr is not within range. Will " send patient to coumadin clinic.       Follow-up:     1 month     Goals:   Goals     • Less pain         Barriers to goals: recent surgery     Health Maintenance   Topic Date Due   • ZOSTER VACCINE (1 of 2) 08/31/1993   • MEDICARE ANNUAL WELLNESS  08/24/2016   • INFLUENZA VACCINE  08/01/2019   • HEMOGLOBIN A1C  02/09/2020   • URINE MICROALBUMIN  02/18/2020   • MAMMOGRAM  05/25/2020   • LIPID PANEL  08/09/2020   • DIABETIC EYE EXAM  08/19/2020   • COLONOSCOPY  05/21/2022   • TDAP/TD VACCINES (2 - Td) 07/10/2024   • PNEUMOCOCCAL VACCINES (65+ LOW/MEDIUM RISK)  Completed       Tobacco: nonsmoker  Alcohol: does not drink  Lifestyle: Patient's Body mass index is 24.55 kg/m². BMI is above normal parameters. Recommendations include: exercise counseling and nutrition counseling.   cut out extra servings, reduce fast food intake, keep TV off during meals, plan meals, eat breakfast and have 3 meals a day    RISK SCORE: 3         Pippa Pierson M.D. PGY1  [facility]  [address] [place] [address]  Office: [phone]    This document has been electronically signed by Pippa Pierson MD on November 24, 2019 1:26 PM          This document has been electronically signed by Pippa Pierson MD on November 24, 2019 1:17 PM

## 2019-11-25 ENCOUNTER — TELEPHONE (OUTPATIENT)
Dept: ORTHOPEDIC SURGERY | Facility: CLINIC | Age: 74
End: 2019-11-25

## 2019-11-25 ENCOUNTER — DOCUMENTATION (OUTPATIENT)
Dept: ORTHOPEDIC SURGERY | Facility: CLINIC | Age: 74
End: 2019-11-25

## 2019-11-25 RX ORDER — HYDROMORPHONE HYDROCHLORIDE 2 MG/1
2 TABLET ORAL EVERY 4 HOURS PRN
Qty: 18 TABLET | Refills: 0 | Status: SHIPPED | OUTPATIENT
Start: 2019-11-25 | End: 2019-11-27 | Stop reason: SDUPTHER

## 2019-11-25 NOTE — TELEPHONE ENCOUNTER
DR MENDOZA PATIENT.  PATIENT IS REQUESTING A REFILL OF DILAUDID (HYDROMORIFORM) 2MG.  SHE CAN NOT TAKE NORCO OR PERCOCET.  THIS HAS HELPED THE PAIN.  SHE IS COMPLETELY OUT.  
Pt CALLED TO CHECK ON HER REFILL REQUEST     I INFORMED THE Pt THIS IS BEING CALLED INTO [facility]   
Yes, Escribed to [facility]. 
Universal Safety Interventions

## 2019-11-25 NOTE — PROGRESS NOTES
Subjective:     Thelma Haley is a 74 y.o. female who presents for   Chief Complaint   Patient presents with   • Diabetes   • Knee Pain     right       74-year-old female status post right TKA 1 week postop currently getting PT currently full weightbearing on anticoagulant therapy for DVT prophylaxis getting her INRs checked twice weekly till the end of December.  Voices no ongoing complaints she is getting hydromorphone up to twice a day for knee pain denies fevers or chills no night sweats no drainage please see Dr. Pippa Hopkins's note for more detailed information regarding today's encounter.        Past Medical Hx:  Past Medical History:   Diagnosis Date   • Abnormal results of thyroid function studies    • Allergic rhinitis due to pollen    • Arthritis    • Asthma      uncomplicated      • Cataract    • Chronic rhinitis    • Diabetes mellitus (CMS/HCC)    • Disease of thyroid gland    • Essential hypertension    • Extrinsic asthma with status asthmaticus    • Glaucoma    • Hyperlipidemia    • Impingement syndrome of right shoulder    • Injury of back     L4 L5 bulging disc   • Neuropathy    • Nuclear senile cataract    • Overweight with body mass index (BMI) 25.0-29.9    • Primary fibromyalgia syndrome    • Primary open angle glaucoma    • Rosacea    • Temporomandibular joint disorder        Past Surgical Hx:  Past Surgical History:   Procedure Laterality Date   • BACK SURGERY     • CARPAL TUNNEL RELEASE      Bilateral carpal tunnel release.)   03/27/2000    • CARPAL TUNNEL RELEASE Bilateral    • CATARACT EXTRACTION      Bilateral   • CERVICAL FUSION  1980   • COLONOSCOPY     • DILATATION AND CURETTAGE     • INJECTION OF MEDICATION  06/17/2015    celestone(betamethasone) (2)    • INJECTION OF MEDICATION  04/14/2016    depo medrol ( methylprednisone) (20)   • OOPHORECTOMY     • OTHER SURGICAL HISTORY      hysterosope procedure   • RIGHT OOPHORECTOMY     • ROTATOR CUFF REPAIR      Right   • SHOULDER  ARTHROSCOPY Right 2/6/2017    Procedure: RIGHT SHOULDER ARTHROSCOPY SUBACROMIAL DECOMPRESSION, ROTATOR CUFF REPAIR   ;  Surgeon: Terrence Haines MD;  Location: [facility];  Service:    • SHOULDER SURGERY      Excision of 4.8 cm mass with primary intermediate closure.)   03/30/2012    • SPINAL FUSION      L4 L5   • TOTAL KNEE ARTHROPLASTY Right 11/13/2019    Procedure: RIGHT TOTAL KNEE ARTHROPLASTY  with adductor canal block;  Surgeon: Terrence Haines MD;  Location: [facility];  Service: Orthopedics       Health Maintenance:  Health Maintenance   Topic Date Due   • ZOSTER VACCINE (1 of 2) 08/31/1993   • MEDICARE ANNUAL WELLNESS  08/24/2016   • INFLUENZA VACCINE  08/01/2019   • HEMOGLOBIN A1C  02/09/2020   • URINE MICROALBUMIN  02/18/2020   • MAMMOGRAM  05/25/2020   • LIPID PANEL  08/09/2020   • DIABETIC EYE EXAM  08/19/2020   • COLONOSCOPY  05/21/2022   • TDAP/TD VACCINES (2 - Td) 07/10/2024   • PNEUMOCOCCAL VACCINES (65+ LOW/MEDIUM RISK)  Completed       Current Meds:    Current Outpatient Medications:   •  Biotin 34825 MCG tablet, Take 1 tablet by mouth Daily., Disp: , Rfl:   •  CETIRIZINE HCL PO, Take 10 mg by mouth Daily., Disp: , Rfl:   •  Cholecalciferol (VITAMIN D-3) 125 MCG (5000 UT) tablet, Take 1 tablet by mouth Daily., Disp: , Rfl:   •  dorzolamide-timolol (COSOPT) 22.3-6.8 MG/ML ophthalmic solution, Administer 1 drop to both eyes 2 (Two) Times a Day., Disp: , Rfl:   •  Ferrous Sulfate (IRON) 325 (65 Fe) MG tablet, Take 1 tablet by mouth Daily., Disp: , Rfl:   •  gabapentin (NEURONTIN) 100 MG capsule, Take 1 capsule by mouth 3 (Three) Times a Day. For diabetic neuropathy, Disp: 90 capsule, Rfl: 3  •  glucose blood (ONE TOUCH ULTRA TEST) test strip, Use to test blood sugar twice a day. ICD E11.9, Disp: 100 each, Rfl: 5  •  lisinopril (PRINIVIL,ZESTRIL) 5 MG tablet, Take 1 tablet by mouth Daily., Disp: 90 tablet, Rfl: 3  •  lovastatin (MEVACOR) 20 MG tablet, Take 1 tablet by mouth Every Night.,  "Disp: 90 tablet, Rfl: 3  •  methIMAzole (TAPAZOLE) 5 MG tablet, Take 0.5 tablets by mouth Daily., Disp: 45 tablet, Rfl: 1  •  ONETOUCH DELICA LANCETS FINE misc, Use to check blood sugar twice a day. ICD E11.9, Disp: 100 each, Rfl: 5  •  SITagliptin-metFORMIN HCl ER (JANUMET XR)  MG tablet, Take 1 tablet by mouth Daily With Breakfast., Disp: 90 tablet, Rfl: 3  •  warfarin (COUMADIN) 3 MG tablet, Take 1 tablet by mouth Every Night. Take 1 tablet by mouth nightly or as directed by [facility], Disp: 30 tablet, Rfl: 1  •  HYDROmorphone (DILAUDID) 2 MG tablet, Take 1 tablet by mouth Every 4 (Four) Hours As Needed for Moderate Pain  or Severe Pain ., Disp: 18 tablet, Rfl: 0    Allergies:  Ciprofloxacin; Lortab [hydrocodone-acetaminophen]; Metronidazole; Oxycodone; and Ultram [tramadol hcl]    Family Hx:  Family History   Problem Relation Age of Onset   • Heart disease Other    • Hypertension Other    • COPD Mother         Social History:  Social History     Socioeconomic History   • Marital status:      Spouse name: Not on file   • Number of children: Not on file   • Years of education: Not on file   • Highest education level: Not on file   Tobacco Use   • Smoking status: Never Smoker   • Smokeless tobacco: Never Used   Substance and Sexual Activity   • Alcohol use: No   • Drug use: No   • Sexual activity: Defer       Review of Systems  Review of Systems as per HPI otherwise -12 systems review    Objective:     /88   Pulse 80   Ht 162.6 cm (64\")   Wt 64.9 kg (143 lb)   LMP 02/06/1980 (Approximate)   SpO2 99%   BMI 24.55 kg/m²   Physical Exam   General appearance is that of a well-developed well-nourished appearing adult female no acute distress oriented x3 HEENT grossly normal neck supple no JVD examination of the left knee shows a clean incision staples are still in place it appears to be healing satisfactorily no drainage or discharge is noted neuro nonfocal    Lab Review  Results " for orders placed or performed in visit on 11/20/19   Protime-INR   Result Value Ref Range    Protime 15.9 (H) 11.1 - 15.3 Seconds    INR 1.29 (H) 0.80 - 1.20            Assessment:     Thelma was seen today for diabetes and knee pain.    Diagnoses and all orders for this visit:    Anticoagulated  -     Protime-INR    History of arthroplasty of right knee        Plan:   Continue postop rehab recheck 1 month with primary care physician keep scheduled follow-up with orthopedics recheck otherwise as needed  I have seen and examined the patient.  I have reviewed the notes, assessments, and/or procedures performed by Dr. Pippa Hopkins, I concur with her/his documentation and assessment and plan for Thelma Haley.            This document has been electronically signed by Roosevelt Jameson MD on November 25, 2019 3:08 PM

## 2019-11-26 ENCOUNTER — READMISSION MANAGEMENT (OUTPATIENT)
Dept: CALL CENTER | Facility: HOSPITAL | Age: 74
End: 2019-11-26

## 2019-11-26 DIAGNOSIS — Z96.651 PRESENCE OF RIGHT ARTIFICIAL KNEE JOINT: Primary | ICD-10-CM

## 2019-11-26 NOTE — OUTREACH NOTE
Total Joint Week 2 Survey      Responses   Facility patient discharged from?  [facility]   Does the patient have one of the following disease processes/diagnoses(primary or secondary)?  Total Joint Replacement   Joint surgery performed?  Knee   Week 2 attempt successful?  Yes   Call start time  1043   Call end time  1055   Has the patient been back in either the hospital or Emergency Department since discharge?  No   Discharge diagnosis  Primary osteoarthritis of both knees, s/p right total knee arthroplasty    Does the patient have all medications related to this admission filled (includes all antibiotics, pain medications, etc.)  Yes   Is the patient taking all medications as directed (includes completed medication regime)?  Yes   Is the patient able to teach back alternate methods of pain control?  Ice, Knee-elevation/no pillow under knee, Reposition   Medication comments  Pain is still present per pt at it worst it has been 10/10 with pain med dropped to 3/10.   Does the patient have a follow up appointment with their surgeon?  Yes   Has the patient kept scheduled appointments due by today?  N/A   Psychosocial issues?  No   When is the first therapy visit scheduled (PO Day) including how many days per week   outpt PT   Has the patient began therapy sessions (either in the home or as an out patient)?  Yes   Has the patient fallen since discharge?  No   Comments  Using walker for ambulation. Appetite is still decreased. Glucose levels 111, pt only takes 1x/wk per her report. Pt reports no issue urinating or having BM. Incision is KING, staples present, no s/sx of inf.    What is the patient's perception of their functional status since discharge?  Improving   Is the patient able to teach back signs and symptoms of infection?  Temp >100.4 for 24h or longer, Increased swelling or redness around incision (not associated with surgical edema), Blisters around incision   Is the patient able to teach back how to prevent  infection?  Wash hands before and after touching incision, Shower only as directed by surgeon, Monitor blood sugar if diabetic, Eat well-balanced diet   Is the patient able to teach back signs and symptoms of DVT?  Redness in calf, Swelling in calf   Is the patient able to teach back home safety measures?  Ability to shower, Accessibility to necessary areas in home   Week 2 call completed?  Yes          Kalani Parker, RN

## 2019-11-27 ENCOUNTER — ANTICOAGULATION VISIT (OUTPATIENT)
Dept: PHARMACY | Facility: HOSPITAL | Age: 74
End: 2019-11-27

## 2019-11-27 ENCOUNTER — OFFICE VISIT (OUTPATIENT)
Dept: ORTHOPEDIC SURGERY | Facility: CLINIC | Age: 74
End: 2019-11-27

## 2019-11-27 ENCOUNTER — LAB (OUTPATIENT)
Dept: LAB | Facility: HOSPITAL | Age: 74
End: 2019-11-27

## 2019-11-27 VITALS — HEIGHT: 64 IN | BODY MASS INDEX: 24.21 KG/M2 | WEIGHT: 141.8 LBS

## 2019-11-27 DIAGNOSIS — G89.29 CHRONIC PAIN OF BOTH KNEES: Primary | ICD-10-CM

## 2019-11-27 DIAGNOSIS — M25.561 CHRONIC PAIN OF BOTH KNEES: Primary | ICD-10-CM

## 2019-11-27 DIAGNOSIS — M25.562 CHRONIC PAIN OF BOTH KNEES: Primary | ICD-10-CM

## 2019-11-27 DIAGNOSIS — Z79.01 ANTICOAGULANT LONG-TERM USE: ICD-10-CM

## 2019-11-27 DIAGNOSIS — Z96.651 STATUS POST RIGHT KNEE REPLACEMENT: ICD-10-CM

## 2019-11-27 LAB
INR PPP: 2.47 (ref 0.8–1.2)
PROTHROMBIN TIME: 26.5 SECONDS (ref 11.1–15.3)

## 2019-11-27 PROCEDURE — 85610 PROTHROMBIN TIME: CPT

## 2019-11-27 PROCEDURE — 36415 COLL VENOUS BLD VENIPUNCTURE: CPT

## 2019-11-27 PROCEDURE — 99024 POSTOP FOLLOW-UP VISIT: CPT | Performed by: NURSE PRACTITIONER

## 2019-11-27 RX ORDER — CEPHALEXIN 500 MG/1
500 CAPSULE ORAL 3 TIMES DAILY
Qty: 30 CAPSULE | Refills: 0 | Status: SHIPPED | OUTPATIENT
Start: 2019-11-27 | End: 2019-12-07

## 2019-11-27 RX ORDER — HYDROMORPHONE HYDROCHLORIDE 2 MG/1
2 TABLET ORAL EVERY 4 HOURS PRN
Qty: 18 TABLET | Refills: 0 | Status: SHIPPED | OUTPATIENT
Start: 2019-11-27 | End: 2019-12-06 | Stop reason: SDUPTHER

## 2019-11-27 NOTE — PROGRESS NOTES
Spoke with Ms Haley over the telephone    Reviewed current lab/INR which is therapeutic (Goal 1.7-2.5)    Pt reports no s/s of bleeding.   No changes to their medications.  No missed doses of warfarin.   No extreme changes in their diet    Reviewed schedule of 3 mg daily except 6 mg on Saturday  Next INR on 12/4 provided by [facility]  Pt read back regimen and verbalized understanding    Avi Vasquez RPH  11/27/19  3:28 PM

## 2019-12-01 PROBLEM — Z96.651 STATUS POST RIGHT KNEE REPLACEMENT: Status: ACTIVE | Noted: 2019-12-01

## 2019-12-04 ENCOUNTER — ANTICOAGULATION VISIT (OUTPATIENT)
Dept: PHARMACY | Facility: HOSPITAL | Age: 74
End: 2019-12-04

## 2019-12-04 ENCOUNTER — LAB (OUTPATIENT)
Dept: LAB | Facility: HOSPITAL | Age: 74
End: 2019-12-04

## 2019-12-04 DIAGNOSIS — Z79.01 ANTICOAGULANT LONG-TERM USE: ICD-10-CM

## 2019-12-04 LAB
INR PPP: 2.88 (ref 0.8–1.2)
PROTHROMBIN TIME: 29.9 SECONDS (ref 11.1–15.3)

## 2019-12-04 PROCEDURE — 36415 COLL VENOUS BLD VENIPUNCTURE: CPT

## 2019-12-04 PROCEDURE — 85610 PROTHROMBIN TIME: CPT

## 2019-12-04 NOTE — PROGRESS NOTES
Spoke with Thelma. Reviewed current lab.  No s/s of bleeding. No new meds. No missed doses. Reviewed schedule for following week. Pt read back regimen and verbalized understanding. All questions answered. Next INR on 12/10 provided by [facility].    Luz Marina Parker [facility]  12/04/19  12:45 PM

## 2019-12-06 ENCOUNTER — TELEPHONE (OUTPATIENT)
Dept: ORTHOPEDIC SURGERY | Facility: CLINIC | Age: 74
End: 2019-12-06

## 2019-12-06 RX ORDER — HYDROMORPHONE HYDROCHLORIDE 2 MG/1
2 TABLET ORAL EVERY 4 HOURS PRN
Qty: 18 TABLET | Refills: 0 | Status: SHIPPED | OUTPATIENT
Start: 2019-12-06 | End: 2019-12-10 | Stop reason: SDUPTHER

## 2019-12-06 NOTE — TELEPHONE ENCOUNTER
[facility]        PATIENT WOULD LIKE A REFILL ON DILAUDID 2 MG.    [facility]    [phone]

## 2019-12-10 ENCOUNTER — ANTICOAGULATION VISIT (OUTPATIENT)
Dept: PHARMACY | Facility: HOSPITAL | Age: 74
End: 2019-12-10

## 2019-12-10 ENCOUNTER — LAB (OUTPATIENT)
Dept: LAB | Facility: HOSPITAL | Age: 74
End: 2019-12-10

## 2019-12-10 ENCOUNTER — OFFICE VISIT (OUTPATIENT)
Dept: ORTHOPEDIC SURGERY | Facility: CLINIC | Age: 74
End: 2019-12-10

## 2019-12-10 VITALS — HEIGHT: 64 IN | BODY MASS INDEX: 24.07 KG/M2 | WEIGHT: 141 LBS

## 2019-12-10 DIAGNOSIS — Z96.651 STATUS POST RIGHT KNEE REPLACEMENT: Primary | ICD-10-CM

## 2019-12-10 DIAGNOSIS — G89.29 CHRONIC PAIN OF LEFT KNEE: ICD-10-CM

## 2019-12-10 DIAGNOSIS — Z79.01 ANTICOAGULANT LONG-TERM USE: ICD-10-CM

## 2019-12-10 DIAGNOSIS — M25.562 CHRONIC PAIN OF LEFT KNEE: ICD-10-CM

## 2019-12-10 DIAGNOSIS — M62.81 QUADRICEPS WEAKNESS: ICD-10-CM

## 2019-12-10 LAB
25(OH)D3 SERPL-MCNC: 72.6 NG/ML (ref 30–100)
ALBUMIN SERPL-MCNC: 4.4 G/DL (ref 3.5–5.2)
ALBUMIN/GLOB SERPL: 1.4 G/DL
ALP SERPL-CCNC: 64 U/L (ref 39–117)
ALT SERPL W P-5'-P-CCNC: 13 U/L (ref 1–33)
ANION GAP SERPL CALCULATED.3IONS-SCNC: 14.1 MMOL/L (ref 5–15)
AST SERPL-CCNC: 16 U/L (ref 1–32)
BASOPHILS # BLD AUTO: 0.02 10*3/MM3 (ref 0–0.2)
BASOPHILS NFR BLD AUTO: 0.2 % (ref 0–1.5)
BILIRUB SERPL-MCNC: 0.3 MG/DL (ref 0.2–1.2)
BUN BLD-MCNC: 24 MG/DL (ref 8–23)
BUN/CREAT SERPL: 26.7 (ref 7–25)
CALCIUM SPEC-SCNC: 9.3 MG/DL (ref 8.6–10.5)
CHLORIDE SERPL-SCNC: 104 MMOL/L (ref 98–107)
CHOLEST SERPL-MCNC: 158 MG/DL (ref 0–200)
CO2 SERPL-SCNC: 23.9 MMOL/L (ref 22–29)
CREAT BLD-MCNC: 0.9 MG/DL (ref 0.57–1)
DEPRECATED RDW RBC AUTO: 41.1 FL (ref 37–54)
EOSINOPHIL # BLD AUTO: 0.21 10*3/MM3 (ref 0–0.4)
EOSINOPHIL NFR BLD AUTO: 2.4 % (ref 0.3–6.2)
ERYTHROCYTE [DISTWIDTH] IN BLOOD BY AUTOMATED COUNT: 12.9 % (ref 12.3–15.4)
GFR SERPL CREATININE-BSD FRML MDRD: 61 ML/MIN/1.73
GLOBULIN UR ELPH-MCNC: 3.2 GM/DL
GLUCOSE BLD-MCNC: 100 MG/DL (ref 65–99)
HBA1C MFR BLD: 5.39 % (ref 4.8–5.6)
HCT VFR BLD AUTO: 32.4 % (ref 34–46.6)
HDLC SERPL-MCNC: 45 MG/DL (ref 40–60)
HGB BLD-MCNC: 10.9 G/DL (ref 12–15.9)
IMM GRANULOCYTES # BLD AUTO: 0.09 10*3/MM3 (ref 0–0.05)
IMM GRANULOCYTES NFR BLD AUTO: 1 % (ref 0–0.5)
INR PPP: 1.94 (ref 0.8–1.2)
LDLC SERPL CALC-MCNC: 83 MG/DL (ref 0–100)
LDLC/HDLC SERPL: 1.84 {RATIO}
LYMPHOCYTES # BLD AUTO: 1.77 10*3/MM3 (ref 0.7–3.1)
LYMPHOCYTES NFR BLD AUTO: 20 % (ref 19.6–45.3)
MCH RBC QN AUTO: 29.5 PG (ref 26.6–33)
MCHC RBC AUTO-ENTMCNC: 33.6 G/DL (ref 31.5–35.7)
MCV RBC AUTO: 87.8 FL (ref 79–97)
MONOCYTES # BLD AUTO: 0.71 10*3/MM3 (ref 0.1–0.9)
MONOCYTES NFR BLD AUTO: 8 % (ref 5–12)
NEUTROPHILS # BLD AUTO: 6.06 10*3/MM3 (ref 1.7–7)
NEUTROPHILS NFR BLD AUTO: 68.4 % (ref 42.7–76)
NRBC BLD AUTO-RTO: 0 /100 WBC (ref 0–0.2)
PLATELET # BLD AUTO: 350 10*3/MM3 (ref 140–450)
PMV BLD AUTO: 12.3 FL (ref 6–12)
POTASSIUM BLD-SCNC: 4.1 MMOL/L (ref 3.5–5.2)
PROT SERPL-MCNC: 7.6 G/DL (ref 6–8.5)
PROTHROMBIN TIME: 21.9 SECONDS (ref 11.1–15.3)
RBC # BLD AUTO: 3.69 10*6/MM3 (ref 3.77–5.28)
SODIUM BLD-SCNC: 142 MMOL/L (ref 136–145)
TRIGL SERPL-MCNC: 151 MG/DL (ref 0–150)
TSH SERPL DL<=0.05 MIU/L-ACNC: 2.42 UIU/ML (ref 0.27–4.2)
VIT B12 BLD-MCNC: 400 PG/ML (ref 211–946)
VLDLC SERPL-MCNC: 30.2 MG/DL (ref 5–40)
WBC NRBC COR # BLD: 8.86 10*3/MM3 (ref 3.4–10.8)

## 2019-12-10 PROCEDURE — 80061 LIPID PANEL: CPT | Performed by: INTERNAL MEDICINE

## 2019-12-10 PROCEDURE — 85610 PROTHROMBIN TIME: CPT

## 2019-12-10 PROCEDURE — 20610 DRAIN/INJ JOINT/BURSA W/O US: CPT | Performed by: NURSE PRACTITIONER

## 2019-12-10 PROCEDURE — 84443 ASSAY THYROID STIM HORMONE: CPT | Performed by: INTERNAL MEDICINE

## 2019-12-10 PROCEDURE — 85025 COMPLETE CBC W/AUTO DIFF WBC: CPT | Performed by: INTERNAL MEDICINE

## 2019-12-10 PROCEDURE — 80053 COMPREHEN METABOLIC PANEL: CPT | Performed by: INTERNAL MEDICINE

## 2019-12-10 PROCEDURE — 99024 POSTOP FOLLOW-UP VISIT: CPT | Performed by: NURSE PRACTITIONER

## 2019-12-10 PROCEDURE — 36415 COLL VENOUS BLD VENIPUNCTURE: CPT | Performed by: INTERNAL MEDICINE

## 2019-12-10 PROCEDURE — 83036 HEMOGLOBIN GLYCOSYLATED A1C: CPT | Performed by: INTERNAL MEDICINE

## 2019-12-10 PROCEDURE — 82306 VITAMIN D 25 HYDROXY: CPT | Performed by: INTERNAL MEDICINE

## 2019-12-10 PROCEDURE — 82607 VITAMIN B-12: CPT | Performed by: INTERNAL MEDICINE

## 2019-12-10 RX ORDER — HYDROMORPHONE HYDROCHLORIDE 2 MG/1
2 TABLET ORAL EVERY 8 HOURS PRN
Qty: 30 TABLET | Refills: 0 | Status: SHIPPED | OUTPATIENT
Start: 2019-12-10 | End: 2019-12-27

## 2019-12-10 RX ADMIN — LIDOCAINE HYDROCHLORIDE 2 ML: 10 INJECTION, SOLUTION EPIDURAL; INFILTRATION; INTRACAUDAL; PERINEURAL at 11:29

## 2019-12-10 RX ADMIN — TRIAMCINOLONE ACETONIDE 40 MG: 40 INJECTION, SUSPENSION INTRA-ARTICULAR; INTRAMUSCULAR at 11:29

## 2019-12-10 NOTE — PROGRESS NOTES
Thelma Haley is a 74 y.o. female is s/p       Chief Complaint   Patient presents with   • Right Knee - Follow-up, Pain       HISTORY OF PRESENT ILLNESS: Patient presents to office for postoperative follow-up status post right total knee arthroplasty performed per Dr. Haines on 11/13/2019.  Patient continues to complain of right knee pain but states her pain is better controlled and has been gradually improving since last office visit.  She continues to take Dilaudid as needed for pain.  Right knee swelling is improved from prior exam.  Patient was also started on antibiotics at last office visit for mild erythema to the distal aspect of her incision, which has resolved.  Patient continues with her current course of physical therapy at [facility].  Patient continues with Coumadin therapy as directed for DVT prophylaxis.  No new complaints or concerns noted in regards to her right knee.  Patient complains of some increased rectal problem that she attributes to compensation for her right knee.  She has known osteoarthritis in the left knee.  Patient inquires today about a steroid injection into the left knee to help with her chronic knee pain.  Patient is ambulatory in office today with use of her walker and doing well.  Surgical incision is healing as expected.        11/13/19 (27d) Terrence Haines MD    RIGHT TOTAL KNEE ARTHROPLASTY  with adductor canal block - Right       Allergies   Allergen Reactions   • Ciprofloxacin Nausea And Vomiting   • Lortab [Hydrocodone-Acetaminophen] Nausea And Vomiting   • Metronidazole Nausea And Vomiting   • Oxycodone Nausea And Vomiting     Dizziness and doesn't decrease pain   • Ultram [Tramadol Hcl] Nausea And Vomiting         Current Outpatient Medications:   •  Biotin 27507 MCG tablet, Take 1 tablet by mouth Daily., Disp: , Rfl:   •  CETIRIZINE HCL PO, Take 10 mg by mouth Daily., Disp: , Rfl:   •  Cholecalciferol (VITAMIN D-3) 125 MCG (5000 UT) tablet, Take 1 tablet by  mouth Daily., Disp: , Rfl:   •  dorzolamide-timolol (COSOPT) 22.3-6.8 MG/ML ophthalmic solution, Administer 1 drop to both eyes 2 (Two) Times a Day., Disp: , Rfl:   •  Ferrous Sulfate (IRON) 325 (65 Fe) MG tablet, Take 1 tablet by mouth Daily., Disp: , Rfl:   •  gabapentin (NEURONTIN) 100 MG capsule, Take 1 capsule by mouth 3 (Three) Times a Day. For diabetic neuropathy, Disp: 90 capsule, Rfl: 3  •  glucose blood (ONE TOUCH ULTRA TEST) test strip, Use to test blood sugar twice a day. ICD E11.9, Disp: 100 each, Rfl: 5  •  HYDROmorphone (DILAUDID) 2 MG tablet, Take 1 tablet by mouth Every 8 (Eight) Hours As Needed for Moderate Pain  or Severe Pain ., Disp: 30 tablet, Rfl: 0  •  lisinopril (PRINIVIL,ZESTRIL) 5 MG tablet, Take 1 tablet by mouth Daily., Disp: 90 tablet, Rfl: 3  •  lovastatin (MEVACOR) 20 MG tablet, Take 1 tablet by mouth Every Night., Disp: 90 tablet, Rfl: 3  •  methIMAzole (TAPAZOLE) 5 MG tablet, Take 0.5 tablets by mouth Daily., Disp: 45 tablet, Rfl: 1  •  ONETOUCH DELICA LANCETS FINE misc, Use to check blood sugar twice a day. ICD E11.9, Disp: 100 each, Rfl: 5  •  SITagliptin-metFORMIN HCl ER (JANUMET XR)  MG tablet, Take 1 tablet by mouth Daily With Breakfast., Disp: 90 tablet, Rfl: 3  •  warfarin (COUMADIN) 3 MG tablet, Take 1 tablet by mouth Every Night. Take 1 tablet by mouth nightly or as directed by [facility], Disp: 30 tablet, Rfl: 1    No fevers or chills.  No nausea or vomiting. Right knee pain. Left knee pain.       PHYSICAL EXAMINATION:       Thelma Haley is a 74 y.o. female    Patient is awake and alert, answers questions appropriately and is in no apparent distress.    GAIT:     []  Normal  [x]  Antalgic    Assistive device: []  None  [x]  Walker     []  Crutches  []  Cane     []  Wheelchair  []  Stretcher    Right Knee Exam     Tenderness   Right knee tenderness location: Mild, diffuse.    Range of Motion   Extension: 5   Flexion: 100     Other   Erythema:  absent  Sensation: normal  Pulse: present  Swelling: mild  Effusion: effusion present    Comments:  Pain and limitations with range of motion, improved from prior exam.  Mild swelling/effusion noted, improved from prior exam.  Surgical incision is well approximated with no active drainage noted. No erythema.  No signs of infection noted.  Calf is soft and nontender.  Homans sign is negative.      Left Knee Exam     Tenderness   Left knee tenderness location: Mild, diffuse.    Range of Motion   Extension: 0   Flexion: 120     Other   Erythema: absent  Sensation: normal  Pulse: present  Swelling: mild  Effusion: no effusion present    Comments:  Mild pain and limitations with range of motion.  Mild, generalized swelling noted.  No definitive effusion.  No erythema.  No warmth.  No signs of infection noted.              Xr Knee 1 Or 2 View Right    Result Date: 11/27/2019  Narrative: Lateral view of the right knee reveals postsurgical changes post total knee arthroplasty.  Prostheses are well-positioned and well-aligned with no evidence of hardware failure or loosening noted.  No significant changes are noted to the implant when compared with prior images from 11/13/2019.  There is surrounding soft tissue swelling noted.  Subcutaneous staples are noted to the anterior knee.  The previously noted surgical drain has been removed.  No acute bony radiologic abnormalities are noted at this time.11/27/19 at 3:07 PM by JUAN Aponte     Xr Knee Bilateral Ap Standing    Result Date: 11/27/2019  Narrative: AP standing view of bilateral knees reveals postsurgical changes post right total knee arthroplasty.  Prostheses are well-positioned and well-aligned with no evidence of hardware failure or loosening noted.  The presence of the right knee implant is new in the interim when compared with prior images from 10/10/2019.  There are degenerative changes noted in the left knee with diminished joint spacing and marginal  osteophyte formations, unchanged from prior images.  There are subcutaneous staples noted to the anterior right knee, otherwise soft tissues appear unremarkable.  No acute bony radiologic abnormalities are noted at this time. 11/27/19 at 3:05 PM by JUAN Aponte         ASSESSMENT:    Diagnoses and all orders for this visit:    Status post right knee replacement  -     HYDROmorphone (DILAUDID) 2 MG tablet; Take 1 tablet by mouth Every 8 (Eight) Hours As Needed for Moderate Pain  or Severe Pain .  -     Miscellaneous DME    Chronic pain of left knee  -     Large Joint Arthrocentesis: L knee  -     HYDROmorphone (DILAUDID) 2 MG tablet; Take 1 tablet by mouth Every 8 (Eight) Hours As Needed for Moderate Pain  or Severe Pain .  -     Miscellaneous DME    Quadriceps weakness  -     Miscellaneous DME      Large Joint Arthrocentesis: L knee  Date/Time: 12/10/2019 11:29 AM  Consent given by: patient  Site marked: site marked  Timeout: Immediately prior to procedure a time out was called to verify the correct patient, procedure, equipment, support staff and site/side marked as required   Supporting Documentation  Indications: pain, joint swelling and diagnostic evaluation   Procedure Details  Location: knee - L knee  Preparation: Patient was prepped and draped in the usual sterile fashion  Needle size: 22 G  Approach: anterolateral  Medications administered: 2 mL lidocaine PF 1% 1 %; 40 mg triamcinolone acetonide 40 MG/ML  Patient tolerance: patient tolerated the procedure well with no immediate complications        PLAN    Patient is doing well postoperatively and continues to progressively improve.  She is doing better than her visit 2 weeks ago.  She reports her right knee pain and swelling are better controlled.  Surgical incision is healing as expected with no signs of infection noted.  The previously noted small area of erythema to the distal aspect of her incision has resolved.  As previously noted, she was  treated with Keflex as a precaution.  Continue with current course of physical therapy and home exercises as instructed.  Continue to progress activity and weightbearing as tolerated.  Continue with use of her walker for modified weightbearing as needed.  We discussed that she can transition to a quad cane when she feels ready.  PT can assist with this transition.  Continue with elevation and ice therapy to the right knee to minimize pain/inflammation/swelling.  Continue Dilaudid as needed for pain.  This is refilled for the patient today at her request.  Recommend Tylenol as needed for milder pain.  Patient also inquires today about a Kneehab electrical stimulation device for the right leg to help with quadriceps strength.  She brought her 's unit in today that was previously ordered per Dr. Haines following his knee surgery.  I have ordered her one of these units at her request and this was okayed per Dr. Haines.  Continue Coumadin therapy as directed for DVT prophylaxis for a total of 6 weeks postop, as managed per hospital pharmacy.  Patient also complains of some increased left knee pain recently that she attributes to compensation for her right knee.  She has known osteoarthritis in the left knee.  Recommend an intra-articular injection of steroid to the left knee today for management of joint pain/inflammation.  Follow-up in 4 weeks for recheck and repeat x-rays at that time.    This patient has underwent a recent surgical procedure. Therefore, I will recommend a course of narcotic pain medication for this patient until their pain has been sufficiently reduced to a level that I deem acceptable to be treated with alternative treatment options.  She will continue with non-opioid pain management methods as well including physical therapy, home exercises, modified weightbearing with use of her walker, elevation, ice therapy and Tylenol. CHERYL reviewed # [id number].     Return in about 4 weeks (around  1/7/2020) for Recheck.        This document has been electronically signed by JUAN Aponte on December 15, 2019 4:46 AM      JUAN Aponte

## 2019-12-10 NOTE — PROGRESS NOTES
Spoke with Ms. Tera. Reviewed current lab.  No s/s of bleeding. No new meds. No missed doses.     INR 1.94, therapeutic.  Instructed to take warfarin 1.5 mg tonight, then 3 mg nightly until next bloodwork.    Reviewed schedule for following week. Pt read back regimen and verbalized understanding. All questions answered. Next INR on 11/17 provided by  JOSÉ MIGUEL.    Olegario Ch RP  12/10/19  3:15 PM

## 2019-12-11 ENCOUNTER — READMISSION MANAGEMENT (OUTPATIENT)
Dept: CALL CENTER | Facility: HOSPITAL | Age: 74
End: 2019-12-11

## 2019-12-11 NOTE — OUTREACH NOTE
Total Joint Month 1 Survey      Responses   Facility patient discharged from?  [facility]   Does the patient have one of the following disease processes/diagnoses(primary or secondary)?  Total Joint Replacement   Joint surgery performed?  Knee   Month 1 attempt successful?  Yes   Call start time  1359   Call end time  1404   Has the patient been back in either the hospital or Emergency Department since discharge?  No   Discharge diagnosis  Primary osteoarthritis of both knees, s/p right total knee arthroplasty    Is the patient taking all medications as directed (includes completed medication regime)?  Yes   Medication comments  Pain has improved but pt continues to use pain medication.   Has the patient kept scheduled appointments due by today?  N/A   Is the patient still receiving Home Health Services?  N/A   Is the patient still attending therapy sessions(either in the home or as an outpatient)?  Yes [Pt is doing therapy 2x per week]   Has the patient fallen since discharge?  No   Comments  Pt is using walker but will use the cane in her home.  She reports she has a good diet.  PT is checking glucose once per week and last check was 111.  Pt also had a steroid shot in opposite knee yesterday.   What is the patient's perception of their functional status since discharge?  Improving   Is the patient/caregiver able to teach back the hierarchy of who to call/visit for symptoms/problems? PCP, Specialist, Home health nurse, Urgent Care, ED, 911  Yes   Month 1 call completed?  Yes   Wrap up additional comments  PT reports she is doing ok. She states she is continuing to have pain but this is improviing.           Krystal Leija RN

## 2019-12-15 PROBLEM — M62.81 QUADRICEPS WEAKNESS: Status: ACTIVE | Noted: 2019-12-15

## 2019-12-15 RX ORDER — TRIAMCINOLONE ACETONIDE 40 MG/ML
40 INJECTION, SUSPENSION INTRA-ARTICULAR; INTRAMUSCULAR
Status: COMPLETED | OUTPATIENT
Start: 2019-12-10 | End: 2019-12-10

## 2019-12-15 RX ORDER — LIDOCAINE HYDROCHLORIDE 10 MG/ML
2 INJECTION, SOLUTION EPIDURAL; INFILTRATION; INTRACAUDAL; PERINEURAL
Status: COMPLETED | OUTPATIENT
Start: 2019-12-10 | End: 2019-12-10

## 2019-12-17 ENCOUNTER — ANTICOAGULATION VISIT (OUTPATIENT)
Dept: PHARMACY | Facility: HOSPITAL | Age: 74
End: 2019-12-17

## 2019-12-17 ENCOUNTER — LAB (OUTPATIENT)
Dept: LAB | Facility: HOSPITAL | Age: 74
End: 2019-12-17

## 2019-12-17 DIAGNOSIS — Z79.01 ANTICOAGULANT LONG-TERM USE: ICD-10-CM

## 2019-12-17 LAB
INR PPP: 1.83 (ref 0.8–1.2)
PROTHROMBIN TIME: 20.9 SECONDS (ref 11.1–15.3)

## 2019-12-17 PROCEDURE — 85610 PROTHROMBIN TIME: CPT

## 2019-12-17 PROCEDURE — 36415 COLL VENOUS BLD VENIPUNCTURE: CPT

## 2019-12-17 NOTE — PROGRESS NOTES
Spoke with Ms. Tera. Reviewed current lab.  No s/s of bleeding. No new meds. No missed doses.     INR 1.83, therapeutic.  Continue current regimen of 1.5 mg on Tu and 3 mg all other days.  Concerns for restarting greens.  Recommended greens if present for holiday meals, as it would just be a day early before therapy completion.   No significant risk at this point in therapy.    Reviewed schedule for following week. Pt read back regimen and verbalized understanding. All questions answered. No further bloodwork.  Will resolve this episode.  Willl move telephone call to 12/26 to avoid the holiday.    Olegario Ch RPH  12/17/19  4:18 PM

## 2019-12-19 ENCOUNTER — OFFICE VISIT (OUTPATIENT)
Dept: FAMILY MEDICINE CLINIC | Facility: CLINIC | Age: 74
End: 2019-12-19

## 2019-12-19 ENCOUNTER — APPOINTMENT (OUTPATIENT)
Dept: LAB | Facility: HOSPITAL | Age: 74
End: 2019-12-19

## 2019-12-19 VITALS
SYSTOLIC BLOOD PRESSURE: 160 MMHG | WEIGHT: 138 LBS | TEMPERATURE: 98.2 F | HEART RATE: 70 BPM | OXYGEN SATURATION: 98 % | BODY MASS INDEX: 23.56 KG/M2 | DIASTOLIC BLOOD PRESSURE: 90 MMHG | HEIGHT: 64 IN

## 2019-12-19 DIAGNOSIS — I10 ESSENTIAL HYPERTENSION: Primary | ICD-10-CM

## 2019-12-19 DIAGNOSIS — Z23 NEED FOR IMMUNIZATION AGAINST INFLUENZA: ICD-10-CM

## 2019-12-19 DIAGNOSIS — Z79.899 LONG TERM USE OF DRUG: ICD-10-CM

## 2019-12-19 PROCEDURE — G0008 ADMIN INFLUENZA VIRUS VAC: HCPCS | Performed by: FAMILY MEDICINE

## 2019-12-19 PROCEDURE — 99213 OFFICE O/P EST LOW 20 MIN: CPT | Performed by: FAMILY MEDICINE

## 2019-12-19 PROCEDURE — G0480 DRUG TEST DEF 1-7 CLASSES: HCPCS | Performed by: FAMILY MEDICINE

## 2019-12-19 PROCEDURE — 90653 IIV ADJUVANT VACCINE IM: CPT | Performed by: FAMILY MEDICINE

## 2019-12-19 RX ORDER — LISINOPRIL 5 MG/1
5 TABLET ORAL DAILY
Qty: 90 TABLET | Refills: 3 | Status: SHIPPED | OUTPATIENT
Start: 2019-12-19 | End: 2020-04-27 | Stop reason: SDUPTHER

## 2019-12-19 NOTE — PROGRESS NOTES
Family Medicine   Jaky Prieto MD    Subjective:     Thelma Haley is a 74 y.o. female who presents for follow up of HTN.    New concerns: states her BP is up today because she just finished PT for her knee and she has had a lot of pain this week.     Evaluation:   Home blood pressure readings: not checking   Blood pressure often elevated in physician office: No   Duration of symptoms: several years  Risk Factors:   HLD, Sedentary lifestyle    Comorbid Conditions:   Diabetes mellitus    The following portions of the patient's history were reviewed and updated as appropriate: allergies, current medications, past family history, past medical history, past social history, past surgical history and problem list.    Past Medical Hx:  Past Medical History:   Diagnosis Date   • Abnormal results of thyroid function studies    • Allergic rhinitis due to pollen    • Arthritis    • Asthma      uncomplicated      • Cataract    • Chronic rhinitis    • Diabetes mellitus (CMS/HCC)    • Disease of thyroid gland    • Essential hypertension    • Extrinsic asthma with status asthmaticus    • Glaucoma    • Hyperlipidemia    • Impingement syndrome of right shoulder    • Injury of back     L4 L5 bulging disc   • Neuropathy    • Nuclear senile cataract    • Overweight with body mass index (BMI) 25.0-29.9    • Primary fibromyalgia syndrome    • Primary open angle glaucoma    • Rosacea    • Temporomandibular joint disorder        Past Surgical Hx:  Past Surgical History:   Procedure Laterality Date   • BACK SURGERY     • CARPAL TUNNEL RELEASE      Bilateral carpal tunnel release.)   03/27/2000    • CARPAL TUNNEL RELEASE Bilateral    • CATARACT EXTRACTION      Bilateral   • CERVICAL FUSION  1980   • COLONOSCOPY     • DILATATION AND CURETTAGE     • INJECTION OF MEDICATION  06/17/2015    celestone(betamethasone) (2)    • INJECTION OF MEDICATION  04/14/2016    depo medrol ( methylprednisone) (20)   • OOPHORECTOMY     • OTHER  SURGICAL HISTORY      hysterosope procedure   • RIGHT OOPHORECTOMY     • ROTATOR CUFF REPAIR      Right   • SHOULDER ARTHROSCOPY Right 2/6/2017    Procedure: RIGHT SHOULDER ARTHROSCOPY SUBACROMIAL DECOMPRESSION, ROTATOR CUFF REPAIR   ;  Surgeon: Terrence Haines MD;  Location: [facility];  Service:    • SHOULDER SURGERY      Excision of 4.8 cm mass with primary intermediate closure.)   03/30/2012    • SPINAL FUSION      L4 L5   • TOTAL KNEE ARTHROPLASTY Right 11/13/2019    Procedure: RIGHT TOTAL KNEE ARTHROPLASTY  with adductor canal block;  Surgeon: Terrence Haines MD;  Location: [facility];  Service: Orthopedics       Current Meds:    Current Outpatient Medications:   •  Biotin 88172 MCG tablet, Take 1 tablet by mouth Daily., Disp: , Rfl:   •  CETIRIZINE HCL PO, Take 10 mg by mouth Daily., Disp: , Rfl:   •  Cholecalciferol (VITAMIN D-3) 125 MCG (5000 UT) tablet, Take 1 tablet by mouth Daily., Disp: , Rfl:   •  dorzolamide-timolol (COSOPT) 22.3-6.8 MG/ML ophthalmic solution, Administer 1 drop to both eyes 2 (Two) Times a Day., Disp: , Rfl:   •  Ferrous Sulfate (IRON) 325 (65 Fe) MG tablet, Take 1 tablet by mouth Daily., Disp: , Rfl:   •  gabapentin (NEURONTIN) 100 MG capsule, Take 1 capsule by mouth 3 (Three) Times a Day. For diabetic neuropathy, Disp: 90 capsule, Rfl: 3  •  glucose blood (ONE TOUCH ULTRA TEST) test strip, Use to test blood sugar twice a day. ICD E11.9, Disp: 100 each, Rfl: 5  •  HYDROmorphone (DILAUDID) 2 MG tablet, Take 1 tablet by mouth Every 8 (Eight) Hours As Needed for Moderate Pain  or Severe Pain ., Disp: 30 tablet, Rfl: 0  •  lisinopril (PRINIVIL,ZESTRIL) 5 MG tablet, Take 1 tablet by mouth Daily., Disp: 90 tablet, Rfl: 3  •  lovastatin (MEVACOR) 20 MG tablet, Take 1 tablet by mouth Every Night., Disp: 90 tablet, Rfl: 3  •  methIMAzole (TAPAZOLE) 5 MG tablet, Take 0.5 tablets by mouth Daily., Disp: 45 tablet, Rfl: 1  •  ONETOUCH DELICA LANCETS FINE misc, Use to check blood  sugar twice a day. ICD E11.9, Disp: 100 each, Rfl: 5  •  SITagliptin-metFORMIN HCl ER (JANUMET XR)  MG tablet, Take 1 tablet by mouth Daily With Breakfast., Disp: 90 tablet, Rfl: 3  •  warfarin (COUMADIN) 3 MG tablet, Take 1 tablet by mouth Every Night. Take 1 tablet by mouth nightly or as directed by [facility], Disp: 30 tablet, Rfl: 1    Allergies:  Ciprofloxacin; Lortab [hydrocodone-acetaminophen]; Metronidazole; Oxycodone; and Ultram [tramadol hcl]    Family Hx:  Family History   Problem Relation Age of Onset   • Heart disease Other    • Hypertension Other    • COPD Mother         Social History:  Social History     Socioeconomic History   • Marital status:      Spouse name: Not on file   • Number of children: Not on file   • Years of education: Not on file   • Highest education level: Not on file   Tobacco Use   • Smoking status: Never Smoker   • Smokeless tobacco: Never Used   Substance and Sexual Activity   • Alcohol use: No   • Drug use: No   • Sexual activity: Defer       Review of Systems  Review of Systems   Constitutional: Negative for activity change, appetite change, fatigue and fever.   Eyes: Negative for photophobia and visual disturbance.   Respiratory: Negative for cough and shortness of breath.    Cardiovascular: Negative for chest pain, palpitations and leg swelling.   Gastrointestinal: Negative for abdominal pain and nausea.   Genitourinary: Negative for decreased urine volume, difficulty urinating and dysuria.   Musculoskeletal: Positive for gait problem. Negative for arthralgias.   Skin: Negative for color change and rash.   Neurological: Negative for dizziness, weakness, light-headedness and headaches.   Psychiatric/Behavioral: Negative for agitation, confusion and sleep disturbance.     Hypertension ROS: taking medications as instructed, no medication side effects noted, no TIA's, no chest pain on exertion, no dyspnea on exertion and no swelling of  "ankles.    Objective:     /90 (BP Location: Right arm)   Pulse 70   Temp 98.2 °F (36.8 °C)   Ht 162.6 cm (64.02\")   Wt 62.6 kg (138 lb)   LMP 02/06/1980 (Approximate)   SpO2 98%   BMI 23.68 kg/m²   Physical Exam   Constitutional: She is oriented to person, place, and time. Vital signs are normal. She appears well-developed and well-nourished. She is active and cooperative. No distress.   HENT:   Head: Normocephalic and atraumatic.   Nose: Nose normal.   Eyes: Pupils are equal, round, and reactive to light. Conjunctivae and EOM are normal.   Neck: Normal range of motion. Neck supple.   Cardiovascular: Normal rate, regular rhythm, normal heart sounds and intact distal pulses.   Pulmonary/Chest: Effort normal and breath sounds normal. No respiratory distress. She has no wheezes.   Abdominal: Soft. Bowel sounds are normal. She exhibits no distension. There is no tenderness.   Musculoskeletal: She exhibits no edema.   Neurological: She is alert and oriented to person, place, and time.   Skin: Skin is warm and dry. Capillary refill takes less than 2 seconds.   Psychiatric: She has a normal mood and affect.   Vitals reviewed.       Assessment:     Thelma was seen today for hypertension.    Diagnoses and all orders for this visit:    Essential hypertension  -     lisinopril (PRINIVIL,ZESTRIL) 5 MG tablet; Take 1 tablet by mouth Daily.    Long term use of drug  -     Gabapentin, Urine -    Need for immunization against influenza  -     Fluad Quad =>65 yrs ([id number])        Plan:   1.  Rx changes: none  2.  Education:    EKG ordered: no   Presented an overview of HTN, expected course, considerations, risk factors, and exacerbation prevention.   Discussed treatment options for HTN: yes   Recommended restricted dietary Na intake: yes   Recommended increased in dietary K intake: yes   Discussed patient action plan for HTN: yes  3.  Compliance at present is estimated to be good. Efforts to improve compliance (if " necessary) will be directed at dietary modifications: low salt.    Return in about 3 months (around 3/19/2020).    Preventative:  Health Maintenance   Topic Date Due   • ZOSTER VACCINE (1 of 2) 08/31/1993   • MEDICARE ANNUAL WELLNESS  08/24/2016   • INFLUENZA VACCINE  08/01/2019   • URINE MICROALBUMIN  02/18/2020   • MAMMOGRAM  05/25/2020   • HEMOGLOBIN A1C  06/10/2020   • DIABETIC EYE EXAM  08/19/2020   • LIPID PANEL  12/10/2020   • COLONOSCOPY  05/21/2022   • TDAP/TD VACCINES (2 - Td) 07/10/2024   • PNEUMOCOCCAL VACCINES (65+ LOW/MEDIUM RISK)  Completed     Female Preventative: needs flu shot  Recommended: Influenza  Vaccine Counseling: Patient was counseled on R/B/A to Influenza vaccine(s). Vaccine components reviewed and all questions answered.  Patient allowed to accept or refuse vaccine.  Informed consent obtained.     Weight  -Class: Overweight: 25.0-29.9kg/m2   -Patient's Body mass index is 23.68 kg/m². BMI is above normal parameters. Recommendations include: nutrition counseling.   increase water intake and increase physical activity    Alcohol use:  reports that she does not drink alcohol.  Nicotine status  reports that she has never smoked. She has never used smokeless tobacco.    Goals     • Less pain           RISK SCORE: 3    Signature  [place] [address]  Office: [phone]    This document has been electronically signed by Jaky Prieto MD on December 19, 2019 3:11 PM

## 2019-12-24 LAB — GABAPENTIN UR-MCNC: NEGATIVE UG/ML

## 2019-12-26 ENCOUNTER — TELEPHONE (OUTPATIENT)
Dept: PHARMACY | Facility: HOSPITAL | Age: 74
End: 2019-12-26

## 2019-12-26 DIAGNOSIS — Z96.651 STATUS POST RIGHT KNEE REPLACEMENT: ICD-10-CM

## 2019-12-26 DIAGNOSIS — G89.29 CHRONIC PAIN OF LEFT KNEE: ICD-10-CM

## 2019-12-26 DIAGNOSIS — M25.562 CHRONIC PAIN OF LEFT KNEE: ICD-10-CM

## 2019-12-26 RX ORDER — HYDROMORPHONE HYDROCHLORIDE 2 MG/1
2 TABLET ORAL EVERY 8 HOURS PRN
Qty: 30 TABLET | Refills: 0 | OUTPATIENT
Start: 2019-12-26

## 2019-12-26 RX ORDER — TRAMADOL HYDROCHLORIDE 50 MG/1
50 TABLET ORAL EVERY 8 HOURS PRN
Qty: 40 TABLET | Refills: 0 | OUTPATIENT
Start: 2019-12-26 | End: 2020-01-09

## 2019-12-26 NOTE — TELEPHONE ENCOUNTER
Ok  Lets try 2 extra strength tylenol tablets up to 4 times per day.  No more than 4000mg in a day.  We will try to not use any narcotic any longer.\  CAMRON

## 2019-12-26 NOTE — TELEPHONE ENCOUNTER
Tramadol called into [facility]. Called to notify patient. She reports she is unable to take Tramadol due to Nausea and stomach upset. Wants to know what else she can take.

## 2019-12-27 ENCOUNTER — OFFICE VISIT (OUTPATIENT)
Dept: ENDOCRINOLOGY | Facility: CLINIC | Age: 74
End: 2019-12-27

## 2019-12-27 VITALS
DIASTOLIC BLOOD PRESSURE: 82 MMHG | HEIGHT: 64 IN | HEART RATE: 73 BPM | BODY MASS INDEX: 23.41 KG/M2 | OXYGEN SATURATION: 99 % | SYSTOLIC BLOOD PRESSURE: 140 MMHG | WEIGHT: 137.1 LBS

## 2019-12-27 DIAGNOSIS — I10 ESSENTIAL HYPERTENSION: ICD-10-CM

## 2019-12-27 DIAGNOSIS — R93.7 ABNORMAL BONE DENSITY SCREENING: ICD-10-CM

## 2019-12-27 DIAGNOSIS — E04.1 RIGHT THYROID NODULE: ICD-10-CM

## 2019-12-27 DIAGNOSIS — E11.9 TYPE 2 DIABETES MELLITUS WITHOUT COMPLICATION, WITHOUT LONG-TERM CURRENT USE OF INSULIN (HCC): Primary | ICD-10-CM

## 2019-12-27 DIAGNOSIS — E55.9 VITAMIN D DEFICIENCY: ICD-10-CM

## 2019-12-27 DIAGNOSIS — E05.90 SUBCLINICAL HYPERTHYROIDISM: ICD-10-CM

## 2019-12-27 DIAGNOSIS — G62.9 NEUROPATHY: ICD-10-CM

## 2019-12-27 PROCEDURE — 99214 OFFICE O/P EST MOD 30 MIN: CPT | Performed by: INTERNAL MEDICINE

## 2019-12-27 RX ORDER — METHIMAZOLE 5 MG/1
2.5 TABLET ORAL DAILY
Qty: 45 TABLET | Refills: 1 | Status: SHIPPED | OUTPATIENT
Start: 2019-12-27 | End: 2020-04-23

## 2019-12-27 RX ORDER — LOVASTATIN 20 MG/1
20 TABLET ORAL NIGHTLY
Qty: 90 TABLET | Refills: 3 | Status: SHIPPED | OUTPATIENT
Start: 2019-12-27 | End: 2020-03-11

## 2019-12-27 NOTE — PROGRESS NOTES
Thelma Haley is a 74 y.o. female who presents for  evaluation of   Chief Complaint   Patient presents with   • Follow-up     4 months   • Diabetes         Primary Care / Referring Provider  Jaky Prieto MD    Hyperthyroidism     Duration since 2015    Appears to be Graves with right subcm thyroid nodule    Now on methimazole    Also t2dm well controlled with nephropathy       Past Medical History:   Diagnosis Date   • Abnormal results of thyroid function studies    • Allergic rhinitis due to pollen    • Arthritis    • Asthma      uncomplicated      • Cataract    • Chronic rhinitis    • Diabetes mellitus (CMS/HCC)    • Disease of thyroid gland    • Essential hypertension    • Extrinsic asthma with status asthmaticus    • Glaucoma    • Hyperlipidemia    • Impingement syndrome of right shoulder    • Injury of back     L4 L5 bulging disc   • Neuropathy    • Nuclear senile cataract    • Overweight with body mass index (BMI) 25.0-29.9    • Primary fibromyalgia syndrome    • Primary open angle glaucoma    • Rosacea    • Temporomandibular joint disorder      Family History   Problem Relation Age of Onset   • Heart disease Other    • Hypertension Other    • COPD Mother      Social History     Tobacco Use   • Smoking status: Never Smoker   • Smokeless tobacco: Never Used   Substance Use Topics   • Alcohol use: No   • Drug use: No         Current Outpatient Medications:   •  Biotin 30335 MCG tablet, Take 1 tablet by mouth Daily., Disp: , Rfl:   •  CETIRIZINE HCL PO, Take 10 mg by mouth Daily., Disp: , Rfl:   •  Cholecalciferol (VITAMIN D-3) 125 MCG (5000 UT) tablet, Take 1 tablet by mouth Daily., Disp: , Rfl:   •  dorzolamide-timolol (COSOPT) 22.3-6.8 MG/ML ophthalmic solution, Administer 1 drop to both eyes 2 (Two) Times a Day., Disp: , Rfl:   •  Ferrous Sulfate (IRON) 325 (65 Fe) MG tablet, Take 1 tablet by mouth Daily., Disp: , Rfl:   •  gabapentin (NEURONTIN) 100 MG capsule, Take 1 capsule by mouth 3  (Three) Times a Day. For diabetic neuropathy, Disp: 90 capsule, Rfl: 3  •  glucose blood (ONE TOUCH ULTRA TEST) test strip, Use to test blood sugar twice a day. ICD E11.9, Disp: 100 each, Rfl: 5  •  lisinopril (PRINIVIL,ZESTRIL) 5 MG tablet, Take 1 tablet by mouth Daily., Disp: 90 tablet, Rfl: 3  •  lovastatin (MEVACOR) 20 MG tablet, Take 1 tablet by mouth Every Night., Disp: 90 tablet, Rfl: 3  •  methIMAzole (TAPAZOLE) 5 MG tablet, Take 0.5 tablets by mouth Daily., Disp: 45 tablet, Rfl: 1  •  ONETOUCH DELICA LANCETS FINE misc, Use to check blood sugar twice a day. ICD E11.9, Disp: 100 each, Rfl: 5  •  SITagliptin-metFORMIN HCl ER (JANUMET XR)  MG tablet, Take 1 tablet by mouth Daily With Breakfast., Disp: 90 tablet, Rfl: 3  •  traMADol (ULTRAM) 50 MG tablet, Take 1 tablet by mouth Every 8 (Eight) Hours As Needed for Moderate Pain ., Disp: 40 tablet, Rfl: 0    Review of Systems    Review of Systems   Constitutional: Positive for fatigue and unexpected weight change. Negative for activity change, appetite change, chills, diaphoresis and fever.   HENT: Negative for congestion, drooling, ear discharge, ear pain, facial swelling, mouth sores, nosebleeds, postnasal drip, sinus pressure, sneezing, sore throat, tinnitus, trouble swallowing and voice change.    Eyes: Negative.  Negative for photophobia, pain, discharge, redness and itching.   Respiratory: Negative.  Negative for apnea, cough, choking, chest tightness, shortness of breath, wheezing and stridor.    Cardiovascular: Negative.  Negative for chest pain, palpitations and leg swelling.   Gastrointestinal: Negative.  Negative for abdominal distention, abdominal pain, constipation, diarrhea, nausea and vomiting.   Endocrine: Negative.  Negative for cold intolerance, heat intolerance, polydipsia, polyphagia and polyuria.   Genitourinary: Negative for difficulty urinating, dysuria, flank pain and frequency.   Musculoskeletal: Negative for arthralgias, back pain,  "gait problem, joint swelling, myalgias, neck pain and neck stiffness.   Skin: Negative for color change, pallor, rash and wound.   Allergic/Immunologic: Negative for environmental allergies, food allergies and immunocompromised state.   Neurological: Positive for tremors. Negative for dizziness, seizures, syncope, facial asymmetry, speech difficulty, weakness, light-headedness, numbness and headaches.   Hematological: Negative for adenopathy. Does not bruise/bleed easily.   Psychiatric/Behavioral: Negative for agitation, behavioral problems, confusion, decreased concentration, dysphoric mood, hallucinations, self-injury, sleep disturbance and suicidal ideas. The patient is not nervous/anxious and is not hyperactive.         Objective:     /82   Pulse 73   Ht 162.6 cm (64\")   Wt 62.2 kg (137 lb 1.6 oz)   LMP 02/06/1980 (Approximate)   SpO2 99%   BMI 23.53 kg/m²     Physical Exam   Constitutional: She is oriented to person, place, and time. She appears well-developed.   HENT:   Head: Normocephalic.   Right Ear: External ear normal.   Left Ear: External ear normal.   Nose: Nose normal.   Eyes: Conjunctivae and EOM are normal. No scleral icterus.   Neck: Normal range of motion. Neck supple. No tracheal deviation present. Thyromegaly present.   Cardiovascular: Normal rate, regular rhythm, normal heart sounds and intact distal pulses. Exam reveals no gallop and no friction rub.   No murmur heard.  Pulmonary/Chest: Effort normal and breath sounds normal. No stridor. No respiratory distress. She has no wheezes. She has no rales. She exhibits no tenderness.   Abdominal: Soft. Bowel sounds are normal. She exhibits no distension and no mass. There is no tenderness. There is no rebound and no guarding.   Musculoskeletal: Normal range of motion. She exhibits no tenderness or deformity.   Lymphadenopathy:     She has no cervical adenopathy.   Neurological: She is alert and oriented to person, place, and time. She " displays normal reflexes. She exhibits normal muscle tone. Coordination normal.   Skin: No rash noted. No erythema. No pallor.   Psychiatric: She has a normal mood and affect. Her behavior is normal. Judgment and thought content normal.       Lab Review    Results for orders placed or performed in visit on 12/19/19   Gabapentin, Urine -   Result Value Ref Range    Gabapentin, Ur Negative ug/mL           Assessment/Plan       ICD-10-CM ICD-9-CM   1. Type 2 diabetes mellitus without complication, without long-term current use of insulin (CMS/[facility]) E11.9 250.00   2. Right thyroid nodule E04.1 241.0   3. Vitamin D deficiency E55.9 268.9   4. Subclinical hyperthyroidism E05.90 242.90   5. Neuropathy G62.9 355.9   6. Essential hypertension I10 401.9   7. Abnormal bone density screening R93.7 794.9       Glycemic Management:   Lab Results   Component Value Date    HGBA1C 5.39 12/10/2019    HGBA1C 5.72 (H) 08/09/2019    HGBA1C 6.1 (H) 02/18/2019     Lab Results   Component Value Date    GLUCOSE 100 (H) 12/10/2019    BUN 24 (H) 12/10/2019    CREATININE 0.90 12/10/2019    EGFRIFNONA 61 12/10/2019    BCR 26.7 (H) 12/10/2019    CO2 23.9 12/10/2019    CALCIUM 9.3 12/10/2019    ALBUMIN 4.40 12/10/2019    AST 16 12/10/2019    ALT 13 12/10/2019     Lab Results   Component Value Date    WBC 8.86 12/10/2019    HGB 10.9 (L) 12/10/2019    HCT 32.4 (L) 12/10/2019    MCV 87.8 12/10/2019     12/10/2019     Janumet XR 50/1000 , 1 tab daily   We briefly discussed about trulicity once she had ckd but decided to stay on janumet and ckd improved after stopping diuretic and NSAIDs    Lipid Management  Lab Results   Component Value Date    CHOL 158 12/10/2019    CHOL 161 08/09/2019    CHOL 168 02/18/2019     Lab Results   Component Value Date    TRIG 151 (H) 12/10/2019    TRIG 113 08/09/2019    TRIG 132 02/18/2019     Lab Results   Component Value Date    HDL 45 12/10/2019    HDL 49 08/09/2019    HDL 45 (L) 02/18/2019     No components  found for: LDLCALC  Lab Results   Component Value Date    LDL 83 12/10/2019    LDL 89 08/09/2019    LDL 92 02/18/2019       On lovastatin before , stopped restarted at 20 mg qhs         Blood Pressure Management:    Vitals:    12/27/19 1047   BP: 140/82   Pulse: 73   SpO2: 99%     Lab Results   Component Value Date    GLUCOSE 100 (H) 12/10/2019    CALCIUM 9.3 12/10/2019     12/10/2019    K 4.1 12/10/2019    CO2 23.9 12/10/2019     12/10/2019    BUN 24 (H) 12/10/2019    CREATININE 0.90 12/10/2019    EGFRIFNONA 61 12/10/2019    BCR 26.7 (H) 12/10/2019    ANIONGAP 14.1 12/10/2019     Lab Results   Component Value Date    GLUCOSE 100 (H) 12/10/2019    BUN 24 (H) 12/10/2019    CREATININE 0.90 12/10/2019    EGFRIFNONA 61 12/10/2019    BCR 26.7 (H) 12/10/2019    CO2 23.9 12/10/2019    CALCIUM 9.3 12/10/2019    ALBUMIN 4.40 12/10/2019    AST 16 12/10/2019    ALT 13 12/10/2019     On lisinopril 5 mg     Taking hctz 25 mg daily --- stopped and CKD improved     Stopped naproxen before     Preventive Care:      nonsmoker    Weight Related:   Wt Readings from Last 3 Encounters:   12/27/19 62.2 kg (137 lb 1.6 oz)   12/19/19 62.6 kg (138 lb)   12/10/19 64 kg (141 lb)     Body mass index is 23.53 kg/m².    Aim for 1200 calories daily   Walk 30 min daily     Bone Health      Lab Results   Component Value Date    RBBA80XX 72.6 12/10/2019    VHMS78VZ 105.7 (H) 08/09/2019    FYCD69CM 53.1 02/18/2019       Lab Results   Component Value Date    CALCIUM 9.3 12/10/2019     Takes vit D 5th u daily -- decrease to 1000 units daily         Order dxa due to hyperthyroidism - done in 12-16 and nl    Start calcium 600 mg w supper     Thyroid Health  Lab Results   Component Value Date    TSH 2.420 12/10/2019     Subclinical hyperthyroidism, US multinodular goiter, subcm     Indication for tx     TSH less than 0.1 and history of osteopenia now nl on methimazole 2.5 mg daily     12-16 small subcm thyroid nodule on the right - inferior      Stop biotin before   testing       Other Diabetes Related Aspects       Lab Results   Component Value Date    DFBMZKPL78 400 12/10/2019                      I reviewed and summarized records from Jaky Prieto MD from 2019  and I reviewed / ordered labs.     No orders of the defined types were placed in this encounter.        A copy of my note was sent to Jaky Prieto MD    Please see my above opinion and suggestions.

## 2020-01-08 DIAGNOSIS — Z96.651 STATUS POST RIGHT KNEE REPLACEMENT: Primary | ICD-10-CM

## 2020-01-09 ENCOUNTER — OFFICE VISIT (OUTPATIENT)
Dept: ORTHOPEDIC SURGERY | Facility: CLINIC | Age: 75
End: 2020-01-09

## 2020-01-09 VITALS — WEIGHT: 138.2 LBS | BODY MASS INDEX: 23.6 KG/M2 | HEIGHT: 64 IN

## 2020-01-09 DIAGNOSIS — E11.9 TYPE 2 DIABETES MELLITUS WITHOUT COMPLICATION, WITHOUT LONG-TERM CURRENT USE OF INSULIN (HCC): ICD-10-CM

## 2020-01-09 DIAGNOSIS — Z96.651 STATUS POST RIGHT KNEE REPLACEMENT: Primary | ICD-10-CM

## 2020-01-09 DIAGNOSIS — Z96.651 PRESENCE OF RIGHT ARTIFICIAL KNEE JOINT: ICD-10-CM

## 2020-01-09 DIAGNOSIS — I10 ESSENTIAL HYPERTENSION: ICD-10-CM

## 2020-01-09 PROCEDURE — 99024 POSTOP FOLLOW-UP VISIT: CPT | Performed by: ORTHOPAEDIC SURGERY

## 2020-01-09 RX ORDER — MELOXICAM 15 MG/1
15 TABLET ORAL DAILY
Qty: 30 TABLET | Refills: 2 | Status: SHIPPED | OUTPATIENT
Start: 2020-01-09 | End: 2020-02-08

## 2020-01-09 RX ORDER — HYDROMORPHONE HYDROCHLORIDE 2 MG/1
2 TABLET ORAL EVERY 8 HOURS PRN
Qty: 20 TABLET | Refills: 0 | Status: SHIPPED | OUTPATIENT
Start: 2020-01-09 | End: 2020-03-05

## 2020-01-09 NOTE — PROGRESS NOTES
"Thelma Haley is a 74 y.o. female returns for     Chief Complaint   Patient presents with   • Right Knee - Follow-up       HISTORY OF PRESENT ILLNESS:  Follow up Right knee.  Therapy at [facility].             11/13/19 (57d) Terrence Haines MD    RIGHT TOTAL KNEE ARTHROPLASTY  with adductor canal block - Right   Having pain in knee, quad, and IT band.  No new injury  Very tender along medial aspect of knee.  Pain in knee is better over the last couple weeks but still with tightness around knee.  No fever or chills, has been \"cold\" all the time.  Having pain at night with difficulty sleeping.       CONCURRENT MEDICAL HISTORY:    The following portions of the patient's history were reviewed and updated as appropriate: allergies, current medications, past family history, past medical history, past social history, past surgical history and problem list.     ROS  No fevers or chills.  No chest pain or shortness of air.  No GI or  disturbances.    PHYSICAL EXAMINATION:       Ht 162.6 cm (64\")   Wt 62.7 kg (138 lb 3.2 oz)   LMP 02/06/1980 (Approximate)   BMI 23.72 kg/m²     Physical Exam   Constitutional: She is oriented to person, place, and time. She appears well-developed and well-nourished.   Musculoskeletal:        Right knee: She exhibits no effusion.   Neurological: She is alert and oriented to person, place, and time.   Psychiatric: She has a normal mood and affect. Her behavior is normal. Judgment and thought content normal.       GAIT:     [x]  Normal  []  Antalgic    Assistive device: [x]  None  []  Walker     []  Crutches  []  Cane     []  Wheelchair  []  Stretcher    Right Knee Exam     Muscle Strength   The patient has normal right knee strength.    Tenderness   Right knee tenderness location: Diffusely tender.    Range of Motion   Extension: 0   Flexion: 120     Tests   Varus: negative Valgus: negative  Drawer:  Anterior - negative        Other   Erythema: absent  Scars: present  Sensation: " normal  Pulse: present  Swelling: none  Effusion: no effusion present              Xr Knee 1 Or 2 View Right    Result Date: 1/9/2020  Narrative: Ordering Provider:  Terrence Haines MD Ordering Diagnosis/Indication:  Status post right knee replacement Procedure:  XR KNEE 1 OR 2 VW RIGHT Exam Date:  1/9/20 COMPARISON:  Todays X-rays were compared to previous images dated November 27, 2019.     Impression:  AP bilateral standing of the knees with lateral of the right knee shows acceptable position and alignment of a right total knee arthroplasty.  No sign of implant loosening or failure is noted.  Appropriate sizing is noted on both views.  No other acute findings noted.  No interval change from prior x-ray.  Mild medial joint space narrowing and mild osteoarthritic change noted in the left knee. Terrence Haines MD 1/9/20     Xr Knee Bilateral Ap Standing    Result Date: 1/9/2020  Narrative: Ordering Provider:  Terrence Haines MD Ordering Diagnosis/Indication:  Status post right knee replacement Procedure:  XR KNEE BILATERAL AP STANDING Exam Date:  1/9/20 COMPARISON:  Todays X-rays were compared to previous images dated November 27, 2019.     Impression:  AP bilateral standing of the knees with lateral of the right knee shows acceptable position and alignment of a right total knee arthroplasty.  No sign of implant loosening or failure is noted.  Appropriate sizing is noted on both views.  No other acute findings noted.  No interval change from prior x-ray.  Mild medial joint space narrowing and mild osteoarthritic change noted in the left knee. Terrence Haines MD 1/9/20             ASSESSMENT:    Diagnoses and all orders for this visit:    Status post right knee replacement  -     HYDROmorphone (DILAUDID) 2 MG tablet; Take 1 tablet by mouth Every 8 (Eight) Hours As Needed for Moderate Pain .    Presence of right artificial knee joint  -     HYDROmorphone (DILAUDID) 2 MG tablet; Take 1  tablet by mouth Every 8 (Eight) Hours As Needed for Moderate Pain .    Type 2 diabetes mellitus without complication, without long-term current use of insulin (CMS/HCC)    Essential hypertension    Other orders  -     Cholecalciferol ( VITAMIN D3) 25 MCG (1000 UT) tablet; Take 1,000 Units by mouth Daily.  -     meloxicam (MOBIC) 15 MG tablet; Take 1 tablet by mouth Daily for 30 days.          PLAN    Try nsaids  Tylenol  Will try a little dilaudid again for severe pain.    F/u 8 weeks.    She has very good range of motion, x-ray looks very good, she has no swelling, and she has good stability.  She is continuing to have some pain.  However, she does state that the pain is getting better.  Continue to slowly progress as tolerated.  Follow-up in 1 year with new x-rays but we discussed that if she continues to have symptoms then we can see her sooner for further evaluation.    Patient's Body mass index is 23.72 kg/m². BMI is within normal parameters. No follow-up required..      Return in about 1 year (around 1/9/2021) for Recheck with repeat xrays.    Terrence Haines MD

## 2020-01-10 ENCOUNTER — READMISSION MANAGEMENT (OUTPATIENT)
Dept: CALL CENTER | Facility: HOSPITAL | Age: 75
End: 2020-01-10

## 2020-01-10 NOTE — OUTREACH NOTE
Total Joint Month 2 Survey      Responses   Facility patient discharged from?  [place]   Does the patient have one of the following disease processes/diagnoses(primary or secondary)?  Total Joint Replacement   Joint surgery performed?  Knee   Month 2 attempt successful?  No   Unsuccessful attempts  Attempt 1          Monica Ashley RN

## 2020-01-14 ENCOUNTER — READMISSION MANAGEMENT (OUTPATIENT)
Dept: CALL CENTER | Facility: HOSPITAL | Age: 75
End: 2020-01-14

## 2020-01-14 NOTE — OUTREACH NOTE
Total Joint Month 2 Survey      Responses   Facility patient discharged from?  [facility]   Does the patient have one of the following disease processes/diagnoses(primary or secondary)?  Total Joint Replacement   Joint surgery performed?  Knee   Month 2 attempt successful?  Yes   Call start time  1711   Call end time  1717   Has the patient been back in either the hospital or Emergency Department since discharge?  No   Discharge diagnosis  Primary osteoarthritis of both knees, s/p right total knee arthroplasty    Is the patient taking all medications as directed (includes completed medication regime)?  Yes   Medication comments  Pain has improved but pt continues to use pain medication.   Has the patient kept scheduled appointments due by today?  N/A   Is the patient still receiving Home Health Services?  N/A   Is the patient still attending therapy sessions(either in the home or as an outpatient)?  Yes   Has the patient fallen since discharge?  No   If the patient has fallen, were there any injuries?  No   Comments  Patient has stopped therapy for two weeks per MD orders. She has had alot of pain in tendons around knee.    What is the patient's perception of their functional status since discharge?  Improving   Is the patient able to teach back signs and symptoms of infection?  Temp >100.4 for 24h or longer, Increased swelling or redness around incision (not associated with surgical edema), Blisters around incision, Severe discomfort or pain   Is the patient/caregiver able to teach back the hierarchy of who to call/visit for symptoms/problems? PCP, Specialist, Home health nurse, Urgent Care, ED, 911  Yes   Month 2 Call Completed?  Yes          Julius Knight RN

## 2020-01-15 ENCOUNTER — OFFICE VISIT (OUTPATIENT)
Dept: CARDIOLOGY | Facility: CLINIC | Age: 75
End: 2020-01-15

## 2020-01-15 VITALS
WEIGHT: 139.2 LBS | SYSTOLIC BLOOD PRESSURE: 134 MMHG | HEIGHT: 64 IN | BODY MASS INDEX: 23.76 KG/M2 | OXYGEN SATURATION: 99 % | DIASTOLIC BLOOD PRESSURE: 84 MMHG | HEART RATE: 65 BPM

## 2020-01-15 DIAGNOSIS — I10 ESSENTIAL HYPERTENSION: ICD-10-CM

## 2020-01-15 DIAGNOSIS — I25.10 ATHEROSCLEROSIS OF NATIVE CORONARY ARTERY OF NATIVE HEART WITHOUT ANGINA PECTORIS: Primary | ICD-10-CM

## 2020-01-15 DIAGNOSIS — E78.2 MIXED HYPERLIPIDEMIA: ICD-10-CM

## 2020-01-15 PROCEDURE — 99214 OFFICE O/P EST MOD 30 MIN: CPT | Performed by: INTERNAL MEDICINE

## 2020-01-15 RX ORDER — ACETAMINOPHEN 500 MG
500 TABLET ORAL EVERY 6 HOURS PRN
COMMUNITY
End: 2020-07-07

## 2020-01-15 NOTE — PROGRESS NOTES
Thelma Haley  74 y.o. female    01/15/2019  1. Atherosclerosis of native coronary artery of native heart without angina pectoris    2. Essential hypertension    3. Mixed hyperlipidemia        History of Present Illness  Mrs. Haley is a 72-year-old  lady who was a patient of  in the past.  Her history is remarkable for hypertension, hyperlipidemia and has history of fibromyalgia.  She has been evaluated for chest pain in the past by both nuclear stress testing and CT angiogram of the coronary arteries.  She is known to have less than 50% stenosis in the Left Anterior Descending Coronary Artery.  She is done well from a cardiac standpoint with no chest pain.  She sees Dr. Duncan for diabetes mellitus and hyperthyroidism.  Medication adjustments have been made.  Clinical exam today was unremarkable.  Blood pressure was in the normal range.    She underwent knee surgery in November 2019 and she had no cardiac issues during the perioperative period.  She was started on meloxicam for pain and she believes that she has been a little short of breath since she was started on the medication.  Her LV systolic function has been normal in the past.  Clinical exam did not show any bronchospasm or signs of congestive heart failure.    SUBJECTIVE    Allergies   Allergen Reactions   • Ciprofloxacin Nausea And Vomiting   • Lortab [Hydrocodone-Acetaminophen] Nausea And Vomiting   • Metronidazole Nausea And Vomiting   • Oxycodone Nausea And Vomiting     Dizziness and doesn't decrease pain   • Ultram [Tramadol Hcl] Nausea And Vomiting         Past Medical History:   Diagnosis Date   • Abnormal results of thyroid function studies    • Allergic rhinitis due to pollen    • Arthritis    • Asthma      uncomplicated      • Cataract    • Chronic rhinitis    • Diabetes mellitus (CMS/HCC)    • Disease of thyroid gland    • Essential hypertension    • Extrinsic asthma with status asthmaticus    • Glaucoma    •  Hyperlipidemia    • Impingement syndrome of right shoulder    • Injury of back     L4 L5 bulging disc   • Neuropathy    • Nuclear senile cataract    • Overweight with body mass index (BMI) 25.0-29.9    • Primary fibromyalgia syndrome    • Primary open angle glaucoma    • Rosacea    • Temporomandibular joint disorder          Past Surgical History:   Procedure Laterality Date   • BACK SURGERY     • CARPAL TUNNEL RELEASE      Bilateral carpal tunnel release.)   03/27/2000    • CARPAL TUNNEL RELEASE Bilateral    • CATARACT EXTRACTION      Bilateral   • CERVICAL FUSION  1980   • COLONOSCOPY     • DILATATION AND CURETTAGE     • INJECTION OF MEDICATION  06/17/2015    celestone(betamethasone) (2)    • INJECTION OF MEDICATION  04/14/2016    depo medrol ( methylprednisone) (20)   • OOPHORECTOMY     • OTHER SURGICAL HISTORY      hysterosope procedure   • RIGHT OOPHORECTOMY     • ROTATOR CUFF REPAIR      Right   • SHOULDER ARTHROSCOPY Right 2/6/2017    Procedure: RIGHT SHOULDER ARTHROSCOPY SUBACROMIAL DECOMPRESSION, ROTATOR CUFF REPAIR   ;  Surgeon: Terrence Haines MD;  Location: [facility];  Service:    • SHOULDER SURGERY      Excision of 4.8 cm mass with primary intermediate closure.)   03/30/2012    • SPINAL FUSION      L4 L5   • TOTAL KNEE ARTHROPLASTY Right 11/13/2019    Procedure: RIGHT TOTAL KNEE ARTHROPLASTY  with adductor canal block;  Surgeon: Terrence Haines MD;  Location: [facility];  Service: Orthopedics         Family History   Problem Relation Age of Onset   • Heart disease Other    • Hypertension Other    • COPD Mother          Social History     Socioeconomic History   • Marital status:      Spouse name: Not on file   • Number of children: Not on file   • Years of education: Not on file   • Highest education level: Not on file   Tobacco Use   • Smoking status: Never Smoker   • Smokeless tobacco: Never Used   Substance and Sexual Activity   • Alcohol use: No   • Drug use: No   • Sexual  "activity: Defer         Current Outpatient Medications   Medication Sig Dispense Refill   • Biotin 84698 MCG tablet Take 1 tablet by mouth Daily.     • CETIRIZINE HCL PO Take 10 mg by mouth Daily.     • Cholecalciferol ( VITAMIN D3) 25 MCG (1000 UT) tablet Take 1,000 Units by mouth Daily.     • dorzolamide-timolol (COSOPT) 22.3-6.8 MG/ML ophthalmic solution Administer 1 drop to both eyes 2 (Two) Times a Day.     • Ferrous Sulfate (IRON) 325 (65 Fe) MG tablet Take 1 tablet by mouth Daily.     • gabapentin (NEURONTIN) 100 MG capsule Take 1 capsule by mouth 3 (Three) Times a Day. For diabetic neuropathy 90 capsule 3   • glucose blood (ONE TOUCH ULTRA TEST) test strip Use to test blood sugar twice a day. ICD E11.9 100 each 5   • HYDROmorphone (DILAUDID) 2 MG tablet Take 1 tablet by mouth Every 8 (Eight) Hours As Needed for Moderate Pain . 20 tablet 0   • lisinopril (PRINIVIL,ZESTRIL) 5 MG tablet Take 1 tablet by mouth Daily. 90 tablet 3   • lovastatin (MEVACOR) 20 MG tablet Take 1 tablet by mouth Every Night. 90 tablet 3   • meloxicam (MOBIC) 15 MG tablet Take 1 tablet by mouth Daily for 30 days. 30 tablet 2   • methIMAzole (TAPAZOLE) 5 MG tablet Take 0.5 tablets by mouth Daily. 45 tablet 1   • ONETOUCH DELICA LANCETS FINE misc Use to check blood sugar twice a day. ICD E11.9 100 each 5   • SITagliptin-metFORMIN HCl ER (JANUMET XR)  MG tablet Take 1 tablet by mouth Daily With Breakfast. 90 tablet 3     No current facility-administered medications for this visit.          OBJECTIVE    /84 (BP Location: Left arm, Patient Position: Sitting)   Pulse 65   Ht 162.6 cm (64\")   Wt 63.1 kg (139 lb 3.2 oz)   LMP 02/06/1980 (Approximate)   SpO2 99%   BMI 23.89 kg/m²         Review of Systems     Constitutional:  Denies recent weight loss, weight gain, fever or chills, no change in exercise tolerance     HENT:  Denies any hearing loss, epistaxis, hoarseness, or difficulty speaking.     Eyes: Wears eyeglasses or " contact lenses     Respiratory:  Denies dyspnea with exertion,no cough, wheezing, or hemoptysis.     Cardiovascular: Negative for palpations, chest pain, orthopnea, PND, peripheral edema, syncope, or claudication.     Gastrointestinal:  Denies change in bowel habits, dyspepsia, ulcer disease, hematochezia, or melena.     Endocrine: Negative for cold intolerance, heat intolerance, polydipsia, polyphagia and polyuria. Diabetes Mellitus not under good control    Genitourinary: Negative.      Musculoskeletal: DJD, Fibromyalgia    Skin:  Denies any change in hair or nails, rashes, or skin lesions.     Allergic/Immunologic: Negative.  Negative for environmental allergies, food allergies and immunocompromised state.     Neurological:  Denies any history of recurrent headaches, strokes, TIA, or seizure disorder.     Hematological: Denies any food allergies, seasonal allergies, bleeding disorders, or lymphadenopathy.     Psychiatric/Behavioral: Denies any history of depression, substance abuse, or change in cognitive function.         Physical Exam     Constitutional: Cooperative, alert and oriented,  in no acute distress.     HENT:   Head: Normocephalic, normal hair patterns, no masses or tenderness.  Ears, Nose, and Throat: No gross abnormalities. No pallor or cyanosis.   Eyes: EOMS intact, PERRL, conjunctivae and lids unremarkable. Fundoscopic exam and visual fields not performed.   Neck: No palpable masses or adenopathy, no thyromegaly, no JVD, carotid pulses are full and equal bilaterally and without  Bruits.     Cardiovascular: Regular rhythm, S1 and S2 normal, no S3 or S4.  No murmurs, gallops, or rubs detected.     Pulmonary/Chest: Chest: normal symmetry,  normal respiratory excursion, no intercostal retraction, no use of accessory muscles.            Pulmonary: Normal breath sounds. No rales or ronchi.    Abdominal: Abdomen soft, bowel sounds normoactive, no masses, no hepatosplenomegaly, non-tender, no bruits.      Musculoskeletal: No deformities, clubbing, cyanosis, erythema, or edema observed.     Neurological: No gross motor or sensory deficits noted, affect appropriate, oriented to time, person, place.     Skin: Warm and dry to the touch, no apparent skin lesions or masses noted.     Psychiatric: She has a normal mood and affect. Her behavior is normal. Judgment and thought content normal.         Procedures      Lab Results   Component Value Date    WBC 8.86 12/10/2019    HGB 10.9 (L) 12/10/2019    HCT 32.4 (L) 12/10/2019    MCV 87.8 12/10/2019     12/10/2019     Lab Results   Component Value Date    GLUCOSE 100 (H) 12/10/2019    BUN 24 (H) 12/10/2019    CREATININE 0.90 12/10/2019    EGFRIFNONA 61 12/10/2019    BCR 26.7 (H) 12/10/2019    CO2 23.9 12/10/2019    CALCIUM 9.3 12/10/2019    ALBUMIN 4.40 12/10/2019    AST 16 12/10/2019    ALT 13 12/10/2019     Lab Results   Component Value Date    CHOL 158 12/10/2019    CHOL 161 08/09/2019    CHOL 168 02/18/2019     Lab Results   Component Value Date    TRIG 151 (H) 12/10/2019    TRIG 113 08/09/2019    TRIG 132 02/18/2019     Lab Results   Component Value Date    HDL 45 12/10/2019    HDL 49 08/09/2019    HDL 45 (L) 02/18/2019     No components found for: LDLCALC  Lab Results   Component Value Date    LDL 83 12/10/2019    LDL 89 08/09/2019    LDL 92 02/18/2019     No results found for: HDLLDLRATIO  No components found for: CHOLHDL  Lab Results   Component Value Date    HGBA1C 5.39 12/10/2019     Lab Results   Component Value Date    TSH 2.420 12/10/2019    THYROIDAB <6 02/18/2019           ASSESSMENT AND PLAN  Mrs. Haley is stable with regards to her heart with no evidence of angina, arrhythmia or congestive heart failure.  Aggressive risk factor modifications recommended.  Management of diabetes, antihypertensive therapy with lisinopril, HCTZ and lipid-lowering therapy with lovastatin have been continued.  Her lipid profiles within the normal range when checked in  December 2019.  Her hemoglobin A1c has optimized.  Thyroid function studies were within normal limits.    Thelma was seen today for coronary artery disease, hyperlipidemia and hypertension.    Diagnoses and all orders for this visit:    Atherosclerosis of native coronary artery of native heart without angina pectoris    Essential hypertension    Mixed hyperlipidemia        Patient's Body mass index is 23.89 kg/m².  BMI within normal limits  Patient is a non-smoker.    Manju Ramirez MD  1/15/2020  10:05 AM

## 2020-01-22 NOTE — TELEPHONE ENCOUNTER
Left message as a reminder to stop warfarin therapy today. Blood will return to normal state with in a few days. No more labs are needed. Advised on how to discard any remaining medication by placing in bang liter or coffee grinds. Left phone number to call with any questions.     Luz Marina Parker [facility]  12/26/19  1:37 PM

## 2020-02-14 ENCOUNTER — READMISSION MANAGEMENT (OUTPATIENT)
Dept: CALL CENTER | Facility: HOSPITAL | Age: 75
End: 2020-02-14

## 2020-02-14 NOTE — OUTREACH NOTE
Total Joint Month 3 Survey      Responses   Facility patient discharged from?  [place]   Does the patient have one of the following disease processes/diagnoses(primary or secondary)?  Total Joint Replacement   Joint surgery performed?  Knee   Month 3 attempt successful?  No   Unsuccessful attempts  Attempt 1          Julius Knight RN

## 2020-02-18 ENCOUNTER — READMISSION MANAGEMENT (OUTPATIENT)
Dept: CALL CENTER | Facility: HOSPITAL | Age: 75
End: 2020-02-18

## 2020-02-18 NOTE — OUTREACH NOTE
Total Joint Month 3 Survey      Responses   Facility patient discharged from?  [place]   Does the patient have one of the following disease processes/diagnoses(primary or secondary)?  Total Joint Replacement   Joint surgery performed?  Knee   Month 3 attempt successful?  Yes   Call start time  1133   Call end time  1138   Has the patient been back in either the hospital or Emergency Department since discharge?  No   Discharge diagnosis  Primary osteoarthritis of both knees, s/p right total knee arthroplasty    Is the patient taking all medications as directed (includes completed medication regime)?  Yes   Medication comments  Ice therapy for pain   Has the patient kept scheduled appointments due by today?  Yes   Is the patient still receiving Home Health Services?  N/A   Is the patient still attending therapy sessions(either in the home or as an outpatient)?  Yes   Has the patient fallen since discharge?  No   Comments  Still has therapy once per week but has alot of pain from tendons   What is the patient's perception of their functional status since discharge?  Same   Is the patient/caregiver able to teach back the hierarchy of who to call/visit for symptoms/problems? PCP, Specialist, Home health nurse, Urgent Care, ED, 911  Yes   Additional teach back comments  Pt doing better except for pain from tendons. taking Tylenol. Incision has healed well.   Graduated  Yes   Did the patient feel the follow up calls were helpful during their recovery period?  Yes   Was the number of calls appropriate?  Yes          Nicole Randall RN

## 2020-03-05 ENCOUNTER — OFFICE VISIT (OUTPATIENT)
Dept: ORTHOPEDIC SURGERY | Facility: CLINIC | Age: 75
End: 2020-03-05

## 2020-03-05 VITALS — WEIGHT: 144.1 LBS | HEIGHT: 64 IN | BODY MASS INDEX: 24.6 KG/M2

## 2020-03-05 DIAGNOSIS — M25.562 CHRONIC PAIN OF BOTH KNEES: ICD-10-CM

## 2020-03-05 DIAGNOSIS — M25.561 CHRONIC PAIN OF BOTH KNEES: ICD-10-CM

## 2020-03-05 DIAGNOSIS — Z96.651 STATUS POST RIGHT KNEE REPLACEMENT: Primary | ICD-10-CM

## 2020-03-05 DIAGNOSIS — Z96.651 PRESENCE OF RIGHT ARTIFICIAL KNEE JOINT: ICD-10-CM

## 2020-03-05 DIAGNOSIS — G89.29 CHRONIC PAIN OF BOTH KNEES: ICD-10-CM

## 2020-03-05 PROCEDURE — 99213 OFFICE O/P EST LOW 20 MIN: CPT | Performed by: ORTHOPAEDIC SURGERY

## 2020-03-05 NOTE — PROGRESS NOTES
"Thelma Haley is a 74 y.o. female returns for     Chief Complaint   Patient presents with   • Right Knee - Follow-up       HISTORY OF PRESENT ILLNESS:   11/13/19 (113d) Terrence Haines MD     RIGHT TOTAL KNEE ARTHROPLASTY  with adductor canal block - Right     Patient being seen for right knee follow up.   Still having some soreness.  Still seems to be cold all the time.    Does not feel \"steady\" on her feet.  No fever or chills         CONCURRENT MEDICAL HISTORY:    The following portions of the patient's history were reviewed and updated as appropriate: allergies, current medications, past family history, past medical history, past social history, past surgical history and problem list.     ROS  No fevers or chills.  No chest pain or shortness of air.  No GI or  disturbances.    PHYSICAL EXAMINATION:       Ht 162.6 cm (64\")   Wt 65.4 kg (144 lb 1.6 oz)   LMP 02/06/1980 (Approximate)   BMI 24.73 kg/m²     Physical Exam   Constitutional: She is oriented to person, place, and time. She appears well-developed and well-nourished.   Neurological: She is alert and oriented to person, place, and time.   Psychiatric: She has a normal mood and affect. Her behavior is normal. Judgment and thought content normal.       GAIT:     [x]  Normal  []  Antalgic    Assistive device: [x]  None  []  Walker     []  Crutches  []  Cane     []  Wheelchair  []  Stretcher    Right Knee Exam     Tenderness   Right knee tenderness location: mild medial pain.    Range of Motion   Right knee extension: -3.   Flexion: 120     Tests   Varus: negative Valgus: negative  Drawer:  Anterior - negative        Other   Erythema: absent  Scars: present  Sensation: normal  Pulse: present  Swelling: none              Procedure:  XR KNEE BILATERAL AP STANDING  Exam Date:  1/9/20     COMPARISON:  Todays X-rays were compared to previous images dated November 27, 2019.     IMPRESSION: AP bilateral standing of the knees with lateral of the right " knee shows acceptable position and alignment of a right total knee arthroplasty.  No sign of implant loosening or failure is noted.  Appropriate sizing is noted on both views.  No other acute findings noted.  No interval change from prior x-ray.  Mild medial joint space narrowing and mild osteoarthritic change noted in the left knee.        Terrence Haines MD  1/9/20    Procedure:  XR KNEE 1 OR 2 VW RIGHT  Exam Date:  1/9/20     COMPARISON:  Todays X-rays were compared to previous images dated November 27, 2019.     IMPRESSION: AP bilateral standing of the knees with lateral of the right knee shows acceptable position and alignment of a right total knee arthroplasty.  No sign of implant loosening or failure is noted.  Appropriate sizing is noted on both views.  No other acute findings noted.  No interval change from prior x-ray.  Mild medial joint space narrowing and mild osteoarthritic change noted in the left knee.        Terrence Haines MD  1/9/20         ASSESSMENT:    Diagnoses and all orders for this visit:    Status post right knee replacement    Presence of right artificial knee joint    Chronic pain of both knees          PLAN    Mild laxity in knee  Continue strength and conditioning  Continue home exercises  Activity as tolerated.  Recheck in 3 months.      Also having some intermittent right shoulder pain  She may need xrays right shoulder/clavicle.  May also need CT chest to assess for SC joint.    Patient's Body mass index is 24.73 kg/m². BMI is within normal parameters. No follow-up required..      Return in about 3 months (around 6/5/2020) for recheck.    Terrence Haines MD

## 2020-03-11 RX ORDER — LOVASTATIN 20 MG/1
20 TABLET ORAL NIGHTLY
Qty: 90 TABLET | Refills: 3 | Status: SHIPPED | OUTPATIENT
Start: 2020-03-11 | End: 2021-02-05

## 2020-03-26 ENCOUNTER — TELEPHONE (OUTPATIENT)
Dept: ENDOCRINOLOGY | Facility: CLINIC | Age: 75
End: 2020-03-26

## 2020-03-27 ENCOUNTER — TELEPHONE (OUTPATIENT)
Dept: ENDOCRINOLOGY | Facility: CLINIC | Age: 75
End: 2020-03-27

## 2020-03-27 RX ORDER — METFORMIN HYDROCHLORIDE 500 MG/1
TABLET, EXTENDED RELEASE ORAL
Qty: 60 TABLET | Refills: 11 | Status: SHIPPED | OUTPATIENT
Start: 2020-03-27 | End: 2020-06-29 | Stop reason: SDUPTHER

## 2020-04-23 RX ORDER — METHIMAZOLE 5 MG/1
TABLET ORAL
Qty: 45 TABLET | Refills: 1 | Status: SHIPPED | OUTPATIENT
Start: 2020-04-23 | End: 2020-07-14 | Stop reason: SDUPTHER

## 2020-04-26 DIAGNOSIS — I10 ESSENTIAL HYPERTENSION: ICD-10-CM

## 2020-04-27 RX ORDER — LISINOPRIL 5 MG/1
5 TABLET ORAL DAILY
Qty: 90 TABLET | Refills: 3 | OUTPATIENT
Start: 2020-04-27

## 2020-04-27 RX ORDER — LISINOPRIL 5 MG/1
5 TABLET ORAL DAILY
Qty: 90 TABLET | Refills: 3 | Status: SHIPPED | OUTPATIENT
Start: 2020-04-27 | End: 2020-06-29 | Stop reason: SDUPTHER

## 2020-06-15 DIAGNOSIS — M25.511 RIGHT SHOULDER PAIN, UNSPECIFIED CHRONICITY: Primary | ICD-10-CM

## 2020-06-24 ENCOUNTER — LAB (OUTPATIENT)
Dept: LAB | Facility: HOSPITAL | Age: 75
End: 2020-06-24

## 2020-06-24 LAB
25(OH)D3 SERPL-MCNC: 56.3 NG/ML (ref 30–100)
ALBUMIN SERPL-MCNC: 4.5 G/DL (ref 3.5–5.2)
ALBUMIN UR-MCNC: 3 MG/DL
ALBUMIN/GLOB SERPL: 1.6 G/DL
ALP SERPL-CCNC: 53 U/L (ref 39–117)
ALT SERPL W P-5'-P-CCNC: 12 U/L (ref 1–33)
ANION GAP SERPL CALCULATED.3IONS-SCNC: 10.8 MMOL/L (ref 5–15)
AST SERPL-CCNC: 17 U/L (ref 1–32)
BASOPHILS # BLD AUTO: 0.05 10*3/MM3 (ref 0–0.2)
BASOPHILS NFR BLD AUTO: 0.8 % (ref 0–1.5)
BILIRUB SERPL-MCNC: 0.4 MG/DL (ref 0.2–1.2)
BUN BLD-MCNC: 26 MG/DL (ref 8–23)
BUN/CREAT SERPL: 25.2 (ref 7–25)
CALCIUM SPEC-SCNC: 9.9 MG/DL (ref 8.6–10.5)
CHLORIDE SERPL-SCNC: 107 MMOL/L (ref 98–107)
CHOLEST SERPL-MCNC: 159 MG/DL (ref 0–200)
CO2 SERPL-SCNC: 22.2 MMOL/L (ref 22–29)
CREAT BLD-MCNC: 1.03 MG/DL (ref 0.57–1)
CREAT UR-MCNC: 60.6 MG/DL
DEPRECATED RDW RBC AUTO: 42.4 FL (ref 37–54)
EOSINOPHIL # BLD AUTO: 0.2 10*3/MM3 (ref 0–0.4)
EOSINOPHIL NFR BLD AUTO: 3.1 % (ref 0.3–6.2)
ERYTHROCYTE [DISTWIDTH] IN BLOOD BY AUTOMATED COUNT: 13 % (ref 12.3–15.4)
GFR SERPL CREATININE-BSD FRML MDRD: 52 ML/MIN/1.73
GLOBULIN UR ELPH-MCNC: 2.8 GM/DL
GLUCOSE BLD-MCNC: 99 MG/DL (ref 65–99)
HBA1C MFR BLD: 5.86 % (ref 4.8–5.6)
HCT VFR BLD AUTO: 35 % (ref 34–46.6)
HDLC SERPL-MCNC: 50 MG/DL (ref 40–60)
HGB BLD-MCNC: 11.9 G/DL (ref 12–15.9)
IMM GRANULOCYTES # BLD AUTO: 0.03 10*3/MM3 (ref 0–0.05)
IMM GRANULOCYTES NFR BLD AUTO: 0.5 % (ref 0–0.5)
LDLC SERPL CALC-MCNC: 82 MG/DL (ref 0–100)
LDLC/HDLC SERPL: 1.64 {RATIO}
LYMPHOCYTES # BLD AUTO: 1.43 10*3/MM3 (ref 0.7–3.1)
LYMPHOCYTES NFR BLD AUTO: 21.8 % (ref 19.6–45.3)
MCH RBC QN AUTO: 30.1 PG (ref 26.6–33)
MCHC RBC AUTO-ENTMCNC: 34 G/DL (ref 31.5–35.7)
MCV RBC AUTO: 88.6 FL (ref 79–97)
MICROALBUMIN/CREAT UR: 49.5 MG/G
MONOCYTES # BLD AUTO: 0.38 10*3/MM3 (ref 0.1–0.9)
MONOCYTES NFR BLD AUTO: 5.8 % (ref 5–12)
NEUTROPHILS # BLD AUTO: 4.46 10*3/MM3 (ref 1.7–7)
NEUTROPHILS NFR BLD AUTO: 68 % (ref 42.7–76)
NRBC BLD AUTO-RTO: 0 /100 WBC (ref 0–0.2)
PLATELET # BLD AUTO: 270 10*3/MM3 (ref 140–450)
PMV BLD AUTO: 12.3 FL (ref 6–12)
POTASSIUM BLD-SCNC: 4.5 MMOL/L (ref 3.5–5.2)
PROT SERPL-MCNC: 7.3 G/DL (ref 6–8.5)
RBC # BLD AUTO: 3.95 10*6/MM3 (ref 3.77–5.28)
SODIUM BLD-SCNC: 140 MMOL/L (ref 136–145)
TRIGL SERPL-MCNC: 134 MG/DL (ref 0–150)
TSH SERPL DL<=0.05 MIU/L-ACNC: 6.83 UIU/ML (ref 0.27–4.2)
VIT B12 BLD-MCNC: 287 PG/ML (ref 211–946)
VLDLC SERPL-MCNC: 26.8 MG/DL (ref 5–40)
WBC NRBC COR # BLD: 6.55 10*3/MM3 (ref 3.4–10.8)

## 2020-06-24 PROCEDURE — 82607 VITAMIN B-12: CPT | Performed by: INTERNAL MEDICINE

## 2020-06-24 PROCEDURE — 84443 ASSAY THYROID STIM HORMONE: CPT | Performed by: INTERNAL MEDICINE

## 2020-06-24 PROCEDURE — 85025 COMPLETE CBC W/AUTO DIFF WBC: CPT | Performed by: INTERNAL MEDICINE

## 2020-06-24 PROCEDURE — 82570 ASSAY OF URINE CREATININE: CPT | Performed by: INTERNAL MEDICINE

## 2020-06-24 PROCEDURE — 36415 COLL VENOUS BLD VENIPUNCTURE: CPT | Performed by: INTERNAL MEDICINE

## 2020-06-24 PROCEDURE — 83036 HEMOGLOBIN GLYCOSYLATED A1C: CPT | Performed by: INTERNAL MEDICINE

## 2020-06-24 PROCEDURE — 82043 UR ALBUMIN QUANTITATIVE: CPT | Performed by: INTERNAL MEDICINE

## 2020-06-24 PROCEDURE — 80053 COMPREHEN METABOLIC PANEL: CPT | Performed by: INTERNAL MEDICINE

## 2020-06-24 PROCEDURE — 80061 LIPID PANEL: CPT | Performed by: INTERNAL MEDICINE

## 2020-06-24 PROCEDURE — 82306 VITAMIN D 25 HYDROXY: CPT | Performed by: INTERNAL MEDICINE

## 2020-06-29 ENCOUNTER — OFFICE VISIT (OUTPATIENT)
Dept: ENDOCRINOLOGY | Facility: CLINIC | Age: 75
End: 2020-06-29

## 2020-06-29 VITALS
HEIGHT: 64 IN | OXYGEN SATURATION: 99 % | WEIGHT: 142.3 LBS | DIASTOLIC BLOOD PRESSURE: 98 MMHG | BODY MASS INDEX: 24.3 KG/M2 | HEART RATE: 65 BPM | SYSTOLIC BLOOD PRESSURE: 184 MMHG

## 2020-06-29 DIAGNOSIS — E05.90 SUBCLINICAL HYPERTHYROIDISM: ICD-10-CM

## 2020-06-29 DIAGNOSIS — R93.7 ABNORMAL BONE DENSITY SCREENING: ICD-10-CM

## 2020-06-29 DIAGNOSIS — E11.9 TYPE 2 DIABETES MELLITUS WITHOUT COMPLICATION, WITHOUT LONG-TERM CURRENT USE OF INSULIN (HCC): Primary | ICD-10-CM

## 2020-06-29 DIAGNOSIS — E04.1 RIGHT THYROID NODULE: ICD-10-CM

## 2020-06-29 DIAGNOSIS — I10 ESSENTIAL HYPERTENSION: ICD-10-CM

## 2020-06-29 DIAGNOSIS — E55.9 VITAMIN D DEFICIENCY: ICD-10-CM

## 2020-06-29 DIAGNOSIS — G62.9 NEUROPATHY: ICD-10-CM

## 2020-06-29 PROCEDURE — 99214 OFFICE O/P EST MOD 30 MIN: CPT | Performed by: INTERNAL MEDICINE

## 2020-06-29 RX ORDER — LISINOPRIL 5 MG/1
5 TABLET ORAL 2 TIMES DAILY
Qty: 180 TABLET | Refills: 3 | Status: SHIPPED | OUTPATIENT
Start: 2020-06-29 | End: 2020-10-09

## 2020-06-29 RX ORDER — METFORMIN HYDROCHLORIDE 500 MG/1
TABLET, EXTENDED RELEASE ORAL
Qty: 180 TABLET | Refills: 3 | Status: SHIPPED | OUTPATIENT
Start: 2020-06-29 | End: 2021-07-15

## 2020-06-29 NOTE — PROGRESS NOTES
Thelma Haley is a 74 y.o. female who presents for  evaluation of   Chief Complaint   Patient presents with   • Follow-up     6 mo jack for type 2          Primary Care / Referring Provider  Jaky Prieto MD    Hyperthyroidism     Duration since 2015    Appears to be Graves with right subcm thyroid nodule    Now on methimazole    Also t2dm well controlled with nephropathy       Past Medical History:   Diagnosis Date   • Abnormal results of thyroid function studies    • Allergic rhinitis due to pollen    • Arthritis    • Asthma      uncomplicated      • Cataract    • Chronic rhinitis    • Diabetes mellitus (CMS/HCC)    • Disease of thyroid gland    • Essential hypertension    • Extrinsic asthma with status asthmaticus    • Glaucoma    • Hyperlipidemia    • Impingement syndrome of right shoulder    • Injury of back     L4 L5 bulging disc   • Neuropathy    • Nuclear senile cataract    • Overweight with body mass index (BMI) 25.0-29.9    • Primary fibromyalgia syndrome    • Primary open angle glaucoma    • Rosacea    • Temporomandibular joint disorder      Family History   Problem Relation Age of Onset   • Heart disease Other    • Hypertension Other    • COPD Mother      Social History     Tobacco Use   • Smoking status: Never Smoker   • Smokeless tobacco: Never Used   Substance Use Topics   • Alcohol use: No   • Drug use: No         Current Outpatient Medications:   •  acetaminophen (TYLENOL) 500 MG tablet, Take 500 mg by mouth Every 6 (Six) Hours As Needed for Mild Pain . TAKE 2 TABLETS 4 TIMES PER DAY., Disp: , Rfl:   •  Biotin 82642 MCG tablet, Take 1 tablet by mouth Daily., Disp: , Rfl:   •  CETIRIZINE HCL PO, Take 10 mg by mouth Daily., Disp: , Rfl:   •  Cholecalciferol ( VITAMIN D3) 25 MCG (1000 UT) tablet, Take 1,000 Units by mouth Daily., Disp: , Rfl:   •  dorzolamide-timolol (COSOPT) 22.3-6.8 MG/ML ophthalmic solution, Administer 1 drop to both eyes 2 (Two) Times a Day., Disp: , Rfl:   •   Dulaglutide 0.75 MG/0.5ML solution pen-injector, Inject 0.75 mg under the skin into the appropriate area as directed 1 (One) Time Per Week., Disp: 4 pen, Rfl: 11  •  Ferrous Sulfate (IRON) 325 (65 Fe) MG tablet, Take 1 tablet by mouth Daily., Disp: , Rfl:   •  gabapentin (NEURONTIN) 100 MG capsule, Take 1 capsule by mouth 3 (Three) Times a Day. For diabetic neuropathy, Disp: 90 capsule, Rfl: 3  •  glucose blood (ONE TOUCH ULTRA TEST) test strip, Use to test blood sugar twice a day. ICD E11.9, Disp: 100 each, Rfl: 5  •  lisinopril (PRINIVIL,ZESTRIL) 5 MG tablet, Take 1 tablet by mouth Daily., Disp: 90 tablet, Rfl: 3  •  lovastatin (MEVACOR) 20 MG tablet, TAKE 1 TABLET BY MOUTH EVERY NIGHT, Disp: 90 tablet, Rfl: 3  •  metFORMIN ER (Glucophage XR) 500 MG 24 hr tablet, 1 tabs twice daily with meals , generic ok, Disp: 60 tablet, Rfl: 11  •  methIMAzole (TAPAZOLE) 5 MG tablet, TAKE 1/2 TABLET BY MOUTH DAILY, Disp: 45 tablet, Rfl: 1  •  ONETOUCH DELICA LANCETS FINE misc, Use to check blood sugar twice a day. ICD E11.9, Disp: 100 each, Rfl: 5    Review of Systems    Review of Systems   Constitutional: Positive for fatigue and unexpected weight change. Negative for activity change, appetite change, chills, diaphoresis and fever.   HENT: Negative for congestion, drooling, ear discharge, ear pain, facial swelling, mouth sores, nosebleeds, postnasal drip, sinus pressure, sneezing, sore throat, tinnitus, trouble swallowing and voice change.    Eyes: Negative.  Negative for photophobia, pain, discharge, redness and itching.   Respiratory: Negative.  Negative for apnea, cough, choking, chest tightness, shortness of breath, wheezing and stridor.    Cardiovascular: Negative.  Negative for chest pain, palpitations and leg swelling.   Gastrointestinal: Negative.  Negative for abdominal distention, abdominal pain, constipation, diarrhea, nausea and vomiting.   Endocrine: Negative.  Negative for cold intolerance, heat intolerance,  "polydipsia, polyphagia and polyuria.   Genitourinary: Negative for difficulty urinating, dysuria, flank pain and frequency.   Musculoskeletal: Negative for arthralgias, back pain, gait problem, joint swelling, myalgias, neck pain and neck stiffness.   Skin: Negative for color change, pallor, rash and wound.   Allergic/Immunologic: Negative for environmental allergies, food allergies and immunocompromised state.   Neurological: Positive for tremors. Negative for dizziness, seizures, syncope, facial asymmetry, speech difficulty, weakness, light-headedness, numbness and headaches.   Hematological: Negative for adenopathy. Does not bruise/bleed easily.   Psychiatric/Behavioral: Negative for agitation, behavioral problems, confusion, decreased concentration, dysphoric mood, hallucinations, self-injury, sleep disturbance and suicidal ideas. The patient is not nervous/anxious and is not hyperactive.         Objective:     BP (!) 184/98 (BP Location: Right arm, Patient Position: Sitting, Cuff Size: Large Adult)   Pulse 65   Ht 162.6 cm (64\")   Wt 64.5 kg (142 lb 4.8 oz)   LMP 02/06/1980 (Approximate)   SpO2 99%   BMI 24.43 kg/m²     Physical Exam   Constitutional: She is oriented to person, place, and time. She appears well-developed.   HENT:   Head: Normocephalic.   Right Ear: External ear normal.   Left Ear: External ear normal.   Nose: Nose normal.   Eyes: Conjunctivae and EOM are normal. No scleral icterus.   Neck: Normal range of motion. Neck supple. No tracheal deviation present. Thyromegaly present.   Cardiovascular: Normal rate, regular rhythm, normal heart sounds and intact distal pulses. Exam reveals no gallop and no friction rub.   No murmur heard.  Pulmonary/Chest: Effort normal and breath sounds normal. No stridor. No respiratory distress. She has no wheezes. She has no rales. She exhibits no tenderness.   Abdominal: Soft. Bowel sounds are normal. She exhibits no distension and no mass. There is no " tenderness. There is no rebound and no guarding.   Musculoskeletal: Normal range of motion. She exhibits no tenderness or deformity.   Lymphadenopathy:     She has no cervical adenopathy.   Neurological: She is alert and oriented to person, place, and time. She displays normal reflexes. She exhibits normal muscle tone. Coordination normal.   Skin: No rash noted. No erythema. No pallor.   Psychiatric: She has a normal mood and affect. Her behavior is normal. Judgment and thought content normal.       Lab Review    Results for orders placed or performed in visit on 12/27/19   CBC Auto Differential   Result Value Ref Range    WBC 6.55 3.40 - 10.80 10*3/mm3    RBC 3.95 3.77 - 5.28 10*6/mm3    Hemoglobin 11.9 (L) 12.0 - 15.9 g/dL    Hematocrit 35.0 34.0 - 46.6 %    MCV 88.6 79.0 - 97.0 fL    MCH 30.1 26.6 - 33.0 pg    MCHC 34.0 31.5 - 35.7 g/dL    RDW 13.0 12.3 - 15.4 %    RDW-SD 42.4 37.0 - 54.0 fl    MPV 12.3 (H) 6.0 - 12.0 fL    Platelets 270 140 - 450 10*3/mm3    Neutrophil % 68.0 42.7 - 76.0 %    Lymphocyte % 21.8 19.6 - 45.3 %    Monocyte % 5.8 5.0 - 12.0 %    Eosinophil % 3.1 0.3 - 6.2 %    Basophil % 0.8 0.0 - 1.5 %    Immature Grans % 0.5 0.0 - 0.5 %    Neutrophils, Absolute 4.46 1.70 - 7.00 10*3/mm3    Lymphocytes, Absolute 1.43 0.70 - 3.10 10*3/mm3    Monocytes, Absolute 0.38 0.10 - 0.90 10*3/mm3    Eosinophils, Absolute 0.20 0.00 - 0.40 10*3/mm3    Basophils, Absolute 0.05 0.00 - 0.20 10*3/mm3    Immature Grans, Absolute 0.03 0.00 - 0.05 10*3/mm3    nRBC 0.0 0.0 - 0.2 /100 WBC   Comprehensive Metabolic Panel   Result Value Ref Range    Glucose 99 65 - 99 mg/dL    BUN 26 (H) 8 - 23 mg/dL    Creatinine 1.03 (H) 0.57 - 1.00 mg/dL    Sodium 140 136 - 145 mmol/L    Potassium 4.5 3.5 - 5.2 mmol/L    Chloride 107 98 - 107 mmol/L    CO2 22.2 22.0 - 29.0 mmol/L    Calcium 9.9 8.6 - 10.5 mg/dL    Total Protein 7.3 6.0 - 8.5 g/dL    Albumin 4.50 3.50 - 5.20 g/dL    ALT (SGPT) 12 1 - 33 U/L    AST (SGOT) 17 1 - 32 U/L     Alkaline Phosphatase 53 39 - 117 U/L    Total Bilirubin 0.4 0.2 - 1.2 mg/dL    eGFR Non African Amer 52 (L) >60 mL/min/1.73    Globulin 2.8 gm/dL    A/G Ratio 1.6 g/dL    BUN/Creatinine Ratio 25.2 (H) 7.0 - 25.0    Anion Gap 10.8 5.0 - 15.0 mmol/L   Hemoglobin A1c   Result Value Ref Range    Hemoglobin A1C 5.86 (H) 4.80 - 5.60 %   Lipid Panel   Result Value Ref Range    Total Cholesterol 159 0 - 200 mg/dL    Triglycerides 134 0 - 150 mg/dL    HDL Cholesterol 50 40 - 60 mg/dL    LDL Cholesterol  82 0 - 100 mg/dL    VLDL Cholesterol 26.8 5 - 40 mg/dL    LDL/HDL Ratio 1.64    TSH   Result Value Ref Range    TSH 6.830 (H) 0.270 - 4.200 uIU/mL   Vitamin B12   Result Value Ref Range    Vitamin B-12 287 211 - 946 pg/mL   Vitamin D 25 Hydroxy   Result Value Ref Range    25 Hydroxy, Vitamin D 56.3 30.0 - 100.0 ng/ml           Assessment/Plan       ICD-10-CM ICD-9-CM   1. Type 2 diabetes mellitus without complication, without long-term current use of insulin (CMS/[facility]) E11.9 250.00   2. Essential hypertension I10 401.9   3. Right thyroid nodule E04.1 241.0   4. Vitamin D deficiency E55.9 268.9   5. Subclinical hyperthyroidism E05.90 242.90   6. Neuropathy G62.9 355.9   7. Abnormal bone density screening R93.7 794.9       Glycemic Management:   Lab Results   Component Value Date    HGBA1C 5.86 (H) 06/24/2020    HGBA1C 5.39 12/10/2019    HGBA1C 5.72 (H) 08/09/2019     Lab Results   Component Value Date    GLUCOSE 99 06/24/2020    BUN 26 (H) 06/24/2020    CREATININE 1.03 (H) 06/24/2020    EGFRIFNONA 52 (L) 06/24/2020    BCR 25.2 (H) 06/24/2020    CO2 22.2 06/24/2020    CALCIUM 9.9 06/24/2020    ALBUMIN 4.50 06/24/2020    AST 17 06/24/2020    ALT 12 06/24/2020     Lab Results   Component Value Date    WBC 6.55 06/24/2020    HGB 11.9 (L) 06/24/2020    HCT 35.0 06/24/2020    MCV 88.6 06/24/2020     06/24/2020     Janumet XR 50/1000 , 1 tab daily   We briefly discussed about trulicity once she had ckd but decided to stay on  janumet and ckd improved after stopping diuretic and NSAIDs    Now on metformin alone due to finance       Lipid Management  Lab Results   Component Value Date    CHOL 159 06/24/2020    CHOL 158 12/10/2019    CHOL 161 08/09/2019     Lab Results   Component Value Date    TRIG 134 06/24/2020    TRIG 151 (H) 12/10/2019    TRIG 113 08/09/2019     Lab Results   Component Value Date    HDL 50 06/24/2020    HDL 45 12/10/2019    HDL 49 08/09/2019     No components found for: LDLCALC  Lab Results   Component Value Date    LDL 82 06/24/2020    LDL 83 12/10/2019    LDL 89 08/09/2019       On lovastatin before , stopped restarted at 20 mg qhs         Blood Pressure Management:    Vitals:    06/29/20 1033   BP: (!) 184/98   Pulse: 65   SpO2: 99%     Lab Results   Component Value Date    GLUCOSE 99 06/24/2020    CALCIUM 9.9 06/24/2020     06/24/2020    K 4.5 06/24/2020    CO2 22.2 06/24/2020     06/24/2020    BUN 26 (H) 06/24/2020    CREATININE 1.03 (H) 06/24/2020    EGFRIFNONA 52 (L) 06/24/2020    BCR 25.2 (H) 06/24/2020    ANIONGAP 10.8 06/24/2020     Lab Results   Component Value Date    GLUCOSE 99 06/24/2020    BUN 26 (H) 06/24/2020    CREATININE 1.03 (H) 06/24/2020    EGFRIFNONA 52 (L) 06/24/2020    BCR 25.2 (H) 06/24/2020    CO2 22.2 06/24/2020    CALCIUM 9.9 06/24/2020    ALBUMIN 4.50 06/24/2020    AST 17 06/24/2020    ALT 12 06/24/2020     On lisinopril 5 mg - bp elevated, hesitant to change, increase to 5 mg po bid     Taking hctz 25 mg daily --- stopped and CKD improved     Stopped naproxen before , renal function, stage III CKD    Knee pain --- can't take tramadol     Improve bp to improve renal function     Preventive Care:      nonsmoker    Weight Related:   Wt Readings from Last 3 Encounters:   06/29/20 64.5 kg (142 lb 4.8 oz)   03/05/20 65.4 kg (144 lb 1.6 oz)   01/15/20 63.1 kg (139 lb 3.2 oz)     Body mass index is 24.43 kg/m².    Aim for 1200 calories daily   Walk 30 min daily     Bone  Health      Lab Results   Component Value Date    NMLT10AN 56.3 06/24/2020    [id number] 72.6 12/10/2019    [id number] 105.7 (H) 08/09/2019       Lab Results   Component Value Date    CALCIUM 9.9 06/24/2020     Takes vit D 5th u daily -- decrease to 1000 units daily         Order dxa due to hyperthyroidism - done in 12-16 and nl    Start calcium 600 mg w supper     Thyroid Health  Lab Results   Component Value Date    TSH 6.830 (H) 06/24/2020     Subclinical hyperthyroidism, US multinodular goiter, subcm     Indication for tx     TSH less than 0.1 and history of osteopenia now nl on methimazole 2.5 mg daily     12-16 small subcm thyroid nodule on the right - inferior     Stop biotin before   testing     TSH now elevated    Change to 1/2 tab every other day       Other Diabetes Related Aspects       Lab Results   Component Value Date    [id number] 287 06/24/2020                      I reviewed and summarized records from Jaky Prieto MD from 2020   and I reviewed / ordered labs.     No orders of the defined types were placed in this encounter.        A copy of my note was sent to Jaky Prieto MD    Please see my above opinion and suggestions.

## 2020-07-07 ENCOUNTER — LAB (OUTPATIENT)
Dept: LAB | Facility: HOSPITAL | Age: 75
End: 2020-07-07

## 2020-07-07 ENCOUNTER — OFFICE VISIT (OUTPATIENT)
Dept: ENDOCRINOLOGY | Facility: CLINIC | Age: 75
End: 2020-07-07

## 2020-07-07 VITALS
DIASTOLIC BLOOD PRESSURE: 96 MMHG | HEIGHT: 64 IN | OXYGEN SATURATION: 99 % | HEART RATE: 65 BPM | BODY MASS INDEX: 24.48 KG/M2 | WEIGHT: 143.4 LBS | SYSTOLIC BLOOD PRESSURE: 180 MMHG

## 2020-07-07 DIAGNOSIS — I70.1 RENAL ARTERY STENOSIS (HCC): ICD-10-CM

## 2020-07-07 DIAGNOSIS — R93.7 ABNORMAL BONE DENSITY SCREENING: ICD-10-CM

## 2020-07-07 DIAGNOSIS — E05.90 SUBCLINICAL HYPERTHYROIDISM: ICD-10-CM

## 2020-07-07 DIAGNOSIS — G62.9 NEUROPATHY: ICD-10-CM

## 2020-07-07 DIAGNOSIS — E11.9 TYPE 2 DIABETES MELLITUS WITHOUT COMPLICATION, WITHOUT LONG-TERM CURRENT USE OF INSULIN (HCC): Primary | ICD-10-CM

## 2020-07-07 DIAGNOSIS — E55.9 VITAMIN D DEFICIENCY: ICD-10-CM

## 2020-07-07 DIAGNOSIS — I10 RESISTANT HYPERTENSION: ICD-10-CM

## 2020-07-07 DIAGNOSIS — E04.1 RIGHT THYROID NODULE: ICD-10-CM

## 2020-07-07 LAB
ANION GAP SERPL CALCULATED.3IONS-SCNC: 10.7 MMOL/L (ref 5–15)
BUN SERPL-MCNC: 23 MG/DL (ref 8–23)
BUN/CREAT SERPL: 25.6 (ref 7–25)
CALCIUM SPEC-SCNC: 10.3 MG/DL (ref 8.6–10.5)
CHLORIDE SERPL-SCNC: 107 MMOL/L (ref 98–107)
CO2 SERPL-SCNC: 23.3 MMOL/L (ref 22–29)
CORTIS AM PEAK SERPL-MCNC: 8.34 MCG/DL
CREAT SERPL-MCNC: 0.9 MG/DL (ref 0.57–1)
GFR SERPL CREATININE-BSD FRML MDRD: 61 ML/MIN/1.73
GLUCOSE SERPL-MCNC: 90 MG/DL (ref 65–99)
POTASSIUM SERPL-SCNC: 4.2 MMOL/L (ref 3.5–5.2)
SODIUM SERPL-SCNC: 141 MMOL/L (ref 136–145)

## 2020-07-07 PROCEDURE — 82627 DEHYDROEPIANDROSTERONE: CPT | Performed by: INTERNAL MEDICINE

## 2020-07-07 PROCEDURE — 36415 COLL VENOUS BLD VENIPUNCTURE: CPT | Performed by: INTERNAL MEDICINE

## 2020-07-07 PROCEDURE — 80048 BASIC METABOLIC PNL TOTAL CA: CPT | Performed by: INTERNAL MEDICINE

## 2020-07-07 PROCEDURE — 82533 TOTAL CORTISOL: CPT | Performed by: INTERNAL MEDICINE

## 2020-07-07 PROCEDURE — 99214 OFFICE O/P EST MOD 30 MIN: CPT | Performed by: INTERNAL MEDICINE

## 2020-07-07 PROCEDURE — 83835 ASSAY OF METANEPHRINES: CPT | Performed by: INTERNAL MEDICINE

## 2020-07-07 PROCEDURE — 82024 ASSAY OF ACTH: CPT | Performed by: INTERNAL MEDICINE

## 2020-07-07 PROCEDURE — 82088 ASSAY OF ALDOSTERONE: CPT | Performed by: INTERNAL MEDICINE

## 2020-07-07 PROCEDURE — 84244 ASSAY OF RENIN: CPT | Performed by: INTERNAL MEDICINE

## 2020-07-07 RX ORDER — AMLODIPINE BESYLATE 5 MG/1
5 TABLET ORAL DAILY
Qty: 30 TABLET | Refills: 11 | Status: SHIPPED | OUTPATIENT
Start: 2020-07-07 | End: 2020-10-29

## 2020-07-07 RX ORDER — HYDRALAZINE HYDROCHLORIDE 10 MG/1
TABLET, FILM COATED ORAL
Qty: 90 TABLET | Refills: 11 | Status: SHIPPED | OUTPATIENT
Start: 2020-07-07 | End: 2021-11-17

## 2020-07-07 NOTE — PROGRESS NOTES
Thelma Haley is a 74 y.o. female who presents for  evaluation of   Chief Complaint   Patient presents with   • Hypertension   • Headache         Primary Care / Referring Provider  Jaky Prieto MD    Hyperthyroidism     Duration since 2015    Appears to be Graves with right subcm thyroid nodule    Now on methimazole    Also t2dm well controlled with nephropathy     Now Hypertension  Uncontrolled       Past Medical History:   Diagnosis Date   • Abnormal results of thyroid function studies    • Allergic rhinitis due to pollen    • Arthritis    • Asthma      uncomplicated      • Cataract    • Chronic rhinitis    • Diabetes mellitus (CMS/HCC)    • Disease of thyroid gland    • Essential hypertension    • Extrinsic asthma with status asthmaticus    • Glaucoma    • Hyperlipidemia    • Impingement syndrome of right shoulder    • Injury of back     L4 L5 bulging disc   • Neuropathy    • Nuclear senile cataract    • Overweight with body mass index (BMI) 25.0-29.9    • Primary fibromyalgia syndrome    • Primary open angle glaucoma    • Rosacea    • Temporomandibular joint disorder      Family History   Problem Relation Age of Onset   • Heart disease Other    • Hypertension Other    • COPD Mother      Social History     Tobacco Use   • Smoking status: Never Smoker   • Smokeless tobacco: Never Used   Substance Use Topics   • Alcohol use: No   • Drug use: No         Current Outpatient Medications:   •  Biotin 37817 MCG tablet, Take 1 tablet by mouth Daily., Disp: , Rfl:   •  CETIRIZINE HCL PO, Take 10 mg by mouth Daily., Disp: , Rfl:   •  Cholecalciferol ( VITAMIN D3) 25 MCG (1000 UT) tablet, Take 1,000 Units by mouth Daily., Disp: , Rfl:   •  dorzolamide-timolol (COSOPT) 22.3-6.8 MG/ML ophthalmic solution, Administer 1 drop to both eyes 2 (Two) Times a Day., Disp: , Rfl:   •  Ferrous Sulfate (IRON) 325 (65 Fe) MG tablet, Take 1 tablet by mouth Daily., Disp: , Rfl:   •  gabapentin (NEURONTIN) 100 MG capsule,  Take 1 capsule by mouth 3 (Three) Times a Day. For diabetic neuropathy, Disp: 90 capsule, Rfl: 3  •  glucose blood (ONE TOUCH ULTRA TEST) test strip, Use to test blood sugar twice a day. ICD E11.9, Disp: 100 each, Rfl: 5  •  lisinopril (PRINIVIL,ZESTRIL) 5 MG tablet, Take 1 tablet by mouth 2 (two) times a day., Disp: 180 tablet, Rfl: 3  •  lovastatin (MEVACOR) 20 MG tablet, TAKE 1 TABLET BY MOUTH EVERY NIGHT, Disp: 90 tablet, Rfl: 3  •  metFORMIN ER (Glucophage XR) 500 MG 24 hr tablet, 1 tabs twice daily with meals , generic ok, Disp: 180 tablet, Rfl: 3  •  methIMAzole (TAPAZOLE) 5 MG tablet, TAKE 1/2 TABLET BY MOUTH DAILY, Disp: 45 tablet, Rfl: 1  •  ONETOUCH DELICA LANCETS FINE misc, Use to check blood sugar twice a day. ICD E11.9, Disp: 100 each, Rfl: 5    Review of Systems    Review of Systems   Constitutional: Positive for fatigue and unexpected weight change. Negative for activity change, appetite change, chills, diaphoresis and fever.   HENT: Negative for congestion, drooling, ear discharge, ear pain, facial swelling, mouth sores, nosebleeds, postnasal drip, sinus pressure, sneezing, sore throat, tinnitus, trouble swallowing and voice change.    Eyes: Negative.  Negative for photophobia, pain, discharge, redness and itching.   Respiratory: Negative.  Negative for apnea, cough, choking, chest tightness, shortness of breath, wheezing and stridor.    Cardiovascular: Negative.  Negative for chest pain, palpitations and leg swelling.   Gastrointestinal: Negative.  Negative for abdominal distention, abdominal pain, constipation, diarrhea, nausea and vomiting.   Endocrine: Negative.  Negative for cold intolerance, heat intolerance, polydipsia, polyphagia and polyuria.   Genitourinary: Negative for difficulty urinating, dysuria, flank pain and frequency.   Musculoskeletal: Negative for arthralgias, back pain, gait problem, joint swelling, myalgias, neck pain and neck stiffness.   Skin: Negative for color change,  "pallor, rash and wound.   Allergic/Immunologic: Negative for environmental allergies, food allergies and immunocompromised state.   Neurological: Positive for tremors. Negative for dizziness, seizures, syncope, facial asymmetry, speech difficulty, weakness, light-headedness, numbness and headaches.   Hematological: Negative for adenopathy. Does not bruise/bleed easily.   Psychiatric/Behavioral: Negative for agitation, behavioral problems, confusion, decreased concentration, dysphoric mood, hallucinations, self-injury, sleep disturbance and suicidal ideas. The patient is not nervous/anxious and is not hyperactive.         Objective:     /96 (BP Location: Right arm, Patient Position: Sitting, Cuff Size: Large Adult)   Pulse 65   Ht 162.6 cm (64\")   Wt 65 kg (143 lb 6.4 oz)   LMP 02/06/1980 (Approximate)   SpO2 99%   BMI 24.61 kg/m²     Physical Exam   Constitutional: She is oriented to person, place, and time. She appears well-developed.   HENT:   Head: Normocephalic.   Right Ear: External ear normal.   Left Ear: External ear normal.   Nose: Nose normal.   Eyes: Conjunctivae and EOM are normal. No scleral icterus.   Neck: Normal range of motion. Neck supple. No tracheal deviation present. Thyromegaly present.   Cardiovascular: Normal rate, regular rhythm, normal heart sounds and intact distal pulses. Exam reveals no gallop and no friction rub.   No murmur heard.  Pulmonary/Chest: Effort normal and breath sounds normal. No stridor. No respiratory distress. She has no wheezes. She has no rales. She exhibits no tenderness.   Abdominal: Soft. Bowel sounds are normal. She exhibits no distension and no mass. There is no tenderness. There is no rebound and no guarding.   Musculoskeletal: Normal range of motion. She exhibits no tenderness or deformity.   Lymphadenopathy:     She has no cervical adenopathy.   Neurological: She is alert and oriented to person, place, and time. She displays normal reflexes. She " exhibits normal muscle tone. Coordination normal.   Skin: No rash noted. No erythema. No pallor.   Psychiatric: She has a normal mood and affect. Her behavior is normal. Judgment and thought content normal.       Lab Review    Results for orders placed or performed in visit on 12/27/19   CBC Auto Differential   Result Value Ref Range    WBC 6.55 3.40 - 10.80 10*3/mm3    RBC 3.95 3.77 - 5.28 10*6/mm3    Hemoglobin 11.9 (L) 12.0 - 15.9 g/dL    Hematocrit 35.0 34.0 - 46.6 %    MCV 88.6 79.0 - 97.0 fL    MCH 30.1 26.6 - 33.0 pg    MCHC 34.0 31.5 - 35.7 g/dL    RDW 13.0 12.3 - 15.4 %    RDW-SD 42.4 37.0 - 54.0 fl    MPV 12.3 (H) 6.0 - 12.0 fL    Platelets 270 140 - 450 10*3/mm3    Neutrophil % 68.0 42.7 - 76.0 %    Lymphocyte % 21.8 19.6 - 45.3 %    Monocyte % 5.8 5.0 - 12.0 %    Eosinophil % 3.1 0.3 - 6.2 %    Basophil % 0.8 0.0 - 1.5 %    Immature Grans % 0.5 0.0 - 0.5 %    Neutrophils, Absolute 4.46 1.70 - 7.00 10*3/mm3    Lymphocytes, Absolute 1.43 0.70 - 3.10 10*3/mm3    Monocytes, Absolute 0.38 0.10 - 0.90 10*3/mm3    Eosinophils, Absolute 0.20 0.00 - 0.40 10*3/mm3    Basophils, Absolute 0.05 0.00 - 0.20 10*3/mm3    Immature Grans, Absolute 0.03 0.00 - 0.05 10*3/mm3    nRBC 0.0 0.0 - 0.2 /100 WBC   Comprehensive Metabolic Panel   Result Value Ref Range    Glucose 99 65 - 99 mg/dL    BUN 26 (H) 8 - 23 mg/dL    Creatinine 1.03 (H) 0.57 - 1.00 mg/dL    Sodium 140 136 - 145 mmol/L    Potassium 4.5 3.5 - 5.2 mmol/L    Chloride 107 98 - 107 mmol/L    CO2 22.2 22.0 - 29.0 mmol/L    Calcium 9.9 8.6 - 10.5 mg/dL    Total Protein 7.3 6.0 - 8.5 g/dL    Albumin 4.50 3.50 - 5.20 g/dL    ALT (SGPT) 12 1 - 33 U/L    AST (SGOT) 17 1 - 32 U/L    Alkaline Phosphatase 53 39 - 117 U/L    Total Bilirubin 0.4 0.2 - 1.2 mg/dL    eGFR Non African Amer 52 (L) >60 mL/min/1.73    Globulin 2.8 gm/dL    A/G Ratio 1.6 g/dL    BUN/Creatinine Ratio 25.2 (H) 7.0 - 25.0    Anion Gap 10.8 5.0 - 15.0 mmol/L   Hemoglobin A1c   Result Value Ref Range     Hemoglobin A1C 5.86 (H) 4.80 - 5.60 %   Lipid Panel   Result Value Ref Range    Total Cholesterol 159 0 - 200 mg/dL    Triglycerides 134 0 - 150 mg/dL    HDL Cholesterol 50 40 - 60 mg/dL    LDL Cholesterol  82 0 - 100 mg/dL    VLDL Cholesterol 26.8 5 - 40 mg/dL    LDL/HDL Ratio 1.64    TSH   Result Value Ref Range    TSH 6.830 (H) 0.270 - 4.200 uIU/mL   Vitamin B12   Result Value Ref Range    Vitamin B-12 287 211 - 946 pg/mL   Vitamin D 25 Hydroxy   Result Value Ref Range    25 Hydroxy, Vitamin D 56.3 30.0 - 100.0 ng/ml           Assessment/Plan       ICD-10-CM ICD-9-CM   1. Type 2 diabetes mellitus without complication, without long-term current use of insulin (CMS/[facility]) E11.9 250.00   2. Essential hypertension I10 401.9   3. Right thyroid nodule E04.1 241.0   4. Vitamin D deficiency E55.9 268.9   5. Subclinical hyperthyroidism E05.90 242.90   6. Neuropathy G62.9 355.9   7. Abnormal bone density screening R93.7 794.9       Glycemic Management:   Lab Results   Component Value Date    HGBA1C 5.86 (H) 06/24/2020    HGBA1C 5.39 12/10/2019    HGBA1C 5.72 (H) 08/09/2019     Lab Results   Component Value Date    GLUCOSE 99 06/24/2020    BUN 26 (H) 06/24/2020    CREATININE 1.03 (H) 06/24/2020    EGFRIFNONA 52 (L) 06/24/2020    BCR 25.2 (H) 06/24/2020    CO2 22.2 06/24/2020    CALCIUM 9.9 06/24/2020    ALBUMIN 4.50 06/24/2020    AST 17 06/24/2020    ALT 12 06/24/2020     Lab Results   Component Value Date    WBC 6.55 06/24/2020    HGB 11.9 (L) 06/24/2020    HCT 35.0 06/24/2020    MCV 88.6 06/24/2020     06/24/2020     Janumet XR 50/1000 , 1 tab daily   We briefly discussed about trulicity once she had ckd but decided to stay on janumet and ckd improved after stopping diuretic and NSAIDs    Now on metformin alone due to finance       Lipid Management  Lab Results   Component Value Date    CHOL 159 06/24/2020    CHOL 158 12/10/2019    CHOL 161 08/09/2019     Lab Results   Component Value Date    TRIG 134 06/24/2020     TRIG 151 (H) 12/10/2019    TRIG 113 08/09/2019     Lab Results   Component Value Date    HDL 50 06/24/2020    HDL 45 12/10/2019    HDL 49 08/09/2019     No components found for: LDLCALC  Lab Results   Component Value Date    LDL 82 06/24/2020    LDL 83 12/10/2019    LDL 89 08/09/2019       On lovastatin before , stopped restarted at 20 mg qhs         Blood Pressure Management:    Vitals:    07/07/20 1044   BP: 180/96   Pulse: 65   SpO2: 99%     Lab Results   Component Value Date    GLUCOSE 99 06/24/2020    CALCIUM 9.9 06/24/2020     06/24/2020    K 4.5 06/24/2020    CO2 22.2 06/24/2020     06/24/2020    BUN 26 (H) 06/24/2020    CREATININE 1.03 (H) 06/24/2020    EGFRIFNONA 52 (L) 06/24/2020    BCR 25.2 (H) 06/24/2020    ANIONGAP 10.8 06/24/2020     Lab Results   Component Value Date    GLUCOSE 99 06/24/2020    BUN 26 (H) 06/24/2020    CREATININE 1.03 (H) 06/24/2020    EGFRIFNONA 52 (L) 06/24/2020    BCR 25.2 (H) 06/24/2020    CO2 22.2 06/24/2020    CALCIUM 9.9 06/24/2020    ALBUMIN 4.50 06/24/2020    AST 17 06/24/2020    ALT 12 06/24/2020     On lisinopril 5 mg twice daily - for now keep this dose     Add amlodipine 5 mg tablet    May take hydralazine 10 mg tablets every 8 hours as follows    If top number more than 160 - take half a tablet = 5 mg  If top number more than 180 - take full tablet - 10 mg           Before stopped taking hctz 25 mg daily --- stopped and CKD improved     Stopped naproxen before , renal function, stage III CKD    Knee pain --- can't take tramadol     Improve bp to improve renal function     Preventive Care:      nonsmoker    Weight Related:   Wt Readings from Last 3 Encounters:   07/07/20 65 kg (143 lb 6.4 oz)   06/29/20 64.5 kg (142 lb 4.8 oz)   03/05/20 65.4 kg (144 lb 1.6 oz)     Body mass index is 24.61 kg/m².    Aim for 1200 calories daily   Walk 30 min daily     Bone Health      Lab Results   Component Value Date    EINQ71LA 56.3 06/24/2020    [id number] 72.6 12/10/2019     [id number] 105.7 (H) 08/09/2019       Lab Results   Component Value Date    CALCIUM 9.9 06/24/2020     Takes vit D 5th u daily -- decrease to 1000 units daily         Order dxa due to hyperthyroidism - done in 12-16 and nl    Start calcium 600 mg w supper     Thyroid Health  Lab Results   Component Value Date    TSH 6.830 (H) 06/24/2020     Subclinical hyperthyroidism, US multinodular goiter, subcm     Indication for tx     TSH less than 0.1 and history of osteopenia now nl on methimazole 2.5 mg daily     12-16 small subcm thyroid nodule on the right - inferior     Stop biotin before   testing     TSH now elevated    Change to 1/2 tab every other day       Other Diabetes Related Aspects       Lab Results   Component Value Date    [id number] 287 06/24/2020                      I reviewed and summarized records from Jaky Prieto MD from 2020   and I reviewed / ordered labs.     No orders of the defined types were placed in this encounter.        A copy of my note was sent to Jaky Prieto MD    Please see my above opinion and suggestions.

## 2020-07-08 LAB — DHEA-S SERPL-MCNC: 3.2 UG/DL (ref 13.9–142.8)

## 2020-07-09 LAB — ACTH PLAS-MCNC: 18.3 PG/ML (ref 7.2–63.3)

## 2020-07-10 LAB
METANEPHRINE, PL: 20.8 PG/ML (ref 0–88)
NORMETANEPHRINE, PL: 88.3 PG/ML (ref 0–191.8)

## 2020-07-14 ENCOUNTER — OFFICE VISIT (OUTPATIENT)
Dept: ENDOCRINOLOGY | Facility: CLINIC | Age: 75
End: 2020-07-14

## 2020-07-14 VITALS
SYSTOLIC BLOOD PRESSURE: 132 MMHG | BODY MASS INDEX: 24.24 KG/M2 | RESPIRATION RATE: 17 BRPM | DIASTOLIC BLOOD PRESSURE: 82 MMHG | WEIGHT: 142 LBS | HEART RATE: 67 BPM | HEIGHT: 64 IN | OXYGEN SATURATION: 99 %

## 2020-07-14 DIAGNOSIS — E05.90 SUBCLINICAL HYPERTHYROIDISM: Primary | ICD-10-CM

## 2020-07-14 PROCEDURE — 99214 OFFICE O/P EST MOD 30 MIN: CPT | Performed by: NURSE PRACTITIONER

## 2020-07-14 RX ORDER — METHIMAZOLE 5 MG/1
2.5 TABLET ORAL DAILY
Qty: 30 TABLET | Refills: 4 | Status: SHIPPED | OUTPATIENT
Start: 2020-07-14 | End: 2020-07-14

## 2020-07-14 RX ORDER — METHIMAZOLE 5 MG/1
TABLET ORAL
Qty: 30 TABLET | Refills: 4 | Status: SHIPPED | OUTPATIENT
Start: 2020-07-14 | End: 2020-10-29

## 2020-07-14 RX ORDER — METHIMAZOLE 5 MG/1
TABLET ORAL
Qty: 30 TABLET | Refills: 4 | Status: SHIPPED | OUTPATIENT
Start: 2020-07-14 | End: 2020-07-14 | Stop reason: SDUPTHER

## 2020-07-14 NOTE — PROGRESS NOTES
Thelma Haley is a 74 y.o. female who presents for  evaluation of       Chief Complaint   Patient presents with   • Diabetes         Primary Care / Referring Provider  Jaky Prieto MD    Hyperthyroidism     Duration since 2015    Appears to be Graves with right subcm thyroid nodule     Now on methimazole     Also t2dm well controlled with nephropathy     Now Hypertension  Uncontrolled- Improved     TSH now elevated     Past Medical History:   Diagnosis Date   • Abnormal results of thyroid function studies    • Allergic rhinitis due to pollen    • Arthritis    • Asthma      uncomplicated      • Cataract    • Chronic rhinitis    • Diabetes mellitus (CMS/HCC)    • Disease of thyroid gland    • Essential hypertension    • Extrinsic asthma with status asthmaticus    • Glaucoma    • Hyperlipidemia    • Impingement syndrome of right shoulder    • Injury of back     L4 L5 bulging disc   • Neuropathy    • Nuclear senile cataract    • Overweight with body mass index (BMI) 25.0-29.9    • Primary fibromyalgia syndrome    • Primary open angle glaucoma    • Rosacea    • Temporomandibular joint disorder      Family History   Problem Relation Age of Onset   • Heart disease Other    • Hypertension Other    • COPD Mother      Social History     Tobacco Use   • Smoking status: Never Smoker   • Smokeless tobacco: Never Used   Substance Use Topics   • Alcohol use: No   • Drug use: No         Current Outpatient Medications:   •  amLODIPine (NORVASC) 5 MG tablet, Take 1 tablet by mouth Daily., Disp: 30 tablet, Rfl: 11  •  Biotin 95632 MCG tablet, Take 1 tablet by mouth Daily., Disp: , Rfl:   •  CETIRIZINE HCL PO, Take 10 mg by mouth Daily., Disp: , Rfl:   •  Cholecalciferol (SM VITAMIN D3) 25 MCG (1000 UT) tablet, Take 1,000 Units by mouth Daily., Disp: , Rfl:   •  dorzolamide-timolol (COSOPT) 22.3-6.8 MG/ML ophthalmic solution, Administer 1 drop to both eyes 2 (Two) Times a Day., Disp: , Rfl:   •  Ferrous Sulfate (IRON)  325 (65 Fe) MG tablet, Take 1 tablet by mouth Daily., Disp: , Rfl:   •  gabapentin (NEURONTIN) 100 MG capsule, Take 1 capsule by mouth 3 (Three) Times a Day. For diabetic neuropathy, Disp: 90 capsule, Rfl: 3  •  glucose blood (ONE TOUCH ULTRA TEST) test strip, Use to test blood sugar twice a day. ICD E11.9, Disp: 100 each, Rfl: 5  •  hydrALAZINE (APRESOLINE) 10 MG tablet, Take every 8hours as follows If SBP less than 160 - don't take Is -180 - half a tablet If SBP more than 180  Take full tablet, Disp: 90 tablet, Rfl: 11  •  lisinopril (PRINIVIL,ZESTRIL) 5 MG tablet, Take 1 tablet by mouth 2 (two) times a day., Disp: 180 tablet, Rfl: 3  •  lovastatin (MEVACOR) 20 MG tablet, TAKE 1 TABLET BY MOUTH EVERY NIGHT, Disp: 90 tablet, Rfl: 3  •  metFORMIN ER (Glucophage XR) 500 MG 24 hr tablet, 1 tabs twice daily with meals , generic ok, Disp: 180 tablet, Rfl: 3  •  methIMAzole (TAPAZOLE) 5 MG tablet, TAKE 1/2 TABLET BY MOUTH DAILY, Disp: 45 tablet, Rfl: 1  •  ONETOUCH DELICA LANCETS FINE misc, Use to check blood sugar twice a day. ICD E11.9, Disp: 100 each, Rfl: 5    Review of Systems    Review of Systems   Constitutional: Positive for fatigue and unexpected weight change. Negative for activity change, appetite change, chills, diaphoresis and fever.   HENT: Negative for congestion, drooling, ear discharge, ear pain, facial swelling, mouth sores, nosebleeds, postnasal drip, sinus pressure, sneezing, sore throat, tinnitus, trouble swallowing and voice change.    Eyes: Negative.  Negative for photophobia, pain, discharge, redness and itching.   Respiratory: Negative.  Negative for apnea, cough, choking, chest tightness, shortness of breath, wheezing and stridor.    Cardiovascular: Negative.  Negative for chest pain, palpitations and leg swelling.   Gastrointestinal: Negative.  Negative for abdominal distention, abdominal pain, constipation, diarrhea, nausea and vomiting.   Endocrine: Negative.  Negative for cold  "intolerance, heat intolerance, polydipsia, polyphagia and polyuria.   Genitourinary: Negative for difficulty urinating, dysuria, flank pain and frequency.   Musculoskeletal: Negative for arthralgias, back pain, gait problem, joint swelling, myalgias, neck pain and neck stiffness.   Skin: Negative for color change, pallor, rash and wound.   Allergic/Immunologic: Negative for environmental allergies, food allergies and immunocompromised state.   Neurological: Positive for tremors. Negative for dizziness, seizures, syncope, facial asymmetry, speech difficulty, weakness, light-headedness, numbness and headaches.   Hematological: Negative for adenopathy. Does not bruise/bleed easily.   Psychiatric/Behavioral: Negative for agitation, behavioral problems, confusion, decreased concentration, dysphoric mood, hallucinations, self-injury, sleep disturbance and suicidal ideas. The patient is not nervous/anxious and is not hyperactive.         Objective:     /82 (BP Location: Right arm, Patient Position: Sitting, Cuff Size: Adult)   Pulse 67   Resp 17   Ht 162.6 cm (64.02\")   Wt 64.4 kg (142 lb)   LMP 02/06/1980 (Approximate)   SpO2 99%   BMI 24.36 kg/m²     Physical Exam   Constitutional: She is oriented to person, place, and time. She appears well-developed.   HENT:   Head: Normocephalic.   Right Ear: External ear normal.   Left Ear: External ear normal.   Nose: Nose normal.   Eyes: Conjunctivae and EOM are normal. No scleral icterus.   Neck: Normal range of motion. Neck supple. No tracheal deviation present. Thyromegaly present.   Cardiovascular: Normal rate, regular rhythm, normal heart sounds and intact distal pulses. Exam reveals no gallop and no friction rub.   No murmur heard.  Pulmonary/Chest: Effort normal and breath sounds normal. No stridor. No respiratory distress. She has no wheezes. She has no rales. She exhibits no tenderness.   Abdominal: Soft. Bowel sounds are normal. She exhibits no distension and " no mass. There is no tenderness. There is no rebound and no guarding.   Musculoskeletal: Normal range of motion. She exhibits no tenderness or deformity.   Lymphadenopathy:     She has no cervical adenopathy.   Neurological: She is alert and oriented to person, place, and time. She displays normal reflexes. She exhibits normal muscle tone. Coordination normal.   Skin: No rash noted. No erythema. No pallor.   Psychiatric: She has a normal mood and affect. Her behavior is normal. Judgment and thought content normal.       Lab Review    Results for orders placed or performed in visit on 07/07/20   Metanephrines, Frac. Free, Plasma   Result Value Ref Range    Normetanephrine 88.3 0.0 - 191.8 pg/mL    Metanephrine 20.8 0.0 - 88.0 pg/mL   ACTH   Result Value Ref Range    ACTH 18.3 7.2 - 63.3 pg/mL   Cortisol - AM   Result Value Ref Range    Cortisol - AM 8.34 mcg/dL   DHEA-Sulfate   Result Value Ref Range    DHEA-Sulfate 3.2 (L) 13.9 - 142.8 ug/dL   Basic Metabolic Panel   Result Value Ref Range    Glucose 90 65 - 99 mg/dL    BUN 23 8 - 23 mg/dL    Creatinine 0.90 0.57 - 1.00 mg/dL    Sodium 141 136 - 145 mmol/L    Potassium 4.2 3.5 - 5.2 mmol/L    Chloride 107 98 - 107 mmol/L    CO2 23.3 22.0 - 29.0 mmol/L    Calcium 10.3 8.6 - 10.5 mg/dL    eGFR Non African Amer 61 >60 mL/min/1.73    BUN/Creatinine Ratio 25.6 (H) 7.0 - 25.0    Anion Gap 10.7 5.0 - 15.0 mmol/L           Assessment/Plan       ICD-10-CM ICD-9-CM   1. Subclinical hyperthyroidism E05.90 242.90       Glycemic Management:   Lab Results   Component Value Date    HGBA1C 5.86 (H) 06/24/2020    HGBA1C 5.39 12/10/2019    HGBA1C 5.72 (H) 08/09/2019     Lab Results   Component Value Date    GLUCOSE 90 07/07/2020    BUN 23 07/07/2020    CREATININE 0.90 07/07/2020    EGFRIFNONA 61 07/07/2020    BCR 25.6 (H) 07/07/2020    CO2 23.3 07/07/2020    CALCIUM 10.3 07/07/2020    ALBUMIN 4.50 06/24/2020    AST 17 06/24/2020    ALT 12 06/24/2020     Lab Results   Component Value  Date    WBC 6.55 06/24/2020    HGB 11.9 (L) 06/24/2020    HCT 35.0 06/24/2020    MCV 88.6 06/24/2020     06/24/2020     Janumet XR 50/1000 , 1 tab daily     We briefly discussed about trulicity once she had ckd but decided to stay on janumet and ckd improved after stopping diuretic and NSAIDs    Now on metformin 500XR BID  alone due to finance     A1c at goal- 5.86    We will continue with Metformin       Lipid Management  Lab Results   Component Value Date    CHOL 159 06/24/2020    CHOL 158 12/10/2019    CHOL 161 08/09/2019     Lab Results   Component Value Date    TRIG 134 06/24/2020    TRIG 151 (H) 12/10/2019    TRIG 113 08/09/2019     Lab Results   Component Value Date    HDL 50 06/24/2020    HDL 45 12/10/2019    HDL 49 08/09/2019     No components found for: LDLCALC  Lab Results   Component Value Date    LDL 82 06/24/2020    LDL 83 12/10/2019    LDL 89 08/09/2019       lovastatin 20 mg qhs         Blood Pressure Management:    Vitals:    07/14/20 1638   BP: 132/82   Pulse: 67   Resp: 17   SpO2: 99%     Lab Results   Component Value Date    GLUCOSE 90 07/07/2020    CALCIUM 10.3 07/07/2020     07/07/2020    K 4.2 07/07/2020    CO2 23.3 07/07/2020     07/07/2020    BUN 23 07/07/2020    CREATININE 0.90 07/07/2020    EGFRIFNONA 61 07/07/2020    BCR 25.6 (H) 07/07/2020    ANIONGAP 10.7 07/07/2020     Lab Results   Component Value Date    GLUCOSE 90 07/07/2020    BUN 23 07/07/2020    CREATININE 0.90 07/07/2020    EGFRIFNONA 61 07/07/2020    BCR 25.6 (H) 07/07/2020    CO2 23.3 07/07/2020    CALCIUM 10.3 07/07/2020    ALBUMIN 4.50 06/24/2020    AST 17 06/24/2020    ALT 12 06/24/2020     On lisinopril 5 mg twice daily      Amlodipine 5 mg tablet    May take hydralazine 10 mg tablets every 8 hours as follows    If top number more than 160 - take half a tablet = 5 mg  If top number more than 180 - take full tablet - 10 mg        hctz 25 mg daily --- stopped and CKD improved     Stopped naproxen before ,  renal function, stage III CKD    Knee pain --- can't take tramadol     Improve bp to improve renal function       Continue this regimen     Preventive Care:      nonsmoker    Weight Related:   Wt Readings from Last 3 Encounters:   07/14/20 64.4 kg (142 lb)   07/07/20 65 kg (143 lb 6.4 oz)   06/29/20 64.5 kg (142 lb 4.8 oz)     Body mass index is 24.36 kg/m².    Aim for 1200 calories daily   Walk 30 min daily     Bone Health      Lab Results   Component Value Date    PCNZ24FQ 56.3 06/24/2020    NRLB64YX 72.6 12/10/2019    TCCB94HQ 105.7 (H) 08/09/2019       Lab Results   Component Value Date    CALCIUM 10.3 07/07/2020      vit D 1000 units daily - Continue     Order dxa due to hyperthyroidism - done in 12-16 and nl     calcium 600 mg w supper - Continue     Thyroid Health  Lab Results   Component Value Date    TSH 6.830 (H) 06/24/2020     Subclinical hyperthyroidism, US multinodular goiter, subcm     12-16 small subcm thyroid nodule on the right - inferior     Indication for tx     TSH less than 0.1 and history of osteopenia now nl on methimazole 2.5 mg daily- we will cut back to every other day       Stop biotin before testing       Lab Results   Component Value Date    TSH 6.830 (H) 06/24/2020       Patient is  taking 1/2 a tab every day-  We will change to 1/2 tab every other day    Repeat labs in 4 weeks       Other Diabetes Related Aspects       Lab Results   Component Value Date    [id number] 287 06/24/2020            Orders Placed This Encounter   Procedures   • TSH     Standing Status:   Future     Standing Expiration Date:   7/15/2021     Return if symptoms worsen or fail to improve, for Next scheduled follow up.        This document has been electronically signed by JUAN Maldonado on July 14, 2020 17:03        A copy of my note was sent to Jaky Prieto MD    Please see my above opinion and suggestions.

## 2020-08-11 ENCOUNTER — TELEPHONE (OUTPATIENT)
Dept: ENDOCRINOLOGY | Facility: CLINIC | Age: 75
End: 2020-08-11

## 2020-10-09 ENCOUNTER — OFFICE VISIT (OUTPATIENT)
Dept: CARDIOLOGY | Facility: CLINIC | Age: 75
End: 2020-10-09

## 2020-10-09 VITALS
WEIGHT: 150.8 LBS | HEIGHT: 64 IN | HEART RATE: 64 BPM | SYSTOLIC BLOOD PRESSURE: 142 MMHG | BODY MASS INDEX: 25.74 KG/M2 | DIASTOLIC BLOOD PRESSURE: 80 MMHG | OXYGEN SATURATION: 98 %

## 2020-10-09 DIAGNOSIS — R60.0 BILATERAL LEG EDEMA: ICD-10-CM

## 2020-10-09 DIAGNOSIS — I10 ESSENTIAL HYPERTENSION: ICD-10-CM

## 2020-10-09 DIAGNOSIS — I25.10 ATHEROSCLEROSIS OF NATIVE CORONARY ARTERY OF NATIVE HEART WITHOUT ANGINA PECTORIS: ICD-10-CM

## 2020-10-09 DIAGNOSIS — E78.2 MIXED HYPERLIPIDEMIA: Primary | ICD-10-CM

## 2020-10-09 DIAGNOSIS — R06.09 DYSPNEA ON EXERTION: ICD-10-CM

## 2020-10-09 PROCEDURE — 99214 OFFICE O/P EST MOD 30 MIN: CPT | Performed by: INTERNAL MEDICINE

## 2020-10-09 PROCEDURE — 93000 ELECTROCARDIOGRAM COMPLETE: CPT | Performed by: INTERNAL MEDICINE

## 2020-10-09 RX ORDER — LISINOPRIL AND HYDROCHLOROTHIAZIDE 20; 12.5 MG/1; MG/1
1 TABLET ORAL DAILY
Qty: 30 TABLET | Refills: 6 | Status: SHIPPED | OUTPATIENT
Start: 2020-10-09 | End: 2020-10-09 | Stop reason: SDUPTHER

## 2020-10-09 RX ORDER — LISINOPRIL AND HYDROCHLOROTHIAZIDE 20; 12.5 MG/1; MG/1
1 TABLET ORAL DAILY
Qty: 30 TABLET | Refills: 6 | Status: SHIPPED | OUTPATIENT
Start: 2020-10-09 | End: 2020-11-05 | Stop reason: ALTCHOICE

## 2020-10-09 RX ORDER — HYDROCHLOROTHIAZIDE 25 MG/1
25 TABLET ORAL AS NEEDED
COMMUNITY
End: 2020-10-09

## 2020-10-09 NOTE — PROGRESS NOTES
Thelma Haley  75 y.o. female    1. Mixed hyperlipidemia    2. Essential hypertension    3. Atherosclerosis of native coronary artery of native heart without angina pectoris    4. Bilateral leg edema    5. Dyspnea on exertion        History of Present Illness  Mrs. Haley is a 75-year-old  lady with hypertension, hyperlipidemia and has history of fibromyalgia.  She has been evaluated for chest pain in the past by both nuclear stress testing and CT angiogram of the coronary arteries.  She is known to have less than 50% stenosis in the Left Anterior Descending Coronary Artery.    She is done well from a cardiac standpoint with no chest pain.  She sees Dr. Duncan for diabetes mellitus and hyperthyroidism.  Her main issue today was fluctuating blood pressure and swelling in the lower extremities which has been present for the past several months.  She does have some tingling in numbness in the legs and 1-2+ pitting edema.  She has used compression stockings from time to time.  She does have occasional NYHA class II dyspnea on exertion.  No PND or orthopnea is reported.  Her blood pressure was elevated at 170/80 at home and she did take her hydralazine tablet.  Blood pressure was 142/80 in our office today.  Clinical exam did not reveal any signs of congestive heart failure.    EKG today showed sinus rhythm with heart rate of 63 bpm.  LAFB.  Minimal nonspecific ST-T changes.    SUBJECTIVE    Allergies   Allergen Reactions   • Ciprofloxacin Nausea And Vomiting   • Lortab [Hydrocodone-Acetaminophen] Nausea And Vomiting   • Metronidazole Nausea And Vomiting   • Oxycodone Nausea And Vomiting     Dizziness and doesn't decrease pain   • Ultram [Tramadol Hcl] Nausea And Vomiting         Past Medical History:   Diagnosis Date   • Abnormal results of thyroid function studies    • Allergic rhinitis due to pollen    • Arthritis    • Asthma      uncomplicated      • Cataract    • Chronic rhinitis    • Diabetes mellitus  (CMS/[facility])    • Disease of thyroid gland    • Essential hypertension    • Extrinsic asthma with status asthmaticus    • Glaucoma    • Hyperlipidemia    • Impingement syndrome of right shoulder    • Injury of back     L4 L5 bulging disc   • Neuropathy    • Nuclear senile cataract    • Overweight with body mass index (BMI) 25.0-29.9    • Primary fibromyalgia syndrome    • Primary open angle glaucoma    • Rosacea    • Temporomandibular joint disorder          Past Surgical History:   Procedure Laterality Date   • BACK SURGERY     • CARPAL TUNNEL RELEASE      Bilateral carpal tunnel release.)   03/27/2000    • CARPAL TUNNEL RELEASE Bilateral    • CATARACT EXTRACTION      Bilateral   • CERVICAL FUSION  1980   • COLONOSCOPY     • DILATATION AND CURETTAGE     • INJECTION OF MEDICATION  06/17/2015    celestone(betamethasone) (2)    • INJECTION OF MEDICATION  04/14/2016    depo medrol ( methylprednisone) (20)   • OOPHORECTOMY     • OTHER SURGICAL HISTORY      hysterosope procedure   • RIGHT OOPHORECTOMY     • ROTATOR CUFF REPAIR      Right   • SHOULDER ARTHROSCOPY Right 2/6/2017    Procedure: RIGHT SHOULDER ARTHROSCOPY SUBACROMIAL DECOMPRESSION, ROTATOR CUFF REPAIR   ;  Surgeon: Terrence Haines MD;  Location: [facility];  Service:    • SHOULDER SURGERY      Excision of 4.8 cm mass with primary intermediate closure.)   03/30/2012    • SPINAL FUSION      L4 L5   • TOTAL KNEE ARTHROPLASTY Right 11/13/2019    Procedure: RIGHT TOTAL KNEE ARTHROPLASTY  with adductor canal block;  Surgeon: Terrence Haines MD;  Location: [facility];  Service: Orthopedics         Family History   Problem Relation Age of Onset   • Heart disease Other    • Hypertension Other    • COPD Mother          Social History     Socioeconomic History   • Marital status:      Spouse name: Not on file   • Number of children: Not on file   • Years of education: Not on file   • Highest education level: Not on file   Tobacco Use   • Smoking status:  "Never Smoker   • Smokeless tobacco: Never Used   Substance and Sexual Activity   • Alcohol use: No   • Drug use: No   • Sexual activity: Defer         Current Outpatient Medications   Medication Sig Dispense Refill   • amLODIPine (NORVASC) 5 MG tablet Take 1 tablet by mouth Daily. 30 tablet 11   • Biotin 49814 MCG tablet Take 1 tablet by mouth Daily.     • CETIRIZINE HCL PO Take 10 mg by mouth Daily.     • Cholecalciferol ( VITAMIN D3) 25 MCG (1000 UT) tablet Take 1,000 Units by mouth Daily.     • dorzolamide-timolol (COSOPT) 22.3-6.8 MG/ML ophthalmic solution Administer 1 drop to both eyes 2 (Two) Times a Day.     • Ferrous Sulfate (IRON) 325 (65 Fe) MG tablet Take 1 tablet by mouth Daily.     • gabapentin (NEURONTIN) 100 MG capsule Take 1 capsule by mouth 3 (Three) Times a Day. For diabetic neuropathy 90 capsule 3   • glucose blood (ONE TOUCH ULTRA TEST) test strip Use to test blood sugar twice a day. ICD E11.9 100 each 5   • hydrALAZINE (APRESOLINE) 10 MG tablet Take every 8hours as follows If SBP less than 160 - don't take Is -180 - half a tablet If SBP more than 180  Take full tablet 90 tablet 11   • lovastatin (MEVACOR) 20 MG tablet TAKE 1 TABLET BY MOUTH EVERY NIGHT 90 tablet 3   • metFORMIN ER (Glucophage XR) 500 MG 24 hr tablet 1 tabs twice daily with meals , generic ok 180 tablet 3   • methIMAzole (TAPAZOLE) 5 MG tablet Take 1/2 tab every other day 30 tablet 4   • ONETOUCH DELICA LANCETS FINE misc Use to check blood sugar twice a day. ICD E11.9 100 each 5   • lisinopril-hydrochlorothiazide (PRINZIDE,ZESTORETIC) 20-12.5 MG per tablet Take 1 tablet by mouth Daily. 30 tablet 6     No current facility-administered medications for this visit.          OBJECTIVE    /80 (BP Location: Left arm, Patient Position: Sitting, Cuff Size: Adult)   Pulse 64   Ht 162.6 cm (64\")   Wt 68.4 kg (150 lb 12.8 oz)   LMP 02/06/1980 (Approximate)   SpO2 98%   BMI 25.88 kg/m²         Review of Systems "     Constitutional:  Denies recent weight loss, weight gain, fever or chills, no change in exercise tolerance     HENT:  Denies any hearing loss, epistaxis, hoarseness, or difficulty speaking.     Eyes: Wears eyeglasses or contact lenses     Respiratory: Mild dyspnea with exertion,no cough, wheezing, or hemoptysis.     Cardiovascular: Negative for palpations, chest pain, orthopnea, PND.  Has leg edema    Gastrointestinal:  Denies change in bowel habits, dyspepsia, ulcer disease, hematochezia, or melena.     Endocrine: Negative for cold intolerance, heat intolerance, polydipsia, polyphagia and polyuria. Diabetes Mellitus not under good control    Genitourinary: Negative.      Musculoskeletal: DJD, Fibromyalgia    Neurological:  Denies any history of recurrent headaches, strokes, TIA, or seizure disorder.     Hematological: Denies any food allergies, seasonal allergies, bleeding disorders, or lymphadenopathy.     Psychiatric/Behavioral: Denies any history of depression, substance abuse, or change in cognitive function.     Physical Exam     Constitutional: Cooperative, alert and oriented,  in no acute distress.     HENT:   Head: Normocephalic, normal hair patterns, no masses or tenderness.  Ears, Nose, and Throat: No gross abnormalities. No pallor or cyanosis.   Eyes: EOMS intact, PERRL, conjunctivae and lids unremarkable. Fundoscopic exam and visual fields not performed.   Neck: No palpable masses or adenopathy, no thyromegaly, no JVD, carotid pulses are full and equal bilaterally and without  Bruits.     Cardiovascular: Regular rhythm, S1 and S2 normal, no S3 or S4.  No murmurs, gallops, or rubs detected.     Pulmonary/Chest: Chest: normal symmetry,  normal respiratory excursion, no intercostal retraction, no use of accessory muscles.            Pulmonary: Normal breath sounds. No rales or ronchi.    Abdominal: Abdomen soft, bowel sounds normoactive, no masses, no hepatosplenomegaly, non-tender, no bruits.      Musculoskeletal: No deformities, clubbing, cyanosis, erythema, or edema observed.  Reticular veins noted in upper part of the legs on both sides.    Neurological: No gross motor or sensory deficits noted, affect appropriate, oriented to time, person, place.     Skin: Warm and dry to the touch, no apparent skin lesions or masses noted.     Psychiatric: She has a normal mood and affect. Her behavior is normal. Judgment and thought content normal.         Procedures      Lab Results   Component Value Date    WBC 6.55 06/24/2020    HGB 11.9 (L) 06/24/2020    HCT 35.0 06/24/2020    MCV 88.6 06/24/2020     06/24/2020     Lab Results   Component Value Date    GLUCOSE 90 07/07/2020    BUN 23 07/07/2020    CREATININE 0.90 07/07/2020    EGFRIFNONA 61 07/07/2020    BCR 25.6 (H) 07/07/2020    CO2 23.3 07/07/2020    CALCIUM 10.3 07/07/2020    ALBUMIN 4.50 06/24/2020    AST 17 06/24/2020    ALT 12 06/24/2020     Lab Results   Component Value Date    CHOL 159 06/24/2020    CHOL 158 12/10/2019    CHOL 161 08/09/2019     Lab Results   Component Value Date    TRIG 134 06/24/2020    TRIG 151 (H) 12/10/2019    TRIG 113 08/09/2019     Lab Results   Component Value Date    HDL 50 06/24/2020    HDL 45 12/10/2019    HDL 49 08/09/2019     No components found for: LDLCALC  Lab Results   Component Value Date    LDL 82 06/24/2020    LDL 83 12/10/2019    LDL 89 08/09/2019     No results found for: HDLLDLRATIO  No components found for: CHOLHDL  Lab Results   Component Value Date    HGBA1C 5.86 (H) 06/24/2020     Lab Results   Component Value Date    TSH 6.830 (H) 06/24/2020    THYROIDAB <6 02/18/2019           ASSESSMENT AND PLAN  Mrs. Haley has no clinical evidence of angina, arrhythmia or congestive heart failure.  Her blood pressure is not under control.  Her leg edema could be related to side effects of amlodipine/venous stasis.  She did have reticular veins in both legs.  No large varicose veins were seen.     After reviewing her  medications I have changed the dose of lisinopril to lisinopril HCTZ 20/12.5 mg in a.m. and I have advised her to take amlodipine in p.m.  Compression stockings to be used on a regular basis was recommended.  I have discouraged her from using hydralazine unless the systolic blood pressure is high in spite of these medicines.   Aggressive risk factor modifications recommended.  Her lipid profiles were in the normal range when checked in June 2020.  Lipid-lowering therapy with lovastatin has been continued.  An echocardiogram to assess left ventricular and valvular function has been arranged.    Thelma was seen today for leg pain.    Diagnoses and all orders for this visit:    Mixed hyperlipidemia  -     Adult Transthoracic Echo Complete W/ Cont if Necessary Per Protocol; Future    Essential hypertension  -     ECG 12 Lead  -     Adult Transthoracic Echo Complete W/ Cont if Necessary Per Protocol; Future    Atherosclerosis of native coronary artery of native heart without angina pectoris  -     Adult Transthoracic Echo Complete W/ Cont if Necessary Per Protocol; Future    Bilateral leg edema  -     Adult Transthoracic Echo Complete W/ Cont if Necessary Per Protocol; Future    Dyspnea on exertion  -     Adult Transthoracic Echo Complete W/ Cont if Necessary Per Protocol; Future    Other orders  -     Discontinue: lisinopril-hydrochlorothiazide (PRINZIDE,ZESTORETIC) 20-12.5 MG per tablet; Take 1 tablet by mouth Daily.  -     lisinopril-hydrochlorothiazide (PRINZIDE,ZESTORETIC) 20-12.5 MG per tablet; Take 1 tablet by mouth Daily.        Patient's Body mass index is 25.88 kg/m².  BMI within normal limits  Patient is a non-smoker.    Manju Ramirez MD  10/9/2020  10:37 CDT

## 2020-10-26 ENCOUNTER — LAB (OUTPATIENT)
Dept: LAB | Facility: HOSPITAL | Age: 75
End: 2020-10-26

## 2020-10-26 DIAGNOSIS — E05.90 SUBCLINICAL HYPERTHYROIDISM: ICD-10-CM

## 2020-10-26 DIAGNOSIS — I10 RESISTANT HYPERTENSION: ICD-10-CM

## 2020-10-26 LAB — TSH SERPL DL<=0.05 MIU/L-ACNC: 4.22 UIU/ML (ref 0.27–4.2)

## 2020-10-26 PROCEDURE — 84443 ASSAY THYROID STIM HORMONE: CPT

## 2020-10-26 PROCEDURE — 80048 BASIC METABOLIC PNL TOTAL CA: CPT

## 2020-10-26 PROCEDURE — 36415 COLL VENOUS BLD VENIPUNCTURE: CPT

## 2020-10-29 ENCOUNTER — OFFICE VISIT (OUTPATIENT)
Dept: ENDOCRINOLOGY | Facility: CLINIC | Age: 75
End: 2020-10-29

## 2020-10-29 ENCOUNTER — TELEPHONE (OUTPATIENT)
Dept: CARDIOLOGY | Facility: CLINIC | Age: 75
End: 2020-10-29

## 2020-10-29 VITALS
WEIGHT: 145.9 LBS | DIASTOLIC BLOOD PRESSURE: 84 MMHG | HEART RATE: 65 BPM | HEIGHT: 64 IN | BODY MASS INDEX: 24.91 KG/M2 | OXYGEN SATURATION: 98 % | SYSTOLIC BLOOD PRESSURE: 180 MMHG

## 2020-10-29 DIAGNOSIS — I10 RESISTANT HYPERTENSION: ICD-10-CM

## 2020-10-29 DIAGNOSIS — E55.9 VITAMIN D DEFICIENCY: ICD-10-CM

## 2020-10-29 DIAGNOSIS — G62.9 NEUROPATHY: ICD-10-CM

## 2020-10-29 DIAGNOSIS — E11.9 TYPE 2 DIABETES MELLITUS WITHOUT COMPLICATION, WITHOUT LONG-TERM CURRENT USE OF INSULIN (HCC): ICD-10-CM

## 2020-10-29 DIAGNOSIS — E05.90 SUBCLINICAL HYPERTHYROIDISM: Primary | ICD-10-CM

## 2020-10-29 DIAGNOSIS — R93.7 ABNORMAL BONE DENSITY SCREENING: ICD-10-CM

## 2020-10-29 LAB
ANION GAP SERPL CALCULATED.3IONS-SCNC: 15.9 MMOL/L (ref 5–15)
BUN SERPL-MCNC: 24 MG/DL (ref 8–23)
BUN/CREAT SERPL: 23.5 (ref 7–25)
CALCIUM SPEC-SCNC: 9.7 MG/DL (ref 8.6–10.5)
CHLORIDE SERPL-SCNC: 106 MMOL/L (ref 98–107)
CO2 SERPL-SCNC: 20.1 MMOL/L (ref 22–29)
CREAT SERPL-MCNC: 1.02 MG/DL (ref 0.57–1)
GFR SERPL CREATININE-BSD FRML MDRD: 53 ML/MIN/1.73
GLUCOSE SERPL-MCNC: 70 MG/DL (ref 65–99)
POTASSIUM SERPL-SCNC: 4.7 MMOL/L (ref 3.5–5.2)
SODIUM SERPL-SCNC: 142 MMOL/L (ref 136–145)

## 2020-10-29 PROCEDURE — 99214 OFFICE O/P EST MOD 30 MIN: CPT | Performed by: INTERNAL MEDICINE

## 2020-10-29 RX ORDER — GABAPENTIN 100 MG/1
100 CAPSULE ORAL 3 TIMES DAILY
Qty: 90 CAPSULE | Refills: 3 | Status: SHIPPED | OUTPATIENT
Start: 2020-10-29 | End: 2021-07-20 | Stop reason: SDUPTHER

## 2020-10-29 RX ORDER — LISINOPRIL 20 MG/1
TABLET ORAL
Qty: 30 TABLET | Refills: 11 | Status: SHIPPED | OUTPATIENT
Start: 2020-10-29 | End: 2020-11-05 | Stop reason: ALTCHOICE

## 2020-10-29 RX ORDER — LISINOPRIL 5 MG/1
5 TABLET ORAL NIGHTLY
COMMUNITY
End: 2020-10-29

## 2020-10-29 NOTE — TELEPHONE ENCOUNTER
Dr. Walters called and wants Dr. Lea to see pt one day next week after the Echo on 11/02/2020.  He says to reference his note where he saw her today.  She is having significant b/p issues and Dr. Walters prefers Dr Lea to handle those issues.  Where can we add her on to the schedule?

## 2020-10-29 NOTE — PROGRESS NOTES
Thelma Haley is a 75 y.o. female who presents for  evaluation of       Chief Complaint   Patient presents with   • Follow-up     4 mo jack type 2 and thyroid    • Edema     in legs/ feet         Primary Care / Referring Provider  Jaky Prieto MD    Hyperthyroidism     Duration since 2015    Appears to be Graves with right subcm thyroid nodule     Now on methimazole     Also t2dm well controlled with nephropathy     Now Hypertension  Uncontrolled- Improved     TSH now elevated     Past Medical History:   Diagnosis Date   • Abnormal results of thyroid function studies    • Allergic rhinitis due to pollen    • Arthritis    • Asthma      uncomplicated      • Cataract    • Chronic rhinitis    • Diabetes mellitus (CMS/HCC)    • Disease of thyroid gland    • Essential hypertension    • Extrinsic asthma with status asthmaticus    • Glaucoma    • Hyperlipidemia    • Impingement syndrome of right shoulder    • Injury of back     L4 L5 bulging disc   • Neuropathy    • Nuclear senile cataract    • Overweight with body mass index (BMI) 25.0-29.9    • Primary fibromyalgia syndrome    • Primary open angle glaucoma    • Rosacea    • Temporomandibular joint disorder      Family History   Problem Relation Age of Onset   • Heart disease Other    • Hypertension Other    • COPD Mother      Social History     Tobacco Use   • Smoking status: Never Smoker   • Smokeless tobacco: Never Used   Substance Use Topics   • Alcohol use: No   • Drug use: No         Current Outpatient Medications:   •  amLODIPine (NORVASC) 5 MG tablet, Take 1 tablet by mouth Daily., Disp: 30 tablet, Rfl: 11  •  Biotin 79407 MCG tablet, Take 1 tablet by mouth Daily., Disp: , Rfl:   •  CETIRIZINE HCL PO, Take 10 mg by mouth Daily., Disp: , Rfl:   •  Cholecalciferol ( VITAMIN D3) 25 MCG (1000 UT) tablet, Take 1,000 Units by mouth Daily., Disp: , Rfl:   •  dorzolamide-timolol (COSOPT) 22.3-6.8 MG/ML ophthalmic solution, Administer 1 drop to both eyes 2  (Two) Times a Day., Disp: , Rfl:   •  Ferrous Sulfate (IRON) 325 (65 Fe) MG tablet, Take 1 tablet by mouth Daily., Disp: , Rfl:   •  gabapentin (NEURONTIN) 100 MG capsule, Take 1 capsule by mouth 3 (Three) Times a Day. For diabetic neuropathy, Disp: 90 capsule, Rfl: 3  •  glucose blood (ONE TOUCH ULTRA TEST) test strip, Use to test blood sugar twice a day. ICD E11.9, Disp: 100 each, Rfl: 5  •  hydrALAZINE (APRESOLINE) 10 MG tablet, Take every 8hours as follows If SBP less than 160 - don't take Is -180 - half a tablet If SBP more than 180  Take full tablet, Disp: 90 tablet, Rfl: 11  •  lisinopril (PRINIVIL,ZESTRIL) 5 MG tablet, Take 5 mg by mouth Every Night., Disp: , Rfl:   •  lisinopril-hydrochlorothiazide (PRINZIDE,ZESTORETIC) 20-12.5 MG per tablet, Take 1 tablet by mouth Daily., Disp: 30 tablet, Rfl: 6  •  lovastatin (MEVACOR) 20 MG tablet, TAKE 1 TABLET BY MOUTH EVERY NIGHT, Disp: 90 tablet, Rfl: 3  •  metFORMIN ER (Glucophage XR) 500 MG 24 hr tablet, 1 tabs twice daily with meals , generic ok, Disp: 180 tablet, Rfl: 3  •  methIMAzole (TAPAZOLE) 5 MG tablet, Take 1/2 tab every other day, Disp: 30 tablet, Rfl: 4  •  ONETOUCH DELICA LANCETS FINE misc, Use to check blood sugar twice a day. ICD E11.9, Disp: 100 each, Rfl: 5    Review of Systems    Review of Systems   Constitutional: Positive for fatigue and unexpected weight change. Negative for activity change, appetite change, chills, diaphoresis and fever.   HENT: Negative for congestion, drooling, ear discharge, ear pain, facial swelling, mouth sores, nosebleeds, postnasal drip, sinus pressure, sneezing, sore throat, tinnitus, trouble swallowing and voice change.    Eyes: Negative.  Negative for photophobia, pain, discharge, redness and itching.   Respiratory: Negative.  Negative for apnea, cough, choking, chest tightness, shortness of breath, wheezing and stridor.    Cardiovascular: Negative.  Negative for chest pain, palpitations and leg swelling.  "  Gastrointestinal: Negative.  Negative for abdominal distention, abdominal pain, constipation, diarrhea, nausea and vomiting.   Endocrine: Negative.  Negative for cold intolerance, heat intolerance, polydipsia, polyphagia and polyuria.   Genitourinary: Negative for difficulty urinating, dysuria, flank pain and frequency.   Musculoskeletal: Negative for arthralgias, back pain, gait problem, joint swelling, myalgias, neck pain and neck stiffness.   Skin: Negative for color change, pallor, rash and wound.   Allergic/Immunologic: Negative for environmental allergies, food allergies and immunocompromised state.   Neurological: Positive for tremors. Negative for dizziness, seizures, syncope, facial asymmetry, speech difficulty, weakness, light-headedness, numbness and headaches.   Hematological: Negative for adenopathy. Does not bruise/bleed easily.   Psychiatric/Behavioral: Negative for agitation, behavioral problems, confusion, decreased concentration, dysphoric mood, hallucinations, self-injury, sleep disturbance and suicidal ideas. The patient is not nervous/anxious and is not hyperactive.         Objective:     /84 (BP Location: Right arm, Patient Position: Sitting, Cuff Size: Large Adult)   Pulse 65   Ht 162.6 cm (64\")   Wt 66.2 kg (145 lb 14.4 oz)   LMP 02/06/1980 (Approximate)   SpO2 98%   BMI 25.04 kg/m²     Physical Exam   Constitutional: She is oriented to person, place, and time. She appears well-developed.   HENT:   Head: Normocephalic.   Right Ear: External ear normal.   Left Ear: External ear normal.   Nose: Nose normal.   Eyes: Conjunctivae are normal. No scleral icterus.   Neck: Normal range of motion. Neck supple. No tracheal deviation present. Thyromegaly present.   Cardiovascular: Normal rate, regular rhythm and normal heart sounds. Exam reveals no gallop and no friction rub.   No murmur heard.  Pulmonary/Chest: Effort normal and breath sounds normal. No stridor. No respiratory distress. " She has no wheezes. She has no rales. She exhibits no tenderness.   Abdominal: Soft. Bowel sounds are normal. She exhibits no distension and no mass. There is no abdominal tenderness. There is no rebound and no guarding.   Musculoskeletal: Normal range of motion. No tenderness or deformity.   Lymphadenopathy:     She has no cervical adenopathy.   Neurological: She is alert and oriented to person, place, and time. She displays normal reflexes. She exhibits normal muscle tone. Coordination normal.   Skin: No rash noted. No erythema. No pallor.   Psychiatric: Her behavior is normal. Judgment and thought content normal.       Lab Review    Results for orders placed or performed in visit on 10/26/20   TSH    Specimen: Blood   Result Value Ref Range    TSH 4.220 (H) 0.270 - 4.200 uIU/mL           Assessment/Plan       ICD-10-CM ICD-9-CM   1. Subclinical hyperthyroidism  E05.90 242.90   2. Type 2 diabetes mellitus without complication, without long-term current use of insulin (CMS/[facility])  E11.9 250.00   3. Right thyroid nodule  E04.1 241.0   4. Vitamin D deficiency  E55.9 268.9   5. Neuropathy  G62.9 355.9   6. Abnormal bone density screening  R93.7 794.9   7. Resistant hypertension  I10 401.9       Glycemic Management:   Lab Results   Component Value Date    HGBA1C 5.86 (H) 06/24/2020    HGBA1C 5.39 12/10/2019    HGBA1C 5.72 (H) 08/09/2019     Lab Results   Component Value Date    GLUCOSE 90 07/07/2020    BUN 23 07/07/2020    CREATININE 0.90 07/07/2020    EGFRIFNONA 61 07/07/2020    BCR 25.6 (H) 07/07/2020    CO2 23.3 07/07/2020    CALCIUM 10.3 07/07/2020    ALBUMIN 4.50 06/24/2020    AST 17 06/24/2020    ALT 12 06/24/2020     Lab Results   Component Value Date    WBC 6.55 06/24/2020    HGB 11.9 (L) 06/24/2020    HCT 35.0 06/24/2020    MCV 88.6 06/24/2020     06/24/2020     Metformin 500 mg bid alone is working    In the past janumet but too expensive    Never did trulicity due to fear       Lipid Management  Lab  Results   Component Value Date    CHOL 159 06/24/2020    CHOL 158 12/10/2019    CHOL 161 08/09/2019     Lab Results   Component Value Date    TRIG 134 06/24/2020    TRIG 151 (H) 12/10/2019    TRIG 113 08/09/2019     Lab Results   Component Value Date    HDL 50 06/24/2020    HDL 45 12/10/2019    HDL 49 08/09/2019     No components found for: LDLCALC  Lab Results   Component Value Date    LDL 82 06/24/2020    LDL 83 12/10/2019    LDL 89 08/09/2019       lovastatin 20 mg qhs         Blood Pressure Management:    Vitals:    10/29/20 1057   BP: 180/84   Pulse: 65   SpO2: 98%     Lab Results   Component Value Date    GLUCOSE 90 07/07/2020    CALCIUM 10.3 07/07/2020     07/07/2020    K 4.2 07/07/2020    CO2 23.3 07/07/2020     07/07/2020    BUN 23 07/07/2020    CREATININE 0.90 07/07/2020    EGFRIFNONA 61 07/07/2020    BCR 25.6 (H) 07/07/2020    ANIONGAP 10.7 07/07/2020     Lab Results   Component Value Date    GLUCOSE 90 07/07/2020    BUN 23 07/07/2020    CREATININE 0.90 07/07/2020    EGFRIFNONA 61 07/07/2020    BCR 25.6 (H) 07/07/2020    CO2 23.3 07/07/2020    CALCIUM 10.3 07/07/2020    ALBUMIN 4.50 06/24/2020    AST 17 06/24/2020    ALT 12 06/24/2020       It seems Vannessa and me are adjusting BP meds    Ms Haley informs me that Dr. Lea suggest that my BP regimen is not the one he would have chosen    I will step back from managing HTN and leave it to the expert    I will focus on diabetes and thyroid     Today alone I will do something since BP is high    Amlodipine 5 mg daily by me ---------   Ms Mcgrath believes that this causes pain and swelling --we will have to stop     Lisinopril 20 mg , hctz 12.5 mg daily --- in the past I stopped hctz due to renal failure but we will just watch     GFR had dipped to 30 on hctz , 60 off of it     We will have to work w lisinopril, until able to get in w cardiology I suggest extra 20 mg at night     I ruled out Conn's and Pheo         I added hydralazine 10  mg tablets every 8 hours as follows, this can be stopped by Cardiology if he seems important      If top number more than 160 - take half a tablet = 5 mg  If top number more than 180 - take full tablet - 10 mg        Stopped naproxen before , renal function, stage III CKD, now intermittent use     Knee pain --- can't take tramadol   Has neuropathic , may take neurontin 100 mg po tid PRN        Preventive Care:      nonsmoker    Weight Related:   Wt Readings from Last 3 Encounters:   10/29/20 66.2 kg (145 lb 14.4 oz)   10/09/20 68.4 kg (150 lb 12.8 oz)   07/14/20 64.4 kg (142 lb)     Body mass index is 25.04 kg/m².    Aim for 1200 calories daily   Walk 30 min daily     Bone Health      Lab Results   Component Value Date    TCZD65YI 56.3 06/24/2020    NKVD41XA 72.6 12/10/2019    MNJA93PB 105.7 (H) 08/09/2019       Lab Results   Component Value Date    CALCIUM 10.3 07/07/2020      vit D 1000 units daily - Continue     Order dxa due to hyperthyroidism - done in 12-16 and nl     calcium 600 mg w supper - Continue     Thyroid Health  Lab Results   Component Value Date    TSH 4.220 (H) 10/26/2020     Subclinical hyperthyroidism, US multinodular goiter, subcm     12-16 small subcm thyroid nodule on the right - inferior     Indication for tx     TSH less than 0.1 and history of osteopenia     Not taking biotin before testing       Lab Results   Component Value Date    TSH 4.220 (H) 10/26/2020       We started methimazole and progressively decreased to half a tab every other day and still hypo ---- stop     Other Diabetes Related Aspects       Lab Results   Component Value Date    [id number] 287 06/24/2020            No orders of the defined types were placed in this encounter.    No follow-ups on file.        This document has been electronically signed by Nikolai Duncan MD on October 29, 2020 11:38 CDT        A copy of my note was sent to Jaky Prieto MD    Please see my above opinion and  suggestions.

## 2020-10-30 ENCOUNTER — TELEPHONE (OUTPATIENT)
Dept: ENDOCRINOLOGY | Facility: CLINIC | Age: 75
End: 2020-10-30

## 2020-11-01 DIAGNOSIS — E11.9 TYPE 2 DIABETES MELLITUS WITHOUT COMPLICATION, WITHOUT LONG-TERM CURRENT USE OF INSULIN (HCC): ICD-10-CM

## 2020-11-02 ENCOUNTER — TELEPHONE (OUTPATIENT)
Dept: ENDOCRINOLOGY | Facility: CLINIC | Age: 75
End: 2020-11-02

## 2020-11-02 ENCOUNTER — LAB (OUTPATIENT)
Dept: LAB | Facility: HOSPITAL | Age: 75
End: 2020-11-02

## 2020-11-02 ENCOUNTER — TELEPHONE (OUTPATIENT)
Dept: ADMINISTRATIVE | Facility: HOSPITAL | Age: 75
End: 2020-11-02

## 2020-11-02 LAB
25(OH)D3 SERPL-MCNC: 46.7 NG/ML (ref 30–100)
ALBUMIN SERPL-MCNC: 4.3 G/DL (ref 3.5–5.2)
ALBUMIN/GLOB SERPL: 1.8 G/DL
ALP SERPL-CCNC: 66 U/L (ref 39–117)
ALT SERPL W P-5'-P-CCNC: 12 U/L (ref 1–33)
ANION GAP SERPL CALCULATED.3IONS-SCNC: 10.3 MMOL/L (ref 5–15)
AST SERPL-CCNC: 16 U/L (ref 1–32)
BASOPHILS # BLD AUTO: 0.05 10*3/MM3 (ref 0–0.2)
BASOPHILS NFR BLD AUTO: 0.6 % (ref 0–1.5)
BILIRUB SERPL-MCNC: 0.3 MG/DL (ref 0–1.2)
BUN SERPL-MCNC: 33 MG/DL (ref 8–23)
BUN/CREAT SERPL: 32.4 (ref 7–25)
CALCIUM SPEC-SCNC: 9.4 MG/DL (ref 8.6–10.5)
CHLORIDE SERPL-SCNC: 107 MMOL/L (ref 98–107)
CHOLEST SERPL-MCNC: 156 MG/DL (ref 0–200)
CO2 SERPL-SCNC: 23.7 MMOL/L (ref 22–29)
CREAT SERPL-MCNC: 1.02 MG/DL (ref 0.57–1)
DEPRECATED RDW RBC AUTO: 41.7 FL (ref 37–54)
EOSINOPHIL # BLD AUTO: 0.3 10*3/MM3 (ref 0–0.4)
EOSINOPHIL NFR BLD AUTO: 3.6 % (ref 0.3–6.2)
ERYTHROCYTE [DISTWIDTH] IN BLOOD BY AUTOMATED COUNT: 13.3 % (ref 12.3–15.4)
GFR SERPL CREATININE-BSD FRML MDRD: 53 ML/MIN/1.73
GLOBULIN UR ELPH-MCNC: 2.4 GM/DL
GLUCOSE SERPL-MCNC: 92 MG/DL (ref 65–99)
HBA1C MFR BLD: 5.7 % (ref 4.8–5.6)
HCT VFR BLD AUTO: 34.7 % (ref 34–46.6)
HDLC SERPL-MCNC: 62 MG/DL (ref 40–60)
HGB BLD-MCNC: 11.9 G/DL (ref 12–15.9)
IMM GRANULOCYTES # BLD AUTO: 0.08 10*3/MM3 (ref 0–0.05)
IMM GRANULOCYTES NFR BLD AUTO: 1 % (ref 0–0.5)
LDLC SERPL CALC-MCNC: 80 MG/DL (ref 0–100)
LDLC/HDLC SERPL: 1.29 {RATIO}
LYMPHOCYTES # BLD AUTO: 2.57 10*3/MM3 (ref 0.7–3.1)
LYMPHOCYTES NFR BLD AUTO: 31.2 % (ref 19.6–45.3)
MCH RBC QN AUTO: 29.8 PG (ref 26.6–33)
MCHC RBC AUTO-ENTMCNC: 34.3 G/DL (ref 31.5–35.7)
MCV RBC AUTO: 87 FL (ref 79–97)
MONOCYTES # BLD AUTO: 0.54 10*3/MM3 (ref 0.1–0.9)
MONOCYTES NFR BLD AUTO: 6.5 % (ref 5–12)
NEUTROPHILS NFR BLD AUTO: 4.71 10*3/MM3 (ref 1.7–7)
NEUTROPHILS NFR BLD AUTO: 57.1 % (ref 42.7–76)
NRBC BLD AUTO-RTO: 0 /100 WBC (ref 0–0.2)
PLATELET # BLD AUTO: 311 10*3/MM3 (ref 140–450)
PMV BLD AUTO: 12.4 FL (ref 6–12)
POTASSIUM SERPL-SCNC: 4.4 MMOL/L (ref 3.5–5.2)
PROT SERPL-MCNC: 6.7 G/DL (ref 6–8.5)
RBC # BLD AUTO: 3.99 10*6/MM3 (ref 3.77–5.28)
SODIUM SERPL-SCNC: 141 MMOL/L (ref 136–145)
TRIGL SERPL-MCNC: 71 MG/DL (ref 0–150)
TSH SERPL DL<=0.05 MIU/L-ACNC: 5.39 UIU/ML (ref 0.27–4.2)
VIT B12 BLD-MCNC: 295 PG/ML (ref 211–946)
VLDLC SERPL-MCNC: 14 MG/DL (ref 5–40)
WBC # BLD AUTO: 8.25 10*3/MM3 (ref 3.4–10.8)

## 2020-11-02 PROCEDURE — 80053 COMPREHEN METABOLIC PANEL: CPT | Performed by: INTERNAL MEDICINE

## 2020-11-02 PROCEDURE — 82607 VITAMIN B-12: CPT | Performed by: INTERNAL MEDICINE

## 2020-11-02 PROCEDURE — 84443 ASSAY THYROID STIM HORMONE: CPT | Performed by: INTERNAL MEDICINE

## 2020-11-02 PROCEDURE — 85025 COMPLETE CBC W/AUTO DIFF WBC: CPT | Performed by: INTERNAL MEDICINE

## 2020-11-02 PROCEDURE — 83036 HEMOGLOBIN GLYCOSYLATED A1C: CPT | Performed by: INTERNAL MEDICINE

## 2020-11-02 PROCEDURE — 80061 LIPID PANEL: CPT | Performed by: INTERNAL MEDICINE

## 2020-11-02 PROCEDURE — 36415 COLL VENOUS BLD VENIPUNCTURE: CPT | Performed by: INTERNAL MEDICINE

## 2020-11-02 PROCEDURE — 82306 VITAMIN D 25 HYDROXY: CPT | Performed by: INTERNAL MEDICINE

## 2020-11-02 NOTE — TELEPHONE ENCOUNTER
Dr. serna spoke with patient about seeing  about her elevated BP. She just saw him and was scheduled to come back in April

## 2020-11-03 DIAGNOSIS — E55.9 VITAMIN D DEFICIENCY: ICD-10-CM

## 2020-11-03 DIAGNOSIS — E05.90 SUBCLINICAL HYPERTHYROIDISM: ICD-10-CM

## 2020-11-03 DIAGNOSIS — E11.9 TYPE 2 DIABETES MELLITUS WITHOUT COMPLICATION, WITHOUT LONG-TERM CURRENT USE OF INSULIN (HCC): Primary | ICD-10-CM

## 2020-11-03 DIAGNOSIS — I10 RESISTANT HYPERTENSION: ICD-10-CM

## 2020-11-04 ENCOUNTER — TELEPHONE (OUTPATIENT)
Dept: CARDIOLOGY | Facility: CLINIC | Age: 75
End: 2020-11-04

## 2020-11-04 NOTE — TELEPHONE ENCOUNTER
Pt called and left a vm wanted to know when /if she is going to be able to see Dr. Lea sooner then her April appt due to her b/p being elevated.  She was asked by Dr. Walters to see him earlier.  Is it ok to schedule her soon?

## 2020-11-04 NOTE — TELEPHONE ENCOUNTER
Called pt to ask her about her blood pressure issues because Dr. Walters wants Dr. Lea to see the patient sooner due to these issues. Pt states that it started around June when she saw Dr. Walters and the systolic number at that time was around 200. She said that Dr. Walters then increased her b/p medication. She also states that she took her b/p twice today and the systolic number was 170 and then 164.    I explained to her that I would bring this to Dr. Lea's attention tomorrow and he can decide how soon she needs an appointment. I also looked back in her chart and her b/p during her office visit with Dr. Walters on 10/29/20 was 180/84. Her b/p during her echo on 11/2/2020 was 170/84.

## 2020-11-05 ENCOUNTER — TELEPHONE (OUTPATIENT)
Dept: ENDOCRINOLOGY | Facility: CLINIC | Age: 75
End: 2020-11-05

## 2020-11-05 RX ORDER — AMLODIPINE BESYLATE 5 MG/1
5 TABLET ORAL NIGHTLY
Qty: 90 TABLET | Refills: 1 | Status: SHIPPED | OUTPATIENT
Start: 2020-11-05 | End: 2021-05-28

## 2020-11-05 RX ORDER — LISINOPRIL 30 MG/1
30 TABLET ORAL DAILY
Qty: 90 TABLET | Refills: 1 | Status: SHIPPED | OUTPATIENT
Start: 2020-11-05 | End: 2021-02-07 | Stop reason: SDUPTHER

## 2020-11-05 NOTE — TELEPHONE ENCOUNTER
Pt is needing a script for     ONETOUCH DELICA LANCETS FINE [facility]    Sent to     [id number] - [place], [place] [facility] [address] [facility]  [phone] [facility] [phone] FX

## 2020-11-05 NOTE — TELEPHONE ENCOUNTER
Called pt to inform her about her new appointment time because Dr. Walters wanted her to be seen by Dr. Lea sooner. The new appointment is on November 24th at 9:15.

## 2020-11-06 ENCOUNTER — TELEPHONE (OUTPATIENT)
Dept: CARDIOLOGY | Facility: CLINIC | Age: 75
End: 2020-11-06

## 2020-11-11 ENCOUNTER — TELEPHONE (OUTPATIENT)
Dept: ENDOCRINOLOGY | Facility: CLINIC | Age: 75
End: 2020-11-11

## 2020-11-11 RX ORDER — BLOOD-GLUCOSE METER
KIT MISCELLANEOUS
Qty: 120 EACH | Refills: 11 | Status: SHIPPED | OUTPATIENT
Start: 2020-11-11 | End: 2020-11-13

## 2020-11-11 NOTE — TELEPHONE ENCOUNTER
Pt needs new script for the      One Touch Delica lancetts is running low     sent to     [facility]     in [place], [place]     Thank You

## 2020-11-13 ENCOUNTER — TELEPHONE (OUTPATIENT)
Dept: ADMINISTRATIVE | Facility: HOSPITAL | Age: 75
End: 2020-11-13

## 2020-11-13 RX ORDER — LANCETS 30 GAUGE
EACH MISCELLANEOUS
Qty: 100 EACH | Refills: 11 | Status: SHIPPED | OUTPATIENT
Start: 2020-11-13 | End: 2021-07-20

## 2020-11-13 NOTE — TELEPHONE ENCOUNTER
"Pt called stating tht she is needing the \"30 fine gauge\" sent in, says the other scripts are not the correct size.   "

## 2020-11-13 NOTE — TELEPHONE ENCOUNTER
Called pt in regards to her Echo results. I explained that the nurse who called was not currently in the office but she did have a note in the pt's chart stating that the Echo was ok. I informed this to the pt but she would still like to speak to the nurse to further explain. I informed the pt that Stephanie may not get back to her till Monday of next week. Pt verbalized her understanding.

## 2020-11-16 ENCOUNTER — DOCUMENTATION (OUTPATIENT)
Dept: CARDIOLOGY | Facility: CLINIC | Age: 75
End: 2020-11-16

## 2020-11-24 ENCOUNTER — OFFICE VISIT (OUTPATIENT)
Dept: CARDIOLOGY | Facility: CLINIC | Age: 75
End: 2020-11-24

## 2020-11-24 VITALS
WEIGHT: 149.8 LBS | DIASTOLIC BLOOD PRESSURE: 66 MMHG | SYSTOLIC BLOOD PRESSURE: 148 MMHG | HEIGHT: 64 IN | OXYGEN SATURATION: 100 % | HEART RATE: 66 BPM | BODY MASS INDEX: 25.57 KG/M2

## 2020-11-24 DIAGNOSIS — I10 ESSENTIAL HYPERTENSION: ICD-10-CM

## 2020-11-24 DIAGNOSIS — E78.2 MIXED HYPERLIPIDEMIA: Primary | ICD-10-CM

## 2020-11-24 DIAGNOSIS — I25.10 ATHEROSCLEROSIS OF NATIVE CORONARY ARTERY OF NATIVE HEART WITHOUT ANGINA PECTORIS: ICD-10-CM

## 2020-11-24 PROCEDURE — 99213 OFFICE O/P EST LOW 20 MIN: CPT | Performed by: INTERNAL MEDICINE

## 2020-11-24 NOTE — PROGRESS NOTES
Thelma Haley  75 y.o. female    1. Mixed hyperlipidemia    2. Essential hypertension    3. Atherosclerosis of native coronary artery of native heart without angina pectoris        History of Present Illness  Mrs. Haley is a 75-year-old  lady with hypertension, hyperlipidemia and has history of fibromyalgia. She has been evaluated for chest pain in the past by both nuclear stress testing and CT angiogram of the coronary arteries.  She is known to have less than 50% stenosis in the Left Anterior Descending Coronary Artery.    She is done well from a cardiac standpoint with no chest pain.  She sees Dr. Duncan for diabetes mellitus and hyperthyroidism.    She was evaluated for fluctuations in blood pressure and I had initially started her on lisinopril/HCTZ 20/12.5 mg daily.  This however did not improve her blood pressure and after reviewing the situation I change the dose of lisinopril to 30 mg in a.m. and amlodipine 5 mg in p.m. was recommended.  This seems to have helped her considerably and her blood pressure has been in the normal range.  Though the blood pressure was borderline elevated in our office she indicates that it is usually 130 systolic or lower at home.    Echocardiogram on 11/2/2020 showed:  · Left ventricular wall thickness is consistent with concentric hypertrophy.  · Estimated left ventricular EF = 68% Left ventricular ejection fraction appears to be 66 - 70%. Left ventricular systolic function is normal.  · Left ventricular diastolic function is consistent with (grade Ia w/high LAP) impaired relaxation.  · The right ventricular cavity is borderline dilated. Normal systolic  · Mild mitral valve regurgitation is present.  · Estimated right ventricular systolic pressure from tricuspid regurgitation is normal (<35 mmHg).  · Mild tricuspid valve regurgitation is present.         SUBJECTIVE    Allergies   Allergen Reactions   • Ciprofloxacin Nausea And Vomiting   • Lortab  [Hydrocodone-Acetaminophen] Nausea And Vomiting   • Metronidazole Nausea And Vomiting   • Oxycodone Nausea And Vomiting     Dizziness and doesn't decrease pain   • Ultram [Tramadol Hcl] Nausea And Vomiting         Past Medical History:   Diagnosis Date   • Abnormal results of thyroid function studies    • Allergic rhinitis due to pollen    • Arthritis    • Asthma      uncomplicated      • Cataract    • Chronic rhinitis    • Diabetes mellitus (CMS/HCC)    • Disease of thyroid gland    • Essential hypertension    • Extrinsic asthma with status asthmaticus    • Glaucoma    • Hyperlipidemia    • Impingement syndrome of right shoulder    • Injury of back     L4 L5 bulging disc   • Neuropathy    • Nuclear senile cataract    • Overweight with body mass index (BMI) 25.0-29.9    • Primary fibromyalgia syndrome    • Primary open angle glaucoma    • Rosacea    • Temporomandibular joint disorder          Past Surgical History:   Procedure Laterality Date   • BACK SURGERY     • CARPAL TUNNEL RELEASE      Bilateral carpal tunnel release.)   03/27/2000    • CARPAL TUNNEL RELEASE Bilateral    • CATARACT EXTRACTION      Bilateral   • CERVICAL FUSION  1980   • COLONOSCOPY     • DILATATION AND CURETTAGE     • INJECTION OF MEDICATION  06/17/2015    celestone(betamethasone) (2)    • INJECTION OF MEDICATION  04/14/2016    depo medrol ( methylprednisone) (20)   • OOPHORECTOMY     • OTHER SURGICAL HISTORY      hysterosope procedure   • RIGHT OOPHORECTOMY     • ROTATOR CUFF REPAIR      Right   • SHOULDER ARTHROSCOPY Right 2/6/2017    Procedure: RIGHT SHOULDER ARTHROSCOPY SUBACROMIAL DECOMPRESSION, ROTATOR CUFF REPAIR   ;  Surgeon: Terrence Haines MD;  Location: [facility];  Service:    • SHOULDER SURGERY      Excision of 4.8 cm mass with primary intermediate closure.)   03/30/2012    • SPINAL FUSION      L4 L5   • TOTAL KNEE ARTHROPLASTY Right 11/13/2019    Procedure: RIGHT TOTAL KNEE ARTHROPLASTY  with adductor canal block;  Surgeon:  Terrence Haines MD;  Location: [facility] OR;  Service: Orthopedics         Family History   Problem Relation Age of Onset   • Heart disease Other    • Hypertension Other    • COPD Mother          Social History     Socioeconomic History   • Marital status:      Spouse name: Not on file   • Number of children: Not on file   • Years of education: Not on file   • Highest education level: Not on file   Tobacco Use   • Smoking status: Never Smoker   • Smokeless tobacco: Never Used   Substance and Sexual Activity   • Alcohol use: No   • Drug use: No   • Sexual activity: Defer         Current Outpatient Medications   Medication Sig Dispense Refill   • amLODIPine (NORVASC) 5 MG tablet Take 1 tablet by mouth Every Night. 90 tablet 1   • Biotin 32992 MCG tablet Take 1 tablet by mouth Daily.     • CETIRIZINE HCL PO Take 10 mg by mouth Daily.     • Cholecalciferol ( VITAMIN D3) 25 MCG (1000 UT) tablet Take 1,000 Units by mouth Daily.     • dorzolamide-timolol (COSOPT) 22.3-6.8 MG/ML ophthalmic solution Administer 1 drop to both eyes 2 (Two) Times a Day.     • Easy Touch Lancets 30G misc Use up to 4 x daily 100 each 11   • Ferrous Sulfate (IRON) 325 (65 Fe) MG tablet Take 1 tablet by mouth Daily.     • gabapentin (NEURONTIN) 100 MG capsule Take 1 capsule by mouth 3 (Three) Times a Day. For diabetic neuropathy 90 capsule 3   • glucose blood (ONE TOUCH ULTRA TEST) test strip Use to test blood sugar twice a day. ICD E11.9 100 each 5   • hydrALAZINE (APRESOLINE) 10 MG tablet Take every 8hours as follows If SBP less than 160 - don't take Is -180 - half a tablet If SBP more than 180  Take full tablet 90 tablet 11   • lisinopril (PRINIVIL,ZESTRIL) 30 MG tablet Take 1 tablet by mouth Daily. 90 tablet 1   • lovastatin (MEVACOR) 20 MG tablet TAKE 1 TABLET BY MOUTH EVERY NIGHT 90 tablet 3   • metFORMIN ER (Glucophage XR) 500 MG 24 hr tablet 1 tabs twice daily with meals , generic ok 180 tablet 3     No current  "facility-administered medications for this visit.          OBJECTIVE    /66 (BP Location: Left arm, Patient Position: Sitting, Cuff Size: Adult)   Pulse 66   Ht 162.6 cm (64.02\")   Wt 67.9 kg (149 lb 12.8 oz)   LMP 02/06/1980 (Approximate)   SpO2 100%   BMI 25.70 kg/m²         Review of Systems     Constitutional:  Denies recent weight loss, weight gain, fever or chills, no change in exercise tolerance     HENT:  Denies any hearing loss, epistaxis, hoarseness, or difficulty speaking.     Eyes: Wears eyeglasses or contact lenses     Respiratory: Mild dyspnea with exertion,no cough, wheezing, or hemoptysis.     Cardiovascular: Negative for palpations, chest pain, orthopnea, PND.  Has leg edema    Gastrointestinal:  Denies change in bowel habits, dyspepsia, ulcer disease, hematochezia, or melena.     Endocrine: Negative for cold intolerance, heat intolerance, polydipsia, polyphagia and polyuria. Diabetes Mellitus not under good control    Genitourinary: Negative.      Musculoskeletal: DJD, Fibromyalgia    Neurological:  Denies any history of recurrent headaches, strokes, TIA, or seizure disorder.     Hematological: Denies any food allergies, seasonal allergies, bleeding disorders, or lymphadenopathy.     Psychiatric/Behavioral: Denies any history of depression, substance abuse, or change in cognitive function.     Physical Exam     Constitutional: Cooperative, alert and oriented,  in no acute distress.     HENT:   Head: Normocephalic, normal hair patterns, no masses or tenderness.  Ears, Nose, and Throat: No gross abnormalities. No pallor or cyanosis.   Eyes: EOMS intact, PERRL, conjunctivae and lids unremarkable. Fundoscopic exam and visual fields not performed.   Neck: No palpable masses or adenopathy, no thyromegaly, no JVD, carotid pulses are full and equal bilaterally and without  Bruits.     Cardiovascular: Regular rhythm, S1 and S2 normal, no S3 or S4.  No murmurs, gallops, or rubs detected. "     Pulmonary/Chest: Chest: normal symmetry,  normal respiratory excursion, no intercostal retraction, no use of accessory muscles.            Pulmonary: Normal breath sounds. No rales or ronchi.    Abdominal: Abdomen soft, bowel sounds normoactive, no masses, no hepatosplenomegaly, non-tender, no bruits.     Musculoskeletal: No deformities, clubbing, cyanosis, erythema, or edema observed.  Reticular veins noted in upper part of the legs on both sides.    Neurological: No gross motor or sensory deficits noted, affect appropriate, oriented to time, person, place.     Skin: Warm and dry to the touch, no apparent skin lesions or masses noted.     Psychiatric: She has a normal mood and affect. Her behavior is normal. Judgment and thought content normal.         Procedures      Lab Results   Component Value Date    WBC 8.25 11/02/2020    HGB 11.9 (L) 11/02/2020    HCT 34.7 11/02/2020    MCV 87.0 11/02/2020     11/02/2020     Lab Results   Component Value Date    GLUCOSE 92 11/02/2020    BUN 33 (H) 11/02/2020    CREATININE 1.02 (H) 11/02/2020    EGFRIFNONA 53 (L) 11/02/2020    BCR 32.4 (H) 11/02/2020    CO2 23.7 11/02/2020    CALCIUM 9.4 11/02/2020    ALBUMIN 4.30 11/02/2020    AST 16 11/02/2020    ALT 12 11/02/2020     Lab Results   Component Value Date    CHOL 156 11/02/2020    CHOL 159 06/24/2020    CHOL 158 12/10/2019     Lab Results   Component Value Date    TRIG 71 11/02/2020    TRIG 134 06/24/2020    TRIG 151 (H) 12/10/2019     Lab Results   Component Value Date    HDL 62 (H) 11/02/2020    HDL 50 06/24/2020    HDL 45 12/10/2019     No components found for: LDLCALC  Lab Results   Component Value Date    LDL 80 11/02/2020    LDL 82 06/24/2020    LDL 83 12/10/2019     No results found for: HDLLDLRATIO  No components found for: CHOLHDL  Lab Results   Component Value Date    HGBA1C 5.70 (H) 11/02/2020     Lab Results   Component Value Date    TSH 5.390 (H) 11/02/2020    THYROIDAB <6 02/18/2019            ASSESSMENT AND PLAN  Mrs. Haley has no clinical evidence of angina, arrhythmia or congestive heart failure.  Her blood pressure has improved on her present combination of lisinopril 30 mg in a.m. and amlodipine 5 mg in p.m.  She has required hydralazine only once on a as needed basis.   She had questions with regards to her echocardiogram findings and these were discussed.  She was reassured that the valve regurgitations were insignificant.  She will continue to use compression stockings on a regular basis.     Diagnoses and all orders for this visit:    1. Mixed hyperlipidemia (Primary)    2. Essential hypertension    3. Atherosclerosis of native coronary artery of native heart without angina pectoris        Patient's Body mass index is 25.7 kg/m².  BMI within normal limits  Patient is a non-smoker.    Manju Ramirez MD  11/24/2020  09:40 CST

## 2021-01-08 ENCOUNTER — LAB (OUTPATIENT)
Dept: LAB | Facility: HOSPITAL | Age: 76
End: 2021-01-08

## 2021-01-08 LAB
25(OH)D3 SERPL-MCNC: 50.7 NG/ML (ref 30–100)
ALBUMIN SERPL-MCNC: 4 G/DL (ref 3.5–5.2)
ALBUMIN UR-MCNC: 3.5 MG/DL
ALBUMIN/GLOB SERPL: 1.4 G/DL
ALP SERPL-CCNC: 55 U/L (ref 39–117)
ALT SERPL W P-5'-P-CCNC: 16 U/L (ref 1–33)
ANION GAP SERPL CALCULATED.3IONS-SCNC: 10.4 MMOL/L (ref 5–15)
AST SERPL-CCNC: 24 U/L (ref 1–32)
BASOPHILS # BLD AUTO: 0.05 10*3/MM3 (ref 0–0.2)
BASOPHILS NFR BLD AUTO: 0.9 % (ref 0–1.5)
BILIRUB SERPL-MCNC: 0.4 MG/DL (ref 0–1.2)
BUN SERPL-MCNC: 23 MG/DL (ref 8–23)
BUN/CREAT SERPL: 28 (ref 7–25)
CALCIUM SPEC-SCNC: 9.4 MG/DL (ref 8.6–10.5)
CHLORIDE SERPL-SCNC: 107 MMOL/L (ref 98–107)
CHOLEST SERPL-MCNC: 115 MG/DL (ref 0–200)
CO2 SERPL-SCNC: 22.6 MMOL/L (ref 22–29)
CREAT SERPL-MCNC: 0.82 MG/DL (ref 0.57–1)
CREAT UR-MCNC: 72.9 MG/DL
DEPRECATED RDW RBC AUTO: 40.1 FL (ref 37–54)
EOSINOPHIL # BLD AUTO: 0.17 10*3/MM3 (ref 0–0.4)
EOSINOPHIL NFR BLD AUTO: 2.9 % (ref 0.3–6.2)
ERYTHROCYTE [DISTWIDTH] IN BLOOD BY AUTOMATED COUNT: 12.5 % (ref 12.3–15.4)
GFR SERPL CREATININE-BSD FRML MDRD: 68 ML/MIN/1.73
GLOBULIN UR ELPH-MCNC: 2.8 GM/DL
GLUCOSE SERPL-MCNC: 85 MG/DL (ref 65–99)
HBA1C MFR BLD: 5.8 % (ref 4.8–5.6)
HCT VFR BLD AUTO: 35.8 % (ref 34–46.6)
HDLC SERPL-MCNC: 43 MG/DL (ref 40–60)
HGB BLD-MCNC: 12.2 G/DL (ref 12–15.9)
IMM GRANULOCYTES # BLD AUTO: 0.05 10*3/MM3 (ref 0–0.05)
IMM GRANULOCYTES NFR BLD AUTO: 0.9 % (ref 0–0.5)
LDLC SERPL CALC-MCNC: 47 MG/DL (ref 0–100)
LDLC/HDLC SERPL: 1 {RATIO}
LYMPHOCYTES # BLD AUTO: 1.39 10*3/MM3 (ref 0.7–3.1)
LYMPHOCYTES NFR BLD AUTO: 23.9 % (ref 19.6–45.3)
MCH RBC QN AUTO: 30 PG (ref 26.6–33)
MCHC RBC AUTO-ENTMCNC: 34.1 G/DL (ref 31.5–35.7)
MCV RBC AUTO: 88.2 FL (ref 79–97)
MICROALBUMIN/CREAT UR: 48 MG/G
MONOCYTES # BLD AUTO: 0.34 10*3/MM3 (ref 0.1–0.9)
MONOCYTES NFR BLD AUTO: 5.8 % (ref 5–12)
NEUTROPHILS NFR BLD AUTO: 3.82 10*3/MM3 (ref 1.7–7)
NEUTROPHILS NFR BLD AUTO: 65.6 % (ref 42.7–76)
NRBC BLD AUTO-RTO: 0 /100 WBC (ref 0–0.2)
PLATELET # BLD AUTO: 319 10*3/MM3 (ref 140–450)
PMV BLD AUTO: 12.1 FL (ref 6–12)
POTASSIUM SERPL-SCNC: 4.2 MMOL/L (ref 3.5–5.2)
PROT SERPL-MCNC: 6.8 G/DL (ref 6–8.5)
RBC # BLD AUTO: 4.06 10*6/MM3 (ref 3.77–5.28)
SODIUM SERPL-SCNC: 140 MMOL/L (ref 136–145)
TRIGL SERPL-MCNC: 145 MG/DL (ref 0–150)
TSH SERPL DL<=0.05 MIU/L-ACNC: 2.47 UIU/ML (ref 0.27–4.2)
VIT B12 BLD-MCNC: 384 PG/ML (ref 211–946)
VLDLC SERPL-MCNC: 25 MG/DL (ref 5–40)
WBC # BLD AUTO: 5.82 10*3/MM3 (ref 3.4–10.8)

## 2021-01-08 PROCEDURE — 85025 COMPLETE CBC W/AUTO DIFF WBC: CPT | Performed by: INTERNAL MEDICINE

## 2021-01-08 PROCEDURE — 36415 COLL VENOUS BLD VENIPUNCTURE: CPT | Performed by: INTERNAL MEDICINE

## 2021-01-08 PROCEDURE — 82043 UR ALBUMIN QUANTITATIVE: CPT | Performed by: INTERNAL MEDICINE

## 2021-01-08 PROCEDURE — 82607 VITAMIN B-12: CPT | Performed by: INTERNAL MEDICINE

## 2021-01-08 PROCEDURE — 80053 COMPREHEN METABOLIC PANEL: CPT | Performed by: INTERNAL MEDICINE

## 2021-01-08 PROCEDURE — 83036 HEMOGLOBIN GLYCOSYLATED A1C: CPT | Performed by: INTERNAL MEDICINE

## 2021-01-08 PROCEDURE — 84443 ASSAY THYROID STIM HORMONE: CPT | Performed by: INTERNAL MEDICINE

## 2021-01-08 PROCEDURE — 80061 LIPID PANEL: CPT | Performed by: INTERNAL MEDICINE

## 2021-01-08 PROCEDURE — 82570 ASSAY OF URINE CREATININE: CPT | Performed by: INTERNAL MEDICINE

## 2021-01-08 PROCEDURE — 82306 VITAMIN D 25 HYDROXY: CPT | Performed by: INTERNAL MEDICINE

## 2021-01-21 ENCOUNTER — OFFICE VISIT (OUTPATIENT)
Dept: ENDOCRINOLOGY | Facility: CLINIC | Age: 76
End: 2021-01-21

## 2021-01-21 VITALS
OXYGEN SATURATION: 98 % | DIASTOLIC BLOOD PRESSURE: 72 MMHG | HEIGHT: 64 IN | BODY MASS INDEX: 25.15 KG/M2 | RESPIRATION RATE: 18 BRPM | HEART RATE: 81 BPM | SYSTOLIC BLOOD PRESSURE: 126 MMHG | WEIGHT: 147.3 LBS

## 2021-01-21 DIAGNOSIS — E11.9 TYPE 2 DIABETES MELLITUS WITHOUT COMPLICATION, WITHOUT LONG-TERM CURRENT USE OF INSULIN (HCC): Primary | ICD-10-CM

## 2021-01-21 DIAGNOSIS — E55.9 VITAMIN D DEFICIENCY: ICD-10-CM

## 2021-01-21 DIAGNOSIS — I10 RESISTANT HYPERTENSION: ICD-10-CM

## 2021-01-21 DIAGNOSIS — E05.90 SUBCLINICAL HYPERTHYROIDISM: ICD-10-CM

## 2021-01-21 PROCEDURE — 99214 OFFICE O/P EST MOD 30 MIN: CPT | Performed by: INTERNAL MEDICINE

## 2021-01-21 RX ORDER — INFLUENZA A VIRUS A/MICHIGAN/45/2015 X-275 (H1N1) ANTIGEN (FORMALDEHYDE INACTIVATED), INFLUENZA A VIRUS A/SINGAPORE/INFIMH-16-0019/2016 IVR-186 (H3N2) ANTIGEN (FORMALDEHYDE INACTIVATED), INFLUENZA B VIRUS B/PHUKET/3073/2013 ANTIGEN (FORMALDEHYDE INACTIVATED), AND INFLUENZA B VIRUS B/MARYLAND/15/2016 BX-69A ANTIGEN (FORMALDEHYDE INACTIVATED) 60; 60; 60; 60 UG/.7ML; UG/.7ML; UG/.7ML; UG/.7ML
INJECTION, SUSPENSION INTRAMUSCULAR
COMMUNITY
Start: 2020-12-22 | End: 2022-01-19

## 2021-01-21 NOTE — PROGRESS NOTES
Thelma Haley is a 75 y.o. female who presents for  evaluation of       Chief Complaint   Patient presents with   • Follow-up     2 month         Primary Care / Referring Provider  Jaky Prieto MD    Hyperthyroidism     Duration since 2015    Appears to be Graves with right subcm thyroid nodule     Off methimazole and TSH remains nl     Also t2dm well controlled with nephropathy     Now Hypertension managed by cardiology     TSH now elevated     Past Medical History:   Diagnosis Date   • Abnormal results of thyroid function studies    • Allergic rhinitis due to pollen    • Arthritis    • Asthma      uncomplicated      • Cataract    • Chronic rhinitis    • Diabetes mellitus (CMS/HCC)    • Disease of thyroid gland    • Essential hypertension    • Extrinsic asthma with status asthmaticus    • Glaucoma    • Hyperlipidemia    • Impingement syndrome of right shoulder    • Injury of back     L4 L5 bulging disc   • Neuropathy    • Nuclear senile cataract    • Overweight with body mass index (BMI) 25.0-29.9    • Primary fibromyalgia syndrome    • Primary open angle glaucoma    • Rosacea    • Temporomandibular joint disorder      Family History   Problem Relation Age of Onset   • Heart disease Other    • Hypertension Other    • COPD Mother      Social History     Tobacco Use   • Smoking status: Never Smoker   • Smokeless tobacco: Never Used   Substance Use Topics   • Alcohol use: No   • Drug use: No         Current Outpatient Medications:   •  amLODIPine (NORVASC) 5 MG tablet, Take 1 tablet by mouth Every Night., Disp: 90 tablet, Rfl: 1  •  Biotin 25045 MCG tablet, Take 1 tablet by mouth Daily., Disp: , Rfl:   •  CETIRIZINE HCL PO, Take 10 mg by mouth Daily., Disp: , Rfl:   •  Cholecalciferol (SM VITAMIN D3) 25 MCG (1000 UT) tablet, Take 1,000 Units by mouth Daily., Disp: , Rfl:   •  dorzolamide-timolol (COSOPT) 22.3-6.8 MG/ML ophthalmic solution, Administer 1 drop to both eyes 2 (Two) Times a Day., Disp: ,  "Rfl:   •  Easy Touch Lancets 30G misc, Use up to 4 x daily, Disp: 100 each, Rfl: 11  •  Ferrous Sulfate (IRON) 325 (65 Fe) MG tablet, Take 1 tablet by mouth Daily., Disp: , Rfl:   •  Fluzone High-Dose Quadrivalent 0.7 ML suspension prefilled syringe, PHARMACY ADMINISTERED, Disp: , Rfl:   •  gabapentin (NEURONTIN) 100 MG capsule, Take 1 capsule by mouth 3 (Three) Times a Day. For diabetic neuropathy, Disp: 90 capsule, Rfl: 3  •  glucose blood (ONE TOUCH ULTRA TEST) test strip, Use to test blood sugar twice a day. ICD E11.9, Disp: 100 each, Rfl: 5  •  hydrALAZINE (APRESOLINE) 10 MG tablet, Take every 8hours as follows If SBP less than 160 - don't take Is -180 - half a tablet If SBP more than 180  Take full tablet, Disp: 90 tablet, Rfl: 11  •  lisinopril (PRINIVIL,ZESTRIL) 30 MG tablet, Take 1 tablet by mouth Daily., Disp: 90 tablet, Rfl: 1  •  lovastatin (MEVACOR) 20 MG tablet, TAKE 1 TABLET BY MOUTH EVERY NIGHT, Disp: 90 tablet, Rfl: 3  •  metFORMIN ER (Glucophage XR) 500 MG 24 hr tablet, 1 tabs twice daily with meals , generic ok, Disp: 180 tablet, Rfl: 3    Review of Systems    Review of Systems   Constitutional: Positive for fatigue.   Neurological: Positive for weakness.        Objective:     /72 (BP Location: Left arm, Patient Position: Sitting, Cuff Size: Adult)   Pulse 81   Resp 18   Ht 162.6 cm (64.02\")   Wt 66.8 kg (147 lb 4.8 oz)   LMP 02/06/1980 (Approximate)   SpO2 98%   BMI 25.27 kg/m²     Physical Exam   Constitutional: She is oriented to person, place, and time. She appears well-developed.   HENT:   Head: Normocephalic.   Right Ear: External ear normal.   Left Ear: External ear normal.   Nose: Nose normal.   Eyes: Conjunctivae are normal. No scleral icterus.   Neck: Normal range of motion. Neck supple. No tracheal deviation present. Thyromegaly present.   Cardiovascular: Normal rate, regular rhythm and normal heart sounds. Exam reveals no gallop and no friction rub.   No murmur " heard.  Pulmonary/Chest: Effort normal and breath sounds normal. No stridor. No respiratory distress. She has no wheezes. She has no rales. She exhibits no tenderness.   Abdominal: Soft. Bowel sounds are normal. She exhibits no distension and no mass. There is no abdominal tenderness. There is no rebound and no guarding.   Musculoskeletal: Normal range of motion. No tenderness or deformity.      Right lower leg: Edema present.      Left lower leg: Edema present.   Lymphadenopathy:     She has no cervical adenopathy.   Neurological: She is alert and oriented to person, place, and time. She displays normal reflexes. She exhibits normal muscle tone. Coordination normal.   Skin: No rash noted. No erythema. No pallor.   Psychiatric: Her behavior is normal. Judgment and thought content normal.       Lab Review    Results for orders placed or performed in visit on 11/03/20   CBC Auto Differential    Specimen: Blood   Result Value Ref Range    WBC 5.82 3.40 - 10.80 10*3/mm3    RBC 4.06 3.77 - 5.28 10*6/mm3    Hemoglobin 12.2 12.0 - 15.9 g/dL    Hematocrit 35.8 34.0 - 46.6 %    MCV 88.2 79.0 - 97.0 fL    MCH 30.0 26.6 - 33.0 pg    MCHC 34.1 31.5 - 35.7 g/dL    RDW 12.5 12.3 - 15.4 %    RDW-SD 40.1 37.0 - 54.0 fl    MPV 12.1 (H) 6.0 - 12.0 fL    Platelets 319 140 - 450 10*3/mm3    Neutrophil % 65.6 42.7 - 76.0 %    Lymphocyte % 23.9 19.6 - 45.3 %    Monocyte % 5.8 5.0 - 12.0 %    Eosinophil % 2.9 0.3 - 6.2 %    Basophil % 0.9 0.0 - 1.5 %    Immature Grans % 0.9 (H) 0.0 - 0.5 %    Neutrophils, Absolute 3.82 1.70 - 7.00 10*3/mm3    Lymphocytes, Absolute 1.39 0.70 - 3.10 10*3/mm3    Monocytes, Absolute 0.34 0.10 - 0.90 10*3/mm3    Eosinophils, Absolute 0.17 0.00 - 0.40 10*3/mm3    Basophils, Absolute 0.05 0.00 - 0.20 10*3/mm3    Immature Grans, Absolute 0.05 0.00 - 0.05 10*3/mm3    nRBC 0.0 0.0 - 0.2 /100 WBC   Comprehensive Metabolic Panel    Specimen: Blood   Result Value Ref Range    Glucose 85 65 - 99 mg/dL    BUN 23 8 - 23  mg/dL    Creatinine 0.82 0.57 - 1.00 mg/dL    Sodium 140 136 - 145 mmol/L    Potassium 4.2 3.5 - 5.2 mmol/L    Chloride 107 98 - 107 mmol/L    CO2 22.6 22.0 - 29.0 mmol/L    Calcium 9.4 8.6 - 10.5 mg/dL    Total Protein 6.8 6.0 - 8.5 g/dL    Albumin 4.00 3.50 - 5.20 g/dL    ALT (SGPT) 16 1 - 33 U/L    AST (SGOT) 24 1 - 32 U/L    Alkaline Phosphatase 55 39 - 117 U/L    Total Bilirubin 0.4 0.0 - 1.2 mg/dL    eGFR Non African Amer 68 >60 mL/min/1.73    Globulin 2.8 gm/dL    A/G Ratio 1.4 g/dL    BUN/Creatinine Ratio 28.0 (H) 7.0 - 25.0    Anion Gap 10.4 5.0 - 15.0 mmol/L   Hemoglobin A1c    Specimen: Blood   Result Value Ref Range    Hemoglobin A1C 5.80 (H) 4.80 - 5.60 %   Lipid Panel    Specimen: Blood   Result Value Ref Range    Total Cholesterol 115 0 - 200 mg/dL    Triglycerides 145 0 - 150 mg/dL    HDL Cholesterol 43 40 - 60 mg/dL    LDL Cholesterol  47 0 - 100 mg/dL    VLDL Cholesterol 25 5 - 40 mg/dL    LDL/HDL Ratio 1.00    Microalbumin / Creatinine Urine Ratio - Urine, Clean Catch    Specimen: Urine, Clean Catch   Result Value Ref Range    Microalbumin/Creatinine Ratio 48.0 mg/g    Creatinine, Urine 72.9 mg/dL    Microalbumin, Urine 3.5 mg/dL   TSH    Specimen: Blood   Result Value Ref Range    TSH 2.470 0.270 - 4.200 uIU/mL   Vitamin B12    Specimen: Blood   Result Value Ref Range    Vitamin B-12 384 211 - 946 pg/mL   Vitamin D 25 Hydroxy    Specimen: Blood   Result Value Ref Range    25 Hydroxy, Vitamin D 50.7 30.0 - 100.0 ng/ml           Assessment/Plan       ICD-10-CM ICD-9-CM   1. Type 2 diabetes mellitus without complication, without long-term current use of insulin (CMS/[facility])  E11.9 250.00   2. Resistant hypertension  I10 401.9   3. Subclinical hyperthyroidism  E05.90 242.90   4. Vitamin D deficiency  E55.9 268.9       Glycemic Management:   Lab Results   Component Value Date    HGBA1C 5.80 (H) 01/08/2021    HGBA1C 5.70 (H) 11/02/2020    HGBA1C 5.86 (H) 06/24/2020     Lab Results   Component Value Date     GLUCOSE 85 01/08/2021    BUN 23 01/08/2021    CREATININE 0.82 01/08/2021    EGFRIFNONA 68 01/08/2021    BCR 28.0 (H) 01/08/2021    CO2 22.6 01/08/2021    CALCIUM 9.4 01/08/2021    ALBUMIN 4.00 01/08/2021    AST 24 01/08/2021    ALT 16 01/08/2021     Lab Results   Component Value Date    WBC 5.82 01/08/2021    HGB 12.2 01/08/2021    HCT 35.8 01/08/2021    MCV 88.2 01/08/2021     01/08/2021     Metformin 500 mg bid alone is working    In the past janumet but too expensive    Never did trulicity due to fear       Lipid Management  Lab Results   Component Value Date    CHOL 115 01/08/2021    CHOL 156 11/02/2020    CHOL 159 06/24/2020     Lab Results   Component Value Date    TRIG 145 01/08/2021    TRIG 71 11/02/2020    TRIG 134 06/24/2020     Lab Results   Component Value Date    HDL 43 01/08/2021    HDL 62 (H) 11/02/2020    HDL 50 06/24/2020     No components found for: LDLCALC  Lab Results   Component Value Date    LDL 47 01/08/2021    LDL 80 11/02/2020    LDL 82 06/24/2020       lovastatin 20 mg qhs         Blood Pressure Management:    Vitals:    01/21/21 1425   BP: 126/72   Pulse: 81   Resp: 18   SpO2: 98%     Lab Results   Component Value Date    GLUCOSE 85 01/08/2021    CALCIUM 9.4 01/08/2021     01/08/2021    K 4.2 01/08/2021    CO2 22.6 01/08/2021     01/08/2021    BUN 23 01/08/2021    CREATININE 0.82 01/08/2021    EGFRIFNONA 68 01/08/2021    BCR 28.0 (H) 01/08/2021    ANIONGAP 10.4 01/08/2021     Lab Results   Component Value Date    GLUCOSE 85 01/08/2021    BUN 23 01/08/2021    CREATININE 0.82 01/08/2021    EGFRIFNONA 68 01/08/2021    BCR 28.0 (H) 01/08/2021    CO2 22.6 01/08/2021    CALCIUM 9.4 01/08/2021    ALBUMIN 4.00 01/08/2021    AST 24 01/08/2021    ALT 16 01/08/2021       Per cardiology and Dr. Prieto   I did stop hctz and renal function normalized   I ruled out Conn's and Pheo          Stopped naproxen before , renal function improved  Knee pain --- can't take tramadol    Has neuropathic , may take neurontin 100 mg po tid PRN        Preventive Care:      nonsmoker    Weight Related:   Wt Readings from Last 3 Encounters:   01/21/21 66.8 kg (147 lb 4.8 oz)   11/24/20 67.9 kg (149 lb 12.8 oz)   11/02/20 65.8 kg (145 lb)     Body mass index is 25.27 kg/m².    Aim for 1200 calories daily   Walk 30 min daily     Bone Health      Lab Results   Component Value Date    DSBX99CN 50.7 01/08/2021    [id number] 46.7 11/02/2020    [id number] 56.3 06/24/2020       Lab Results   Component Value Date    CALCIUM 9.4 01/08/2021      vit D 1000 units daily - Continue     Order dxa due to hyperthyroidism - done in 12-16 and nl     calcium 600 mg w supper - Continue     Thyroid Health  Lab Results   Component Value Date    TSH 2.470 01/08/2021     Subclinical hyperthyroidism, US multinodular goiter, subcm     12-16 small subcm thyroid nodule on the right - inferior     Indication for tx     TSH less than 0.1 and history of osteopenia     Not taking biotin before testing       Lab Results   Component Value Date    TSH 2.470 01/08/2021       We started methimazole and progressively decreased to half a tab every other day and still hypo ---- stop and now nl     Other Diabetes Related Aspects       Lab Results   Component Value Date    [id number] 384 01/08/2021            No orders of the defined types were placed in this encounter.    No follow-ups on file.        This document has been electronically signed by Nikolai Duncan MD on January 21, 2021 14:56 CST        A copy of my note was sent to Jaky Prieto MD    Please see my above opinion and suggestions.

## 2021-02-05 RX ORDER — LOVASTATIN 20 MG/1
TABLET ORAL
Qty: 90 TABLET | Refills: 1 | Status: SHIPPED | OUTPATIENT
Start: 2021-02-05 | End: 2021-07-20 | Stop reason: SDUPTHER

## 2021-02-07 RX ORDER — LISINOPRIL 30 MG/1
TABLET ORAL
Qty: 90 TABLET | Refills: 1 | Status: SHIPPED | OUTPATIENT
Start: 2021-02-07 | End: 2021-09-21

## 2021-03-16 LAB
ALDOST SERPL-MCNC: 4.8 NG/DL (ref 0–30)
ALDOST/RENIN PLAS-RTO: 11.4 {RATIO} (ref 0–30)
RENIN PLAS-CCNC: 0.42 NG/ML/HR (ref 0.17–5.38)

## 2021-05-28 ENCOUNTER — OFFICE VISIT (OUTPATIENT)
Dept: CARDIOLOGY | Facility: CLINIC | Age: 76
End: 2021-05-28

## 2021-05-28 VITALS
OXYGEN SATURATION: 99 % | HEIGHT: 64 IN | BODY MASS INDEX: 25.78 KG/M2 | WEIGHT: 151 LBS | SYSTOLIC BLOOD PRESSURE: 132 MMHG | TEMPERATURE: 97.7 F | HEART RATE: 83 BPM | DIASTOLIC BLOOD PRESSURE: 78 MMHG

## 2021-05-28 DIAGNOSIS — I10 ESSENTIAL HYPERTENSION: ICD-10-CM

## 2021-05-28 DIAGNOSIS — I25.10 ATHEROSCLEROSIS OF NATIVE CORONARY ARTERY OF NATIVE HEART WITHOUT ANGINA PECTORIS: Primary | ICD-10-CM

## 2021-05-28 DIAGNOSIS — E78.2 MIXED HYPERLIPIDEMIA: ICD-10-CM

## 2021-05-28 PROCEDURE — 99213 OFFICE O/P EST LOW 20 MIN: CPT | Performed by: INTERNAL MEDICINE

## 2021-05-28 RX ORDER — MELATONIN
5000 DAILY
COMMUNITY
End: 2021-05-28 | Stop reason: SDUPTHER

## 2021-05-28 RX ORDER — AMLODIPINE BESYLATE 10 MG/1
10 TABLET ORAL DAILY
Qty: 90 TABLET | Refills: 3 | Status: SHIPPED | OUTPATIENT
Start: 2021-05-28 | End: 2022-04-04

## 2021-05-28 RX ORDER — OMEPRAZOLE 20 MG/1
20 CAPSULE, DELAYED RELEASE ORAL
COMMUNITY
End: 2021-07-20

## 2021-05-28 RX ORDER — CETIRIZINE HYDROCHLORIDE 10 MG/1
10 TABLET ORAL DAILY
COMMUNITY

## 2021-05-28 NOTE — PROGRESS NOTES
Thelma Haley  75 y.o. female    1. Atherosclerosis of native coronary artery of native heart without angina pectoris    2. Essential hypertension    3. Mixed hyperlipidemia        History of Present Illness  Mrs. Haley is a 75-year-old  lady with hypertension, hyperlipidemia, coronary atherosclerosis and has history of fibromyalgia. She has been evaluated for chest pain in the past by both nuclear stress testing and CT angiogram of the coronary arteries.  She is known to have less than 50% stenosis in the Left Anterior Descending Coronary Artery.  The patient does report fluctuating blood pressures and did bring several readings when her systolic blood pressure in the mornings ranges from 130-180 mm hg.  She has been compliant with her medications including lisinopril in a.m. and amlodipine in p.m. and does take hydralazine on a as needed basis.  Her blood pressure was in the normal range in our office today at 132/78.  Clinical exam was otherwise unremarkable.    Echocardiogram on 11/2/2020 showed:  · Left ventricular wall thickness is consistent with concentric hypertrophy.  · Estimated left ventricular EF = 68% Left ventricular ejection fraction appears to be 66 - 70%. Left ventricular systolic function is normal.  · Left ventricular diastolic function is consistent with (grade Ia w/high LAP) impaired relaxation.  · The right ventricular cavity is borderline dilated. Normal systolic  · Mild mitral valve regurgitation is present.  · Estimated right ventricular systolic pressure from tricuspid regurgitation is normal (<35 mmHg).  · Mild tricuspid valve regurgitation is present.     SUBJECTIVE    Allergies   Allergen Reactions   • Ciprofloxacin Nausea And Vomiting   • Lortab [Hydrocodone-Acetaminophen] Nausea And Vomiting   • Metronidazole Nausea And Vomiting   • Oxycodone Nausea And Vomiting     Dizziness and doesn't decrease pain   • Ultram [Tramadol Hcl] Nausea And Vomiting         Past Medical  History:   Diagnosis Date   • Abnormal results of thyroid function studies    • Allergic rhinitis due to pollen    • Arthritis    • Asthma      uncomplicated      • Cataract    • Chronic rhinitis    • Diabetes mellitus (CMS/HCC)    • Disease of thyroid gland    • Essential hypertension    • Extrinsic asthma with status asthmaticus    • Glaucoma    • Hyperlipidemia    • Impingement syndrome of right shoulder    • Injury of back     L4 L5 bulging disc   • Neuropathy    • Nuclear senile cataract    • Overweight with body mass index (BMI) 25.0-29.9    • Primary fibromyalgia syndrome    • Primary open angle glaucoma    • Rosacea    • Temporomandibular joint disorder          Past Surgical History:   Procedure Laterality Date   • BACK SURGERY     • CARPAL TUNNEL RELEASE      Bilateral carpal tunnel release.)   03/27/2000    • CARPAL TUNNEL RELEASE Bilateral    • CATARACT EXTRACTION      Bilateral   • CERVICAL FUSION  1980   • COLONOSCOPY     • DILATATION AND CURETTAGE     • INJECTION OF MEDICATION  06/17/2015    celestone(betamethasone) (2)    • INJECTION OF MEDICATION  04/14/2016    depo medrol ( methylprednisone) (20)   • OOPHORECTOMY     • OTHER SURGICAL HISTORY      hysterosope procedure   • RIGHT OOPHORECTOMY     • ROTATOR CUFF REPAIR      Right   • SHOULDER ARTHROSCOPY Right 2/6/2017    Procedure: RIGHT SHOULDER ARTHROSCOPY SUBACROMIAL DECOMPRESSION, ROTATOR CUFF REPAIR   ;  Surgeon: Terrence Haines MD;  Location: [facility];  Service:    • SHOULDER SURGERY      Excision of 4.8 cm mass with primary intermediate closure.)   03/30/2012    • SPINAL FUSION      L4 L5   • TOTAL KNEE ARTHROPLASTY Right 11/13/2019    Procedure: RIGHT TOTAL KNEE ARTHROPLASTY  with adductor canal block;  Surgeon: Terrence Haines MD;  Location: [facility];  Service: Orthopedics         Family History   Problem Relation Age of Onset   • Heart disease Other    • Hypertension Other    • COPD Mother          Social History      Socioeconomic History   • Marital status:      Spouse name: Not on file   • Number of children: Not on file   • Years of education: Not on file   • Highest education level: Not on file   Tobacco Use   • Smoking status: Never Smoker   • Smokeless tobacco: Never Used   Substance and Sexual Activity   • Alcohol use: No   • Drug use: No   • Sexual activity: Defer         Current Outpatient Medications   Medication Sig Dispense Refill   • amLODIPine (NORVASC) 5 MG tablet Take 1 tablet by mouth Every Night. 90 tablet 1   • Biotin 22303 MCG tablet Take 1 tablet by mouth Daily.     • cetirizine (zyrTEC) 10 MG tablet Take 10 mg by mouth Daily.     • Cholecalciferol (SM VITAMIN D3) 25 MCG (1000 UT) tablet Take 1,000 Units by mouth Daily.     • dorzolamide-timolol (COSOPT) 22.3-6.8 MG/ML ophthalmic solution Administer 1 drop to both eyes 2 (Two) Times a Day.     • Easy Touch Lancets 30G misc Use up to 4 x daily 100 each 11   • Ferrous Sulfate (IRON) 325 (65 Fe) MG tablet Take 1 tablet by mouth Daily.     • Fluzone High-Dose Quadrivalent 0.7 ML suspension prefilled syringe PHARMACY ADMINISTERED     • gabapentin (NEURONTIN) 100 MG capsule Take 1 capsule by mouth 3 (Three) Times a Day. For diabetic neuropathy 90 capsule 3   • glucose blood (ONE TOUCH ULTRA TEST) test strip Use to test blood sugar twice a day. ICD E11.9 100 each 5   • hydrALAZINE (APRESOLINE) 10 MG tablet Take every 8hours as follows If SBP less than 160 - don't take Is -180 - half a tablet If SBP more than 180  Take full tablet 90 tablet 11   • lisinopril (PRINIVIL,ZESTRIL) 30 MG tablet TAKE 1 TABLET BY MOUTH EVERY DAY 90 tablet 1   • lovastatin (MEVACOR) 20 MG tablet TAKE 1 TAB BY MOUTH AT NIGHT 90 tablet 1   • metFORMIN ER (Glucophage XR) 500 MG 24 hr tablet 1 tabs twice daily with meals , generic ok 180 tablet 3   • omeprazole (priLOSEC) 20 MG capsule Take 20 mg by mouth.       No current facility-administered medications for this visit.  "        OBJECTIVE    /78 (BP Location: Left arm, Patient Position: Sitting, Cuff Size: Adult)   Pulse 83   Temp 97.7 °F (36.5 °C)   Ht 162.6 cm (64\")   Wt 68.5 kg (151 lb)   LMP 02/06/1980 (Approximate)   SpO2 99%   BMI 25.92 kg/m²         Review of Systems : The following systems were reviewed and no changes noted    Constitutional:  Denies recent weight loss, weight gain, fever or chills, no change in exercise tolerance     HENT:  Denies any hearing loss, epistaxis, hoarseness, or difficulty speaking.     Eyes: Wears eyeglasses or contact lenses     Respiratory: Mild dyspnea with exertion,no cough, wheezing, or hemoptysis.     Cardiovascular: Negative for palpations, chest pain, orthopnea, PND.  Has leg edema    Gastrointestinal:  Denies change in bowel habits, dyspepsia, ulcer disease, hematochezia, or melena.     Endocrine: Negative for cold intolerance, heat intolerance, polydipsia, polyphagia and polyuria. Diabetes Mellitus not under good control    Genitourinary: Negative.      Musculoskeletal: DJD, Fibromyalgia    Neurological:  Denies any history of recurrent headaches, strokes, TIA, or seizure disorder.     Hematological: Denies any food allergies, seasonal allergies, bleeding disorders, or lymphadenopathy.     Psychiatric/Behavioral: Denies any history of depression, substance abuse, or change in cognitive function.     Physical Exam : The following systems were reassessed and no changes noted    Constitutional: Cooperative, alert and oriented,  in no acute distress.     HENT:   Head: Normocephalic, normal hair patterns, no masses or tenderness.  Ears, Nose, and Throat: No gross abnormalities. No pallor or cyanosis.   Eyes: EOMS intact, PERRL, conjunctivae and lids unremarkable. Fundoscopic exam and visual fields not performed.   Neck: No palpable masses or adenopathy, no thyromegaly, no JVD, carotid pulses are full and equal bilaterally and without  Bruits.     Cardiovascular: Regular " rhythm, S1 and S2 normal, no S3 or S4.  No murmurs, gallops, or rubs detected.     Pulmonary/Chest: Chest: normal symmetry,  normal respiratory excursion, no intercostal retraction, no use of accessory muscles.            Pulmonary: Normal breath sounds. No rales or ronchi.    Abdominal: Abdomen soft, bowel sounds normoactive, no masses, no hepatosplenomegaly, non-tender, no bruits.     Musculoskeletal: No deformities, clubbing, cyanosis, erythema, or edema observed.  Reticular veins noted in upper part of the legs on both sides.    Neurological: No gross motor or sensory deficits noted, affect appropriate, oriented to time, person, place.     Skin: Warm and dry to the touch, no apparent skin lesions or masses noted.     Psychiatric: She has a normal mood and affect. Her behavior is normal. Judgment and thought content normal.         Procedures      Lab Results   Component Value Date    WBC 5.82 01/08/2021    HGB 12.2 01/08/2021    HCT 35.8 01/08/2021    MCV 88.2 01/08/2021     01/08/2021     Lab Results   Component Value Date    GLUCOSE 85 01/08/2021    BUN 23 01/08/2021    CREATININE 0.82 01/08/2021    EGFRIFNONA 68 01/08/2021    BCR 28.0 (H) 01/08/2021    CO2 22.6 01/08/2021    CALCIUM 9.4 01/08/2021    ALBUMIN 4.00 01/08/2021    AST 24 01/08/2021    ALT 16 01/08/2021     Lab Results   Component Value Date    CHOL 115 01/08/2021    CHOL 156 11/02/2020    CHOL 159 06/24/2020     Lab Results   Component Value Date    TRIG 145 01/08/2021    TRIG 71 11/02/2020    TRIG 134 06/24/2020     Lab Results   Component Value Date    HDL 43 01/08/2021    HDL 62 (H) 11/02/2020    HDL 50 06/24/2020     No components found for: LDLCALC  Lab Results   Component Value Date    LDL 47 01/08/2021    LDL 80 11/02/2020    LDL 82 06/24/2020     No results found for: HDLLDLRATIO  No components found for: CHOLHDL  Lab Results   Component Value Date    HGBA1C 5.80 (H) 01/08/2021     Lab Results   Component Value Date    TSH 2.470  01/08/2021    THYROIDAB <6 02/18/2019           ASSESSMENT AND PLAN  Mrs. Haley has no clinical evidence of angina, arrhythmia or congestive heart failure.  As reported above, she does have fluctuating blood pressure after reviewing the situation, for optimization, I have increased the dose of amlodipine to 10 mg in p.m. Lisinopril 30 mg in a.m. has been continued.  She will continue hydralazine on a as needed basis.  Lipid-lowering therapy with lovastatin has been continued.    Diagnoses and all orders for this visit:    1. Atherosclerosis of native coronary artery of native heart without angina pectoris (Primary)    2. Essential hypertension    3. Mixed hyperlipidemia        Patient's Body mass index is 25.92 kg/m².  BMI within normal limits  Patient is a non-smoker.    Manju Ramirez MD  5/28/2021  10:07 CDT

## 2021-06-28 RX ORDER — AMLODIPINE BESYLATE 5 MG/1
TABLET ORAL
Qty: 90 TABLET | Refills: 3 | Status: SHIPPED | OUTPATIENT
Start: 2021-06-28 | End: 2021-07-20

## 2021-06-29 ENCOUNTER — LAB (OUTPATIENT)
Dept: LAB | Facility: HOSPITAL | Age: 76
End: 2021-06-29

## 2021-06-29 PROCEDURE — 82570 ASSAY OF URINE CREATININE: CPT | Performed by: INTERNAL MEDICINE

## 2021-06-29 PROCEDURE — 84156 ASSAY OF PROTEIN URINE: CPT | Performed by: INTERNAL MEDICINE

## 2021-06-29 PROCEDURE — 82607 VITAMIN B-12: CPT | Performed by: INTERNAL MEDICINE

## 2021-06-29 PROCEDURE — 80053 COMPREHEN METABOLIC PANEL: CPT | Performed by: INTERNAL MEDICINE

## 2021-06-29 PROCEDURE — 36415 COLL VENOUS BLD VENIPUNCTURE: CPT | Performed by: INTERNAL MEDICINE

## 2021-06-29 PROCEDURE — 85025 COMPLETE CBC W/AUTO DIFF WBC: CPT | Performed by: INTERNAL MEDICINE

## 2021-06-29 PROCEDURE — 80061 LIPID PANEL: CPT | Performed by: INTERNAL MEDICINE

## 2021-06-29 PROCEDURE — 82306 VITAMIN D 25 HYDROXY: CPT | Performed by: INTERNAL MEDICINE

## 2021-06-29 PROCEDURE — 84443 ASSAY THYROID STIM HORMONE: CPT | Performed by: INTERNAL MEDICINE

## 2021-06-30 LAB
25(OH)D3 SERPL-MCNC: 45.1 NG/ML (ref 30–100)
ALBUMIN SERPL-MCNC: 4.7 G/DL (ref 3.5–5.2)
ALBUMIN/GLOB SERPL: 1.8 G/DL
ALP SERPL-CCNC: 61 U/L (ref 39–117)
ALT SERPL W P-5'-P-CCNC: 14 U/L (ref 1–33)
ANION GAP SERPL CALCULATED.3IONS-SCNC: 11.2 MMOL/L (ref 5–15)
AST SERPL-CCNC: 22 U/L (ref 1–32)
BASOPHILS # BLD AUTO: 0.05 10*3/MM3 (ref 0–0.2)
BASOPHILS NFR BLD AUTO: 0.7 % (ref 0–1.5)
BILIRUB SERPL-MCNC: 0.4 MG/DL (ref 0–1.2)
BUN SERPL-MCNC: 23 MG/DL (ref 8–23)
BUN/CREAT SERPL: 27.7 (ref 7–25)
CALCIUM SPEC-SCNC: 9.7 MG/DL (ref 8.6–10.5)
CHLORIDE SERPL-SCNC: 108 MMOL/L (ref 98–107)
CHOLEST SERPL-MCNC: 158 MG/DL (ref 0–200)
CO2 SERPL-SCNC: 21.8 MMOL/L (ref 22–29)
CREAT SERPL-MCNC: 0.83 MG/DL (ref 0.57–1)
CREAT UR-MCNC: 121.8 MG/DL
DEPRECATED RDW RBC AUTO: 44.6 FL (ref 37–54)
EOSINOPHIL # BLD AUTO: 0.23 10*3/MM3 (ref 0–0.4)
EOSINOPHIL NFR BLD AUTO: 3.1 % (ref 0.3–6.2)
ERYTHROCYTE [DISTWIDTH] IN BLOOD BY AUTOMATED COUNT: 13.8 % (ref 12.3–15.4)
GFR SERPL CREATININE-BSD FRML MDRD: 67 ML/MIN/1.73
GLOBULIN UR ELPH-MCNC: 2.6 GM/DL
GLUCOSE SERPL-MCNC: 92 MG/DL (ref 65–99)
HCT VFR BLD AUTO: 35.6 % (ref 34–46.6)
HDLC SERPL-MCNC: 46 MG/DL (ref 40–60)
HGB BLD-MCNC: 12.2 G/DL (ref 12–15.9)
IMM GRANULOCYTES # BLD AUTO: 0.07 10*3/MM3 (ref 0–0.05)
IMM GRANULOCYTES NFR BLD AUTO: 0.9 % (ref 0–0.5)
LDLC SERPL CALC-MCNC: 84 MG/DL (ref 0–100)
LDLC/HDLC SERPL: 1.73 {RATIO}
LYMPHOCYTES # BLD AUTO: 1.46 10*3/MM3 (ref 0.7–3.1)
LYMPHOCYTES NFR BLD AUTO: 19.5 % (ref 19.6–45.3)
MCH RBC QN AUTO: 30.3 PG (ref 26.6–33)
MCHC RBC AUTO-ENTMCNC: 34.3 G/DL (ref 31.5–35.7)
MCV RBC AUTO: 88.3 FL (ref 79–97)
MONOCYTES # BLD AUTO: 0.48 10*3/MM3 (ref 0.1–0.9)
MONOCYTES NFR BLD AUTO: 6.4 % (ref 5–12)
NEUTROPHILS NFR BLD AUTO: 5.2 10*3/MM3 (ref 1.7–7)
NEUTROPHILS NFR BLD AUTO: 69.4 % (ref 42.7–76)
NRBC BLD AUTO-RTO: 0 /100 WBC (ref 0–0.2)
PLATELET # BLD AUTO: 289 10*3/MM3 (ref 140–450)
PMV BLD AUTO: 12.8 FL (ref 6–12)
POTASSIUM SERPL-SCNC: 4.6 MMOL/L (ref 3.5–5.2)
PROT SERPL-MCNC: 7.3 G/DL (ref 6–8.5)
PROT UR-MCNC: 17 MG/DL
PROT/CREAT UR: 139.6 MG/G CREA (ref 0–200)
RBC # BLD AUTO: 4.03 10*6/MM3 (ref 3.77–5.28)
SODIUM SERPL-SCNC: 141 MMOL/L (ref 136–145)
TRIGL SERPL-MCNC: 162 MG/DL (ref 0–150)
TSH SERPL DL<=0.05 MIU/L-ACNC: 4.02 UIU/ML (ref 0.27–4.2)
VIT B12 BLD-MCNC: 294 PG/ML (ref 211–946)
VLDLC SERPL-MCNC: 28 MG/DL (ref 5–40)
WBC # BLD AUTO: 7.49 10*3/MM3 (ref 3.4–10.8)

## 2021-07-15 RX ORDER — METFORMIN HYDROCHLORIDE 500 MG/1
TABLET, EXTENDED RELEASE ORAL
Qty: 180 TABLET | Refills: 3 | Status: SHIPPED | OUTPATIENT
Start: 2021-07-15 | End: 2021-07-20 | Stop reason: SDUPTHER

## 2021-07-20 ENCOUNTER — OFFICE VISIT (OUTPATIENT)
Dept: ENDOCRINOLOGY | Facility: CLINIC | Age: 76
End: 2021-07-20

## 2021-07-20 VITALS
HEART RATE: 79 BPM | HEIGHT: 64 IN | SYSTOLIC BLOOD PRESSURE: 110 MMHG | BODY MASS INDEX: 26.12 KG/M2 | OXYGEN SATURATION: 97 % | DIASTOLIC BLOOD PRESSURE: 60 MMHG | WEIGHT: 153 LBS

## 2021-07-20 DIAGNOSIS — E05.90 SUBCLINICAL HYPERTHYROIDISM: ICD-10-CM

## 2021-07-20 DIAGNOSIS — I10 RESISTANT HYPERTENSION: ICD-10-CM

## 2021-07-20 DIAGNOSIS — E55.9 VITAMIN D DEFICIENCY: ICD-10-CM

## 2021-07-20 DIAGNOSIS — E11.9 TYPE 2 DIABETES MELLITUS WITHOUT COMPLICATION, WITHOUT LONG-TERM CURRENT USE OF INSULIN (HCC): Primary | ICD-10-CM

## 2021-07-20 DIAGNOSIS — G62.9 NEUROPATHY: ICD-10-CM

## 2021-07-20 LAB — HBA1C MFR BLD: 5.5 %

## 2021-07-20 PROCEDURE — 99214 OFFICE O/P EST MOD 30 MIN: CPT | Performed by: INTERNAL MEDICINE

## 2021-07-20 PROCEDURE — 83036 HEMOGLOBIN GLYCOSYLATED A1C: CPT | Performed by: INTERNAL MEDICINE

## 2021-07-20 RX ORDER — METFORMIN HYDROCHLORIDE 500 MG/1
TABLET, EXTENDED RELEASE ORAL
Qty: 180 TABLET | Refills: 3 | Status: SHIPPED | OUTPATIENT
Start: 2021-07-20 | End: 2022-01-19 | Stop reason: SDUPTHER

## 2021-07-20 RX ORDER — OMEPRAZOLE 20 MG/1
20 CAPSULE, DELAYED RELEASE ORAL DAILY
Qty: 90 CAPSULE | Refills: 3 | Status: SHIPPED | OUTPATIENT
Start: 2021-07-20 | End: 2022-10-07

## 2021-07-20 RX ORDER — LOVASTATIN 20 MG/1
TABLET ORAL
Qty: 90 TABLET | Refills: 3 | Status: SHIPPED | OUTPATIENT
Start: 2021-07-20 | End: 2022-01-19 | Stop reason: SDUPTHER

## 2021-07-20 RX ORDER — BLOOD-GLUCOSE METER
KIT MISCELLANEOUS
Qty: 90 EACH | Refills: 3 | Status: SHIPPED | OUTPATIENT
Start: 2021-07-20

## 2021-07-20 RX ORDER — GABAPENTIN 100 MG/1
100 CAPSULE ORAL 3 TIMES DAILY
Qty: 270 CAPSULE | Refills: 1 | Status: SHIPPED | OUTPATIENT
Start: 2021-07-20 | End: 2022-01-19 | Stop reason: SDUPTHER

## 2021-07-20 RX ORDER — FLUCONAZOLE 150 MG/1
TABLET ORAL
Qty: 2 TABLET | Refills: 6 | Status: SHIPPED | OUTPATIENT
Start: 2021-07-20 | End: 2022-06-10

## 2021-07-20 NOTE — PROGRESS NOTES
" Thelma Haley is a 75 y.o. female who presents for  evaluation of T2DM      HPI   Hyperthyroidism     Duration since 2015    Appears to be Graves with right subcm thyroid nodule     Off methimazole and TSH remains nl     Also t2dm well controlled with nephropathy     Now Hypertension managed by cardiology     TSH now elevated         PE    /60   Pulse 79   Ht 162.6 cm (64\")   Wt 69.4 kg (153 lb)   LMP 02/06/1980 (Approximate)   SpO2 97%   BMI 26.26 kg/m²   AOx3  No visible goiter  Normal Respiratory Effort , Lung CTA  RRR  Positive Edema    Labs    Lab Results   Component Value Date    WBC 7.49 06/29/2021    HGB 12.2 06/29/2021    HCT 35.6 06/29/2021    MCV 88.3 06/29/2021     06/29/2021     Lab Results   Component Value Date    GLUCOSE 92 06/29/2021    BUN 23 06/29/2021    CREATININE 0.83 06/29/2021    EGFRIFNONA 67 06/29/2021    BCR 27.7 (H) 06/29/2021    K 4.6 06/29/2021    CO2 21.8 (L) 06/29/2021    CALCIUM 9.7 06/29/2021    ALBUMIN 4.70 06/29/2021    AST 22 06/29/2021    ALT 14 06/29/2021             Assessment/Plan       ICD-10-CM ICD-9-CM   1. Type 2 diabetes mellitus without complication, without long-term current use of insulin (CMS/[facility])  E11.9 250.00   2. Resistant hypertension  I10 401.9   3. Subclinical hyperthyroidism  E05.90 242.90   4. Vitamin D deficiency  E55.9 268.9   5. Neuropathy  G62.9 355.9       Glycemic Management:   Lab Results   Component Value Date    HGBA1C 5.80 (H) 01/08/2021    HGBA1C 5.70 (H) 11/02/2020    HGBA1C 5.86 (H) 06/24/2020     Lab Results   Component Value Date    GLUCOSE 92 06/29/2021    BUN 23 06/29/2021    CREATININE 0.83 06/29/2021    EGFRIFNONA 67 06/29/2021    BCR 27.7 (H) 06/29/2021    CO2 21.8 (L) 06/29/2021    CALCIUM 9.7 06/29/2021    ALBUMIN 4.70 06/29/2021    AST 22 06/29/2021    ALT 14 06/29/2021     Lab Results   Component Value Date    WBC 7.49 06/29/2021    HGB 12.2 06/29/2021    HCT 35.6 06/29/2021    MCV 88.3 06/29/2021     " 06/29/2021     Metformin 500 mg bid alone is working    In the past janumet but too expensive    Never did trulicity due to fear     Add jardiance 10 mg daily, she has edema     Lipid Management  Lab Results   Component Value Date    CHOL 158 06/29/2021    CHOL 115 01/08/2021    CHOL 156 11/02/2020     Lab Results   Component Value Date    TRIG 162 (H) 06/29/2021    TRIG 145 01/08/2021    TRIG 71 11/02/2020     Lab Results   Component Value Date    HDL 46 06/29/2021    HDL 43 01/08/2021    HDL 62 (H) 11/02/2020     No components found for: LDLCALC  Lab Results   Component Value Date    LDL 84 06/29/2021    LDL 47 01/08/2021    LDL 80 11/02/2020       lovastatin 20 mg qhs         Blood Pressure Management:    Vitals:    07/20/21 1555   BP: 110/60   Pulse: 79   SpO2: 97%     Lab Results   Component Value Date    GLUCOSE 92 06/29/2021    CALCIUM 9.7 06/29/2021     06/29/2021    K 4.6 06/29/2021    CO2 21.8 (L) 06/29/2021     (H) 06/29/2021    BUN 23 06/29/2021    CREATININE 0.83 06/29/2021    EGFRIFNONA 67 06/29/2021    BCR 27.7 (H) 06/29/2021    ANIONGAP 11.2 06/29/2021     Lab Results   Component Value Date    GLUCOSE 92 06/29/2021    BUN 23 06/29/2021    CREATININE 0.83 06/29/2021    EGFRIFNONA 67 06/29/2021    BCR 27.7 (H) 06/29/2021    CO2 21.8 (L) 06/29/2021    CALCIUM 9.7 06/29/2021    ALBUMIN 4.70 06/29/2021    AST 22 06/29/2021    ALT 14 06/29/2021       Per cardiology and Dr. Prieto   I did stop hctz and renal function normalized   I ruled out Conn's and Pheo          Stopped naproxen before , renal function improved  Knee pain --- can't take tramadol   Has neuropathic , may take neurontin 100 mg po tid PRN        Preventive Care:      nonsmoker    Weight Related:   Wt Readings from Last 3 Encounters:   07/20/21 69.4 kg (153 lb)   05/28/21 68.5 kg (151 lb)   01/21/21 66.8 kg (147 lb 4.8 oz)     Body mass index is 26.26 kg/m².    Aim for 1200 calories daily   Walk 30 min daily     Bone  Health      Lab Results   Component Value Date    [id number] 45.1 06/29/2021    [id number] 50.7 01/08/2021    [id number] 46.7 11/02/2020       Lab Results   Component Value Date    CALCIUM 9.7 06/29/2021      vit D 1000 units daily - Continue     Order dxa due to hyperthyroidism - done in 12-16 and nl     calcium 600 mg w supper - Continue     Thyroid Health  Lab Results   Component Value Date    TSH 4.020 06/29/2021     Subclinical hyperthyroidism, US multinodular goiter, subcm     12-16 small subcm thyroid nodule on the right - inferior     Indication for tx     TSH less than 0.1 and history of osteopenia     Not taking biotin before testing       Lab Results   Component Value Date    TSH 4.020 06/29/2021       We started methimazole and progressively decreased to half a tab every other day and still hypo ---- stop and now nl     Other Diabetes Related Aspects       Lab Results   Component Value Date    [id number] 294 06/29/2021        Dsyphagia  Restart prilosec     No orders of the defined types were placed in this encounter.    No follow-ups on file.        This document has been electronically signed by Nikolai Duncan MD on July 20, 2021 16:20 CDT    n

## 2021-09-21 RX ORDER — LISINOPRIL 30 MG/1
TABLET ORAL
Qty: 90 TABLET | Refills: 3 | Status: SHIPPED | OUTPATIENT
Start: 2021-09-21 | End: 2022-07-07

## 2021-11-17 ENCOUNTER — OFFICE VISIT (OUTPATIENT)
Dept: CARDIOLOGY | Facility: CLINIC | Age: 76
End: 2021-11-17

## 2021-11-17 VITALS
OXYGEN SATURATION: 97 % | TEMPERATURE: 96.4 F | HEIGHT: 64 IN | HEART RATE: 68 BPM | SYSTOLIC BLOOD PRESSURE: 116 MMHG | BODY MASS INDEX: 26.07 KG/M2 | DIASTOLIC BLOOD PRESSURE: 68 MMHG | WEIGHT: 152.7 LBS

## 2021-11-17 DIAGNOSIS — I25.10 ATHEROSCLEROSIS OF NATIVE CORONARY ARTERY OF NATIVE HEART WITHOUT ANGINA PECTORIS: ICD-10-CM

## 2021-11-17 DIAGNOSIS — I10 ESSENTIAL HYPERTENSION: Primary | ICD-10-CM

## 2021-11-17 PROCEDURE — 93000 ELECTROCARDIOGRAM COMPLETE: CPT | Performed by: INTERNAL MEDICINE

## 2021-11-17 PROCEDURE — 99213 OFFICE O/P EST LOW 20 MIN: CPT | Performed by: INTERNAL MEDICINE

## 2021-11-17 NOTE — PROGRESS NOTES
Thelma Haley  76 y.o. female    1. Essential hypertension    2. Atherosclerosis of native coronary artery of native heart without angina pectoris        History of Present Illness:  Mrs. Haley is a 76-year-old  lady with hypertension, hyperlipidemia, coronary atherosclerosis and has history of fibromyalgia. She has been evaluated for chest pain in the past by both nuclear stress testing and CT angiogram of the coronary arteries.  She is known to have less than 50% stenosis in the Left Anterior Descending Coronary Artery.    Echocardiogram on 11/2/2020 showed normal LV systolic function with an EF of 68% with grade 1A diastolic dysfunction.  Right ventricle was borderline dilated.  Mild mitral valve regurgitation and mild tricuspid valve regurgitation was present.    She denied any cardiac symptoms at the present time with no chest pain, shortness of breath or palpitation.  She has been compliant with her medications.  Her blood pressure has been in the normal range.     EKG showed sinus rhythm with heart rate of 68 bpm.  LAFB/left axis deviation.  No ST-T wave changes diagnostic of ischemia.    Allergies   Allergen Reactions   • Ciprofloxacin Nausea And Vomiting   • Lortab [Hydrocodone-Acetaminophen] Nausea And Vomiting   • Metronidazole Nausea And Vomiting   • Oxycodone Nausea And Vomiting     Dizziness and doesn't decrease pain   • Ultram [Tramadol Hcl] Nausea And Vomiting         Past Medical History:   Diagnosis Date   • Abnormal results of thyroid function studies    • Allergic rhinitis due to pollen    • Arthritis    • Asthma      uncomplicated      • Cataract    • Chronic rhinitis    • Diabetes mellitus (HCC)    • Disease of thyroid gland    • Essential hypertension    • Extrinsic asthma with status asthmaticus    • Glaucoma    • Hyperlipidemia    • Impingement syndrome of right shoulder    • Injury of back     L4 L5 bulging disc   • Neuropathy    • Nuclear senile cataract    • Overweight with  body mass index (BMI) 25.0-29.9    • Primary fibromyalgia syndrome    • Primary open angle glaucoma    • Rosacea    • Temporomandibular joint disorder          Past Surgical History:   Procedure Laterality Date   • BACK SURGERY     • CARPAL TUNNEL RELEASE      Bilateral carpal tunnel release.)   03/27/2000    • CARPAL TUNNEL RELEASE Bilateral    • CATARACT EXTRACTION      Bilateral   • CERVICAL FUSION  1980   • COLONOSCOPY     • DILATATION AND CURETTAGE     • INJECTION OF MEDICATION  06/17/2015    celestone(betamethasone) (2)    • INJECTION OF MEDICATION  04/14/2016    depo medrol ( methylprednisone) (20)   • OOPHORECTOMY     • OTHER SURGICAL HISTORY      hysterosope procedure   • RIGHT OOPHORECTOMY     • ROTATOR CUFF REPAIR      Right   • SHOULDER ARTHROSCOPY Right 2/6/2017    Procedure: RIGHT SHOULDER ARTHROSCOPY SUBACROMIAL DECOMPRESSION, ROTATOR CUFF REPAIR   ;  Surgeon: Terrence Haines MD;  Location: [facility];  Service:    • SHOULDER SURGERY      Excision of 4.8 cm mass with primary intermediate closure.)   03/30/2012    • SPINAL FUSION      L4 L5   • TOTAL KNEE ARTHROPLASTY Right 11/13/2019    Procedure: RIGHT TOTAL KNEE ARTHROPLASTY  with adductor canal block;  Surgeon: Terrence Haines MD;  Location: [facility];  Service: Orthopedics         Family History   Problem Relation Age of Onset   • Heart disease Other    • Hypertension Other    • COPD Mother          Social History     Socioeconomic History   • Marital status:    Tobacco Use   • Smoking status: Never Smoker   • Smokeless tobacco: Never Used   Substance and Sexual Activity   • Alcohol use: No   • Drug use: No   • Sexual activity: Defer         Current Outpatient Medications   Medication Sig Dispense Refill   • amLODIPine (NORVASC) 10 MG tablet Take 1 tablet by mouth Daily. 90 tablet 3   • B-D ULTRA-FINE 33 LANCETS misc Use 1 x daily , one touch ultra please 90 each 3   • Biotin 41227 MCG tablet Take 1 tablet by mouth Daily.    "  • cetirizine (zyrTEC) 10 MG tablet Take 10 mg by mouth Daily.     • Cholecalciferol ( VITAMIN D3) 25 MCG (1000 UT) tablet Take 1,000 Units by mouth Daily.     • dorzolamide-timolol (COSOPT) 22.3-6.8 MG/ML ophthalmic solution Administer 1 drop to both eyes 2 (Two) Times a Day.     • empagliflozin (Jardiance) 10 MG tablet tablet Take 1 tablet by mouth Daily. Before bkfast 30 tablet 11   • Ferrous Sulfate (IRON) 325 (65 Fe) MG tablet Take 1 tablet by mouth Daily.     • Fluzone High-Dose Quadrivalent 0.7 ML suspension prefilled syringe PHARMACY ADMINISTERED     • gabapentin (NEURONTIN) 100 MG capsule Take 1 capsule by mouth 3 (Three) Times a Day. For diabetic neuropathy 270 capsule 1   • glucose blood (ONE TOUCH ULTRA TEST) test strip Use to test blood sugar twice a day. ICD E11.9 100 each 5   • lisinopril (PRINIVIL,ZESTRIL) 30 MG tablet TAKE 1 TABLET BY MOUTH EVERY DAY 90 tablet 3   • lovastatin (MEVACOR) 20 MG tablet One tablet po qhs 90 tablet 3   • metFORMIN ER (GLUCOPHAGE-XR) 500 MG 24 hr tablet TAKE 1 TABLET BY MOUTH TWICE A DAY WITH MEALS 180 tablet 3   • omeprazole (PrilOSEC) 20 MG capsule Take 1 capsule by mouth Daily. 90 capsule 3   • fluconazole (DIFLUCAN) 150 MG tablet Take 1 tablet day of infection and 1 tablet 3 days later 2 tablet 6     No current facility-administered medications for this visit.         OBJECTIVE    /68 (BP Location: Left arm, Patient Position: Sitting, Cuff Size: Adult)   Pulse 68   Temp 96.4 °F (35.8 °C)   Ht 162.6 cm (64\")   Wt 69.3 kg (152 lb 11.2 oz)   LMP 02/06/1980 (Approximate)   SpO2 97%   BMI 26.21 kg/m²         Review of Systems : The following systems were reviewed and changes noted as indicated below    Constitutional:  Denies recent weight loss, weight gain, fever or chills, no change in exercise tolerance     HENT:  Denies any hearing loss, epistaxis, hoarseness, or difficulty speaking.     Eyes: Wears eyeglasses or contact lenses     Respiratory: No " dyspnea with exertion,no cough, wheezing, or hemoptysis.     Cardiovascular: Negative for palpations, chest pain, orthopnea, PND.     Gastrointestinal:  Denies change in bowel habits, dyspepsia, ulcer disease, hematochezia, or melena.     Endocrine: Negative for cold intolerance, heat intolerance, polydipsia, polyphagia and polyuria. Diabetes Mellitus not under good control    Genitourinary: Negative.      Musculoskeletal: DJD, Fibromyalgia    Neurological:  Denies any history of recurrent headaches, strokes, TIA, or seizure disorder.     Hematological: Denies any food allergies, seasonal allergies, bleeding disorders, or lymphadenopathy.     Psychiatric/Behavioral: Denies any history of depression, substance abuse, or change in cognitive function.     Physical Exam : The following systems were reassessed and no changes noted    Constitutional: Cooperative, alert and oriented,  in no acute distress.     HENT:   Head: Normocephalic, normal hair patterns, no masses or tenderness.  Ears, Nose, and Throat: No gross abnormalities. No pallor or cyanosis.   Eyes: EOMS intact, PERRL, conjunctivae and lids unremarkable. Fundoscopic exam and visual fields not performed.   Neck: No palpable masses or adenopathy, no thyromegaly, no JVD, carotid pulses are full and equal bilaterally and without  Bruits.     Cardiovascular: Regular rhythm, S1 and S2 normal, no S3 or S4.  No murmurs, gallops, or rubs detected.     Pulmonary/Chest: Chest: normal symmetry,  normal respiratory excursion, no intercostal retraction, no use of accessory muscles.            Pulmonary: Normal breath sounds. No rales or ronchi.    Abdominal: Abdomen soft, bowel sounds normoactive, no masses, no hepatosplenomegaly, non-tender, no bruits.     Musculoskeletal: No deformities, clubbing, cyanosis, erythema, or edema observed.  Reticular veins noted in upper part of the legs on both sides.    Neurological: No gross motor or sensory deficits noted, affect  appropriate, oriented to time, person, place.     Skin: Warm and dry to the touch, no apparent skin lesions or masses noted.     Psychiatric: She has a normal mood and affect. Her behavior is normal. Judgment and thought content normal.         Procedures      Lab Results   Component Value Date    WBC 7.49 06/29/2021    HGB 12.2 06/29/2021    HCT 35.6 06/29/2021    MCV 88.3 06/29/2021     06/29/2021     Lab Results   Component Value Date    GLUCOSE 92 06/29/2021    BUN 23 06/29/2021    CREATININE 0.83 06/29/2021    EGFRIFNONA 67 06/29/2021    BCR 27.7 (H) 06/29/2021    CO2 21.8 (L) 06/29/2021    CALCIUM 9.7 06/29/2021    ALBUMIN 4.70 06/29/2021    AST 22 06/29/2021    ALT 14 06/29/2021     Lab Results   Component Value Date    CHOL 158 06/29/2021    CHOL 115 01/08/2021    CHOL 156 11/02/2020     Lab Results   Component Value Date    TRIG 162 (H) 06/29/2021    TRIG 145 01/08/2021    TRIG 71 11/02/2020     Lab Results   Component Value Date    HDL 46 06/29/2021    HDL 43 01/08/2021    HDL 62 (H) 11/02/2020     No components found for: LDLCALC  Lab Results   Component Value Date    LDL 84 06/29/2021    LDL 47 01/08/2021    LDL 80 11/02/2020     No results found for: HDLLDLRATIO  No components found for: CHOLHDL  Lab Results   Component Value Date    HGBA1C 5.5 07/20/2021     Lab Results   Component Value Date    TSH 4.020 06/29/2021    THYROIDAB <6 02/18/2019           ASSESSMENT AND PLAN  Mrs. Haley has no clinical evidence of angina, arrhythmia or congestive heart failure.  I have continued her current antihypertensive therapy with amlodipine, lisinopril and lipid-lowering therapy with lovastatin has been continued.  In June 2021 her LDL was 84 and HDL 46.    Diagnoses and all orders for this visit:    1. Essential hypertension (Primary)  -     ECG 12 Lead    2. Atherosclerosis of native coronary artery of native heart without angina pectoris        Patient's Body mass index is 26.21 kg/m².  BMI within normal  limits  Patient is a non-smoker.    Manju Ramirez MD  11/17/2021  11:26 CST

## 2021-11-19 LAB
QT INTERVAL: 404 MS
QTC INTERVAL: 429 MS

## 2021-12-21 NOTE — PROGRESS NOTES
The IOP is in the target range and disc is stable but more concerning OD vs OS. baseline RNFL OCT today.  follow with serial analysis.  recommend get baseline HVF in near future. Thelma Haley is a 72 y.o. female returns for     Chief Complaint   Patient presents with   • Right Knee - Follow-up   • Left Knee - Follow-up       HISTORY OF PRESENT ILLNESS: Patient follows up for bilateral knee o/a. She would like to be evaluated for steroid injections.        CONCURRENT MEDICAL HISTORY:    Past Medical History:   Diagnosis Date   • Abnormal results of thyroid function studies    • Acute pain of left shoulder    • Acute pain of right shoulder    • Allergic rhinitis due to pollen    • Asthma      uncomplicated      • Cataract    • Chronic rhinitis    • Essential hypertension    • Extrinsic asthma with status asthmaticus    • Glaucoma    • Hyperlipidemia    • Impingement syndrome of right shoulder    • Neuropathy    • Nuclear senile cataract    • Obesity    • Overweight with body mass index (BMI) 25.0-29.9    • Primary fibromyalgia syndrome    • Primary open angle glaucoma    • Rosacea    • Sinus headache    • Temporomandibular joint disorder        Allergies   Allergen Reactions   • Ciprofloxacin    • Hydrocodone    • Lortab [Hydrocodone-Acetaminophen]    • Metronidazole    • Ultram [Tramadol Hcl]          Current Outpatient Prescriptions:   •  albuterol (PROVENTIL HFA;VENTOLIN HFA) 108 (90 BASE) MCG/ACT inhaler, Inhale 2 puffs Every 4 (Four) Hours As Needed for wheezing., Disp: 6.7 g, Rfl: 11  •  albuterol (VENTOLIN HFA) 108 (90 BASE) MCG/ACT inhaler, 2 puffs every 4 hours as needed for breathing, Disp: 1 inhaler, Rfl: 5  •  ASPIRIN PO, Take  by mouth., Disp: , Rfl:   •  Biotin (BIOTIN MAXIMUM STRENGTH) 10 MG tablet, Take 10 mg by mouth Daily., Disp: , Rfl:   •  budesonide-formoterol (SYMBICORT) 160-4.5 MCG/ACT inhaler, 2 puffs twice a day, Disp: 1 inhaler, Rfl: 5  •  budesonide-formoterol (SYMBICORT) 80-4.5 MCG/ACT inhaler, Inhale., Disp: , Rfl:   •  CETIRIZINE HCL PO, Take 10 mg by mouth Daily., Disp: , Rfl:   •  Cholecalciferol (VITAMIN D3) 1000 UNITS capsule, Take 1,000 Units by mouth  Daily., Disp: , Rfl:   •  cyclobenzaprine (FLEXERIL) 5 MG tablet, Take 1 tablet by mouth 3 (Three) Times a Day As Needed for Muscle Spasms., Disp: 15 tablet, Rfl: 0  •  dorzolamide-timolol (COSOPT) 22.3-6.8 MG/ML ophthalmic solution, INSTILL 1 DROP INTO EACH EYE BID, Disp: , Rfl: 0  •  fluticasone (FLONASE) 50 MCG/ACT nasal spray, 2 sprays into each nostril Daily. Administer 2 sprays in each nostril for each dose., Disp: 1 each, Rfl: 5  •  gabapentin (NEURONTIN) 100 MG capsule, Take 1 capsule by mouth 3 (Three) Times a Day., Disp: 90 capsule, Rfl: 2  •  glucose blood test strip, Use as instructed, Disp: 100 each, Rfl: 12  •  glucose monitoring kit (FREESTYLE) monitoring kit, 1 each As Needed (diabetes)., Disp: 1 each, Rfl: 2  •  hydrochlorothiazide (HYDRODIURIL) 25 MG tablet, Take 1 tablet by mouth Daily., Disp: 90 tablet, Rfl: 3  •  lisinopril (PRINIVIL,ZESTRIL) 5 MG tablet, Take 1 tablet by mouth Daily., Disp: 90 tablet, Rfl: 3  •  LORazepam (ATIVAN) 1 MG tablet, Take 1 tablet by mouth Every 8 (Eight) Hours As Needed for Anxiety., Disp: 2 tablet, Rfl: 0  •  lovastatin (MEVACOR) 20 MG tablet, TAKE 1 TABLET BY MOUTH EVERY NIGHT AT BEDTIME, Disp: 90 tablet, Rfl: 1  •  methIMAzole (TAPAZOLE) 5 MG tablet, Take 2.5 mg by mouth Daily., Disp: , Rfl:   •  MethylPREDNISolone (MEDROL, HARDY,) 4 MG tablet, Take as directed on package instructions., Disp: 21 tablet, Rfl: 0  •  metroNIDAZOLE (METROCREAM) 0.75 % cream, APPLY TOPICALLY BID FOR ROSACEA, Disp: , Rfl: 2  •  minocycline (MINOCIN,DYNACIN) 100 MG capsule, TK 1 C PO QD, Disp: , Rfl: 2  •  omeprazole (priLOSEC) 20 MG capsule, Take 20 mg by mouth., Disp: , Rfl:   •  ONETOUCH DELICA LANCETS 33G misc, 1 application 2 (Two) Times a Day As Needed (for sugars)., Disp: 100 each, Rfl: 12  •  SITagliptin-MetFORMIN HCl ER  MG tablet sustained-release 24 hour, Take 1 tablet by mouth Daily., Disp: 30 tablet, Rfl: 4    Past Surgical History:   Procedure Laterality Date   •  CARPAL TUNNEL RELEASE      Bilateral carpal tunnel release.)   03/27/2000    • CATARACT EXTRACTION      Bilateral   • INJECTION OF MEDICATION  06/17/2015    celestone(betamethasone) (2)    • INJECTION OF MEDICATION  04/14/2016    depo medrol ( methylprednisone) (20)   • OTHER SURGICAL HISTORY      hysterosope procedure   • RIGHT OOPHORECTOMY     • ROTATOR CUFF REPAIR      Right   • SHOULDER ARTHROSCOPY Right 2/6/2017    Procedure: RIGHT SHOULDER ARTHROSCOPY SUBACROMIAL DECOMPRESSION, ROTATOR CUFF REPAIR   ;  Surgeon: Terrence Haines MD;  Location: [facility];  Service:    • SHOULDER SURGERY      Excision of 4.8 cm mass with primary intermediate closure.)   03/30/2012    • SPINAL FUSION         ROS  No fevers or chills.  No chest pain or shortness of air.  No GI or  disturbances.    PHYSICAL EXAMINATION:       [facility] 02/06/1980 (Approximate)    Physical Exam   Constitutional: She is oriented to person, place, and time. Vital signs are normal. She appears well-developed and well-nourished. She is cooperative.   HENT:   Head: Normocephalic and atraumatic.   Neck: Trachea normal and phonation normal.   Pulmonary/Chest: Effort normal. No respiratory distress.   Abdominal: Soft. Normal appearance. She exhibits no distension.   Musculoskeletal:        Right knee: She exhibits no effusion.        Left knee: She exhibits no effusion.   Neurological: She is alert and oriented to person, place, and time. GCS eye subscore is 4. GCS verbal subscore is 5. GCS motor subscore is 6.   Skin: Skin is warm, dry and intact.   Psychiatric: She has a normal mood and affect. Her speech is normal and behavior is normal. Judgment and thought content normal. Cognition and memory are normal.   Vitals reviewed.      GAIT:     []  Normal  [x]  Antalgic    Assistive device: []  None  []  Walker     []  Crutches  []  Cane     []  Wheelchair  []  Stretcher    Right Knee Exam     Tenderness   The patient is experiencing tenderness in the medial  joint line.    Range of Motion   Extension: normal   Flexion: abnormal     Other   Erythema: absent  Scars: absent  Sensation: normal  Pulse: present  Swelling: mild  Other tests: no effusion present      Left Knee Exam     Tenderness   The patient is experiencing tenderness in the medial joint line.    Range of Motion   Extension: normal   Flexion: abnormal     Other   Erythema: absent  Sensation: normal  Pulse: present  Swelling: mild  Effusion: no effusion present              Large Joint Arthrocentesis  Date/Time: 10/19/2017 8:10 AM  Consent given by: patient  Site marked: site marked  Timeout: Immediately prior to procedure a time out was called to verify the correct patient, procedure, equipment, support staff and site/side marked as required   Supporting Documentation  Indications: pain   Procedure Details  Location: knee - R knee  Preparation: Patient was prepped and draped in the usual sterile fashion  Needle size: 22 G  Approach: anteromedial  Medications administered: 40 mg triamcinolone acetonide 40 MG/ML; 2 mL lidocaine 1 %  Patient tolerance: patient tolerated the procedure well with no immediate complications    Large Joint Arthrocentesis  Date/Time: 10/19/2017 8:10 AM  Consent given by: patient  Site marked: site marked  Timeout: Immediately prior to procedure a time out was called to verify the correct patient, procedure, equipment, support staff and site/side marked as required   Supporting Documentation  Indications: pain   Procedure Details  Location: knee - L knee  Preparation: Patient was prepped and draped in the usual sterile fashion  Needle size: 22 G  Approach: anteromedial  Medications administered: 40 mg triamcinolone acetonide 40 MG/ML; 2 mL lidocaine 1 %  Patient tolerance: patient tolerated the procedure well with no immediate complications                  ASSESSMENT:    Diagnoses and all orders for this visit:    Primary osteoarthritis of both knees  -     Large Joint  Arthrocentesis  -     Large Joint Arthrocentesis    Other orders  -     omeprazole (priLOSEC) 20 MG capsule; Take 20 mg by mouth.          PLAN  Repeated the intra-articular injections of steroids today and recommended progression range of motion and activity as tolerated based on pain.  No Follow-up on file.    JUAN Hathaway

## 2021-12-22 ENCOUNTER — LAB (OUTPATIENT)
Dept: LAB | Facility: HOSPITAL | Age: 76
End: 2021-12-22

## 2021-12-22 PROCEDURE — 82306 VITAMIN D 25 HYDROXY: CPT | Performed by: INTERNAL MEDICINE

## 2021-12-22 PROCEDURE — 82570 ASSAY OF URINE CREATININE: CPT | Performed by: INTERNAL MEDICINE

## 2021-12-22 PROCEDURE — 80061 LIPID PANEL: CPT | Performed by: INTERNAL MEDICINE

## 2021-12-22 PROCEDURE — 36415 COLL VENOUS BLD VENIPUNCTURE: CPT | Performed by: INTERNAL MEDICINE

## 2021-12-22 PROCEDURE — 82043 UR ALBUMIN QUANTITATIVE: CPT | Performed by: INTERNAL MEDICINE

## 2021-12-22 PROCEDURE — 85025 COMPLETE CBC W/AUTO DIFF WBC: CPT | Performed by: INTERNAL MEDICINE

## 2021-12-22 PROCEDURE — 82607 VITAMIN B-12: CPT | Performed by: INTERNAL MEDICINE

## 2021-12-22 PROCEDURE — 80053 COMPREHEN METABOLIC PANEL: CPT | Performed by: INTERNAL MEDICINE

## 2021-12-22 PROCEDURE — 84443 ASSAY THYROID STIM HORMONE: CPT | Performed by: INTERNAL MEDICINE

## 2021-12-22 PROCEDURE — 83036 HEMOGLOBIN GLYCOSYLATED A1C: CPT | Performed by: INTERNAL MEDICINE

## 2021-12-23 LAB
25(OH)D3 SERPL-MCNC: 53.8 NG/ML (ref 30–100)
ALBUMIN SERPL-MCNC: 4.7 G/DL (ref 3.5–5.2)
ALBUMIN UR-MCNC: 3.9 MG/DL
ALBUMIN/GLOB SERPL: 1.7 G/DL
ALP SERPL-CCNC: 59 U/L (ref 39–117)
ALT SERPL W P-5'-P-CCNC: 10 U/L (ref 1–33)
ANION GAP SERPL CALCULATED.3IONS-SCNC: 11.8 MMOL/L (ref 5–15)
AST SERPL-CCNC: 20 U/L (ref 1–32)
BASOPHILS # BLD AUTO: 0.05 10*3/MM3 (ref 0–0.2)
BASOPHILS NFR BLD AUTO: 0.7 % (ref 0–1.5)
BILIRUB SERPL-MCNC: 0.3 MG/DL (ref 0–1.2)
BUN SERPL-MCNC: 27 MG/DL (ref 8–23)
BUN/CREAT SERPL: 23.7 (ref 7–25)
CALCIUM SPEC-SCNC: 9.7 MG/DL (ref 8.6–10.5)
CHLORIDE SERPL-SCNC: 107 MMOL/L (ref 98–107)
CHOLEST SERPL-MCNC: 138 MG/DL (ref 0–200)
CO2 SERPL-SCNC: 19.2 MMOL/L (ref 22–29)
CREAT SERPL-MCNC: 1.14 MG/DL (ref 0.57–1)
CREAT UR-MCNC: 197.8 MG/DL
DEPRECATED RDW RBC AUTO: 41.5 FL (ref 37–54)
EOSINOPHIL # BLD AUTO: 0.16 10*3/MM3 (ref 0–0.4)
EOSINOPHIL NFR BLD AUTO: 2.1 % (ref 0.3–6.2)
ERYTHROCYTE [DISTWIDTH] IN BLOOD BY AUTOMATED COUNT: 12.9 % (ref 12.3–15.4)
GFR SERPL CREATININE-BSD FRML MDRD: 46 ML/MIN/1.73
GLOBULIN UR ELPH-MCNC: 2.7 GM/DL
GLUCOSE SERPL-MCNC: 118 MG/DL (ref 65–99)
HBA1C MFR BLD: 6.03 % (ref 4.8–5.6)
HCT VFR BLD AUTO: 34 % (ref 34–46.6)
HDLC SERPL-MCNC: 42 MG/DL (ref 40–60)
HGB BLD-MCNC: 11.5 G/DL (ref 12–15.9)
IMM GRANULOCYTES # BLD AUTO: 0.02 10*3/MM3 (ref 0–0.05)
IMM GRANULOCYTES NFR BLD AUTO: 0.3 % (ref 0–0.5)
LDLC SERPL CALC-MCNC: 71 MG/DL (ref 0–100)
LDLC/HDLC SERPL: 1.62 {RATIO}
LYMPHOCYTES # BLD AUTO: 1.39 10*3/MM3 (ref 0.7–3.1)
LYMPHOCYTES NFR BLD AUTO: 18.1 % (ref 19.6–45.3)
MCH RBC QN AUTO: 30.3 PG (ref 26.6–33)
MCHC RBC AUTO-ENTMCNC: 33.8 G/DL (ref 31.5–35.7)
MCV RBC AUTO: 89.5 FL (ref 79–97)
MICROALBUMIN/CREAT UR: 19.7 MG/G
MONOCYTES # BLD AUTO: 0.5 10*3/MM3 (ref 0.1–0.9)
MONOCYTES NFR BLD AUTO: 6.5 % (ref 5–12)
NEUTROPHILS NFR BLD AUTO: 5.54 10*3/MM3 (ref 1.7–7)
NEUTROPHILS NFR BLD AUTO: 72.3 % (ref 42.7–76)
NRBC BLD AUTO-RTO: 0 /100 WBC (ref 0–0.2)
PLATELET # BLD AUTO: 326 10*3/MM3 (ref 140–450)
PMV BLD AUTO: 12.6 FL (ref 6–12)
POTASSIUM SERPL-SCNC: 5 MMOL/L (ref 3.5–5.2)
PROT SERPL-MCNC: 7.4 G/DL (ref 6–8.5)
RBC # BLD AUTO: 3.8 10*6/MM3 (ref 3.77–5.28)
SODIUM SERPL-SCNC: 138 MMOL/L (ref 136–145)
TRIGL SERPL-MCNC: 140 MG/DL (ref 0–150)
TSH SERPL DL<=0.05 MIU/L-ACNC: 3.08 UIU/ML (ref 0.27–4.2)
VIT B12 BLD-MCNC: 289 PG/ML (ref 211–946)
VLDLC SERPL-MCNC: 25 MG/DL (ref 5–40)
WBC NRBC COR # BLD: 7.66 10*3/MM3 (ref 3.4–10.8)

## 2022-01-19 ENCOUNTER — OFFICE VISIT (OUTPATIENT)
Dept: ENDOCRINOLOGY | Facility: CLINIC | Age: 77
End: 2022-01-19

## 2022-01-19 VITALS
HEART RATE: 76 BPM | BODY MASS INDEX: 26.8 KG/M2 | DIASTOLIC BLOOD PRESSURE: 50 MMHG | OXYGEN SATURATION: 97 % | SYSTOLIC BLOOD PRESSURE: 120 MMHG | WEIGHT: 157 LBS | HEIGHT: 64 IN

## 2022-01-19 DIAGNOSIS — N18.31 CHRONIC RENAL FAILURE, STAGE 3A: ICD-10-CM

## 2022-01-19 DIAGNOSIS — G62.9 NEUROPATHY: ICD-10-CM

## 2022-01-19 DIAGNOSIS — E11.9 TYPE 2 DIABETES MELLITUS WITHOUT COMPLICATION, WITHOUT LONG-TERM CURRENT USE OF INSULIN: Primary | ICD-10-CM

## 2022-01-19 DIAGNOSIS — I10 RESISTANT HYPERTENSION: ICD-10-CM

## 2022-01-19 DIAGNOSIS — E05.90 SUBCLINICAL HYPERTHYROIDISM: ICD-10-CM

## 2022-01-19 DIAGNOSIS — E55.9 VITAMIN D DEFICIENCY: ICD-10-CM

## 2022-01-19 PROCEDURE — 99214 OFFICE O/P EST MOD 30 MIN: CPT | Performed by: INTERNAL MEDICINE

## 2022-01-19 RX ORDER — GABAPENTIN 100 MG/1
100 CAPSULE ORAL 3 TIMES DAILY
Qty: 270 CAPSULE | Refills: 1 | Status: SHIPPED | OUTPATIENT
Start: 2022-01-19 | End: 2022-07-20

## 2022-01-19 RX ORDER — LOVASTATIN 20 MG/1
TABLET ORAL
Qty: 90 TABLET | Refills: 3 | Status: SHIPPED | OUTPATIENT
Start: 2022-01-19 | End: 2023-01-17

## 2022-01-19 RX ORDER — METFORMIN HYDROCHLORIDE 500 MG/1
TABLET, EXTENDED RELEASE ORAL
Qty: 180 TABLET | Refills: 3 | Status: SHIPPED | OUTPATIENT
Start: 2022-01-19 | End: 2023-01-20 | Stop reason: SDUPTHER

## 2022-01-19 NOTE — PROGRESS NOTES
" Thelma Haley is a 76 y.o. female who presents for  evaluation of T2DM      HPI   Hyperthyroidism     Duration since 2015    Appears to be Graves with right subcm thyroid nodule     Off methimazole and TSH remains nl     Also t2dm well controlled with nephropathy     Now Hypertension managed by cardiology     TSH now elevated         PE    /50   Pulse 76   Ht 162.6 cm (64\")   Wt 71.2 kg (157 lb)   LMP 02/06/1980 (Approximate)   SpO2 97%   BMI 26.95 kg/m²   AOx3  No visible goiter  Normal Respiratory Effort , Lung CTA  RRR  Positive Edema    Labs    Lab Results   Component Value Date    WBC 7.66 12/22/2021    HGB 11.5 (L) 12/22/2021    HCT 34.0 12/22/2021    MCV 89.5 12/22/2021     12/22/2021     Lab Results   Component Value Date    GLUCOSE 118 (H) 12/22/2021    CALCIUM 9.7 12/22/2021     12/22/2021    K 5.0 12/22/2021    CO2 19.2 (L) 12/22/2021     12/22/2021    BUN 27 (H) 12/22/2021    CREATININE 1.14 (H) 12/22/2021    EGFRIFNONA 46 (L) 12/22/2021    BCR 23.7 12/22/2021    ANIONGAP 11.8 12/22/2021             Assessment/Plan       ICD-10-CM ICD-9-CM   1. Type 2 diabetes mellitus without complication, without long-term current use of insulin (HCC)  E11.9 250.00   2. Resistant hypertension  I10 401.9   3. Subclinical hyperthyroidism  E05.90 242.90   4. Vitamin D deficiency  E55.9 268.9       Glycemic Management:   Lab Results   Component Value Date    HGBA1C 6.03 (H) 12/22/2021    HGBA1C 5.5 07/20/2021    HGBA1C 5.80 (H) 01/08/2021       Metformin 500 mg bid alone is working    In the past janumet but too expensive    Never did trulicity due to fear   Never did jardiance due to cost       Microvascular complications    Renal   Stage 3a   No proteinuria      Stopped naproxen before , renal function improved but now decline  Knee pain --- can't take tramadol , takes acetaminophen   She is on ACE I , didn't start jardiance due to cost  Could add kerendia 10 mg daily but renal fx " declined from last time and K is 5   Wait to see trend and start next appt         Has neuropathic , may take neurontin 100 mg po tid PRN     Lipid Management  Lab Results   Component Value Date    CHOL 138 12/22/2021    CHOL 158 06/29/2021    CHOL 115 01/08/2021     Lab Results   Component Value Date    TRIG 140 12/22/2021    TRIG 162 (H) 06/29/2021    TRIG 145 01/08/2021     Lab Results   Component Value Date    HDL 42 12/22/2021    HDL 46 06/29/2021    HDL 43 01/08/2021     No components found for: LDLCALC  Lab Results   Component Value Date    LDL 71 12/22/2021    LDL 84 06/29/2021    LDL 47 01/08/2021       lovastatin 20 mg qhs         Blood Pressure Management:    Vitals:    01/19/22 1046   BP: 120/50   Pulse: 76   SpO2: 97%     Lab Results   Component Value Date    GLUCOSE 118 (H) 12/22/2021    CALCIUM 9.7 12/22/2021     12/22/2021    K 5.0 12/22/2021    CO2 19.2 (L) 12/22/2021     12/22/2021    BUN 27 (H) 12/22/2021    CREATININE 1.14 (H) 12/22/2021    EGFRIFNONA 46 (L) 12/22/2021    BCR 23.7 12/22/2021    ANIONGAP 11.8 12/22/2021       Per cardiology and Dr. Prieto   I did stop hctz due to decline in renal function    I ruled out Conn's and Pheo              Preventive Care:      nonsmoker    Weight Related:   Wt Readings from Last 3 Encounters:   01/19/22 71.2 kg (157 lb)   11/17/21 69.3 kg (152 lb 11.2 oz)   07/20/21 69.4 kg (153 lb)     Body mass index is 26.95 kg/m².    Aim for 1200 calories daily   Walk 30 min daily     Bone Health      Lab Results   Component Value Date    TOUQ32CI 53.8 12/22/2021    ESWX75CQ 45.1 06/29/2021    PION06DC 50.7 01/08/2021       Lab Results   Component Value Date    CALCIUM 9.7 12/22/2021      vit D 1000 units daily - Continue     Order dxa due to hyperthyroidism - done in 12-16 and nl     calcium 600 mg w supper - Continue     Thyroid Health  Lab Results   Component Value Date    TSH 3.080 12/22/2021     Subclinical hyperthyroidism, US multinodular  goiter, subcm     12-16 small subcm thyroid nodule on the right - inferior     Indication for tx     TSH less than 0.1 and history of osteopenia     Not taking biotin before testing       Lab Results   Component Value Date    TSH 3.080 12/22/2021       We started methimazole and progressively decreased to half a tab every other day and still hypo ---- stop and now nl     Other Diabetes Related Aspects       Lab Results   Component Value Date    [id number] 289 12/22/2021        Dsyphagia  Restart prilosec

## 2022-03-10 DIAGNOSIS — E11.9 TYPE 2 DIABETES MELLITUS WITHOUT COMPLICATION, WITHOUT LONG-TERM CURRENT USE OF INSULIN: ICD-10-CM

## 2022-03-11 RX ORDER — BLOOD SUGAR DIAGNOSTIC
STRIP MISCELLANEOUS
Qty: 100 EACH | Refills: 5 | Status: SHIPPED | OUTPATIENT
Start: 2022-03-11

## 2022-03-16 ENCOUNTER — OFFICE VISIT (OUTPATIENT)
Dept: OBSTETRICS AND GYNECOLOGY | Facility: CLINIC | Age: 77
End: 2022-03-16

## 2022-03-16 VITALS
DIASTOLIC BLOOD PRESSURE: 62 MMHG | BODY MASS INDEX: 27.49 KG/M2 | SYSTOLIC BLOOD PRESSURE: 160 MMHG | HEIGHT: 64 IN | WEIGHT: 161 LBS

## 2022-03-16 DIAGNOSIS — N81.10 PROLAPSE OF ANTERIOR VAGINAL WALL: ICD-10-CM

## 2022-03-16 DIAGNOSIS — N81.4 PROLAPSE OF UTERUS: Primary | ICD-10-CM

## 2022-03-16 DIAGNOSIS — N81.6 PROLAPSE OF POSTERIOR VAGINAL WALL: ICD-10-CM

## 2022-03-16 PROCEDURE — 99203 OFFICE O/P NEW LOW 30 MIN: CPT | Performed by: STUDENT IN AN ORGANIZED HEALTH CARE EDUCATION/TRAINING PROGRAM

## 2022-03-16 NOTE — PROGRESS NOTES
[facility]  Gynecology  Date of Service: 2022    CC: surgery consult for uterine prolapse    HPI  Thelma Haley is a 76 y.o.  postmenopausal female who presents with complaints of uterine prolapse.    Patient states for the last month has noticed vaginal bulge, thinks her uterus is prolapsing. No trauma or other changes but noticed bulge, like sitting on bowling ball one day. Menopause in , has few hot flashes, no vaginal dryness, no vaginal bleeding since. Does not splint for urination or BM but does have to rock on toilet to empty bladder at times. BM are normal but smaller, per patient.       Denies any vaginal itching, burning, irritation, or discharge. Denies any abnormal uterine bleeding. Has not been sexually active in some time. Denies any urinary symptoms including incontinence, dysuria, frequency, urgency, nocturia.    ROS  Review of Systems   Constitutional: Negative.    HENT: Negative.    Respiratory: Negative.    Cardiovascular: Negative.    Gastrointestinal: Negative.    Genitourinary: Positive for difficulty urinating. Negative for dysuria, vaginal bleeding, vaginal discharge and vaginal pain.        Pelvic pressure, vaginal bulge   Musculoskeletal: Negative.    Skin: Negative.    Psychiatric/Behavioral: Negative.        GYN HISTORY  Menarche: 13  Menopausal  History of STIs: denies  Last pap smear:   Last mammo , declines breast exam or referral for mammo  Last Completed Pap Smear     This patient has no relevant Health Maintenance data.        Abnormal pap smear history: denies  Contraception: NA       OB HISTORY  OB History    Para Term  AB Living   3 1 0 1 2 0   SAB IAB Ectopic Molar Multiple Live Births                    # Outcome Date GA Lbr Heladio/2nd Weight Sex Delivery Anes PTL Lv   3 AB            2 AB            1          FD   Stillbirth at 8 mos-unknown cause    PAST MEDICAL HISTORY  Past Medical History:   Diagnosis Date   •  Abnormal results of thyroid function studies    • Allergic rhinitis due to pollen    • Arthritis    • Asthma      uncomplicated      • Cataract    • Chronic rhinitis    • Diabetes mellitus (HCC)    • Disease of thyroid gland    • Essential hypertension    • Extrinsic asthma with status asthmaticus    • Glaucoma    • Hyperlipidemia    • Impingement syndrome of right shoulder    • Injury of back     L4 L5 bulging disc   • Neuropathy    • Nuclear senile cataract    • Overweight with body mass index (BMI) 25.0-29.9    • Primary fibromyalgia syndrome    • Primary open angle glaucoma    • Rosacea    • Temporomandibular joint disorder      PAST SURGICAL HISTORY  Past Surgical History:   Procedure Laterality Date   • BACK SURGERY     • CARPAL TUNNEL RELEASE      Bilateral carpal tunnel release.)   03/27/2000    • CARPAL TUNNEL RELEASE Bilateral    • CATARACT EXTRACTION      Bilateral   • CERVICAL FUSION  1980   • COLONOSCOPY     • DILATATION AND CURETTAGE     • INJECTION OF MEDICATION  06/17/2015    celestone(betamethasone) (2)    • INJECTION OF MEDICATION  04/14/2016    depo medrol ( methylprednisone) (20)   • OOPHORECTOMY     • OTHER SURGICAL HISTORY      hysterosope procedure   • RIGHT OOPHORECTOMY     • ROTATOR CUFF REPAIR      Right   • SHOULDER ARTHROSCOPY Right 2/6/2017    Procedure: RIGHT SHOULDER ARTHROSCOPY SUBACROMIAL DECOMPRESSION, ROTATOR CUFF REPAIR   ;  Surgeon: Terrence Haines MD;  Location: [facility];  Service:    • SHOULDER SURGERY      Excision of 4.8 cm mass with primary intermediate closure.)   03/30/2012    • SPINAL FUSION      L4 L5   • TOTAL KNEE ARTHROPLASTY Right 11/13/2019    Procedure: RIGHT TOTAL KNEE ARTHROPLASTY  with adductor canal block;  Surgeon: Terrence Haines MD;  Location: [facility];  Service: Orthopedics     FAMILY HISTORY  Family History   Problem Relation Age of Onset   • Heart disease Other    • Hypertension Other    • COPD Mother      SOCIAL HISTORY  Social History      Socioeconomic History   • Marital status:    Tobacco Use   • Smoking status: Never Smoker   • Smokeless tobacco: Never Used   Substance and Sexual Activity   • Alcohol use: No   • Drug use: No   • Sexual activity: Defer     ALLERGIES  Allergies   Allergen Reactions   • Ciprofloxacin Nausea And Vomiting   • Lortab [Hydrocodone-Acetaminophen] Nausea And Vomiting   • Metronidazole Nausea And Vomiting   • Oxycodone Nausea And Vomiting     Dizziness and doesn't decrease pain   • Ultram [Tramadol Hcl] Nausea And Vomiting     HOME MEDICATIONS  Prior to Admission medications    Medication Sig Start Date End Date Taking? Authorizing Provider   amLODIPine (NORVASC) 10 MG tablet Take 1 tablet by mouth Daily. 5/28/21  Yes Manju Ramirez MD   B-D ULTRA-FINE 33 LANCETS misc Use 1 x daily , one touch ultra please 7/20/21  Yes Nikolai Briggs MD   Biotin 61886 MCG tablet Take 1 tablet by mouth Daily.   Yes ProviderLela MD   cetirizine (zyrTEC) 10 MG tablet Take 10 mg by mouth Daily.   Yes Lela Denise MD   cholecalciferol (Cholecalciferol) 25 MCG (1000 UT) tablet Take 1,000 Units by mouth Daily.   Yes Lela Denise MD   dorzolamide-timolol (COSOPT) 22.3-6.8 MG/ML ophthalmic solution Administer 1 drop to both eyes 2 (Two) Times a Day.   Yes Lela Denise MD   Ferrous Sulfate (IRON) 325 (65 Fe) MG tablet Take 1 tablet by mouth Daily.   Yes Lela Denise MD   gabapentin (NEURONTIN) 100 MG capsule Take 1 capsule by mouth 3 (Three) Times a Day. For diabetic neuropathy 1/19/22  Yes Nikolai Briggs MD   lisinopril (PRINIVIL,ZESTRIL) 30 MG tablet TAKE 1 TABLET BY MOUTH EVERY DAY 9/21/21  Yes Manju Ramirez MD   lovastatin (MEVACOR) 20 MG tablet One tablet po qhs 1/19/22  Yes Nikolai Briggs MD   metFORMIN ER (GLUCOPHAGE-XR) 500 MG 24 hr tablet TAKE 1 TABLET BY MOUTH TWICE A DAY WITH MEALS 1/19/22  Yes Nikolai Briggs MD  "  OneTouch Ultra test strip USE TO TEST BLOOD SUGAR TWICE A DAY. ICD E11.9 3/11/22  Yes Nikolai Briggs MD   fluconazole (DIFLUCAN) 150 MG tablet Take 1 tablet day of infection and 1 tablet 3 days later 7/20/21   Nikolai Briggs MD   omeprazole (PrilOSEC) 20 MG capsule Take 1 capsule by mouth Daily. 7/20/21   Nikolai Briggs MD     PE  /62 (BP Location: Left arm, Patient Position: Sitting, Cuff Size: Adult)   Ht 162.6 cm (64\")   Wt 73 kg (161 lb)   LMP 02/06/1980 (Approximate)   BMI 27.64 kg/m²        General: Alert, healthy, no distress, well nourished and well developed.  Neurologic: Alert, oriented to person, place, and time.  Gait normal.  Cranial nerves II-XII grossly intact.  HEENT: Normocephalic, atraumatic.  Extraocular muscles intact.  Lungs: Normal respiratory effort.    Abdomen: Soft, non-tender, non-distended,no masses, no hepatosplenomegaly, no hernia.  Skin: No rash, no lesions.  Extremities: No cyanosis, clubbing or edema.  PELVIC EXAM:  External Genitalia/Vulva: Anatomy is normal, no significant redness of labia, mild atrophy consistent with menopausal state, no bulge without valsalva at introitus, no discharge on vulvar tissues, [place]'s and Bartholin's glands are normal, no ulcers, no condylomatous lesions.  Urethral meatus: Normal, no lesions, .  Urethra: Normal, no masses, no tenderness with palpation.  Bladder: Normal, no fullness, no masses, no tenderness with palpation. Stage II anterior vaginal wall and uterine prolapse  Vagina: Vaginal tissues are not inflamed, normal color and texture, no significant discharge present.  Stage II anterior vaginal wall and uterine prolapse; Stage I posterior vaginal wall prolapse  Cervix: Normal, no lesions, atrophic, no purulent discharge, no cervical motion tenderness. Stage II anterior vaginal wall and uterine prolapse  Uterus: Normal size, shape, and consistency.  Good mobility noted.  Stage II anterior vaginal wall " and uterine prolapse  Adnexa: Normal size and shape bilaterally, no palpable mass bilaterally and non-tender bilaterally.  Rectal: MARCY deferred.    IMPRESSION  Thelma Haley is a 76 y.o.  presenting with Stage II anterior vaginal wall and uterine prolapse, Stage I posterior vaginal wall prolapse, desiring ultimately pessary trial for management-referred to Dr. Dixon.    PLAN    Stage II Prolapse Anterior vaginal wall and Uterus, Stage I prolapse posterior vaginal wall  - Discussed with patient diagnosis and options:  1) Expectant management-do not have to treat if not significantly affecting quality of life, no recurrent UTI, vaginal infection, vaginal bleeding, or other problems>>unacceptable to patient as bulge is uncomfortable and bothersome, having to rock on toilet to void  2) Management with pessary-discussed placement of pessary can be sufficient in some women to relieve symptoms, can get urinary incontinence with replacement of uterus/bladder to anatomic position, need for cleaning/pessary maintenance  3) Surgical management-could refer to urogynecologist in [place], potentially [place], or [place] to consider hysterectomy and vaginal vault suspension  - Patient does not desire surgical management if not necessary but also does not desire expectant management as symptoms are bothersome  - She is interested in pessary; discussed I do not place these but referred to my partner Dr. Dixon to see if patient would be a candidate and if they can find a pessary that would fit and improve patient's symptoms           This document has been electronically signed by Joann Cook DO on 2022 22:52 CDT

## 2022-04-04 RX ORDER — AMLODIPINE BESYLATE 10 MG/1
TABLET ORAL
Qty: 90 TABLET | Refills: 3 | Status: SHIPPED | OUTPATIENT
Start: 2022-04-04 | End: 2022-07-20

## 2022-04-05 ENCOUNTER — OFFICE VISIT (OUTPATIENT)
Dept: OBSTETRICS AND GYNECOLOGY | Facility: CLINIC | Age: 77
End: 2022-04-05

## 2022-04-05 VITALS
HEIGHT: 64 IN | BODY MASS INDEX: 27.14 KG/M2 | WEIGHT: 159 LBS | DIASTOLIC BLOOD PRESSURE: 70 MMHG | SYSTOLIC BLOOD PRESSURE: 146 MMHG

## 2022-04-05 DIAGNOSIS — N95.2 VAGINAL ATROPHY: ICD-10-CM

## 2022-04-05 DIAGNOSIS — N81.2 UTEROVAGINAL PROLAPSE, INCOMPLETE: Primary | ICD-10-CM

## 2022-04-05 PROCEDURE — 57160 INSERT PESSARY/OTHER DEVICE: CPT | Performed by: OBSTETRICS & GYNECOLOGY

## 2022-04-05 PROCEDURE — 99214 OFFICE O/P EST MOD 30 MIN: CPT | Performed by: OBSTETRICS & GYNECOLOGY

## 2022-04-05 PROCEDURE — A4562 PESSARY, NON RUBBER,ANY TYPE: HCPCS | Performed by: OBSTETRICS & GYNECOLOGY

## 2022-04-05 NOTE — PATIENT INSTRUCTIONS
Use pea-sized amount of Premarin cream, place vaginally as high up as possible in the vagina, 2 times per week at night.

## 2022-04-05 NOTE — PROGRESS NOTES
[facility]  Gynecology  Date of Service: 2022    CC: Pessary fitting    HPI  Thelma Haley is a 76 y.o.  postmenopausal female who presents for pessary fitting.      She saw Dr. Cook last month due to uterine prolapse.  She declined surgical intervention and desired pessary therapy.    ROS  Review of Systems   Constitutional: Negative.    HENT: Negative.    Eyes: Negative.    Respiratory: Negative.    Cardiovascular: Positive for leg swelling.   Gastrointestinal: Negative.    Endocrine: Negative.    Genitourinary: Negative.         +vaginal prolapse   Musculoskeletal: Positive for arthralgias, back pain, myalgias and neck pain.   Skin: Negative.    Allergic/Immunologic: Negative.    Neurological: Negative.    Hematological: Negative.    Psychiatric/Behavioral: Negative.      OB HISTORY  OB History    Para Term  AB Living   3 1 0 1 2 0   SAB IAB Ectopic Molar Multiple Live Births                    # Outcome Date GA Lbr Heladio/2nd Weight Sex Delivery Anes PTL Lv   3 AB            2 AB            1       Vag-Spont   FD     PAST MEDICAL HISTORY  Past Medical History:   Diagnosis Date   • Allergic rhinitis due to pollen    • Arthritis    • Asthma      uncomplicated      • Cataract    • Chronic rhinitis    • Diabetes mellitus (HCC)    • Disease of thyroid gland    • Essential hypertension    • Extrinsic asthma with status asthmaticus    • Glaucoma    • Hyperlipidemia    • Impingement syndrome of right shoulder    • Injury of back     L4 L5 bulging disc   • Neuropathy    • Nuclear senile cataract    • Overweight with body mass index (BMI) 25.0-29.9    • Primary fibromyalgia syndrome    • Primary open angle glaucoma    • Rosacea    • Temporomandibular joint disorder      PAST SURGICAL HISTORY  Past Surgical History:   Procedure Laterality Date   • BACK SURGERY     • CARPAL TUNNEL RELEASE      Bilateral carpal tunnel release.)   2000    • CARPAL TUNNEL  RELEASE Bilateral    • CATARACT EXTRACTION      Bilateral   • CERVICAL FUSION  1980   • COLONOSCOPY     • D & C HYSTEROSCOPY     • RIGHT OOPHORECTOMY     • ROTATOR CUFF REPAIR      Right   • SHOULDER ARTHROSCOPY Right 02/06/2017    Procedure: RIGHT SHOULDER ARTHROSCOPY SUBACROMIAL DECOMPRESSION, ROTATOR CUFF REPAIR   ;  Surgeon: Terrence Haines MD;  Location: [facility];  Service:    • SHOULDER SURGERY      Excision of 4.8 cm mass with primary intermediate closure.)   03/30/2012    • SPINAL FUSION      L4 L5   • TOTAL KNEE ARTHROPLASTY Right 11/13/2019    Procedure: RIGHT TOTAL KNEE ARTHROPLASTY  with adductor canal block;  Surgeon: Terrence Haines MD;  Location: [facility];  Service: Orthopedics     FAMILY HISTORY  Family History   Problem Relation Age of Onset   • Heart disease Other    • Hypertension Other    • COPD Mother      SOCIAL HISTORY  Social History     Socioeconomic History   • Marital status:    Tobacco Use   • Smoking status: Never Smoker   • Smokeless tobacco: Never Used   Substance and Sexual Activity   • Alcohol use: No   • Drug use: No   • Sexual activity: Defer     ALLERGIES  Allergies   Allergen Reactions   • Ciprofloxacin Nausea And Vomiting   • Lortab [Hydrocodone-Acetaminophen] Nausea And Vomiting   • Metronidazole Nausea And Vomiting   • Oxycodone Nausea And Vomiting     Dizziness and doesn't decrease pain   • Ultram [Tramadol Hcl] Nausea And Vomiting     HOME MEDICATIONS  Prior to Admission medications    Medication Sig Start Date End Date Taking? Authorizing Provider   amLODIPine (NORVASC) 10 MG tablet TAKE 1 TABLET BY MOUTH EVERY DAY 4/4/22  Yes Manju Ramirez MD   B-D ULTRA-FINE 33 LANCETS misc Use 1 x daily , one touch ultra please 7/20/21  Yes Nikolai Briggs MD   Biotin 58628 MCG tablet Take 1 tablet by mouth Daily.   Yes ProviderLela MD   cetirizine (zyrTEC) 10 MG tablet Take 10 mg by mouth Daily.   Yes ProviderLela MD  "  cholecalciferol (Cholecalciferol) 25 MCG (1000 UT) tablet Take 1,000 Units by mouth Daily.   Yes Lela Denise MD   dorzolamide-timolol (COSOPT) 22.3-6.8 MG/ML ophthalmic solution Administer 1 drop to both eyes 2 (Two) Times a Day.   Yes Lela Denise MD   Ferrous Sulfate (IRON) 325 (65 Fe) MG tablet Take 1 tablet by mouth Daily.   Yes Lela Denise MD   fluconazole (DIFLUCAN) 150 MG tablet Take 1 tablet day of infection and 1 tablet 3 days later 7/20/21  Yes Nikloai Briggs MD   gabapentin (NEURONTIN) 100 MG capsule Take 1 capsule by mouth 3 (Three) Times a Day. For diabetic neuropathy 1/19/22  Yes Nikolai Briggs MD   lisinopril (PRINIVIL,ZESTRIL) 30 MG tablet TAKE 1 TABLET BY MOUTH EVERY DAY 9/21/21  Yes Manju Ramirez MD   lovastatin (MEVACOR) 20 MG tablet One tablet po qhs 1/19/22  Yes Nikolai Briggs MD   metFORMIN ER (GLUCOPHAGE-XR) 500 MG 24 hr tablet TAKE 1 TABLET BY MOUTH TWICE A DAY WITH MEALS 1/19/22  Yes Nikolai Briggs MD   omeprazole (PrilOSEC) 20 MG capsule Take 1 capsule by mouth Daily. 7/20/21  Yes Nikolai Briggs MD   OneTouch Ultra test strip USE TO TEST BLOOD SUGAR TWICE A DAY. ICD E11.9 3/11/22  Yes Nikolai Briggs MD     PE  /70   Ht 162.6 cm (64\")   Wt 72.1 kg (159 lb)   LMP 02/06/1980 (Approximate)   BMI 27.29 kg/m²        General: Alert, healthy, no distress, well nourished and well developed.  Neurologic: Alert, oriented to person, place, and time.  Gait normal.  Cranial nerves II-XII grossly intact.  HEENT: Normocephalic, atraumatic.  Extraocular muscles intact, pupils equal and reactive times two.    Neck: Supple, no adenopathy, thyroid normal size, non-tender, without nodularity, trachea midline.  Lungs: Normal respiratory effort.  Clear to auscultation bilaterally.  No wheezes, rhonci, or rales.  Heart: Regular rate and rhythm.  No murmer, rub or gallop.  Abdomen: Soft, " non-tender, non-distended,no masses, no hepatosplenomegaly, no hernia.  Skin: No rash, no lesions.  Extremities: No cyanosis, clubbing or edema.  PELVIC EXAM:  External Genitalia/Vulva: Anatomy is normal, no significant redness of labia, no discharge on vulvar tissues, [place] and Bartholin's glands are normal, no ulcers, no condylomatous lesions.  Urethral meatus: Normal, no lesions, no prolapse.  Urethra: Normal, no masses, no tenderness with palpation.  Bladder: Normal, no fullness, no masses, no tenderness with palpation.  Vagina: Vaginal tissues are not inflamed, normal color and texture, no significant discharge present.  Pelvic support adequate.  Cervix: Normal, no lesions, no purulent discharge, no cervical motion tenderness.  Uterus: Normal size, shape, and consistency.  Good mobility noted.  Minimal descent noted with good support.  Adnexa: Normal size and shape bilaterally, no palpable mass bilaterally and non-tender bilaterally.  Rectal: Normal, no masses or polyps, confirms bimanual exam, perianal normal, no lesions; MARCY deferred.    Aa        0 Ba      0 C       -1   GH      3  PB      2 TVL      6   Ap      -2  Bp      -2 D      -4     Patient fitted with pessary ring w/ support #4.  Patient tolerated well.    IMPRESSION  Thelma Haley is a 76 y.o.  presenting with uterovaginal prolapse with prolapse of the anterior and apical jacobo.  Does also have vaginal atrophy.    PLAN    1. Uterovaginal prolapse, incomplete  - Pessary  - RTC 1 month for pessary check; if doing well, recheck q3 months    2. Vaginal atrophy  Discussed vaginal estrogen cream vs coconut oil.  Reviewed pros/cons of each.  Sample of Premarin cream given; instructions: use pea-sized amount of Premarin cream, place vaginally as high up as possible in the vagina, 2 times per week at night.    This document has been electronically signed by Mahi Dixon MD on 2022 10:45 CDT.

## 2022-04-07 ENCOUNTER — TELEPHONE (OUTPATIENT)
Dept: OBSTETRICS AND GYNECOLOGY | Facility: CLINIC | Age: 77
End: 2022-04-07

## 2022-04-07 NOTE — TELEPHONE ENCOUNTER
"Called and spoke with patient.  States that after having pessary placed she has lower back pain and pain and \"fullness in her rectum\". Wanted to know if this was normal.    Spoke with dr christensen and advised patient this can be normal since the pessary was just placed and that she should monitor and if still having concerns on Monday to let us know as we may need to go down a size in the pessary.    Patient verbalized understanding.  "

## 2022-04-07 NOTE — TELEPHONE ENCOUNTER
----- Message from Sadaf Redman sent at 4/6/2022  1:45 PM CDT -----  PESSELZBIETA WAS FITTED YESTERDAY AND SHE HAD SOME QUESTIONS AND CONCERNS REGARDING THE SYMPTOMS SHE IS EXPERIENCING. PLEASE GIVE HER A CALL AT THE NUMBER LISTED IN HER CHART.  THANKS

## 2022-04-11 ENCOUNTER — TELEPHONE (OUTPATIENT)
Dept: OBSTETRICS AND GYNECOLOGY | Facility: CLINIC | Age: 77
End: 2022-04-11

## 2022-04-11 NOTE — TELEPHONE ENCOUNTER
Returned patient call,  States she is having trouble with going to the bathroom having bowel movements and she is hurting when she sits down. States she is unsure if this is from the pessary or not.    Let patient know I spoke with dr christensen and she can try using colace and miralax to help her have a bowel movement, or we can try sizing down her pessary but it may fall out.  Patient verbalized understanding and would like to try colace and miralax.

## 2022-04-11 NOTE — TELEPHONE ENCOUNTER
----- Message from Sabiha Yun MA sent at 4/11/2022  2:58 PM CDT -----  Is still having pain and would like a call back

## 2022-05-03 ENCOUNTER — TELEPHONE (OUTPATIENT)
Dept: OBSTETRICS AND GYNECOLOGY | Facility: CLINIC | Age: 77
End: 2022-05-03

## 2022-05-03 ENCOUNTER — OFFICE VISIT (OUTPATIENT)
Dept: OBSTETRICS AND GYNECOLOGY | Facility: CLINIC | Age: 77
End: 2022-05-03

## 2022-05-03 VITALS — SYSTOLIC BLOOD PRESSURE: 128 MMHG | BODY MASS INDEX: 27.22 KG/M2 | DIASTOLIC BLOOD PRESSURE: 76 MMHG | WEIGHT: 158.6 LBS

## 2022-05-03 DIAGNOSIS — N95.2 VAGINAL ATROPHY: ICD-10-CM

## 2022-05-03 DIAGNOSIS — N89.8 VAGINAL DISCHARGE: ICD-10-CM

## 2022-05-03 DIAGNOSIS — Z46.89 PESSARY MAINTENANCE: Primary | ICD-10-CM

## 2022-05-03 DIAGNOSIS — N81.2 UTEROVAGINAL PROLAPSE, INCOMPLETE: ICD-10-CM

## 2022-05-03 LAB
CANDIDA ALBICANS: NEGATIVE
GARDNERELLA VAGINALIS: NEGATIVE
T VAGINALIS DNA VAG QL PROBE+SIG AMP: NEGATIVE

## 2022-05-03 PROCEDURE — 87660 TRICHOMONAS VAGIN DIR PROBE: CPT | Performed by: OBSTETRICS & GYNECOLOGY

## 2022-05-03 PROCEDURE — 99213 OFFICE O/P EST LOW 20 MIN: CPT | Performed by: OBSTETRICS & GYNECOLOGY

## 2022-05-03 PROCEDURE — 87480 CANDIDA DNA DIR PROBE: CPT | Performed by: OBSTETRICS & GYNECOLOGY

## 2022-05-03 PROCEDURE — 87510 GARDNER VAG DNA DIR PROBE: CPT | Performed by: OBSTETRICS & GYNECOLOGY

## 2022-05-03 NOTE — PROGRESS NOTES
[facility]  Gynecology    CC: Pessary check    Patient presents today for a 1 month pessary check.  She states that she is doing well, no complaints.  She does not use the estrogen cream regularly; she requests to use Vaseline.  She would like to learn how to take it in and out herself.    /76 (BP Location: Left arm, Patient Position: Sitting, Cuff Size: Adult)   Wt 71.9 kg (158 lb 9.6 oz)   LMP 1980 (Approximate)   BMI 27.22 kg/m²   Gen: NAD, AAO x3  : Pessary removed and cleaned.  Vaginal tissues atrophic.  Pessary replaced.    A/P: Thelma Haley is a 76 y.o.  with uterovaginal prolapse.  Tolerating pessary use well.  - Continue #4 pessary ring w/ support  - RTC 3 months for recheck  - Recommend vaginal estrogen cream to treat atrophy.  She would like to use Vaseline; discussed that this would provide some moisture but not treat the underlying issue.  - Taught patient how to take pessary in and out herself.  I offered to have her try to do it herself, she declined.    This document has been electronically signed by Mahi Dixon MD on May 3, 2022 11:50 CDT.

## 2022-05-03 NOTE — TELEPHONE ENCOUNTER
----- Message from Mahi Dixon MD sent at 5/3/2022  2:22 PM CDT -----  Please let her know no vaginal infection. Discharge is likely from cream and vaginal atrophy, which is normal.

## 2022-05-18 ENCOUNTER — LAB (OUTPATIENT)
Dept: LAB | Facility: HOSPITAL | Age: 77
End: 2022-05-18

## 2022-05-18 ENCOUNTER — OFFICE VISIT (OUTPATIENT)
Dept: CARDIOLOGY | Facility: CLINIC | Age: 77
End: 2022-05-18

## 2022-05-18 VITALS
OXYGEN SATURATION: 98 % | SYSTOLIC BLOOD PRESSURE: 124 MMHG | HEART RATE: 75 BPM | DIASTOLIC BLOOD PRESSURE: 76 MMHG | WEIGHT: 153 LBS | HEIGHT: 64 IN | BODY MASS INDEX: 26.12 KG/M2 | TEMPERATURE: 97.5 F

## 2022-05-18 DIAGNOSIS — E78.2 MIXED HYPERLIPIDEMIA: ICD-10-CM

## 2022-05-18 DIAGNOSIS — M79.7 PRIMARY FIBROMYALGIA SYNDROME: ICD-10-CM

## 2022-05-18 DIAGNOSIS — R60.0 BILATERAL LEG EDEMA: ICD-10-CM

## 2022-05-18 DIAGNOSIS — I25.10 ATHEROSCLEROSIS OF NATIVE CORONARY ARTERY OF NATIVE HEART WITHOUT ANGINA PECTORIS: Primary | ICD-10-CM

## 2022-05-18 DIAGNOSIS — I10 ESSENTIAL HYPERTENSION: ICD-10-CM

## 2022-05-18 DIAGNOSIS — E11.9 TYPE 2 DIABETES MELLITUS WITHOUT COMPLICATION, WITHOUT LONG-TERM CURRENT USE OF INSULIN: ICD-10-CM

## 2022-05-18 LAB
ALBUMIN SERPL-MCNC: 4.6 G/DL (ref 3.5–5.2)
ALBUMIN/GLOB SERPL: 1.6 G/DL
ALP SERPL-CCNC: 60 U/L (ref 39–117)
ALT SERPL W P-5'-P-CCNC: 8 U/L (ref 1–33)
ANION GAP SERPL CALCULATED.3IONS-SCNC: 13 MMOL/L (ref 5–15)
AST SERPL-CCNC: 14 U/L (ref 1–32)
BILIRUB SERPL-MCNC: 0.4 MG/DL (ref 0–1.2)
BUN SERPL-MCNC: 31 MG/DL (ref 8–23)
BUN/CREAT SERPL: 22.5 (ref 7–25)
CALCIUM SPEC-SCNC: 9.5 MG/DL (ref 8.6–10.5)
CHLORIDE SERPL-SCNC: 108 MMOL/L (ref 98–107)
CO2 SERPL-SCNC: 20 MMOL/L (ref 22–29)
CREAT SERPL-MCNC: 1.38 MG/DL (ref 0.57–1)
EGFRCR SERPLBLD CKD-EPI 2021: 39.3 ML/MIN/1.73
GLOBULIN UR ELPH-MCNC: 2.8 GM/DL
GLUCOSE SERPL-MCNC: 134 MG/DL (ref 65–99)
NT-PROBNP SERPL-MCNC: 770.3 PG/ML (ref 0–1800)
POTASSIUM SERPL-SCNC: 4.2 MMOL/L (ref 3.5–5.2)
PROT SERPL-MCNC: 7.4 G/DL (ref 6–8.5)
SODIUM SERPL-SCNC: 141 MMOL/L (ref 136–145)

## 2022-05-18 PROCEDURE — 99214 OFFICE O/P EST MOD 30 MIN: CPT | Performed by: INTERNAL MEDICINE

## 2022-05-18 PROCEDURE — 80053 COMPREHEN METABOLIC PANEL: CPT | Performed by: INTERNAL MEDICINE

## 2022-05-18 PROCEDURE — 83880 ASSAY OF NATRIURETIC PEPTIDE: CPT | Performed by: INTERNAL MEDICINE

## 2022-05-18 PROCEDURE — 36415 COLL VENOUS BLD VENIPUNCTURE: CPT | Performed by: INTERNAL MEDICINE

## 2022-05-18 NOTE — PROGRESS NOTES
Thelma Haley  76 y.o. female    1. Atherosclerosis of native coronary artery of native heart without angina pectoris    2. Essential hypertension    3. Mixed hyperlipidemia    4. Primary fibromyalgia syndrome    5. Type 2 diabetes mellitus without complication, without long-term current use of insulin (HCC)    6. Bilateral leg edema        History of Present Illness:  Mrs. Haley is a 76-year-old  lady with hypertension, hyperlipidemia, coronary atherosclerosis and has history of fibromyalgia. She has been evaluated for chest pain in the past by both nuclear stress testing and CT angiogram of the coronary arteries (2015).  She is known to have less than 50% stenosis in the Left Anterior Descending Coronary Artery.    Her predominant complaint is swelling of the lower extremity with a chronic problem initially that it is getting worse.  There is no history of DVT.  She has been on diuretics in the past but it affected her renal function and hence is no longer taking diuretics.  She does use compression stockings on a regular basis.  She denied any chest pain or shortness of breath.  Clinical exam today showed her the heart rate and blood pressure were in the normal range.  No pulmonary congestion was noted.    Echocardiogram on 11/2/2020 showed normal LV systolic function with an EF of 68% with grade 1A diastolic dysfunction.  Right ventricle was borderline dilated.  Mild mitral valve regurgitation and mild tricuspid valve regurgitation was present.       Allergies   Allergen Reactions   • Ciprofloxacin Nausea And Vomiting   • Lortab [Hydrocodone-Acetaminophen] Nausea And Vomiting   • Metronidazole Nausea And Vomiting   • Oxycodone Nausea And Vomiting     Dizziness and doesn't decrease pain   • Ultram [Tramadol Hcl] Nausea And Vomiting         Past Medical History:   Diagnosis Date   • Allergic rhinitis due to pollen    • Arthritis    • Asthma      uncomplicated      • Cataract    • Chronic rhinitis    •  Diabetes mellitus (HCC)    • Disease of thyroid gland    • Essential hypertension    • Extrinsic asthma with status asthmaticus    • Glaucoma    • Hyperlipidemia    • Impingement syndrome of right shoulder    • Injury of back     L4 L5 bulging disc   • Neuropathy    • Nuclear senile cataract    • Overweight with body mass index (BMI) 25.0-29.9    • Primary fibromyalgia syndrome    • Primary open angle glaucoma    • Rosacea    • Temporomandibular joint disorder          Past Surgical History:   Procedure Laterality Date   • BACK SURGERY     • CARPAL TUNNEL RELEASE      Bilateral carpal tunnel release.)   03/27/2000    • CARPAL TUNNEL RELEASE Bilateral    • CATARACT EXTRACTION      Bilateral   • CERVICAL FUSION  1980   • COLONOSCOPY     • D & C HYSTEROSCOPY     • RIGHT OOPHORECTOMY     • ROTATOR CUFF REPAIR      Right   • SHOULDER ARTHROSCOPY Right 02/06/2017    Procedure: RIGHT SHOULDER ARTHROSCOPY SUBACROMIAL DECOMPRESSION, ROTATOR CUFF REPAIR   ;  Surgeon: Terrence Haines MD;  Location: [facility];  Service:    • SHOULDER SURGERY      Excision of 4.8 cm mass with primary intermediate closure.)   03/30/2012    • SPINAL FUSION      L4 L5   • TOTAL KNEE ARTHROPLASTY Right 11/13/2019    Procedure: RIGHT TOTAL KNEE ARTHROPLASTY  with adductor canal block;  Surgeon: Terrence Haines MD;  Location: [facility];  Service: Orthopedics         Family History   Problem Relation Age of Onset   • Heart disease Other    • Hypertension Other    • COPD Mother          Social History     Socioeconomic History   • Marital status:    Tobacco Use   • Smoking status: Never Smoker   • Smokeless tobacco: Never Used   Substance and Sexual Activity   • Alcohol use: No   • Drug use: No   • Sexual activity: Defer         Current Outpatient Medications   Medication Sig Dispense Refill   • amLODIPine (NORVASC) 10 MG tablet TAKE 1 TABLET BY MOUTH EVERY DAY 90 tablet 3   • B-D ULTRA-FINE 33 LANCETS misc Use 1 x daily , one  "touch ultra please 90 each 3   • Biotin 18358 MCG tablet Take 1 tablet by mouth Daily.     • cetirizine (zyrTEC) 10 MG tablet Take 10 mg by mouth Daily.     • cholecalciferol (Cholecalciferol) 25 MCG (1000 UT) tablet Take 1,000 Units by mouth Daily.     • dorzolamide-timolol (COSOPT) 22.3-6.8 MG/ML ophthalmic solution Administer 1 drop to both eyes 2 (Two) Times a Day.     • Ferrous Sulfate (IRON) 325 (65 Fe) MG tablet Take 1 tablet by mouth Daily.     • fluconazole (DIFLUCAN) 150 MG tablet Take 1 tablet day of infection and 1 tablet 3 days later 2 tablet 6   • gabapentin (NEURONTIN) 100 MG capsule Take 1 capsule by mouth 3 (Three) Times a Day. For diabetic neuropathy 270 capsule 1   • lisinopril (PRINIVIL,ZESTRIL) 30 MG tablet TAKE 1 TABLET BY MOUTH EVERY DAY 90 tablet 3   • lovastatin (MEVACOR) 20 MG tablet One tablet po qhs 90 tablet 3   • metFORMIN ER (GLUCOPHAGE-XR) 500 MG 24 hr tablet TAKE 1 TABLET BY MOUTH TWICE A DAY WITH MEALS 180 tablet 3   • omeprazole (PrilOSEC) 20 MG capsule Take 1 capsule by mouth Daily. 90 capsule 3   • OneTouch Ultra test strip USE TO TEST BLOOD SUGAR TWICE A DAY. ICD E11.9 100 each 5     No current facility-administered medications for this visit.         OBJECTIVE    /76 (BP Location: Left arm, Patient Position: Sitting, Cuff Size: Adult)   Pulse 75   Temp 97.5 °F (36.4 °C)   Ht 162.6 cm (64\")   Wt 69.4 kg (153 lb)   LMP 02/06/1980 (Approximate)   SpO2 98%   BMI 26.26 kg/m²         Review of Systems : The following systems were reviewed and changes noted as indicated below    Constitutional:  Denies recent weight loss, weight gain, fever or chills, no change in exercise tolerance     HENT:  Denies any hearing loss, epistaxis, hoarseness, or difficulty speaking.     Eyes: Wears eyeglasses or contact lenses     Respiratory: No dyspnea with exertion,no cough, wheezing, or hemoptysis.     Cardiovascular: Negative for palpations, chest pain, orthopnea, PND. "     Gastrointestinal:  Denies change in bowel habits, dyspepsia, ulcer disease, hematochezia, or melena.     Endocrine: Negative for cold intolerance, heat intolerance, polydipsia, polyphagia and polyuria. Diabetes Mellitus not under good control    Genitourinary: Negative.      Musculoskeletal: DJD, Fibromyalgia.  Has bilateral pitting leg edema    Neurological:  Denies any history of recurrent headaches, strokes, TIA, or seizure disorder.     Hematological: Denies any food allergies, seasonal allergies, bleeding disorders, or lymphadenopathy.     Psychiatric/Behavioral: Denies any history of depression, substance abuse, or change in cognitive function.     Physical Exam : The following systems were reassessed and no changes noted    Constitutional: Cooperative, alert and oriented,  in no acute distress.     HENT:   Head: Normocephalic, normal hair patterns, no masses or tenderness.  Ears, Nose, and Throat: No gross abnormalities. No pallor or cyanosis.   Eyes: EOMS intact, PERRL, conjunctivae and lids unremarkable. Fundoscopic exam and visual fields not performed.   Neck: No palpable masses or adenopathy, no thyromegaly, no JVD, carotid pulses are full and equal bilaterally and without  Bruits.     Cardiovascular: Regular rhythm, S1 and S2 normal, no S3 or S4.  1-2/6 systolic murmur, no gallops, or rubs detected.     Pulmonary/Chest: Chest: normal symmetry,  normal respiratory excursion, no intercostal retraction, no use of accessory muscles.            Pulmonary: Normal breath sounds. No rales or ronchi.    Abdominal: Abdomen soft, bowel sounds normoactive, no masses, no hepatosplenomegaly, non-tender, no bruits.     Musculoskeletal: No deformities, clubbing, cyanosis, erythema, or edema observed.  Reticular veins noted in upper part of the legs on both sides.  1-2+ bilateral pitting edema.  Mild bruising on the skin.    Neurological: No gross motor or sensory deficits noted, affect appropriate, oriented to time,  person, place.     Skin: Warm and dry to the touch, no apparent skin lesions or masses noted.     Psychiatric: She has a normal mood and affect. Her behavior is normal. Judgment and thought content normal.         Procedures      Lab Results   Component Value Date    WBC 7.66 12/22/2021    HGB 11.5 (L) 12/22/2021    HCT 34.0 12/22/2021    MCV 89.5 12/22/2021     12/22/2021     Lab Results   Component Value Date    GLUCOSE 118 (H) 12/22/2021    BUN 27 (H) 12/22/2021    CREATININE 1.14 (H) 12/22/2021    EGFRIFNONA 46 (L) 12/22/2021    BCR 23.7 12/22/2021    CO2 19.2 (L) 12/22/2021    CALCIUM 9.7 12/22/2021    ALBUMIN 4.70 12/22/2021    AST 20 12/22/2021    ALT 10 12/22/2021     Lab Results   Component Value Date    CHOL 138 12/22/2021    CHOL 158 06/29/2021    CHOL 115 01/08/2021     Lab Results   Component Value Date    TRIG 140 12/22/2021    TRIG 162 (H) 06/29/2021    TRIG 145 01/08/2021     Lab Results   Component Value Date    HDL 42 12/22/2021    HDL 46 06/29/2021    HDL 43 01/08/2021     No components found for: LDLCALC  Lab Results   Component Value Date    LDL 71 12/22/2021    LDL 84 06/29/2021    LDL 47 01/08/2021     No results found for: HDLLDLRATIO  No components found for: CHOLHDL  Lab Results   Component Value Date    HGBA1C 6.03 (H) 12/22/2021     Lab Results   Component Value Date    TSH 3.080 12/22/2021    THYROIDAB <6 02/18/2019           ASSESSMENT AND PLAN  Mrs. Haley has bilateral pitting edema of the lower extremities with most likely secondary to side effects of amlodipine combined with some degree of renal insufficiency.  No clinical findings to suggest congestive heart failure.    Her GFR was 46 and December 2021.  Addition of it potentially worsen any function and hence this has been avoided.  She does use compression stockings.  Her blood pressure is in the normal range on her current combination of medicines including lisinopril and amlodipine.  I did discuss changing amlodipine to  one of the other agents but he is satisfied with antihypertensive therapy at this time.    Plan will be to check comprehensive metabolic panel and BNP and will refer her to vascular surgery for suggestions regarding management of leg edema which has been chronic.  Venous ultrasound in the past has not shown significant insufficiency or DVT.  Lipid-lowering therapy with lovastatin has been continued.    Diagnoses and all orders for this visit:    1. Atherosclerosis of native coronary artery of native heart without angina pectoris (Primary)  -     BNP  -     Comprehensive Metabolic Panel    2. Essential hypertension  -     BNP  -     Comprehensive Metabolic Panel    3. Mixed hyperlipidemia    4. Primary fibromyalgia syndrome    5. Type 2 diabetes mellitus without complication, without long-term current use of insulin (HCC)  -     BNP  -     Comprehensive Metabolic Panel    6. Bilateral leg edema  -     BNP  -     Comprehensive Metabolic Panel  -     Ambulatory Referral to Vascular Surgery        Patient's Body mass index is 26.26 kg/m².  BMI within normal limits  Patient is a non-smoker.    Manju Ramirez MD  5/18/2022  10:05 CDT

## 2022-05-19 ENCOUNTER — TELEPHONE (OUTPATIENT)
Dept: CARDIOLOGY | Facility: CLINIC | Age: 77
End: 2022-05-19

## 2022-05-19 DIAGNOSIS — R60.0 EDEMA OF BOTH LEGS: Primary | ICD-10-CM

## 2022-05-19 NOTE — TELEPHONE ENCOUNTER
Blood tests do not suggest congestive heart failure.  Drink plenty of fluids.  Kidney numbers slightly elevated.   Per Dr Lea  Called pt no answer left vm to call office back

## 2022-05-23 ENCOUNTER — TELEPHONE (OUTPATIENT)
Dept: CARDIOLOGY | Facility: CLINIC | Age: 77
End: 2022-05-23

## 2022-06-10 ENCOUNTER — OFFICE VISIT (OUTPATIENT)
Dept: CARDIAC SURGERY | Facility: CLINIC | Age: 77
End: 2022-06-10

## 2022-06-10 VITALS
OXYGEN SATURATION: 99 % | SYSTOLIC BLOOD PRESSURE: 123 MMHG | DIASTOLIC BLOOD PRESSURE: 60 MMHG | HEART RATE: 77 BPM | BODY MASS INDEX: 26.98 KG/M2 | HEIGHT: 64 IN | WEIGHT: 158 LBS

## 2022-06-10 DIAGNOSIS — R60.0 BILATERAL EDEMA OF LOWER EXTREMITY: ICD-10-CM

## 2022-06-10 DIAGNOSIS — I10 RESISTANT HYPERTENSION: Primary | ICD-10-CM

## 2022-06-10 DIAGNOSIS — I87.2 VENOUS STASIS DERMATITIS OF BOTH LOWER EXTREMITIES: ICD-10-CM

## 2022-06-10 PROCEDURE — 99213 OFFICE O/P EST LOW 20 MIN: CPT | Performed by: NURSE PRACTITIONER

## 2022-06-10 RX ORDER — PENTOXIFYLLINE 400 MG/1
400 TABLET, EXTENDED RELEASE ORAL 2 TIMES DAILY WITH MEALS
Qty: 60 TABLET | Refills: 2 | Status: SHIPPED | OUTPATIENT
Start: 2022-06-10 | End: 2022-07-07

## 2022-06-10 NOTE — PATIENT INSTRUCTIONS
Recommend continued use of compression stockings 20-30mmHg daily, may remove at night.  Advised elevation of legs while at rest.  Encouraged daily exercise.    Recommend high protein, low sodium diet <2grams per day    Can try selenium supplementation if desired, discussed with patient that potential benefits of selenium supplementation for improved lymphatic drainage remain controversial    Return 6 months for recheck

## 2022-06-13 NOTE — PROGRESS NOTES
CVTS Office Progress Note     6/13/2022    Thelma Haley  1945    Chief Complaint:    Chief Complaint   Patient presents with   • Leg Swelling       HPI:      PCP:  System, Provider Not In  Cardiology:  Dr. Ramirez    76 y.o. female with HTN(stable, increased risk stroke, rupture), Hyperlipidemia(stable, increased risk cardiovascular events), Diabetes Mellitus(stable, increased risk cardiovascular events), COPD(stable, increased risk pulmonary complications) and Chronic Kidney Disease(stable, increased risk renal failure) chronic lower extremity edema.  never smoked.  Referred to vascular surgery for worsening lower extremity edema over the last severals, no prior history of DVT or venous reflux.  Denies peripheral or prior endovenous intervention.  No other associated signs, symptoms or modifying factors.    10/2020 TTE: EF 65%, grade I DD, mild RV dilation, mild MR, mild TR    6/2022 Bilateral venous: Negative for DVT or reflux.  Mild pulsatile venous flow which can be seen in hypervolemic states.     The following portions of the patient's history were reviewed and updated as appropriate: allergies, current medications, past family history, past medical history, past social history, past surgical history and problem list.  Recent images independently reviewed.  Available laboratory values reviewed.    PMH:  Past Medical History:   Diagnosis Date   • Allergic rhinitis due to pollen    • Arthritis    • Asthma      uncomplicated      • Cataract    • Chronic rhinitis    • Diabetes mellitus (HCC)    • Disease of thyroid gland    • Essential hypertension    • Extrinsic asthma with status asthmaticus    • Glaucoma    • Hyperlipidemia    • Impingement syndrome of right shoulder    • Injury of back     L4 L5 bulging disc   • Neuropathy    • Nuclear senile cataract    • Overweight with body mass index (BMI) 25.0-29.9    • Primary fibromyalgia syndrome    • Primary open angle glaucoma    • Rosacea    •  Temporomandibular joint disorder      Past Surgical History:   Procedure Laterality Date   • BACK SURGERY     • CARPAL TUNNEL RELEASE      Bilateral carpal tunnel release.)   03/27/2000    • CARPAL TUNNEL RELEASE Bilateral    • CATARACT EXTRACTION      Bilateral   • CERVICAL FUSION  1980   • COLONOSCOPY     • D & C HYSTEROSCOPY     • RIGHT OOPHORECTOMY     • ROTATOR CUFF REPAIR      Right   • SHOULDER ARTHROSCOPY Right 02/06/2017    Procedure: RIGHT SHOULDER ARTHROSCOPY SUBACROMIAL DECOMPRESSION, ROTATOR CUFF REPAIR   ;  Surgeon: Terrence Haines MD;  Location: [facility];  Service:    • SHOULDER SURGERY      Excision of 4.8 cm mass with primary intermediate closure.)   03/30/2012    • SPINAL FUSION      L4 L5   • TOTAL KNEE ARTHROPLASTY Right 11/13/2019    Procedure: RIGHT TOTAL KNEE ARTHROPLASTY  with adductor canal block;  Surgeon: Terrence Haines MD;  Location: [facility];  Service: Orthopedics     Family History   Problem Relation Age of Onset   • Heart disease Other    • Hypertension Other    • COPD Mother      Social History     Tobacco Use   • Smoking status: Never Smoker   • Smokeless tobacco: Never Used   Substance Use Topics   • Alcohol use: No   • Drug use: No       ALLERGIES:  Allergies   Allergen Reactions   • Ciprofloxacin Nausea And Vomiting   • Lortab [Hydrocodone-Acetaminophen] Nausea And Vomiting   • Metronidazole Nausea And Vomiting   • Oxycodone Nausea And Vomiting     Dizziness and doesn't decrease pain   • Ultram [Tramadol Hcl] Nausea And Vomiting         MEDICATIONS:    Current Outpatient Medications:   •  amLODIPine (NORVASC) 10 MG tablet, TAKE 1 TABLET BY MOUTH EVERY DAY, Disp: 90 tablet, Rfl: 3  •  B-D ULTRA-FINE 33 LANCETS misc, Use 1 x daily , one touch ultra please, Disp: 90 each, Rfl: 3  •  Biotin 35011 MCG tablet, Take 1 tablet by mouth Daily., Disp: , Rfl:   •  cetirizine (zyrTEC) 10 MG tablet, Take 10 mg by mouth Daily., Disp: , Rfl:   •  cholecalciferol  (Cholecalciferol) 25 MCG (1000 UT) tablet, Take 1,000 Units by mouth Daily., Disp: , Rfl:   •  dorzolamide-timolol (COSOPT) 22.3-6.8 MG/ML ophthalmic solution, Administer 1 drop to both eyes 2 (Two) Times a Day., Disp: , Rfl:   •  Ferrous Sulfate (IRON) 325 (65 Fe) MG tablet, Take 1 tablet by mouth Daily., Disp: , Rfl:   •  gabapentin (NEURONTIN) 100 MG capsule, Take 1 capsule by mouth 3 (Three) Times a Day. For diabetic neuropathy, Disp: 270 capsule, Rfl: 1  •  lisinopril (PRINIVIL,ZESTRIL) 30 MG tablet, TAKE 1 TABLET BY MOUTH EVERY DAY, Disp: 90 tablet, Rfl: 3  •  lovastatin (MEVACOR) 20 MG tablet, One tablet po qhs, Disp: 90 tablet, Rfl: 3  •  metFORMIN ER (GLUCOPHAGE-XR) 500 MG 24 hr tablet, TAKE 1 TABLET BY MOUTH TWICE A DAY WITH MEALS, Disp: 180 tablet, Rfl: 3  •  omeprazole (PrilOSEC) 20 MG capsule, Take 1 capsule by mouth Daily., Disp: 90 capsule, Rfl: 3  •  OneTouch Ultra test strip, USE TO TEST BLOOD SUGAR TWICE A DAY. ICD E11.9, Disp: 100 each, Rfl: 5  •  pentoxifylline (TRENtal) 400 MG CR tablet, Take 1 tablet by mouth 2 (Two) Times a Day With Meals., Disp: 60 tablet, Rfl: 2      Review of Systems   Constitutional: Negative for chills, decreased appetite, fever and weight loss.   HENT: Negative for congestion, nosebleeds and sore throat.    Eyes: Negative for blurred vision, visual disturbance and visual halos.   Cardiovascular: Positive for dyspnea on exertion and leg swelling. Negative for chest pain.   Respiratory: Negative for cough, shortness of breath, sputum production and wheezing.    Endocrine: Negative for cold intolerance and polyuria.   Hematologic/Lymphatic: Negative for bleeding problem. Does not bruise/bleed easily.   Skin: Positive for unusual hair distribution. Negative for flushing and nail changes.   Musculoskeletal: Positive for arthritis, back pain and joint pain.   Gastrointestinal: Negative for bloating, abdominal pain, hematemesis, melena, nausea and vomiting.   Genitourinary:  "Negative for flank pain and hematuria.   Neurological: Negative for brief paralysis, difficulty with concentration, focal weakness, light-headedness, loss of balance, numbness, paresthesias and weakness.   Psychiatric/Behavioral: Negative for altered mental status, depression, substance abuse and suicidal ideas.   Allergic/Immunologic: Negative for hives and persistent infections.         Vitals:    06/10/22 1335   BP: 123/60   BP Location: Left arm   Patient Position: Sitting   Cuff Size: Adult   Pulse: 77   SpO2: 99%   Weight: 71.7 kg (158 lb)   Height: 162.6 cm (64\")     Physical Exam  Constitutional:       Appearance: Normal appearance.   HENT:      Head: Normocephalic.      Nose: Nose normal.      Mouth/Throat:      Mouth: Mucous membranes are moist.   Eyes:      Pupils: Pupils are equal, round, and reactive to light.   Cardiovascular:      Rate and Rhythm: Normal rate.      Pulses: Normal pulses.   Pulmonary:      Effort: Pulmonary effort is normal.   Abdominal:      General: Abdomen is flat.      Palpations: Abdomen is soft.   Musculoskeletal:      Cervical back: Normal range of motion.      Right lower leg: Edema present.      Left lower leg: Edema present.   Skin:     General: Skin is warm and dry.      Capillary Refill: Capillary refill takes 2 to 3 seconds.      Findings: Erythema (bilateral shins, thinning skin, venous stasis dermatitis) present.   Neurological:      General: No focal deficit present.      Mental Status: She is alert and oriented to person, place, and time.   Psychiatric:         Mood and Affect: Mood normal.         Assessment & Plan     Independent Review of Studies  6/2022 Bilateral venous: Negative for DVT or reflux.  Mild pulsatile venous flow which can be seen in hypervolemic states.     1. 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  Requests referral to permanent family medicine physician to " establish as new patient.    - Ambulatory Referral to Family Practice    2. Venous stasis dermatitis of both lower extremities  Recommend continued use of compression stockings 20-30mmHg daily, may remove at night.  Advised elevation of legs while at rest.  Encouraged daily exercise.    Recommend high protein, low sodium diet <2grams per day    Can try selenium supplementation if desired, discussed with patient that potential benefits of selenium supplementation for improved lymphatic drainage remain controversial    Will trial trental for the treatment of venous stasis dermatitis, may discontinue if unable to tolerate due to GI side effects.      3. Bilateral edema of lower extremity  Multi-factorial, evidence of CVI with mild venous staining and mild corona phlebectasia.  No convincing evidence of primary lymphedema.     Edema is primary contributed to the use of amlodipine, I believe if she can find alternative to CCB her edema will likely substantially improve without further evidence of treatable superficial reflux.    Weight remains stable, no evidence of hypervolemic state.      Detailed discussion regarding risks, benefits, and treatment plan. Images independently reviewed. Patient understands, agrees, and wishes to proceed with plan.       This document has been electronically signed by SUSAN Basilio-BC @  On June 13, 2022 11:41 [email or web address]

## 2022-07-07 RX ORDER — LISINOPRIL 30 MG/1
TABLET ORAL
Qty: 90 TABLET | Refills: 3 | Status: SHIPPED | OUTPATIENT
Start: 2022-07-07

## 2022-07-07 RX ORDER — PENTOXIFYLLINE 400 MG/1
TABLET, EXTENDED RELEASE ORAL
Qty: 180 TABLET | Refills: 2 | Status: SHIPPED | OUTPATIENT
Start: 2022-07-07 | End: 2023-03-21

## 2022-07-13 ENCOUNTER — LAB (OUTPATIENT)
Dept: LAB | Facility: HOSPITAL | Age: 77
End: 2022-07-13

## 2022-07-13 LAB
ALBUMIN SERPL-MCNC: 4.7 G/DL (ref 3.5–5.2)
ALBUMIN/GLOB SERPL: 1.6 G/DL
ALP SERPL-CCNC: 68 U/L (ref 39–117)
ALT SERPL W P-5'-P-CCNC: 9 U/L (ref 1–33)
ANION GAP SERPL CALCULATED.3IONS-SCNC: 13 MMOL/L (ref 5–15)
AST SERPL-CCNC: 15 U/L (ref 1–32)
BASOPHILS # BLD AUTO: 0.06 10*3/MM3 (ref 0–0.2)
BASOPHILS NFR BLD AUTO: 0.7 % (ref 0–1.5)
BILIRUB SERPL-MCNC: 0.4 MG/DL (ref 0–1.2)
BUN SERPL-MCNC: 34 MG/DL (ref 8–23)
BUN/CREAT SERPL: 28.3 (ref 7–25)
CALCIUM SPEC-SCNC: 9.7 MG/DL (ref 8.6–10.5)
CHLORIDE SERPL-SCNC: 108 MMOL/L (ref 98–107)
CHOLEST SERPL-MCNC: 164 MG/DL (ref 0–200)
CO2 SERPL-SCNC: 21 MMOL/L (ref 22–29)
CREAT SERPL-MCNC: 1.2 MG/DL (ref 0.57–1)
DEPRECATED RDW RBC AUTO: 41.5 FL (ref 37–54)
EGFRCR SERPLBLD CKD-EPI 2021: 46.4 ML/MIN/1.73
EOSINOPHIL # BLD AUTO: 0.27 10*3/MM3 (ref 0–0.4)
EOSINOPHIL NFR BLD AUTO: 3.2 % (ref 0.3–6.2)
ERYTHROCYTE [DISTWIDTH] IN BLOOD BY AUTOMATED COUNT: 13.2 % (ref 12.3–15.4)
GLOBULIN UR ELPH-MCNC: 3 GM/DL
GLUCOSE SERPL-MCNC: 109 MG/DL (ref 65–99)
HBA1C MFR BLD: 6 % (ref 4.8–5.6)
HCT VFR BLD AUTO: 34.6 % (ref 34–46.6)
HDLC SERPL-MCNC: 46 MG/DL (ref 40–60)
HGB BLD-MCNC: 11.7 G/DL (ref 12–15.9)
IMM GRANULOCYTES # BLD AUTO: 0.06 10*3/MM3 (ref 0–0.05)
IMM GRANULOCYTES NFR BLD AUTO: 0.7 % (ref 0–0.5)
LDLC SERPL CALC-MCNC: 93 MG/DL (ref 0–100)
LDLC/HDLC SERPL: 1.95 {RATIO}
LYMPHOCYTES # BLD AUTO: 1.47 10*3/MM3 (ref 0.7–3.1)
LYMPHOCYTES NFR BLD AUTO: 17.5 % (ref 19.6–45.3)
MCH RBC QN AUTO: 29.5 PG (ref 26.6–33)
MCHC RBC AUTO-ENTMCNC: 33.8 G/DL (ref 31.5–35.7)
MCV RBC AUTO: 87.4 FL (ref 79–97)
MONOCYTES # BLD AUTO: 0.39 10*3/MM3 (ref 0.1–0.9)
MONOCYTES NFR BLD AUTO: 4.6 % (ref 5–12)
NEUTROPHILS NFR BLD AUTO: 6.14 10*3/MM3 (ref 1.7–7)
NEUTROPHILS NFR BLD AUTO: 73.3 % (ref 42.7–76)
NRBC BLD AUTO-RTO: 0 /100 WBC (ref 0–0.2)
PLATELET # BLD AUTO: 272 10*3/MM3 (ref 140–450)
PMV BLD AUTO: 12.5 FL (ref 6–12)
POTASSIUM SERPL-SCNC: 4.6 MMOL/L (ref 3.5–5.2)
PROT SERPL-MCNC: 7.7 G/DL (ref 6–8.5)
RBC # BLD AUTO: 3.96 10*6/MM3 (ref 3.77–5.28)
SODIUM SERPL-SCNC: 142 MMOL/L (ref 136–145)
TRIGL SERPL-MCNC: 141 MG/DL (ref 0–150)
TSH SERPL DL<=0.05 MIU/L-ACNC: 1.89 UIU/ML (ref 0.27–4.2)
VLDLC SERPL-MCNC: 25 MG/DL (ref 5–40)
WBC NRBC COR # BLD: 8.39 10*3/MM3 (ref 3.4–10.8)

## 2022-07-13 PROCEDURE — 82043 UR ALBUMIN QUANTITATIVE: CPT | Performed by: INTERNAL MEDICINE

## 2022-07-13 PROCEDURE — 82607 VITAMIN B-12: CPT | Performed by: INTERNAL MEDICINE

## 2022-07-13 PROCEDURE — 84443 ASSAY THYROID STIM HORMONE: CPT | Performed by: INTERNAL MEDICINE

## 2022-07-13 PROCEDURE — 82306 VITAMIN D 25 HYDROXY: CPT | Performed by: INTERNAL MEDICINE

## 2022-07-13 PROCEDURE — 82570 ASSAY OF URINE CREATININE: CPT | Performed by: INTERNAL MEDICINE

## 2022-07-13 PROCEDURE — 83036 HEMOGLOBIN GLYCOSYLATED A1C: CPT | Performed by: INTERNAL MEDICINE

## 2022-07-13 PROCEDURE — 80061 LIPID PANEL: CPT | Performed by: INTERNAL MEDICINE

## 2022-07-13 PROCEDURE — 36415 COLL VENOUS BLD VENIPUNCTURE: CPT | Performed by: INTERNAL MEDICINE

## 2022-07-13 PROCEDURE — 85025 COMPLETE CBC W/AUTO DIFF WBC: CPT | Performed by: INTERNAL MEDICINE

## 2022-07-13 PROCEDURE — 80053 COMPREHEN METABOLIC PANEL: CPT | Performed by: INTERNAL MEDICINE

## 2022-07-14 LAB
25(OH)D3 SERPL-MCNC: 62 NG/ML (ref 30–100)
ALBUMIN UR-MCNC: 16.3 MG/DL
CREAT UR-MCNC: 369.8 MG/DL
MICROALBUMIN/CREAT UR: 44.1 MG/G
VIT B12 BLD-MCNC: 337 PG/ML (ref 211–946)

## 2022-07-20 ENCOUNTER — OFFICE VISIT (OUTPATIENT)
Dept: ENDOCRINOLOGY | Facility: CLINIC | Age: 77
End: 2022-07-20

## 2022-07-20 VITALS
HEIGHT: 64 IN | BODY MASS INDEX: 25.27 KG/M2 | SYSTOLIC BLOOD PRESSURE: 120 MMHG | DIASTOLIC BLOOD PRESSURE: 60 MMHG | HEART RATE: 77 BPM | WEIGHT: 148 LBS | OXYGEN SATURATION: 98 %

## 2022-07-20 DIAGNOSIS — N18.31 CHRONIC RENAL FAILURE, STAGE 3A: ICD-10-CM

## 2022-07-20 DIAGNOSIS — I10 RESISTANT HYPERTENSION: ICD-10-CM

## 2022-07-20 DIAGNOSIS — E55.9 VITAMIN D DEFICIENCY: ICD-10-CM

## 2022-07-20 DIAGNOSIS — E05.90 SUBCLINICAL HYPERTHYROIDISM: ICD-10-CM

## 2022-07-20 DIAGNOSIS — R93.7 ABNORMAL BONE DENSITY SCREENING: ICD-10-CM

## 2022-07-20 DIAGNOSIS — R60.0 LOCALIZED EDEMA: ICD-10-CM

## 2022-07-20 DIAGNOSIS — E11.9 TYPE 2 DIABETES MELLITUS WITHOUT COMPLICATION, WITHOUT LONG-TERM CURRENT USE OF INSULIN: Primary | ICD-10-CM

## 2022-07-20 DIAGNOSIS — G62.9 NEUROPATHY: ICD-10-CM

## 2022-07-20 PROCEDURE — 99214 OFFICE O/P EST MOD 30 MIN: CPT | Performed by: INTERNAL MEDICINE

## 2022-07-20 RX ORDER — GABAPENTIN 300 MG/1
300 CAPSULE ORAL 3 TIMES DAILY
Qty: 90 CAPSULE | Refills: 5 | Status: SHIPPED | OUTPATIENT
Start: 2022-07-20 | End: 2023-02-16

## 2022-07-20 RX ORDER — AMLODIPINE BESYLATE 5 MG/1
5 TABLET ORAL DAILY
Qty: 30 TABLET | Refills: 11 | Status: SHIPPED | OUTPATIENT
Start: 2022-07-20 | End: 2023-07-20

## 2022-07-20 NOTE — PROGRESS NOTES
" Thelma Haley is a 76 y.o. female who presents for  evaluation of T2DM      HPI   Hyperthyroidism     Duration since 2015    Appears to be Graves with right subcm thyroid nodule     Off methimazole and TSH remains nl     Also t2dm well controlled with nephropathy     Now Hypertension managed by cardiology         PE    /60   Pulse 77   Ht 162.6 cm (64\")   Wt 67.1 kg (148 lb)   LMP 02/06/1980 (Approximate)   SpO2 98%   BMI 25.40 kg/m²   AOx3  No visible goiter  Normal Respiratory Effort , Lung CTA  RRR  Positive Edema    Labs    Lab Results   Component Value Date    WBC 8.39 07/13/2022    HGB 11.7 (L) 07/13/2022    HCT 34.6 07/13/2022    MCV 87.4 07/13/2022     07/13/2022     Lab Results   Component Value Date    GLUCOSE 109 (H) 07/13/2022    CALCIUM 9.7 07/13/2022     07/13/2022    K 4.6 07/13/2022    CO2 21.0 (L) 07/13/2022     (H) 07/13/2022    BUN 34 (H) 07/13/2022    CREATININE 1.20 (H) 07/13/2022    EGFRIFNONA 46 (L) 12/22/2021    BCR 28.3 (H) 07/13/2022    ANIONGAP 13.0 07/13/2022             Assessment & Plan       ICD-10-CM ICD-9-CM   1. Type 2 diabetes mellitus without complication, without long-term current use of insulin (HCC)  E11.9 250.00   2. Resistant hypertension  I10 401.9   3. Subclinical hyperthyroidism  E05.90 242.90   4. Vitamin D deficiency  E55.9 268.9   5. Chronic renal failure, stage 3a (HCC)  N18.31 585.3   6. Abnormal bone density screening  R93.7 794.9   7. Neuropathy  G62.9 355.9   8. Localized edema  R60.0 782.3       Glycemic Management:   Lab Results   Component Value Date    HGBA1C 6.00 (H) 07/13/2022    HGBA1C 6.03 (H) 12/22/2021    HGBA1C 5.5 07/20/2021       Metformin 500 mg bid alone is working    In the past janumet but too expensive    Never did trulicity due to fear   Never did jardiance due to cost       Microvascular complications    Renal   Stage 3a   No proteinuria      Stopped naproxen before , renal function improved but now " decline  Knee pain --- can't take tramadol , takes acetaminophen   She is on ACE I , didn't start jardiance due to cost samples today ( 6 month supply )    Could add kerendia 10 mg daily but renal fx declined from last time and K is 5   Wait to see trend and start next appt         Has neuropathic , may take neurontin 100 mg po tid PRN increase to 300 mg tid     Lipid Management  Lab Results   Component Value Date    CHOL 164 07/13/2022    CHOL 138 12/22/2021    CHOL 158 06/29/2021     Lab Results   Component Value Date    TRIG 141 07/13/2022    TRIG 140 12/22/2021    TRIG 162 (H) 06/29/2021     Lab Results   Component Value Date    HDL 46 07/13/2022    HDL 42 12/22/2021    HDL 46 06/29/2021     No components found for: LDLCALC  Lab Results   Component Value Date    LDL 93 07/13/2022    LDL 71 12/22/2021    LDL 84 06/29/2021       lovastatin 20 mg qhs         Blood Pressure Management:    Vitals:    07/20/22 1029   BP: 120/60   Pulse: 77   SpO2: 98%     Lab Results   Component Value Date    GLUCOSE 109 (H) 07/13/2022    CALCIUM 9.7 07/13/2022     07/13/2022    K 4.6 07/13/2022    CO2 21.0 (L) 07/13/2022     (H) 07/13/2022    BUN 34 (H) 07/13/2022    CREATININE 1.20 (H) 07/13/2022    EGFRIFNONA 46 (L) 12/22/2021    BCR 28.3 (H) 07/13/2022    ANIONGAP 13.0 07/13/2022       Per cardiology and Dr. Prieto   I did stop hctz due to decline in renal function    I ruled out Conn's and Pheo     Amlodipine 10 mg daily -- decrease to 5 mg daily due to edema and add jardiance            Preventive Care:      nonsmoker    Weight Related:   Wt Readings from Last 3 Encounters:   07/20/22 67.1 kg (148 lb)   06/10/22 71.7 kg (158 lb)   05/18/22 69.4 kg (153 lb)     Body mass index is 25.4 kg/m².    Aim for 1200 calories daily   Walk 30 min daily     Bone Health      Lab Results   Component Value Date    FVTR90BP 62.0 07/13/2022    NDZF43QE 53.8 12/22/2021    BVOL32PX 45.1 06/29/2021       Lab Results   Component  Value Date    CALCIUM 9.7 07/13/2022      vit D 1000 units daily - Continue     Order dxa due to hyperthyroidism - done in 12-16 and nl     calcium 600 mg w supper - Continue     Thyroid Health  Lab Results   Component Value Date    TSH 1.890 07/13/2022     Subclinical hyperthyroidism, US multinodular goiter, subcm     12-16 small subcm thyroid nodule on the right - inferior     Indication for tx     TSH less than 0.1 and history of osteopenia     Not taking biotin before testing       Lab Results   Component Value Date    TSH 1.890 07/13/2022       We started methimazole and progressively decreased to half a tab every other day and still hypo ---- stop and now nl     Other Diabetes Related Aspects       Lab Results   Component Value Date    [id number] 337 07/13/2022        Dsyphagia  Restart prilosec

## 2022-09-21 ENCOUNTER — OFFICE VISIT (OUTPATIENT)
Dept: FAMILY MEDICINE CLINIC | Facility: CLINIC | Age: 77
End: 2022-09-21

## 2022-09-21 VITALS
DIASTOLIC BLOOD PRESSURE: 66 MMHG | OXYGEN SATURATION: 96 % | HEIGHT: 64 IN | SYSTOLIC BLOOD PRESSURE: 129 MMHG | HEART RATE: 69 BPM | BODY MASS INDEX: 24.41 KG/M2 | WEIGHT: 143 LBS

## 2022-09-21 DIAGNOSIS — E11.42 TYPE 2 DIABETES MELLITUS WITH DIABETIC POLYNEUROPATHY, WITHOUT LONG-TERM CURRENT USE OF INSULIN: ICD-10-CM

## 2022-09-21 DIAGNOSIS — Z76.89 ENCOUNTER TO ESTABLISH CARE: ICD-10-CM

## 2022-09-21 DIAGNOSIS — I10 PRIMARY HYPERTENSION: Primary | ICD-10-CM

## 2022-09-21 DIAGNOSIS — Z12.31 ENCOUNTER FOR SCREENING MAMMOGRAM FOR MALIGNANT NEOPLASM OF BREAST: ICD-10-CM

## 2022-09-21 DIAGNOSIS — R60.0 BILATERAL LEG EDEMA: ICD-10-CM

## 2022-09-21 DIAGNOSIS — Z78.0 POSTMENOPAUSAL: ICD-10-CM

## 2022-09-21 PROCEDURE — 99214 OFFICE O/P EST MOD 30 MIN: CPT | Performed by: FAMILY MEDICINE

## 2022-09-21 RX ORDER — CEFDINIR 300 MG/1
CAPSULE ORAL
COMMUNITY
Start: 2022-09-19 | End: 2022-10-10

## 2022-09-23 ENCOUNTER — TELEPHONE (OUTPATIENT)
Dept: FAMILY MEDICINE CLINIC | Facility: CLINIC | Age: 77
End: 2022-09-23

## 2022-09-23 NOTE — TELEPHONE ENCOUNTER
Patient called and said that she was suppose to call back and let her know her urine test results that she had done somewhere else. She said that it came back  Normal. Phone is [phone].

## 2022-10-07 RX ORDER — OMEPRAZOLE 20 MG/1
CAPSULE, DELAYED RELEASE ORAL
Qty: 90 CAPSULE | Refills: 3 | Status: SHIPPED | OUTPATIENT
Start: 2022-10-07

## 2022-10-10 ENCOUNTER — OFFICE VISIT (OUTPATIENT)
Dept: FAMILY MEDICINE CLINIC | Facility: CLINIC | Age: 77
End: 2022-10-10

## 2022-10-10 ENCOUNTER — TELEPHONE (OUTPATIENT)
Dept: FAMILY MEDICINE CLINIC | Facility: CLINIC | Age: 77
End: 2022-10-10

## 2022-10-10 ENCOUNTER — LAB (OUTPATIENT)
Dept: LAB | Facility: HOSPITAL | Age: 77
End: 2022-10-10

## 2022-10-10 VITALS
BODY MASS INDEX: 24.75 KG/M2 | HEART RATE: 80 BPM | RESPIRATION RATE: 18 BRPM | WEIGHT: 145 LBS | HEIGHT: 64 IN | DIASTOLIC BLOOD PRESSURE: 82 MMHG | SYSTOLIC BLOOD PRESSURE: 126 MMHG | OXYGEN SATURATION: 98 %

## 2022-10-10 DIAGNOSIS — N30.01 ACUTE CYSTITIS WITH HEMATURIA: Primary | ICD-10-CM

## 2022-10-10 PROCEDURE — 99214 OFFICE O/P EST MOD 30 MIN: CPT | Performed by: NURSE PRACTITIONER

## 2022-10-10 PROCEDURE — 87088 URINE BACTERIA CULTURE: CPT | Performed by: NURSE PRACTITIONER

## 2022-10-10 PROCEDURE — 87086 URINE CULTURE/COLONY COUNT: CPT | Performed by: NURSE PRACTITIONER

## 2022-10-10 PROCEDURE — 81001 URINALYSIS AUTO W/SCOPE: CPT | Performed by: NURSE PRACTITIONER

## 2022-10-10 PROCEDURE — 87186 SC STD MICRODIL/AGAR DIL: CPT | Performed by: NURSE PRACTITIONER

## 2022-10-10 RX ORDER — PHENAZOPYRIDINE HYDROCHLORIDE 200 MG/1
200 TABLET, FILM COATED ORAL 3 TIMES DAILY PRN
Qty: 6 TABLET | Refills: 0 | Status: SHIPPED | OUTPATIENT
Start: 2022-10-10 | End: 2022-10-12

## 2022-10-10 RX ORDER — PHENAZOPYRIDINE HYDROCHLORIDE 200 MG/1
TABLET, FILM COATED ORAL
COMMUNITY
Start: 2022-09-19 | End: 2022-10-10 | Stop reason: SDUPTHER

## 2022-10-10 RX ORDER — CEPHALEXIN 500 MG/1
500 CAPSULE ORAL 2 TIMES DAILY
Qty: 14 CAPSULE | Refills: 0 | Status: SHIPPED | OUTPATIENT
Start: 2022-10-10

## 2022-10-10 NOTE — TELEPHONE ENCOUNTER
Per , Ms. Haley   has been called with recent DEXA Bone Density Scan results & recommendations.  Continue current medications and follow-up as planned or sooner if any problems.       ----- Message from Joyce Palmer MD sent at 10/8/2022  9:06 AM CDT -----  Please call and let patient know that DEXA bone scan showed osteopenia at left hip - weakening of the bones.  Would recommend healthy diet, adequate calcium/vitamin D and regular weight bearing exercise like walking for bone health.  Normal bone density at right hip and lumbar spine.  Will plan to repeat for screening in 2 years.  Thanks, CHRISTIANA Palmer

## 2022-10-10 NOTE — PROGRESS NOTES
Chief Complaint  Pain (Reoccurring uti)    Subjective          Thelma Haley presents to [facility] [place] with continuing UTI symptoms. Patient states since she has had pessary placed she has had issues with reoccurring UTI.  The last antibiotic she used for UTI did not seem to help.   Urinary Tract Infection   This is a recurrent problem. The current episode started in the past 7 days. The problem occurs every urination. The problem has been unchanged. The quality of the pain is described as burning. The pain is mild. There has been no fever. Associated symptoms include frequency, hesitancy and urgency. She has tried antibiotics and increased fluids (pyridium) for the symptoms. The treatment provided mild relief. Her past medical history is significant for recurrent UTIs.     Outpatient Medications Prior to Visit   Medication Sig Dispense Refill   • amLODIPine (NORVASC) 5 MG tablet Take 1 tablet by mouth Daily. 30 tablet 11   • B-D ULTRA-FINE 33 LANCETS misc Use 1 x daily , one touch ultra please 90 each 3   • Biotin 62148 MCG tablet Take 1 tablet by mouth Daily.     • cetirizine (zyrTEC) 10 MG tablet Take 10 mg by mouth Daily.     • cholecalciferol (Cholecalciferol) 25 MCG (1000 UT) tablet Take 1,000 Units by mouth Daily.     • dorzolamide-timolol (COSOPT) 22.3-6.8 MG/ML ophthalmic solution Administer 1 drop to both eyes 2 (Two) Times a Day.     • empagliflozin (JARDIANCE) 25 MG tablet tablet Take 12.5 mg by mouth Daily.     • Ferrous Sulfate (IRON) 325 (65 Fe) MG tablet Take 1 tablet by mouth Daily.     • gabapentin (Neurontin) 300 MG capsule Take 1 capsule by mouth 3 (Three) Times a Day. 90 capsule 5   • lisinopril (PRINIVIL,ZESTRIL) 30 MG tablet TAKE 1 TABLET BY MOUTH EVERY DAY 90 tablet 3   • lovastatin (MEVACOR) 20 MG tablet One tablet po qhs 90 tablet 3   • metFORMIN ER (GLUCOPHAGE-XR) 500 MG 24 hr tablet TAKE 1 TABLET BY MOUTH TWICE A DAY WITH MEALS 180  "tablet 3   • omeprazole (priLOSEC) 20 MG capsule TAKE 1 CAPSULE BY MOUTH EVERY DAY 90 capsule 3   • OneTouch Ultra test strip USE TO TEST BLOOD SUGAR TWICE A DAY. ICD E11.9 100 each 5   • pentoxifylline (TRENtal) 400 MG CR tablet TAKE 1 TABLET BY MOUTH TWICE A DAY WITH MEALS 180 tablet 2   • phenazopyridine (PYRIDIUM) 200 MG tablet TAKE 1 TABLET BY MOUTH THREE TIMES A DAY AFTER MEALS FOR 2 DAYS     • cefdinir (OMNICEF) 300 MG capsule        No facility-administered medications prior to visit.       Review of Systems   Genitourinary: Positive for frequency, hesitancy and urgency.         Objective   Vital Signs:   Visit Vitals  /82   Pulse 80   Resp 18   Ht 162.6 cm (64\")   Wt 65.8 kg (145 lb)   LMP 02/06/1980 (Approximate)   SpO2 98%   BMI 24.89 kg/m²     Physical Exam  Vitals and nursing note reviewed.   Constitutional:       Appearance: She is well-developed.   HENT:      Head: Normocephalic and atraumatic.   Eyes:      General: Lids are normal.      Conjunctiva/sclera: Conjunctivae normal.   Neck:      Thyroid: No thyroid mass or thyromegaly.      Trachea: Trachea normal. No tracheal tenderness.   Cardiovascular:      Rate and Rhythm: Normal rate.      Pulses: Normal pulses.      Heart sounds: Normal heart sounds.   Pulmonary:      Effort: Pulmonary effort is normal. No respiratory distress.      Breath sounds: Normal breath sounds. No wheezing.   Abdominal:      General: There is no distension.      Palpations: Abdomen is soft. There is no mass.      Tenderness: There is no right CVA tenderness or left CVA tenderness.   Musculoskeletal:         General: Normal range of motion.      Cervical back: Normal range of motion. No edema.   Skin:     General: Skin is warm and dry.      Coloration: Skin is not pale.      Findings: No abrasion, erythema or lesion.   Neurological:      Mental Status: She is alert and oriented to person, place, and time.   Psychiatric:         Mood and Affect: Mood is not anxious. " Affect is not inappropriate.         Speech: Speech normal.         Behavior: Behavior normal.         Thought Content: Thought content normal.         Judgment: Judgment normal. Judgment is not impulsive.        Result Review :                 Assessment and Plan    Diagnoses and all orders for this visit:    1. Acute cystitis with hematuria (Primary)  -     Urinalysis With Culture If Indicated -; Future  -     cephalexin (Keflex) 500 MG capsule; Take 1 capsule by mouth 2 (Two) Times a Day.  Dispense: 14 capsule; Refill: 0  -     phenazopyridine (PYRIDIUM) 200 MG tablet; Take 1 tablet by mouth 3 (Three) Times a Day As Needed for Bladder Spasms for up to 2 days.  Dispense: 6 tablet; Refill: 0  -     Urinalysis With Culture If Indicated - Urine, Clean Catch  -     Urine Culture - Urine, Urine, Clean Catch  -     Urinalysis, Microscopic Only - Urine, Clean Catch      Start Keflex     UA sent to lab     We will call if we need to change medications    Increase water intake     Advised patient to speak with obgyn about pessary alternatives.     Please call the office if you have issues.     Follow Up   Return if symptoms worsen or fail to improve, for Next scheduled follow up.  Patient was given instructions and counseling regarding her condition or for health maintenance advice. Please see specific information pulled into the AVS if appropriate.           This document has been electronically signed by JUAN Maldonado on October 11, 2022 16:19 CDT

## 2022-10-11 LAB
BACTERIA UR QL AUTO: ABNORMAL /HPF
BILIRUB UR QL STRIP: NEGATIVE
CLARITY UR: ABNORMAL
COLOR UR: ABNORMAL
GLUCOSE UR STRIP-MCNC: ABNORMAL MG/DL
HGB UR QL STRIP.AUTO: ABNORMAL
HYALINE CASTS UR QL AUTO: ABNORMAL /LPF
KETONES UR QL STRIP: NEGATIVE
LEUKOCYTE ESTERASE UR QL STRIP.AUTO: ABNORMAL
NITRITE UR QL STRIP: POSITIVE
PH UR STRIP.AUTO: 6 [PH] (ref 5–8)
PROT UR QL STRIP: ABNORMAL
RBC # UR STRIP: ABNORMAL /HPF
REF LAB TEST METHOD: ABNORMAL
SP GR UR STRIP: 1.02 (ref 1–1.03)
SQUAMOUS #/AREA URNS HPF: ABNORMAL /HPF
UROBILINOGEN UR QL STRIP: ABNORMAL
WBC # UR STRIP: ABNORMAL /HPF

## 2022-10-13 LAB — BACTERIA SPEC AEROBE CULT: ABNORMAL

## 2022-11-30 ENCOUNTER — OFFICE VISIT (OUTPATIENT)
Dept: CARDIOLOGY | Facility: CLINIC | Age: 77
End: 2022-11-30

## 2022-11-30 VITALS
DIASTOLIC BLOOD PRESSURE: 72 MMHG | TEMPERATURE: 97 F | SYSTOLIC BLOOD PRESSURE: 132 MMHG | WEIGHT: 148 LBS | BODY MASS INDEX: 25.27 KG/M2 | OXYGEN SATURATION: 99 % | HEIGHT: 64 IN | HEART RATE: 70 BPM

## 2022-11-30 DIAGNOSIS — R42 DIZZINESS: ICD-10-CM

## 2022-11-30 DIAGNOSIS — E78.2 MIXED HYPERLIPIDEMIA: ICD-10-CM

## 2022-11-30 DIAGNOSIS — I25.10 ATHEROSCLEROSIS OF NATIVE CORONARY ARTERY OF NATIVE HEART WITHOUT ANGINA PECTORIS: ICD-10-CM

## 2022-11-30 DIAGNOSIS — I10 ESSENTIAL HYPERTENSION: Primary | ICD-10-CM

## 2022-11-30 PROCEDURE — 93000 ELECTROCARDIOGRAM COMPLETE: CPT | Performed by: INTERNAL MEDICINE

## 2022-11-30 PROCEDURE — 99214 OFFICE O/P EST MOD 30 MIN: CPT | Performed by: INTERNAL MEDICINE

## 2022-12-06 LAB
QT INTERVAL: 400 MS
QTC INTERVAL: 432 MS

## 2022-12-13 ENCOUNTER — TELEPHONE (OUTPATIENT)
Dept: CARDIOLOGY | Facility: CLINIC | Age: 77
End: 2022-12-13

## 2022-12-13 NOTE — TELEPHONE ENCOUNTER
Per Dr Lea  Notified and understood----- Message from Manju Ramirez MD sent at 12/9/2022  3:38 PM CST -----  Carotids within normal limits  ----- Message -----  From: Manju Ramirez MD  Sent: 12/9/2022   1:34 PM CST  To: Manju Ramirez MD

## 2023-01-03 ENCOUNTER — LAB (OUTPATIENT)
Dept: LAB | Facility: HOSPITAL | Age: 78
End: 2023-01-03
Payer: MEDICARE

## 2023-01-03 LAB
25(OH)D3 SERPL-MCNC: 49.7 NG/ML (ref 30–100)
ALBUMIN SERPL-MCNC: 4.5 G/DL (ref 3.5–5.2)
ALBUMIN UR-MCNC: 4.5 MG/DL
ALBUMIN/GLOB SERPL: 1.5 G/DL
ALP SERPL-CCNC: 69 U/L (ref 39–117)
ALT SERPL W P-5'-P-CCNC: 9 U/L (ref 1–33)
ANION GAP SERPL CALCULATED.3IONS-SCNC: 11 MMOL/L (ref 5–15)
AST SERPL-CCNC: 17 U/L (ref 1–32)
BASOPHILS # BLD AUTO: 0.05 10*3/MM3 (ref 0–0.2)
BASOPHILS NFR BLD AUTO: 0.6 % (ref 0–1.5)
BILIRUB SERPL-MCNC: 0.4 MG/DL (ref 0–1.2)
BUN SERPL-MCNC: 24 MG/DL (ref 8–23)
BUN/CREAT SERPL: 23.1 (ref 7–25)
CALCIUM SPEC-SCNC: 9.7 MG/DL (ref 8.6–10.5)
CHLORIDE SERPL-SCNC: 103 MMOL/L (ref 98–107)
CHOLEST SERPL-MCNC: 186 MG/DL (ref 0–200)
CO2 SERPL-SCNC: 25 MMOL/L (ref 22–29)
CREAT SERPL-MCNC: 1.04 MG/DL (ref 0.57–1)
CREAT UR-MCNC: 81.3 MG/DL
DEPRECATED RDW RBC AUTO: 40.6 FL (ref 37–54)
EGFRCR SERPLBLD CKD-EPI 2021: 54.8 ML/MIN/1.73
EOSINOPHIL # BLD AUTO: 0.22 10*3/MM3 (ref 0–0.4)
EOSINOPHIL NFR BLD AUTO: 2.5 % (ref 0.3–6.2)
ERYTHROCYTE [DISTWIDTH] IN BLOOD BY AUTOMATED COUNT: 12.6 % (ref 12.3–15.4)
GLOBULIN UR ELPH-MCNC: 3 GM/DL
GLUCOSE SERPL-MCNC: 100 MG/DL (ref 65–99)
HBA1C MFR BLD: 5.9 % (ref 4.8–5.6)
HCT VFR BLD AUTO: 37.5 % (ref 34–46.6)
HDLC SERPL-MCNC: 52 MG/DL (ref 40–60)
HGB BLD-MCNC: 12.2 G/DL (ref 12–15.9)
IMM GRANULOCYTES # BLD AUTO: 0.04 10*3/MM3 (ref 0–0.05)
IMM GRANULOCYTES NFR BLD AUTO: 0.5 % (ref 0–0.5)
LDLC SERPL CALC-MCNC: 108 MG/DL (ref 0–100)
LDLC/HDLC SERPL: 2 {RATIO}
LYMPHOCYTES # BLD AUTO: 1.73 10*3/MM3 (ref 0.7–3.1)
LYMPHOCYTES NFR BLD AUTO: 19.8 % (ref 19.6–45.3)
MCH RBC QN AUTO: 28.9 PG (ref 26.6–33)
MCHC RBC AUTO-ENTMCNC: 32.5 G/DL (ref 31.5–35.7)
MCV RBC AUTO: 88.9 FL (ref 79–97)
MICROALBUMIN/CREAT UR: 55.4 MG/G
MONOCYTES # BLD AUTO: 0.47 10*3/MM3 (ref 0.1–0.9)
MONOCYTES NFR BLD AUTO: 5.4 % (ref 5–12)
NEUTROPHILS NFR BLD AUTO: 6.21 10*3/MM3 (ref 1.7–7)
NEUTROPHILS NFR BLD AUTO: 71.2 % (ref 42.7–76)
NRBC BLD AUTO-RTO: 0 /100 WBC (ref 0–0.2)
PLATELET # BLD AUTO: 309 10*3/MM3 (ref 140–450)
PMV BLD AUTO: 12.1 FL (ref 6–12)
POTASSIUM SERPL-SCNC: 4.6 MMOL/L (ref 3.5–5.2)
PROT SERPL-MCNC: 7.5 G/DL (ref 6–8.5)
RBC # BLD AUTO: 4.22 10*6/MM3 (ref 3.77–5.28)
SODIUM SERPL-SCNC: 139 MMOL/L (ref 136–145)
TRIGL SERPL-MCNC: 150 MG/DL (ref 0–150)
TSH SERPL DL<=0.05 MIU/L-ACNC: 3.01 UIU/ML (ref 0.27–4.2)
VIT B12 BLD-MCNC: 497 PG/ML (ref 211–946)
VLDLC SERPL-MCNC: 26 MG/DL (ref 5–40)
WBC NRBC COR # BLD: 8.72 10*3/MM3 (ref 3.4–10.8)

## 2023-01-03 PROCEDURE — 80061 LIPID PANEL: CPT | Performed by: INTERNAL MEDICINE

## 2023-01-03 PROCEDURE — 83036 HEMOGLOBIN GLYCOSYLATED A1C: CPT | Performed by: INTERNAL MEDICINE

## 2023-01-03 PROCEDURE — 82043 UR ALBUMIN QUANTITATIVE: CPT | Performed by: INTERNAL MEDICINE

## 2023-01-03 PROCEDURE — 84443 ASSAY THYROID STIM HORMONE: CPT | Performed by: INTERNAL MEDICINE

## 2023-01-03 PROCEDURE — 80053 COMPREHEN METABOLIC PANEL: CPT | Performed by: INTERNAL MEDICINE

## 2023-01-03 PROCEDURE — 82607 VITAMIN B-12: CPT | Performed by: INTERNAL MEDICINE

## 2023-01-03 PROCEDURE — 82570 ASSAY OF URINE CREATININE: CPT | Performed by: INTERNAL MEDICINE

## 2023-01-03 PROCEDURE — 82306 VITAMIN D 25 HYDROXY: CPT | Performed by: INTERNAL MEDICINE

## 2023-01-03 PROCEDURE — 85025 COMPLETE CBC W/AUTO DIFF WBC: CPT | Performed by: INTERNAL MEDICINE

## 2023-01-03 PROCEDURE — 36415 COLL VENOUS BLD VENIPUNCTURE: CPT | Performed by: INTERNAL MEDICINE

## 2023-01-17 RX ORDER — LOVASTATIN 20 MG/1
TABLET ORAL
Qty: 90 TABLET | Refills: 3 | Status: SHIPPED | OUTPATIENT
Start: 2023-01-17

## 2023-01-20 ENCOUNTER — OFFICE VISIT (OUTPATIENT)
Dept: ENDOCRINOLOGY | Facility: CLINIC | Age: 78
End: 2023-01-20
Payer: MEDICARE

## 2023-01-20 VITALS
OXYGEN SATURATION: 99 % | DIASTOLIC BLOOD PRESSURE: 64 MMHG | SYSTOLIC BLOOD PRESSURE: 130 MMHG | BODY MASS INDEX: 25.27 KG/M2 | HEART RATE: 58 BPM | HEIGHT: 64 IN | WEIGHT: 148 LBS

## 2023-01-20 DIAGNOSIS — I10 RESISTANT HYPERTENSION: ICD-10-CM

## 2023-01-20 DIAGNOSIS — E06.9 THYROIDITIS: ICD-10-CM

## 2023-01-20 DIAGNOSIS — E11.69 MIXED DIABETIC HYPERLIPIDEMIA ASSOCIATED WITH TYPE 2 DIABETES MELLITUS: ICD-10-CM

## 2023-01-20 DIAGNOSIS — E11.21 TYPE 2 DIABETES WITH NEPHROPATHY: Primary | ICD-10-CM

## 2023-01-20 DIAGNOSIS — E78.2 MIXED DIABETIC HYPERLIPIDEMIA ASSOCIATED WITH TYPE 2 DIABETES MELLITUS: ICD-10-CM

## 2023-01-20 DIAGNOSIS — N18.31 CHRONIC RENAL FAILURE, STAGE 3A: ICD-10-CM

## 2023-01-20 PROCEDURE — 99214 OFFICE O/P EST MOD 30 MIN: CPT | Performed by: INTERNAL MEDICINE

## 2023-01-20 RX ORDER — CHOLECALCIFEROL (VITAMIN D3) 125 MCG
500 CAPSULE ORAL DAILY
COMMUNITY

## 2023-01-20 RX ORDER — METFORMIN HYDROCHLORIDE 500 MG/1
TABLET, EXTENDED RELEASE ORAL
Qty: 90 TABLET | Refills: 3 | Status: SHIPPED | OUTPATIENT
Start: 2023-01-20

## 2023-01-20 NOTE — PROGRESS NOTES
" Thelma Haley is a 77 y.o. female who presents for  evaluation of T2DM      HPI   Hyperthyroidism     Duration since 2015    Appears to be Graves with right subcm thyroid nodule     Off methimazole and TSH remains nl     Also t2dm well controlled with nephropathy     Now Hypertension managed by cardiology         PE    /64   Pulse 58   Ht 162.6 cm (64\")   Wt 67.1 kg (148 lb)   LMP 02/06/1980 (Approximate)   SpO2 99%   BMI 25.40 kg/m²   AOx3  No visible goiter  Normal Respiratory Effort , Lung CTA  RRR  Positive Edema    Labs    Lab Results   Component Value Date    WBC 8.72 01/03/2023    HGB 12.2 01/03/2023    HCT 37.5 01/03/2023    MCV 88.9 01/03/2023     01/03/2023     Lab Results   Component Value Date    GLUCOSE 100 (H) 01/03/2023    CALCIUM 9.7 01/03/2023     01/03/2023    K 4.6 01/03/2023    CO2 25.0 01/03/2023     01/03/2023    BUN 24 (H) 01/03/2023    CREATININE 1.04 (H) 01/03/2023    EGFRIFNONA 46 (L) 12/22/2021    BCR 23.1 01/03/2023    ANIONGAP 11.0 01/03/2023             Assessment & Plan      Diagnosis Plan   1. Type 2 diabetes with nephropathy (HCC)  metFORMIN ER (GLUCOPHAGE-XR) 500 MG 24 hr tablet    empagliflozin (JARDIANCE) 10 MG tablet tablet    CBC Auto Differential    Comprehensive Metabolic Panel    Hemoglobin A1c    Lipid Panel    Microalbumin / Creatinine Urine Ratio - Urine, Clean Catch    Vitamin B12      2. Mixed diabetic hyperlipidemia associated with type 2 diabetes mellitus (HCC)        3. Resistant hypertension        4. Chronic renal failure, stage 3a (HCC)        5. Thyroiditis  TSH          Glycemic Management:   Lab Results   Component Value Date    HGBA1C 5.90 (H) 01/03/2023    HGBA1C 6.00 (H) 07/13/2022    HGBA1C 6.03 (H) 12/22/2021       Metformin 500 mg bid alone is working    In the past janumet but too expensive    Never did trulicity due to fear   I give jardiance samples, monthly cost for her is 500 dollars, reason ckd . "       Microvascular complications    Renal   Stage 3a   No proteinuria      Stopped naproxen before    Knee pain --- can't take tramadol , takes acetaminophen   She is on ACE I , and jardiance     Could add kerendia 10 mg daily if cost allows         Has neuropathic , may take neurontin 100 mg po tid PRN increase to 300 mg tid     Lipid Management  Lab Results   Component Value Date    CHOL 186 01/03/2023    CHOL 164 07/13/2022    CHOL 138 12/22/2021     Lab Results   Component Value Date    TRIG 150 01/03/2023    TRIG 141 07/13/2022    TRIG 140 12/22/2021     Lab Results   Component Value Date    HDL 52 01/03/2023    HDL 46 07/13/2022    HDL 42 12/22/2021     No components found for: LDLCALC  Lab Results   Component Value Date     (H) 01/03/2023    LDL 93 07/13/2022    LDL 71 12/22/2021       lovastatin 20 mg qhs         Blood Pressure Management:    Vitals:    01/20/23 1052   BP: 130/64   Pulse: 58   SpO2: 99%     Lab Results   Component Value Date    GLUCOSE 100 (H) 01/03/2023    CALCIUM 9.7 01/03/2023     01/03/2023    K 4.6 01/03/2023    CO2 25.0 01/03/2023     01/03/2023    BUN 24 (H) 01/03/2023    CREATININE 1.04 (H) 01/03/2023    EGFRIFNONA 46 (L) 12/22/2021    BCR 23.1 01/03/2023    ANIONGAP 11.0 01/03/2023       Per cardiology   I did stop hctz due to decline in renal function    I ruled out Conn's and Pheo     Amlodipine 10 mg daily -- decrease to 5 mg daily due to edema and added jardiance , this worked     Lisinopril 30   Started on trental for PAD            Preventive Care:      nonsmoker         Bone Health      Lab Results   Component Value Date    QWIJ92LM 49.7 01/03/2023    WYDH47HK 62.0 07/13/2022    NBEL32AU 53.8 12/22/2021       Lab Results   Component Value Date    CALCIUM 9.7 01/03/2023      vit D 1000 units daily - Continue     Order dxa due to hyperthyroidism - done in 12-16 and nl     calcium 600 mg w supper - Continue     Thyroid Health  Lab Results   Component Value  Date    TSH 3.010 01/03/2023     Subclinical hyperthyroidism, US multinodular goiter, subcm     12-16 small subcm thyroid nodule on the right - inferior     Indication for tx     TSH less than 0.1 and history of osteopenia     Not taking biotin before testing       Lab Results   Component Value Date    TSH 3.010 01/03/2023       We started methimazole and progressively decreased to half a tab every other day and still hypo ---- stop and now nl     Other Diabetes Related Aspects       Lab Results   Component Value Date    [id number] 497 01/03/2023        Dsyphagia  Restart prilosec           This document has been electronically signed by Nikolai Duncan MD on January 20, 2023 17:41 CST       no

## 2023-02-15 DIAGNOSIS — G62.9 NEUROPATHY: ICD-10-CM

## 2023-02-16 RX ORDER — GABAPENTIN 300 MG/1
CAPSULE ORAL
Qty: 90 CAPSULE | Refills: 5 | Status: SHIPPED | OUTPATIENT
Start: 2023-02-16

## 2023-03-20 ENCOUNTER — TELEPHONE (OUTPATIENT)
Dept: FAMILY MEDICINE CLINIC | Facility: CLINIC | Age: 78
End: 2023-03-20
Payer: MEDICARE

## 2023-03-21 RX ORDER — PENTOXIFYLLINE 400 MG/1
TABLET, EXTENDED RELEASE ORAL
Qty: 180 TABLET | Refills: 2 | Status: SHIPPED | OUTPATIENT
Start: 2023-03-21

## 2023-04-18 RX ORDER — AMLODIPINE BESYLATE 10 MG/1
TABLET ORAL
Qty: 90 TABLET | Refills: 3 | OUTPATIENT
Start: 2023-04-18

## 2023-05-09 ENCOUNTER — LAB (OUTPATIENT)
Dept: LAB | Facility: HOSPITAL | Age: 78
End: 2023-05-09
Payer: MEDICARE

## 2023-05-09 DIAGNOSIS — E06.9 THYROIDITIS: ICD-10-CM

## 2023-05-09 DIAGNOSIS — E11.21 TYPE 2 DIABETES WITH NEPHROPATHY: ICD-10-CM

## 2023-05-09 LAB
ALBUMIN SERPL-MCNC: 4.4 G/DL (ref 3.5–5.2)
ALBUMIN UR-MCNC: 4.3 MG/DL
ALBUMIN/GLOB SERPL: 1.4 G/DL
ALP SERPL-CCNC: 69 U/L (ref 39–117)
ALT SERPL W P-5'-P-CCNC: 12 U/L (ref 1–33)
ANION GAP SERPL CALCULATED.3IONS-SCNC: 12 MMOL/L (ref 5–15)
AST SERPL-CCNC: 18 U/L (ref 1–32)
BASOPHILS # BLD AUTO: 0.05 10*3/MM3 (ref 0–0.2)
BASOPHILS NFR BLD AUTO: 0.7 % (ref 0–1.5)
BILIRUB SERPL-MCNC: 0.3 MG/DL (ref 0–1.2)
BUN SERPL-MCNC: 27 MG/DL (ref 8–23)
BUN/CREAT SERPL: 22.9 (ref 7–25)
CALCIUM SPEC-SCNC: 9.5 MG/DL (ref 8.6–10.5)
CHLORIDE SERPL-SCNC: 106 MMOL/L (ref 98–107)
CHOLEST SERPL-MCNC: 174 MG/DL (ref 0–200)
CO2 SERPL-SCNC: 24 MMOL/L (ref 22–29)
CREAT SERPL-MCNC: 1.18 MG/DL (ref 0.57–1)
CREAT UR-MCNC: 130.5 MG/DL
DEPRECATED RDW RBC AUTO: 43.8 FL (ref 37–54)
EGFRCR SERPLBLD CKD-EPI 2021: 47.1 ML/MIN/1.73
EOSINOPHIL # BLD AUTO: 0.22 10*3/MM3 (ref 0–0.4)
EOSINOPHIL NFR BLD AUTO: 3 % (ref 0.3–6.2)
ERYTHROCYTE [DISTWIDTH] IN BLOOD BY AUTOMATED COUNT: 13.4 % (ref 12.3–15.4)
GLOBULIN UR ELPH-MCNC: 3.2 GM/DL
GLUCOSE SERPL-MCNC: 94 MG/DL (ref 65–99)
HBA1C MFR BLD: 5.7 % (ref 4.8–5.6)
HCT VFR BLD AUTO: 37.7 % (ref 34–46.6)
HDLC SERPL-MCNC: 46 MG/DL (ref 40–60)
HGB BLD-MCNC: 12.6 G/DL (ref 12–15.9)
IMM GRANULOCYTES # BLD AUTO: 0.05 10*3/MM3 (ref 0–0.05)
IMM GRANULOCYTES NFR BLD AUTO: 0.7 % (ref 0–0.5)
LDLC SERPL CALC-MCNC: 103 MG/DL (ref 0–100)
LDLC/HDLC SERPL: 2.16 {RATIO}
LYMPHOCYTES # BLD AUTO: 1.57 10*3/MM3 (ref 0.7–3.1)
LYMPHOCYTES NFR BLD AUTO: 21.7 % (ref 19.6–45.3)
MCH RBC QN AUTO: 29.8 PG (ref 26.6–33)
MCHC RBC AUTO-ENTMCNC: 33.4 G/DL (ref 31.5–35.7)
MCV RBC AUTO: 89.1 FL (ref 79–97)
MICROALBUMIN/CREAT UR: 33 MG/G
MONOCYTES # BLD AUTO: 0.4 10*3/MM3 (ref 0.1–0.9)
MONOCYTES NFR BLD AUTO: 5.5 % (ref 5–12)
NEUTROPHILS NFR BLD AUTO: 4.95 10*3/MM3 (ref 1.7–7)
NEUTROPHILS NFR BLD AUTO: 68.4 % (ref 42.7–76)
NRBC BLD AUTO-RTO: 0 /100 WBC (ref 0–0.2)
PLATELET # BLD AUTO: 293 10*3/MM3 (ref 140–450)
PMV BLD AUTO: 12.3 FL (ref 6–12)
POTASSIUM SERPL-SCNC: 4.4 MMOL/L (ref 3.5–5.2)
PROT SERPL-MCNC: 7.6 G/DL (ref 6–8.5)
RBC # BLD AUTO: 4.23 10*6/MM3 (ref 3.77–5.28)
SODIUM SERPL-SCNC: 142 MMOL/L (ref 136–145)
TRIGL SERPL-MCNC: 144 MG/DL (ref 0–150)
TSH SERPL DL<=0.05 MIU/L-ACNC: 2.87 UIU/ML (ref 0.27–4.2)
VIT B12 BLD-MCNC: 667 PG/ML (ref 211–946)
VLDLC SERPL-MCNC: 25 MG/DL (ref 5–40)
WBC NRBC COR # BLD: 7.24 10*3/MM3 (ref 3.4–10.8)

## 2023-05-09 PROCEDURE — 82607 VITAMIN B-12: CPT

## 2023-05-09 PROCEDURE — 84443 ASSAY THYROID STIM HORMONE: CPT

## 2023-05-09 PROCEDURE — 82043 UR ALBUMIN QUANTITATIVE: CPT

## 2023-05-09 PROCEDURE — 82570 ASSAY OF URINE CREATININE: CPT

## 2023-05-09 PROCEDURE — 80053 COMPREHEN METABOLIC PANEL: CPT

## 2023-05-09 PROCEDURE — 36415 COLL VENOUS BLD VENIPUNCTURE: CPT

## 2023-05-09 PROCEDURE — 85025 COMPLETE CBC W/AUTO DIFF WBC: CPT

## 2023-05-09 PROCEDURE — 80061 LIPID PANEL: CPT

## 2023-05-09 PROCEDURE — 83036 HEMOGLOBIN GLYCOSYLATED A1C: CPT

## 2023-05-23 ENCOUNTER — OFFICE VISIT (OUTPATIENT)
Dept: ENDOCRINOLOGY | Facility: CLINIC | Age: 78
End: 2023-05-23
Payer: MEDICARE

## 2023-05-23 VITALS
HEART RATE: 65 BPM | DIASTOLIC BLOOD PRESSURE: 60 MMHG | HEIGHT: 64 IN | SYSTOLIC BLOOD PRESSURE: 120 MMHG | BODY MASS INDEX: 25.78 KG/M2 | OXYGEN SATURATION: 98 % | WEIGHT: 151 LBS

## 2023-05-23 DIAGNOSIS — R60.0 LOCALIZED EDEMA: ICD-10-CM

## 2023-05-23 DIAGNOSIS — E11.21 TYPE 2 DIABETES WITH NEPHROPATHY: Primary | ICD-10-CM

## 2023-05-23 DIAGNOSIS — I10 RESISTANT HYPERTENSION: ICD-10-CM

## 2023-05-23 DIAGNOSIS — E78.2 MIXED DIABETIC HYPERLIPIDEMIA ASSOCIATED WITH TYPE 2 DIABETES MELLITUS: ICD-10-CM

## 2023-05-23 DIAGNOSIS — E11.69 MIXED DIABETIC HYPERLIPIDEMIA ASSOCIATED WITH TYPE 2 DIABETES MELLITUS: ICD-10-CM

## 2023-05-23 DIAGNOSIS — G62.9 NEUROPATHY: ICD-10-CM

## 2023-05-23 RX ORDER — LANOLIN ALCOHOL/MO/W.PET/CERES
50 CREAM (GRAM) TOPICAL DAILY
COMMUNITY

## 2023-05-23 NOTE — PROGRESS NOTES
" Thelma Haley is a 77 y.o. female who presents for  evaluation of T2DM      HPI   Hyperthyroidism     Duration since 2015    Appears to be Graves with right subcm thyroid nodule     Off methimazole and TSH remains nl     Also t2dm well controlled with nephropathy     Now Hypertension managed by cardiology         PE    /60   Pulse 65   Ht 162.6 cm (64\")   Wt 68.5 kg (151 lb)   LMP 02/06/1980 (Approximate)   SpO2 98%   BMI 25.92 kg/m²   AOx3  No visible goiter  Normal Respiratory Effort , Lung CTA  RRR  Positive Edema    Labs    Lab Results   Component Value Date    WBC 7.24 05/09/2023    HGB 12.6 05/09/2023    HCT 37.7 05/09/2023    MCV 89.1 05/09/2023     05/09/2023     Lab Results   Component Value Date    GLUCOSE 94 05/09/2023    CALCIUM 9.5 05/09/2023     05/09/2023    K 4.4 05/09/2023    CO2 24.0 05/09/2023     05/09/2023    BUN 27 (H) 05/09/2023    CREATININE 1.18 (H) 05/09/2023    EGFRIFNONA 46 (L) 12/22/2021    BCR 22.9 05/09/2023    ANIONGAP 12.0 05/09/2023       Lab Results   Component Value Date    EGFR 47.1 (L) 05/09/2023    EGFR 54.8 (L) 01/03/2023    EGFR 46.4 (L) 07/13/2022           Assessment & Plan      Diagnosis Plan   1. Type 2 diabetes with nephropathy        2. Mixed diabetic hyperlipidemia associated with type 2 diabetes mellitus        3. Resistant hypertension        4. Neuropathy            Glycemic Management:   Lab Results   Component Value Date    HGBA1C 5.70 (H) 05/09/2023    HGBA1C 5.90 (H) 01/03/2023    HGBA1C 6.00 (H) 07/13/2022       Metformin 500 mg bid alone is working    In the past janumet but too expensive    Never did trulicity due to fear   I give jardiance samples, monthly cost for her is 500 dollars, reason ckd .       Microvascular complications    Renal   Stage 3a   No proteinuria      Stopped naproxen before    Knee pain --- can't take tramadol , takes acetaminophen   She is on ACE I , and jardiance     Could add kerendia 10 mg daily " if cost allows         Has neuropathic , may take neurontin 100 mg po tid PRN increase to 300 mg tid   Needs to decrease only night , too much fatigue     Lipid Management  Lab Results   Component Value Date    CHOL 174 05/09/2023    CHOL 186 01/03/2023    CHOL 164 07/13/2022     Lab Results   Component Value Date    TRIG 144 05/09/2023    TRIG 150 01/03/2023    TRIG 141 07/13/2022     Lab Results   Component Value Date    HDL 46 05/09/2023    HDL 52 01/03/2023    HDL 46 07/13/2022     No components found for: LDLCALC  Lab Results   Component Value Date     (H) 05/09/2023     (H) 01/03/2023    LDL 93 07/13/2022       lovastatin 20 mg qhs         Blood Pressure Management:    Vitals:    05/23/23 0958   BP: 120/60   Pulse: 65   SpO2: 98%     Lab Results   Component Value Date    GLUCOSE 94 05/09/2023    CALCIUM 9.5 05/09/2023     05/09/2023    K 4.4 05/09/2023    CO2 24.0 05/09/2023     05/09/2023    BUN 27 (H) 05/09/2023    CREATININE 1.18 (H) 05/09/2023    EGFRIFNONA 46 (L) 12/22/2021    BCR 22.9 05/09/2023    ANIONGAP 12.0 05/09/2023       Per cardiology   I did stop hctz due to decline in renal function    I ruled out Conn's and Pheo     Amlodipine 10 mg daily -- decrease to 5 mg daily due to edema and added jardiance , this worked     Lisinopril 30 , has dry cough but prefers not to change  Started on trental for PAD            Preventive Care:      nonsmoker         Bone Health      Lab Results   Component Value Date    [id number] 49.7 01/03/2023    [id number] 62.0 07/13/2022    [id number] 53.8 12/22/2021       Lab Results   Component Value Date    CALCIUM 9.5 05/09/2023      vit D 1000 units daily - Continue     Order dxa due to hyperthyroidism - done in 12-16 and nl     calcium 600 mg w supper - Continue     Thyroid Health  Lab Results   Component Value Date    TSH 2.870 05/09/2023     Subclinical hyperthyroidism, US multinodular goiter, subcm     12-16 small subcm thyroid nodule on the  right - inferior     Indication for tx     TSH less than 0.1 and history of osteopenia     Not taking biotin before testing       Lab Results   Component Value Date    TSH 2.870 05/09/2023       We started methimazole and progressively decreased to half a tab every other day and still hypo ---- stop and now nl     Other Diabetes Related Aspects       Lab Results   Component Value Date    [id number] 667 05/09/2023        Dsyphagia  Restart prilosec           This document has been electronically signed by Nikolai Duncan MD on May 23, 2023 10:46 CDT

## 2023-06-12 RX ORDER — LISINOPRIL 30 MG/1
TABLET ORAL
Qty: 90 TABLET | Refills: 3 | Status: SHIPPED | OUTPATIENT
Start: 2023-06-12

## 2023-06-12 RX ORDER — AMLODIPINE BESYLATE 5 MG/1
TABLET ORAL
Qty: 90 TABLET | Refills: 3 | Status: SHIPPED | OUTPATIENT
Start: 2023-06-12

## 2023-06-14 ENCOUNTER — OFFICE VISIT (OUTPATIENT)
Dept: CARDIOLOGY | Facility: CLINIC | Age: 78
End: 2023-06-14
Payer: MEDICARE

## 2023-06-14 VITALS
HEIGHT: 64 IN | BODY MASS INDEX: 25.1 KG/M2 | SYSTOLIC BLOOD PRESSURE: 128 MMHG | DIASTOLIC BLOOD PRESSURE: 64 MMHG | HEART RATE: 77 BPM | OXYGEN SATURATION: 98 % | TEMPERATURE: 97.7 F | WEIGHT: 147 LBS

## 2023-06-14 DIAGNOSIS — E11.9 TYPE 2 DIABETES MELLITUS WITHOUT COMPLICATION, WITHOUT LONG-TERM CURRENT USE OF INSULIN: ICD-10-CM

## 2023-06-14 DIAGNOSIS — E78.2 MIXED HYPERLIPIDEMIA: ICD-10-CM

## 2023-06-14 DIAGNOSIS — I25.10 ATHEROSCLEROSIS OF NATIVE CORONARY ARTERY OF NATIVE HEART WITHOUT ANGINA PECTORIS: ICD-10-CM

## 2023-06-14 DIAGNOSIS — I10 ESSENTIAL HYPERTENSION: Primary | ICD-10-CM

## 2023-06-14 NOTE — PROGRESS NOTES
Thelma Haley  77 y.o. female    1. Essential hypertension    2. Mixed hyperlipidemia    3. Atherosclerosis of native coronary artery of native heart without angina pectoris    4. Type 2 diabetes mellitus without complication, without long-term current use of insulin        History of Present Illness:  Mrs. Haley is a 77-year-old  lady with hypertension, hyperlipidemia, coronary atherosclerosis and has history of fibromyalgia. She has been evaluated for chest pain in the past by both nuclear stress testing and CT angiogram of the coronary arteries (2015).  She is known to have less than 50% stenosis in the Left Anterior Descending Coronary Artery.    Carotid duplex study in December 2022 showed:    Right internal carotid artery stenosis of 0-49%.    Left internal carotid artery stenosis of 0-49%.     Echocardiogram on 11/2/2020 showed normal LV systolic function with an EF of 68% with grade 1A diastolic dysfunction.  Right ventricle was borderline dilated.  Mild mitral valve regurgitation and mild tricuspid valve regurgitation was present.    EKG today showed sinus rhythm with heart rate 70 bpm.  Left anterior fascicular block.  Poor R wave progression anterior leads.  Most likely due to lead location.    She has progressed reasonably well and denied any chest pain or shortness of breath.  She has been compliant with her medications.      Allergies   Allergen Reactions    Ciprofloxacin Nausea And Vomiting    Lortab [Hydrocodone-Acetaminophen] Nausea And Vomiting    Metronidazole Nausea And Vomiting    Oxycodone Nausea And Vomiting     Dizziness and doesn't decrease pain    Ultram [Tramadol Hcl] Nausea And Vomiting         Past Medical History:   Diagnosis Date    Allergic rhinitis due to pollen     Arthritis     Asthma      uncomplicated       Cataract     Chronic rhinitis     Diabetes mellitus     Disease of thyroid gland     Essential hypertension     Extrinsic asthma with status asthmaticus      Glaucoma     Hyperlipidemia     Impingement syndrome of right shoulder     Injury of back     L4 L5 bulging disc    Neuropathy     Nuclear senile cataract     Overweight with body mass index (BMI) 25.0-29.9     Primary fibromyalgia syndrome     Primary open angle glaucoma     Rosacea     Temporomandibular joint disorder          Past Surgical History:   Procedure Laterality Date    BACK SURGERY      CARPAL TUNNEL RELEASE      Bilateral carpal tunnel release.)   03/27/2000     CARPAL TUNNEL RELEASE Bilateral     CATARACT EXTRACTION      Bilateral    CERVICAL FUSION  1980    COLONOSCOPY      D & C HYSTEROSCOPY      OOPHORECTOMY      RIGHT OOPHORECTOMY      ROTATOR CUFF REPAIR      Right    SHOULDER ARTHROSCOPY Right 02/06/2017    Procedure: RIGHT SHOULDER ARTHROSCOPY SUBACROMIAL DECOMPRESSION, ROTATOR CUFF REPAIR   ;  Surgeon: Terrence Haines MD;  Location: [facility] [place];  Service:     SHOULDER SURGERY      Excision of 4.8 cm mass with primary intermediate closure.)   03/30/2012     SPINAL FUSION      L4 L5    TOTAL KNEE ARTHROPLASTY Right 11/13/2019    Procedure: RIGHT TOTAL KNEE ARTHROPLASTY  with adductor canal block;  Surgeon: Terrence Haines MD;  Location: [facility] [place];  Service: Orthopedics         Family History   Problem Relation Age of Onset    Hypertension Mother     COPD Mother     Hypertension Sister     Diabetes Maternal Aunt     Heart disease Other     Hypertension Other     Diabetes Half-Brother          Social History     Socioeconomic History    Marital status:    Tobacco Use    Smoking status: Never    Smokeless tobacco: Never   Substance and Sexual Activity    Alcohol use: No    Drug use: No    Sexual activity: Defer         Current Outpatient Medications   Medication Sig Dispense Refill    amLODIPine (NORVASC) 5 MG tablet TAKE 1 TABLET BY MOUTH EVERY DAY 90 tablet 3    B-D ULTRA-FINE 33 LANCETS misc Use 1 x daily , one touch ultra please 90 each 3    Biotin 57160 MCG tablet Take 1  "tablet by mouth Daily.      cephalexin (Keflex) 500 MG capsule Take 1 capsule by mouth 2 (Two) Times a Day. 14 capsule 0    cetirizine (zyrTEC) 10 MG tablet Take 1 tablet by mouth Daily.      cholecalciferol (Cholecalciferol) 25 MCG (1000 UT) tablet Take 1 tablet by mouth Daily.      dorzolamide-timolol (COSOPT) 22.3-6.8 MG/ML ophthalmic solution Administer 1 drop to both eyes 2 (Two) Times a Day.      empagliflozin (JARDIANCE) 10 MG tablet tablet Take 1 tablet by mouth Daily. 30 tablet 11    Ferrous Sulfate (IRON) 325 (65 Fe) MG tablet Take 1 tablet by mouth Daily.      gabapentin (NEURONTIN) 300 MG capsule TAKE 1 CAPSULE BY MOUTH THREE TIMES A DAY 90 capsule 5    lisinopril (PRINIVIL,ZESTRIL) 30 MG tablet TAKE 1 TABLET BY MOUTH EVERY DAY 90 tablet 3    lovastatin (MEVACOR) 20 MG tablet TAKE 1 TABLET BY MOUTH AT BEDTIME 90 tablet 3    metFORMIN ER (GLUCOPHAGE-XR) 500 MG 24 hr tablet TAKE 1 TABLET BY MOUTH TWICE A DAY WITH MEALS 90 tablet 3    omeprazole (priLOSEC) 20 MG capsule TAKE 1 CAPSULE BY MOUTH EVERY DAY 90 capsule 3    OneTouch Ultra test strip USE TO TEST BLOOD SUGAR TWICE A DAY. ICD E11.9 100 each 5    pentoxifylline (TRENtal) 400 MG CR tablet TAKE 1 TABLET BY MOUTH TWICE A DAY WITH MEALS 180 tablet 2    Polyethyl Glyc-Propyl Glyc PF (SYSTANE PF) 0.4-0.3 % solution ophthalmic solution 3 (Three) Times a Day.      vitamin B-12 (CYANOCOBALAMIN) 500 MCG tablet Take 1 tablet by mouth Daily.      vitamin B-6 (PYRIDOXINE) 50 MG tablet Take 1 tablet by mouth Daily.       No current facility-administered medications for this visit.         OBJECTIVE    /64 (BP Location: Left arm, Patient Position: Sitting, Cuff Size: Adult)   Pulse 77   Temp 97.7 °F (36.5 °C)   Ht 162.6 cm (64\")   Wt 66.7 kg (147 lb)   LMP 02/06/1980 (Approximate)   SpO2 98%   BMI 25.23 kg/m²         Review of Systems : The following systems were reviewed and changes noted as indicated below    Constitutional:  Denies recent weight " loss, weight gain, fever or chills, no change in exercise tolerance     HENT:  Denies any hearing loss, epistaxis, hoarseness, or difficulty speaking.     Eyes: Wears eyeglasses or contact lenses     Respiratory: No dyspnea with exertion,no cough, wheezing, or hemoptysis.     Cardiovascular: Negative for palpations, chest pain, orthopnea, PND.  Has occasional dizziness.  No syncopal episodes    Gastrointestinal:  Denies change in bowel habits, dyspepsia, ulcer disease, hematochezia, or melena.     Endocrine: Negative for cold intolerance, heat intolerance, polydipsia, polyphagia and polyuria. Diabetes Mellitus, hyperlipidemia    Genitourinary: Negative.      Musculoskeletal: DJD, Fibromyalgia.      Neurological:  Denies any history of recurrent headaches, strokes, TIA, or seizure disorder.     Hematological: Denies any food allergies, seasonal allergies, bleeding disorders, or lymphadenopathy.     Psychiatric/Behavioral: Denies any history of depression, substance abuse, or change in cognitive function.     Physical Exam : The following systems were reassessed and changes noted as indicated below    Constitutional: Cooperative, alert and oriented,  in no acute distress.     HENT:   Head: Normocephalic, normal hair patterns, no masses or tenderness.  Ears, Nose, and Throat: No gross abnormalities. No pallor or cyanosis.   Eyes: EOMS intact, PERRL, conjunctivae and lids unremarkable. Fundoscopic exam and visual fields not performed.   Neck: No palpable masses or adenopathy, no thyromegaly, no JVD, carotid pulses are full. ?  Faint left-sided carotid bruit    Cardiovascular: Regular rhythm, S1 and S2 normal, no S3 or S4.  1-2/6 systolic murmur, no gallops, or rubs detected.     Pulmonary/Chest: Chest: normal symmetry,  normal respiratory excursion, no intercostal retraction, no use of accessory muscles.            Pulmonary: Normal breath sounds. No rales or ronchi.    Abdominal: Abdomen soft, bowel sounds normoactive,  no masses, no hepatosplenomegaly, non-tender, no bruits.     Musculoskeletal: No deformities, clubbing, cyanosis, erythema, or edema observed.  Reticular veins noted in upper part of the legs on both sides.     Neurological: No gross motor or sensory deficits noted, affect appropriate, oriented to time, person, place.     Skin: Warm and dry to the touch, no apparent skin lesions or masses noted.     Psychiatric: She has a normal mood and affect. Her behavior is normal. Judgment and thought content normal.         Procedures      Lab Results   Component Value Date    WBC 7.24 05/09/2023    HGB 12.6 05/09/2023    HCT 37.7 05/09/2023    MCV 89.1 05/09/2023     05/09/2023     Lab Results   Component Value Date    GLUCOSE 94 05/09/2023    BUN 27 (H) 05/09/2023    CREATININE 1.18 (H) 05/09/2023    EGFRIFNONA 46 (L) 12/22/2021    BCR 22.9 05/09/2023    CO2 24.0 05/09/2023    CALCIUM 9.5 05/09/2023    ALBUMIN 4.4 05/09/2023    AST 18 05/09/2023    ALT 12 05/09/2023     Lab Results   Component Value Date    CHOL 174 05/09/2023    CHOL 186 01/03/2023    CHOL 164 07/13/2022     Lab Results   Component Value Date    TRIG 144 05/09/2023    TRIG 150 01/03/2023    TRIG 141 07/13/2022     Lab Results   Component Value Date    HDL 46 05/09/2023    HDL 52 01/03/2023    HDL 46 07/13/2022     No components found for: LDLCALC  Lab Results   Component Value Date     (H) 05/09/2023     (H) 01/03/2023    LDL 93 07/13/2022     No results found for: HDLLDLRATIO  No components found for: CHOLHDL  Lab Results   Component Value Date    HGBA1C 5.70 (H) 05/09/2023     Lab Results   Component Value Date    TSH 2.870 05/09/2023    THYROIDAB <6 02/18/2019           ASSESSMENT AND PLAN  Mrs. Haley is stable with regards to her heart with no clinical evidence of angina or congestive heart failure.  She has been compliant with her medications.    Her labs from May 2023 were reviewed and LDL was 103 and HDL 46.  Hemoglobin A1c was  5.7.  Thyroid function studies were within normal limits.  CBC was within normal limits and CMP showed a BUN of 27 creatinine 1.18.    I have continued her current antihypertensive therapy with amlodipine, lisinopril and lipid-lowering therapy with lovastatin.  She has been advised to maintain a high fluid intake.      Diagnoses and all orders for this visit:    1. Essential hypertension (Primary)    2. Mixed hyperlipidemia    3. Atherosclerosis of native coronary artery of native heart without angina pectoris    4. Type 2 diabetes mellitus without complication, without long-term current use of insulin        Patient's Body mass index is 25.23 kg/m².  BMI within normal limits  Patient is a non-smoker.    Manju Ramirez MD  6/14/2023  08:58 CDT

## 2023-08-01 ENCOUNTER — OFFICE VISIT (OUTPATIENT)
Dept: ORTHOPEDIC SURGERY | Facility: CLINIC | Age: 78
End: 2023-08-01
Payer: MEDICARE

## 2023-08-01 VITALS — BODY MASS INDEX: 24.21 KG/M2 | HEIGHT: 64 IN | WEIGHT: 141.8 LBS

## 2023-08-01 DIAGNOSIS — Z96.651 HISTORY OF TOTAL KNEE ARTHROPLASTY, RIGHT: ICD-10-CM

## 2023-08-01 DIAGNOSIS — G89.29 CHRONIC PAIN OF RIGHT KNEE: Primary | ICD-10-CM

## 2023-08-01 DIAGNOSIS — M25.561 CHRONIC PAIN OF RIGHT KNEE: Primary | ICD-10-CM

## 2023-08-01 DIAGNOSIS — M17.12 PRIMARY OSTEOARTHRITIS OF LEFT KNEE: ICD-10-CM

## 2023-08-01 DIAGNOSIS — G89.29 CHRONIC PAIN OF LEFT KNEE: ICD-10-CM

## 2023-08-01 DIAGNOSIS — M25.562 CHRONIC PAIN OF LEFT KNEE: ICD-10-CM

## 2023-08-01 RX ORDER — ONDANSETRON 4 MG/1
TABLET, ORALLY DISINTEGRATING ORAL
COMMUNITY
Start: 2023-06-23

## 2023-08-21 ENCOUNTER — TELEPHONE (OUTPATIENT)
Dept: ENDOCRINOLOGY | Facility: CLINIC | Age: 78
End: 2023-08-21
Payer: MEDICARE

## 2023-08-21 DIAGNOSIS — G62.9 NEUROPATHY: ICD-10-CM

## 2023-08-21 RX ORDER — GABAPENTIN 300 MG/1
CAPSULE ORAL
Qty: 90 CAPSULE | Refills: 5 | Status: SHIPPED | OUTPATIENT
Start: 2023-08-21

## 2023-08-21 NOTE — TELEPHONE ENCOUNTER
MsJanice Tera called again concerning the samples of Jardiance.  She will be at the clinic tomorrow and would like to come by and pick it up if possible.    Please call pt to let her know if you have any samples to  please.    Pt call back [phone]

## 2023-08-21 NOTE — TELEPHONE ENCOUNTER
Pt called to see if we have any samples of Jardiance. She said she has to come to the clinic tomorrow and wants to know if she can pick them up tomorrow if we have any.    Please advise.    Thanks

## 2023-08-25 ENCOUNTER — CLINICAL SUPPORT (OUTPATIENT)
Dept: ORTHOPEDIC SURGERY | Facility: CLINIC | Age: 78
End: 2023-08-25
Payer: MEDICARE

## 2023-08-25 ENCOUNTER — OFFICE VISIT (OUTPATIENT)
Dept: FAMILY MEDICINE CLINIC | Facility: CLINIC | Age: 78
End: 2023-08-25
Payer: MEDICARE

## 2023-08-25 VITALS — BODY MASS INDEX: 23.9 KG/M2 | HEIGHT: 64 IN | WEIGHT: 140 LBS

## 2023-08-25 VITALS
HEIGHT: 64 IN | SYSTOLIC BLOOD PRESSURE: 118 MMHG | DIASTOLIC BLOOD PRESSURE: 64 MMHG | BODY MASS INDEX: 23.9 KG/M2 | HEART RATE: 73 BPM | OXYGEN SATURATION: 96 % | WEIGHT: 140 LBS

## 2023-08-25 DIAGNOSIS — E11.42 TYPE 2 DIABETES MELLITUS WITH DIABETIC POLYNEUROPATHY, WITHOUT LONG-TERM CURRENT USE OF INSULIN: ICD-10-CM

## 2023-08-25 DIAGNOSIS — M17.12 PRIMARY OSTEOARTHRITIS OF LEFT KNEE: Primary | ICD-10-CM

## 2023-08-25 DIAGNOSIS — M25.562 CHRONIC PAIN OF LEFT KNEE: ICD-10-CM

## 2023-08-25 DIAGNOSIS — M17.12 PRIMARY OSTEOARTHRITIS OF LEFT KNEE: ICD-10-CM

## 2023-08-25 DIAGNOSIS — I10 PRIMARY HYPERTENSION: Primary | ICD-10-CM

## 2023-08-25 DIAGNOSIS — G89.29 CHRONIC PAIN OF LEFT KNEE: ICD-10-CM

## 2023-08-25 PROCEDURE — 3074F SYST BP LT 130 MM HG: CPT | Performed by: FAMILY MEDICINE

## 2023-08-25 PROCEDURE — 99213 OFFICE O/P EST LOW 20 MIN: CPT | Performed by: FAMILY MEDICINE

## 2023-08-25 PROCEDURE — 3078F DIAST BP <80 MM HG: CPT | Performed by: FAMILY MEDICINE

## 2023-08-25 NOTE — PROGRESS NOTES
Chief Complaint  Hypertension    Subjective    History of Present Illness {  Problem List  Visit  Diagnosis   Encounters  Notes  Medications  Labs  Result Review Imaging  Media :23}     Thelma Haley presents to [facility] for     Chief Complaint   Patient presents with    Hypertension      Patient seen today for follow up.  Has chronic medical problems including hypertension, type 2 diabetes mellitus with peripheral neuropathy, allergies, history of iron deficiency and history of vitamin D deficiency.  Has been doing ok since last office visit.  Follows with endocrinology for management type 2 diabetes and associated neuropathy.  Blood pressure has been controlled, she is adherent to medication regimen.  Has seen orthopedic surgery for chronic bilateral knee pain.       Current Outpatient Medications:     amLODIPine (NORVASC) 5 MG tablet, TAKE 1 TABLET BY MOUTH EVERY DAY, Disp: 90 tablet, Rfl: 3    B-D ULTRA-FINE 33 LANCETS misc, Use 1 x daily , one touch ultra please, Disp: 90 each, Rfl: 3    Biotin 51291 MCG tablet, Take 1 tablet by mouth Daily., Disp: , Rfl:     cetirizine (zyrTEC) 10 MG tablet, Take 1 tablet by mouth Daily., Disp: , Rfl:     cholecalciferol (Cholecalciferol) 25 MCG (1000 UT) tablet, Take 1 tablet by mouth Daily., Disp: , Rfl:     dorzolamide-timolol (COSOPT) 22.3-6.8 MG/ML ophthalmic solution, Administer 1 drop to both eyes 2 (Two) Times a Day., Disp: , Rfl:     empagliflozin (JARDIANCE) 10 MG tablet tablet, Take 1 tablet by mouth Daily., Disp: 30 tablet, Rfl: 11    Ferrous Sulfate (IRON) 325 (65 Fe) MG tablet, Take 1 tablet by mouth Daily., Disp: , Rfl:     gabapentin (NEURONTIN) 300 MG capsule, TAKE 1 CAPSULE BY MOUTH THREE TIMES A DAY, Disp: 90 capsule, Rfl: 5    lisinopril (PRINIVIL,ZESTRIL) 30 MG tablet, TAKE 1 TABLET BY MOUTH EVERY DAY, Disp: 90 tablet, Rfl: 3    lovastatin (MEVACOR) 20 MG tablet, TAKE 1 TABLET BY  "MOUTH AT BEDTIME, Disp: 90 tablet, Rfl: 3    metFORMIN ER (GLUCOPHAGE-XR) 500 MG 24 hr tablet, TAKE 1 TABLET BY MOUTH TWICE A DAY WITH MEALS, Disp: 180 tablet, Rfl: 3    omeprazole (priLOSEC) 20 MG capsule, TAKE 1 CAPSULE BY MOUTH EVERY DAY, Disp: 90 capsule, Rfl: 3    ondansetron ODT (ZOFRAN-ODT) 4 MG disintegrating tablet, PLACE 1 TABLET ON THE TONGUE AND ALLOW TO DISSOLVE 3 TIMES A DAY 10 DAYS, Disp: , Rfl:     OneTouch Ultra test strip, USE TO TEST BLOOD SUGAR TWICE A DAY. ICD E11.9, Disp: 100 each, Rfl: 5    pentoxifylline (TRENtal) 400 MG CR tablet, TAKE 1 TABLET BY MOUTH TWICE A DAY WITH MEALS, Disp: 180 tablet, Rfl: 2    Polyethyl Glyc-Propyl Glyc PF (SYSTANE PF) 0.4-0.3 % solution ophthalmic solution, 3 (Three) Times a Day., Disp: , Rfl:     vitamin B-12 (CYANOCOBALAMIN) 500 MCG tablet, Take 1 tablet by mouth Daily., Disp: , Rfl:     vitamin B-6 (PYRIDOXINE) 50 MG tablet, Take 1 tablet by mouth Daily., Disp: , Rfl:      Objective       Vital Signs:   /64   Pulse 73   Ht 162.6 cm (64\")   Wt 63.5 kg (140 lb)   SpO2 96%   BMI 24.03 kg/mý     Physical Exam  Vitals reviewed.   Constitutional:       General: She is not in acute distress.     Appearance: She is well-developed.   Cardiovascular:      Rate and Rhythm: Normal rate and regular rhythm.      Heart sounds: Normal heart sounds. No murmur heard.  Pulmonary:      Effort: Pulmonary effort is normal. No respiratory distress.      Breath sounds: Normal breath sounds. No wheezing or rales.   Skin:     General: Skin is warm and dry.      Findings: No rash.   Neurological:      Mental Status: She is alert and oriented to person, place, and time.      Result Review :{ Labs  Result Review  Imaging  Med Tab  Media :23}   The following data was reviewed by: Joyce Palmer MD on 08/25/2023    Common labs          5/9/2023    10:14   Common Labs   Glucose 94    BUN 27    Creatinine 1.18    Sodium 142    Potassium 4.4    Chloride 106    Calcium 9.5  "   Albumin 4.4    Total Bilirubin 0.3    Alkaline Phosphatase 69    AST (SGOT) 18    ALT (SGPT) 12    WBC 7.24    Hemoglobin 12.6    Hematocrit 37.7    Platelets 293    Total Cholesterol 174    Triglycerides 144    HDL Cholesterol 46    LDL Cholesterol  103    Hemoglobin A1C 5.70    Microalbumin, Urine 4.3              Assessment and Plan {CC Problem List  Visit Diagnosis  ROS  Review (Popup)  Health Maintenance  Quality  BestPractice  Medications  SmartSets  SnapShot Encounters  Media :23}   Diagnoses and all orders for this visit:    1. Primary hypertension (Primary)    2. Type 2 diabetes mellitus with diabetic polyneuropathy, without long-term current use of insulin    3. Primary osteoarthritis of left knee       Patient seen today for follow up  Hypertension controlled, continue current medications  Type 2 diabetes well controlled, continue following with endocrinology  On gabapentin for neuropathy  Osteoarthritis left knee - following with orthopedic surgery, recent corticosteroid injection      Follow Up {Instructions Charge Capture  Follow-up Communications :23}   Return in about 6 months (around 2/25/2024) for Medicare Wellness, Recheck.  Patient was given instructions and counseling regarding her condition or for health maintenance advice. Please see specific information pulled into the AVS if appropriate.            This document has been electronically signed by Joyce Palmer MD

## 2023-08-25 NOTE — PROGRESS NOTES
"Knee Joint Injection      Patient: Thelma Haley    YOB: 1945    Chief Complaint   Patient presents with    Left Knee - Follow-up, Pain    Injections     Synvisc one.        History of Present Illness: This 77-year-old female patient presents today for a Visco gel injection to the left knee for her osteoarthritic pain.  This patient was previously seen on 8/1/2023 for reevaluation of bilateral knee pain.  The patient has a history of a right total knee and OA in the left knee.  The patient requested a gel injection for her osteoarthritis.  Denies new injury.  Patient has no new complaints.  Denies numbness and tingling.  Denies fever and chills.      Physical Exam: 77 y.o. female  General Appearance:    Alert, cooperative, in no acute distress                   Vitals:    08/25/23 0938   Weight: 63.5 kg (140 lb)   Height: 162.6 cm (64\")        Patient is alert and oriented, x3 no acute distress.  Affect is normal respiratory rate is normal unlabored.  Exam and complaints are unchanged.    Procedure:  Large Joint Arthrocentesis: L knee  Date/Time: 8/25/2023 9:38 AM  Consent given by: patient  Site marked: site marked  Timeout: Immediately prior to procedure a time out was called to verify the correct patient, procedure, equipment, support staff and site/side marked as required   Supporting Documentation  Indications: pain   Procedure Details  Location: knee - L knee  Preparation: Patient was prepped and draped in the usual sterile fashion  Needle size: 20 G  Approach: anteromedial  Medications administered: 48 mg hylan 48 MG/6ML  Patient tolerance: patient tolerated the procedure well with no immediate complications        Assessment:    Diagnoses and all orders for this visit:    Primary osteoarthritis of left knee  -     Large Joint Arthrocentesis: L knee    Chronic pain of left knee  -     Large Joint Arthrocentesis: L knee        Plan:  Reviewed the risk and benefits of the Visco gel injection. "  Patient verbalized understanding wished proceed with the injection.  After consent was obtained, using sterile technique, the patient was prepped and lidocaine 1% was used for local anesthetic.  The joint was entered and 2 mL of lidocaine administered.  Patient tolerated well.  Reentered the joint space with the gel injection.  Patient tolerated well with no immediate reaction.  Recommended to rest over the next couple of days.  Recommended to ice, elevate and reduce weightbearing activity.  Suggested to use a cane as needed.  Slowly increase activity as tolerated.  Discussed importance of leg strengthening and general conditioning.  Discussed warning signs of injection.  Discussed that purpose of injections was symptom improvement and improved activity.  Also discussed that further treatment options depended on symptoms at followup and length of time of improvement after injections.    Recommended follow-up in 6 weeks for reevaluation.  May follow-up sooner as needed if symptoms change, worsen or for new complaint.    Return in about 6 weeks (around 10/6/2023) for Recheck.    JUAN Hodges    This document has been electronically signed by JUAN Hodges on August 25, 2023 13:53 CDT      EMR Dragon/Transciption Disclaimer: Some of this note may be an electronic transcription/translation of spoken language to printed text using the Dragon Dictation System.

## 2023-09-05 ENCOUNTER — LAB (OUTPATIENT)
Dept: LAB | Facility: HOSPITAL | Age: 78
End: 2023-09-05
Payer: MEDICARE

## 2023-09-05 DIAGNOSIS — G62.9 NEUROPATHY: ICD-10-CM

## 2023-09-05 DIAGNOSIS — E78.2 MIXED DIABETIC HYPERLIPIDEMIA ASSOCIATED WITH TYPE 2 DIABETES MELLITUS: ICD-10-CM

## 2023-09-05 DIAGNOSIS — I10 RESISTANT HYPERTENSION: ICD-10-CM

## 2023-09-05 DIAGNOSIS — E11.69 MIXED DIABETIC HYPERLIPIDEMIA ASSOCIATED WITH TYPE 2 DIABETES MELLITUS: ICD-10-CM

## 2023-09-05 DIAGNOSIS — R60.0 LOCALIZED EDEMA: ICD-10-CM

## 2023-09-05 DIAGNOSIS — E11.21 TYPE 2 DIABETES WITH NEPHROPATHY: ICD-10-CM

## 2023-09-05 LAB
ALBUMIN UR-MCNC: 4.2 MG/DL
ANION GAP SERPL CALCULATED.3IONS-SCNC: 11 MMOL/L (ref 5–15)
BUN SERPL-MCNC: 24 MG/DL (ref 8–23)
BUN/CREAT SERPL: 22.9 (ref 7–25)
CALCIUM SPEC-SCNC: 9.6 MG/DL (ref 8.6–10.5)
CHLORIDE SERPL-SCNC: 108 MMOL/L (ref 98–107)
CHOLEST SERPL-MCNC: 161 MG/DL (ref 0–200)
CO2 SERPL-SCNC: 23 MMOL/L (ref 22–29)
CREAT SERPL-MCNC: 1.05 MG/DL (ref 0.57–1)
CREAT UR-MCNC: 169.5 MG/DL
EGFRCR SERPLBLD CKD-EPI 2021: 53.8 ML/MIN/1.73
GLUCOSE SERPL-MCNC: 90 MG/DL (ref 65–99)
HBA1C MFR BLD: 5.9 % (ref 4.8–5.6)
HDLC SERPL-MCNC: 43 MG/DL (ref 40–60)
LDLC SERPL CALC-MCNC: 88 MG/DL (ref 0–100)
LDLC/HDLC SERPL: 1.93 {RATIO}
MICROALBUMIN/CREAT UR: 24.8 MG/G
POTASSIUM SERPL-SCNC: 4.5 MMOL/L (ref 3.5–5.2)
SODIUM SERPL-SCNC: 142 MMOL/L (ref 136–145)
TRIGL SERPL-MCNC: 174 MG/DL (ref 0–150)
TSH SERPL DL<=0.05 MIU/L-ACNC: 2.01 UIU/ML (ref 0.27–4.2)
VIT B12 BLD-MCNC: 381 PG/ML (ref 211–946)
VLDLC SERPL-MCNC: 30 MG/DL (ref 5–40)

## 2023-09-05 PROCEDURE — 83036 HEMOGLOBIN GLYCOSYLATED A1C: CPT

## 2023-09-05 PROCEDURE — 84443 ASSAY THYROID STIM HORMONE: CPT

## 2023-09-05 PROCEDURE — 82607 VITAMIN B-12: CPT

## 2023-09-05 PROCEDURE — 82043 UR ALBUMIN QUANTITATIVE: CPT

## 2023-09-05 PROCEDURE — 80048 BASIC METABOLIC PNL TOTAL CA: CPT

## 2023-09-05 PROCEDURE — 80061 LIPID PANEL: CPT

## 2023-09-05 PROCEDURE — 82570 ASSAY OF URINE CREATININE: CPT

## 2023-09-05 PROCEDURE — 36415 COLL VENOUS BLD VENIPUNCTURE: CPT

## 2024-11-14 NOTE — DISCHARGE INSTRUCTIONS
Return immediately to the emergency room for any worsening of symptoms    
Patient/Caregiver provided printed discharge information.

## (undated) DEVICE — GLV SURG SENSICARE PI LF PF 7.5 GRN STRL

## (undated) DEVICE — SPNG GZ WOVN 4X4IN 12PLY 10/BX STRL

## (undated) DEVICE — BLD SHAVER RESECTOR SERR AGGR 5MM 13CM

## (undated) DEVICE — CANN TWST IN 7CM 8.25MM ID

## (undated) DEVICE — SOL IRR NACL 0.9PCT BT 1000ML

## (undated) DEVICE — CANN TWST IN TRPL DAM 7CM 8.25MM

## (undated) DEVICE — SUT ETHIB 0 CT1 CR8 18IN CX21D

## (undated) DEVICE — SUT ETHLN 3-0 FS118IN 663H

## (undated) DEVICE — SUT ETHIB 0/0 CT1 30IN X424H

## (undated) DEVICE — DRSNG GZ CURAD XEROFORM NONADHS 5X9IN STRL

## (undated) DEVICE — APPL CHLORAPREP W/TINT 26ML ORNG

## (undated) DEVICE — SOL IRR H2O BG 1000ML STRL

## (undated) DEVICE — GOWN,AURORA,NOREINF,RAGLAN,XL,STERILE: Brand: MEDLINE

## (undated) DEVICE — PK KN TOTL LF 60

## (undated) DEVICE — PAD,ABDOMINAL,8"X10",ST,LF: Brand: MEDLINE

## (undated) DEVICE — STRYKER PERFORMANCE SERIES SAGITTAL BLADE: Brand: STRYKER PERFORMANCE SERIES

## (undated) DEVICE — SPNG DRN AMD EXCILON 6PLY 4X4IN PK/2

## (undated) DEVICE — NO-SCRATCH ™ SMALL WHITNEY CURETTE ™ IS A SINGLE-USE, PLASTIC CURETTE FOR QUICKLY APPLYING, MANIPULATING AND REMOVING BONE CEMENT DURING HIP AND KNEE REPLACEMENT SURGERY. THE PLASTIC IS SOFTER THAN STEEL INSTRUMENTS, REDUCING THE RISK OF DAMAGING THE PROSTHESIS WITH METAL INSTRUMENTS.  THE CURETTE’S 6MM TIP REMOVES EXCESS CEMENT FROM REPLACEMENT HIPS AND KNEES. EASY-TO-MANEUVER, THE SMALL BLUE CURETTE LETS YOU REMOVE CEMENT FROM ALL EDGES OF THE PROSTHESIS.NO-SCRATCH WHITNEY SMALL CURETTE FEATURES:SAFER THAN STEEL- MADE OF PLASTIC - STURDY YET SOFTER THAN SURGICAL STEEL.HANDIER- EACH TOOL HAS A MOLDED-IN THUMB INDENTATION INSTANTLY ORIENTING THE TOOL.- EASIER TO MANEUVER IN HARD TO SEE PLACES.- COLOR-CODED FOR EASY IDENTIFICATION.FASTER- COMES INDIVIDUALLY PACKAGED IN STERILE, PEEL OPEN POUCH, READY TO GO.- APPLIES, MANIPULATES, OR REMOVES CEMENT WITH FINGERTIP PRECISION.ECONOMICAL- THE COST OF A SINGLE REVISION DWARFS THE COST OF A SINGLE-USE CURETTE. - DISPOSABLE – THERE’S NO NEED TO WASTE TIME REMOVING HARDENED CEMENT OR RE-STERILIZING TOOLS.- LESS EXPENSIVE TO BUY AND INVENTORY - ORDER ONLY THE TOOL YOU USE.- PACKAGED 25 INDIVIDUALLY WRAPPED TOOLS TO A CARTON FOR CONVENIENT SHELF STORAGE.: Brand: WHITNEY NO-SCRATCH CURETTE (SMALL)

## (undated) DEVICE — GLV SURG TRIUMPH LT PF LTX 8 STRL

## (undated) DEVICE — TBG PUMP ARTHSCP MAIN AR6400 16FT

## (undated) DEVICE — GLV SURG TRIUMPH LT PF LTX 7 STRL

## (undated) DEVICE — RECIPROCATING BLADE, DOUBLE SIDED, OFFSET  (70.0 X 1.0 X 12.5MM)

## (undated) DEVICE — SOL IRR NACL 0.9PCT 3000ML

## (undated) DEVICE — GOWN,PREVENTION PLUS,XLONG/XLARGE,STRL: Brand: MEDLINE

## (undated) DEVICE — HOOD, PEEL-AWAY: Brand: FLYTE

## (undated) DEVICE — TP ELAS ELASTIKON ADHS 4IN 2.5YD

## (undated) DEVICE — PK SHLDR ARTHSCP 60

## (undated) DEVICE — Device

## (undated) DEVICE — SUT VIC 0 CT1 36IN J946H

## (undated) DEVICE — DRSNG GZ CURAD XEROFORM NONADHR OVERWRAP 5X9IN

## (undated) DEVICE — GLV SURG SENSICARE PI PF LF 7 GRN STRL

## (undated) DEVICE — GLV SURG TRIUMPH PF LTX 7 STRL

## (undated) DEVICE — CLTH CLENS READYCLEANSE PERI CARE PK/5

## (undated) DEVICE — CATHETER,URETHRAL,REDRUBBER,STRL,16FR: Brand: MEDLINE

## (undated) DEVICE — SOL IRR LACT RNG BG 3000ML

## (undated) DEVICE — ABL OPES COOLCUT ASPIR 3MM 90D

## (undated) DEVICE — GLV SURG SENSICARE GREEN W/ALOE PF LF 8.5 STRL

## (undated) DEVICE — STPLR SKIN VISISTAT WD 35CT

## (undated) DEVICE — DRSNG WND BORDR/ADHS NONADHR/GZ LF 4X10IN STRL

## (undated) DEVICE — SPNG LAP 18X18IN LF STRL PK/5

## (undated) DEVICE — KT DRN EVAC WND PVC PCH WTROC RND 10F400

## (undated) DEVICE — GLV SURG SENSICARE POLYISPRN W/ALOE PF LF 6.5 GRN STRL

## (undated) DEVICE — TRAY,FOLEY INSERTION,PVP,10ML SYRINGE: Brand: MEDLINE

## (undated) DEVICE — ANTIBACTERIAL UNDYED BRAIDED (POLYGLACTIN 910), SYNTHETIC ABSORBABLE SUTURE: Brand: COATED VICRYL

## (undated) DEVICE — TP NDL SCORPION SUREFIRE SD SLOT

## (undated) DEVICE — DISPOSABLE TOURNIQUET CUFF SINGLE BLADDER, DUAL PORT AND QUICK CONNECT CONNECTOR: Brand: COLOR CUFF

## (undated) DEVICE — STERILE POLYISOPRENE POWDER-FREE SURGICAL GLOVES WITH EMOLLIENT COATING: Brand: PROTEXIS

## (undated) DEVICE — LIGHT HANDLE: Brand: DEVON

## (undated) DEVICE — GLV SURG SENSICARE ALOE LF PF SZ7.5 GRN